# Patient Record
Sex: MALE | Race: WHITE | NOT HISPANIC OR LATINO | Employment: FULL TIME | URBAN - NONMETROPOLITAN AREA
[De-identification: names, ages, dates, MRNs, and addresses within clinical notes are randomized per-mention and may not be internally consistent; named-entity substitution may affect disease eponyms.]

---

## 2017-03-23 ENCOUNTER — APPOINTMENT (OUTPATIENT)
Dept: CT IMAGING | Facility: HOSPITAL | Age: 72
End: 2017-03-23

## 2017-03-23 ENCOUNTER — HOSPITAL ENCOUNTER (EMERGENCY)
Facility: HOSPITAL | Age: 72
Discharge: HOME | End: 2017-03-23
Attending: EMERGENCY MEDICINE | Admitting: EMERGENCY MEDICINE

## 2017-03-23 VITALS
DIASTOLIC BLOOD PRESSURE: 89 MMHG | HEART RATE: 99 BPM | SYSTOLIC BLOOD PRESSURE: 148 MMHG | OXYGEN SATURATION: 97 % | BODY MASS INDEX: 28.85 KG/M2 | HEIGHT: 64 IN | RESPIRATION RATE: 18 BRPM | TEMPERATURE: 98 F | WEIGHT: 169 LBS

## 2017-03-23 DIAGNOSIS — G43.109 OCULAR MIGRAINE: Primary | ICD-10-CM

## 2017-03-23 LAB
A-A DO2: 30.7 MMHG (ref 0–300)
ALBUMIN SERPL-MCNC: 3.7 G/DL (ref 3.4–4.8)
ALBUMIN/GLOB SERPL: 1 G/DL (ref 1.5–2.5)
ALP SERPL-CCNC: 93 U/L (ref 40–129)
ALT SERPL W P-5'-P-CCNC: 21 U/L (ref 10–44)
ANION GAP SERPL CALCULATED.3IONS-SCNC: 6.2 MMOL/L (ref 3.6–11.2)
ARTERIAL PATENCY WRIST A: ABNORMAL
AST SERPL-CCNC: 33 U/L (ref 10–34)
ATMOSPHERIC PRESS: 735 MMHG
BASE EXCESS BLDA CALC-SCNC: 3.5 MMOL/L
BASOPHILS # BLD AUTO: 0.03 10*3/MM3 (ref 0–0.3)
BASOPHILS NFR BLD AUTO: 0.5 % (ref 0–2)
BDY SITE: ABNORMAL
BILIRUB SERPL-MCNC: 0.6 MG/DL (ref 0.2–1.8)
BILIRUB UR QL STRIP: NEGATIVE
BODY TEMPERATURE: 98.6 C
BUN BLD-MCNC: 19 MG/DL (ref 7–21)
BUN/CREAT SERPL: 18.4 (ref 7–25)
CALCIUM SPEC-SCNC: 9 MG/DL (ref 7.7–10)
CHLORIDE SERPL-SCNC: 103 MMOL/L (ref 99–112)
CLARITY UR: CLEAR
CO2 SERPL-SCNC: 28.8 MMOL/L (ref 24.3–31.9)
COHGB MFR BLD: 2.3 % (ref 0–5)
COLOR UR: YELLOW
CREAT BLD-MCNC: 1.03 MG/DL (ref 0.43–1.29)
DEPRECATED RDW RBC AUTO: 40.2 FL (ref 37–54)
EOSINOPHIL # BLD AUTO: 0.14 10*3/MM3 (ref 0–0.7)
EOSINOPHIL NFR BLD AUTO: 2.3 % (ref 0–7)
ERYTHROCYTE [DISTWIDTH] IN BLOOD BY AUTOMATED COUNT: 12.6 % (ref 11.5–14.5)
GFR SERPL CREATININE-BSD FRML MDRD: 71 ML/MIN/1.73
GLOBULIN UR ELPH-MCNC: 3.6 GM/DL
GLUCOSE BLD-MCNC: 97 MG/DL (ref 70–110)
GLUCOSE UR STRIP-MCNC: NEGATIVE MG/DL
HCO3 BLDA-SCNC: 27.7 MMOL/L (ref 22–26)
HCT VFR BLD AUTO: 40.9 % (ref 42–52)
HCT VFR BLD CALC: 42 % (ref 42–52)
HGB BLD-MCNC: 13.4 G/DL (ref 14–18)
HGB BLDA-MCNC: 14.4 G/DL (ref 12–16)
HGB UR QL STRIP.AUTO: NEGATIVE
HOROWITZ INDEX BLD+IHG-RTO: 21 %
IMM GRANULOCYTES # BLD: 0.01 10*3/MM3 (ref 0–0.03)
IMM GRANULOCYTES NFR BLD: 0.2 % (ref 0–0.5)
KETONES UR QL STRIP: NEGATIVE
LEUKOCYTE ESTERASE UR QL STRIP.AUTO: NEGATIVE
LYMPHOCYTES # BLD AUTO: 1.52 10*3/MM3 (ref 1–3)
LYMPHOCYTES NFR BLD AUTO: 24.5 % (ref 16–46)
MCH RBC QN AUTO: 29.3 PG (ref 27–33)
MCHC RBC AUTO-ENTMCNC: 32.8 G/DL (ref 33–37)
MCV RBC AUTO: 89.3 FL (ref 80–94)
METHGB BLD QL: 0.4 % (ref 0–3)
MODALITY: ABNORMAL
MONOCYTES # BLD AUTO: 0.65 10*3/MM3 (ref 0.1–0.9)
MONOCYTES NFR BLD AUTO: 10.5 % (ref 0–12)
NEUTROPHILS # BLD AUTO: 3.85 10*3/MM3 (ref 1.4–6.5)
NEUTROPHILS NFR BLD AUTO: 62 % (ref 40–75)
NITRITE UR QL STRIP: NEGATIVE
OSMOLALITY SERPL CALC.SUM OF ELEC: 277.9 MOSM/KG (ref 273–305)
OXYHGB MFR BLDV: 91.2 % (ref 85–100)
PCO2 BLDA: 40.4 MM HG (ref 35–45)
PH BLDA: 7.45 PH UNITS (ref 7.35–7.45)
PH UR STRIP.AUTO: 6.5 [PH] (ref 5–8)
PLATELET # BLD AUTO: 202 10*3/MM3 (ref 130–400)
PMV BLD AUTO: 10.6 FL (ref 6–10)
PO2 BLDA: 65.4 MM HG (ref 80–100)
POTASSIUM BLD-SCNC: 3.5 MMOL/L (ref 3.5–5.3)
PROT SERPL-MCNC: 7.3 G/DL (ref 6–8)
PROT UR QL STRIP: NEGATIVE
RBC # BLD AUTO: 4.58 10*6/MM3 (ref 4.7–6.1)
SAO2 % BLDCOA: 93.7 % (ref 90–100)
SODIUM BLD-SCNC: 138 MMOL/L (ref 135–153)
SP GR UR STRIP: 1.02 (ref 1–1.03)
UROBILINOGEN UR QL STRIP: NORMAL
WBC NRBC COR # BLD: 6.2 10*3/MM3 (ref 4.5–12.5)

## 2017-03-23 PROCEDURE — 36600 WITHDRAWAL OF ARTERIAL BLOOD: CPT | Performed by: EMERGENCY MEDICINE

## 2017-03-23 PROCEDURE — 82805 BLOOD GASES W/O2 SATURATION: CPT | Performed by: EMERGENCY MEDICINE

## 2017-03-23 PROCEDURE — 81003 URINALYSIS AUTO W/O SCOPE: CPT | Performed by: EMERGENCY MEDICINE

## 2017-03-23 PROCEDURE — 80053 COMPREHEN METABOLIC PANEL: CPT | Performed by: EMERGENCY MEDICINE

## 2017-03-23 PROCEDURE — 99284 EMERGENCY DEPT VISIT MOD MDM: CPT

## 2017-03-23 PROCEDURE — 85025 COMPLETE CBC W/AUTO DIFF WBC: CPT | Performed by: EMERGENCY MEDICINE

## 2017-03-23 PROCEDURE — 93005 ELECTROCARDIOGRAM TRACING: CPT | Performed by: EMERGENCY MEDICINE

## 2017-03-23 PROCEDURE — 82375 ASSAY CARBOXYHB QUANT: CPT | Performed by: EMERGENCY MEDICINE

## 2017-03-23 PROCEDURE — 70450 CT HEAD/BRAIN W/O DYE: CPT

## 2017-03-23 PROCEDURE — 70450 CT HEAD/BRAIN W/O DYE: CPT | Performed by: RADIOLOGY

## 2017-03-23 PROCEDURE — 93010 ELECTROCARDIOGRAM REPORT: CPT | Performed by: INTERNAL MEDICINE

## 2017-03-23 PROCEDURE — 83050 HGB METHEMOGLOBIN QUAN: CPT | Performed by: EMERGENCY MEDICINE

## 2017-03-23 RX ORDER — NAPROXEN 500 MG/1
500 TABLET ORAL 2 TIMES DAILY PRN
Qty: 20 TABLET | Refills: 0 | Status: SHIPPED | OUTPATIENT
Start: 2017-03-23 | End: 2021-01-01

## 2017-03-23 RX ORDER — OMEPRAZOLE 40 MG/1
40 CAPSULE, DELAYED RELEASE ORAL DAILY
COMMUNITY

## 2017-03-23 RX ORDER — LOSARTAN POTASSIUM AND HYDROCHLOROTHIAZIDE 25; 100 MG/1; MG/1
1 TABLET ORAL DAILY
COMMUNITY
End: 2021-01-01

## 2017-03-23 NOTE — ED PROVIDER NOTES
"Subjective   History of Present Illness  72-year-old white male here today with complaint of vision change and speech deficit.  Patient states that he was at work today when he had abrupt onset of speech deficit which she describes as \"not being able to say what he wanted to say.  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  He states that he could see somewhat what he was doing, but not completely, and this was worse in the right eye.  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  He states that he also has severe headache primarily on the right side which seems to start after the other symptoms.  His symptoms currently have resolved.  He had no associated chest pain, shortness of breath, nausea, vomiting, numbness, weakness, tingling, or other symptoms.  He states he has had these symptoms for years intermittently-more than 45 years.  When he was a teenager use to have severe headaches.  Review of Systems   All other systems reviewed and are negative.      Past Medical History:   Diagnosis Date   • Diabetes mellitus     states border diabetic, is not on meds for it   • Hypertension        Allergies   Allergen Reactions   • Codeine        Past Surgical History:   Procedure Laterality Date   • APPENDECTOMY     • CATARACT EXTRACTION     • HERNIA REPAIR         History reviewed. No pertinent family history.    Social History     Social History   • Marital status:      Spouse name: N/A   • Number of children: N/A   • Years of education: N/A     Social History Main Topics   • Smoking status: Former Smoker   • Smokeless tobacco: None   • Alcohol use No   • Drug use: No   • Sexual activity: Defer     Other Topics Concern   • None     Social History Narrative   • None           Objective   Physical Exam   Constitutional: He is oriented to person, place, and time. He appears well-developed and well-nourished.   HENT:   Head: Normocephalic and atraumatic.   Cardiovascular: Normal " rate, regular rhythm and normal heart sounds.  Exam reveals no gallop and no friction rub.    No murmur heard.  Pulmonary/Chest: Effort normal and breath sounds normal.   Abdominal: Soft. Bowel sounds are normal. He exhibits no distension. There is no tenderness.   Musculoskeletal: Normal range of motion. He exhibits edema.   Neurological: He is alert and oriented to person, place, and time. He has normal strength. No cranial nerve deficit or sensory deficit.   Skin: Skin is warm and dry.   Psychiatric: He has a normal mood and affect.   Nursing note and vitals reviewed.      Procedures         ED Course  ED Course   Value Comment By Time   ECG 12 Lead Normal sinus rhythm with occasional PVCs..  Rate of 80.  The right bundle branch block.  Small Q waves in lead aVF.  No acute ischemic changes. Pan Barrera MD 03/23 1704    Workup here today unremarkable.  Given his current symptoms as well as past history, he most likely has ocular migraines.  I discussed with Dr. Vargas who will see patient in follow-up as an outpatient.  He recommends NSAIDs for abortive therapy at this time. Pan Barrera MD 03/23 1710                  MDM  Number of Diagnoses or Management Options  Ocular migraine:      Amount and/or Complexity of Data Reviewed  Clinical lab tests: reviewed and ordered  Tests in the radiology section of CPT®: reviewed and ordered    Risk of Complications, Morbidity, and/or Mortality  Presenting problems: high  Diagnostic procedures: high  Management options: high        Final diagnoses:   Ocular migraine            Pan Barrera MD  03/23/17 3598

## 2017-03-23 NOTE — ED NOTES
Pt requesting something to eat, dr walker agreed, dietary notified     Oliva Griffin, RN  03/23/17 9837

## 2017-03-23 NOTE — ED NOTES
Patient unable to provide a urine sample at this time. Collection materials at patient bedside, will check back soon.      Sharonda Kerr  03/23/17 2758

## 2017-05-05 ENCOUNTER — TRANSCRIBE ORDERS (OUTPATIENT)
Dept: ADMINISTRATIVE | Facility: HOSPITAL | Age: 72
End: 2017-05-05

## 2017-05-05 ENCOUNTER — HOSPITAL ENCOUNTER (OUTPATIENT)
Dept: GENERAL RADIOLOGY | Facility: HOSPITAL | Age: 72
Discharge: HOME | End: 2017-05-05
Attending: PSYCHIATRY & NEUROLOGY | Admitting: PSYCHIATRY & NEUROLOGY

## 2017-05-05 DIAGNOSIS — H53.9 VISUAL CHANGES: ICD-10-CM

## 2017-05-05 DIAGNOSIS — R51.9 HEADACHE, UNSPECIFIED HEADACHE TYPE: Primary | ICD-10-CM

## 2017-05-05 DIAGNOSIS — R51.9 HEADACHE, UNSPECIFIED HEADACHE TYPE: ICD-10-CM

## 2017-05-05 PROCEDURE — 72050 X-RAY EXAM NECK SPINE 4/5VWS: CPT | Performed by: RADIOLOGY

## 2017-05-05 PROCEDURE — 72050 X-RAY EXAM NECK SPINE 4/5VWS: CPT

## 2017-05-15 ENCOUNTER — HOSPITAL ENCOUNTER (OUTPATIENT)
Dept: MRI IMAGING | Facility: HOSPITAL | Age: 72
Discharge: HOME | End: 2017-05-15
Attending: PSYCHIATRY & NEUROLOGY

## 2017-05-15 ENCOUNTER — HOSPITAL ENCOUNTER (OUTPATIENT)
Dept: CARDIOLOGY | Facility: HOSPITAL | Age: 72
Discharge: HOME | End: 2017-05-15
Attending: PSYCHIATRY & NEUROLOGY | Admitting: PSYCHIATRY & NEUROLOGY

## 2017-05-15 DIAGNOSIS — H53.9 VISUAL CHANGES: ICD-10-CM

## 2017-05-15 DIAGNOSIS — R51.9 HEADACHE, UNSPECIFIED HEADACHE TYPE: ICD-10-CM

## 2017-05-15 PROCEDURE — 70551 MRI BRAIN STEM W/O DYE: CPT

## 2017-05-15 PROCEDURE — 93880 EXTRACRANIAL BILAT STUDY: CPT

## 2017-05-15 PROCEDURE — 93880 EXTRACRANIAL BILAT STUDY: CPT | Performed by: RADIOLOGY

## 2017-05-15 PROCEDURE — 70551 MRI BRAIN STEM W/O DYE: CPT | Performed by: RADIOLOGY

## 2017-06-13 ENCOUNTER — HOSPITAL ENCOUNTER (EMERGENCY)
Facility: HOSPITAL | Age: 72
Discharge: LEFT BEFORE DISCHARGE | End: 2017-06-13
Attending: EMERGENCY MEDICINE

## 2017-06-13 VITALS
WEIGHT: 163 LBS | HEART RATE: 88 BPM | RESPIRATION RATE: 16 BRPM | BODY MASS INDEX: 26.2 KG/M2 | OXYGEN SATURATION: 97 % | TEMPERATURE: 97.5 F | HEIGHT: 66 IN | DIASTOLIC BLOOD PRESSURE: 86 MMHG | SYSTOLIC BLOOD PRESSURE: 174 MMHG

## 2017-06-13 PROCEDURE — 99211 OFF/OP EST MAY X REQ PHY/QHP: CPT

## 2017-06-14 ENCOUNTER — HOSPITAL ENCOUNTER (EMERGENCY)
Facility: HOSPITAL | Age: 72
Discharge: HOME | End: 2017-06-14
Attending: EMERGENCY MEDICINE | Admitting: EMERGENCY MEDICINE

## 2017-06-14 VITALS
BODY MASS INDEX: 26.2 KG/M2 | WEIGHT: 163 LBS | SYSTOLIC BLOOD PRESSURE: 172 MMHG | HEART RATE: 82 BPM | DIASTOLIC BLOOD PRESSURE: 96 MMHG | OXYGEN SATURATION: 98 % | HEIGHT: 66 IN | RESPIRATION RATE: 16 BRPM | TEMPERATURE: 98 F

## 2017-06-14 DIAGNOSIS — G44.209 TENSION HEADACHE: Primary | ICD-10-CM

## 2017-06-14 DIAGNOSIS — I10 ESSENTIAL HYPERTENSION: ICD-10-CM

## 2017-06-14 LAB
ANION GAP SERPL CALCULATED.3IONS-SCNC: 5.3 MMOL/L (ref 3.6–11.2)
BASOPHILS # BLD AUTO: 0.02 10*3/MM3 (ref 0–0.3)
BASOPHILS NFR BLD AUTO: 0.2 % (ref 0–2)
BUN BLD-MCNC: 18 MG/DL (ref 7–21)
BUN/CREAT SERPL: 18.4 (ref 7–25)
CALCIUM SPEC-SCNC: 9.6 MG/DL (ref 7.7–10)
CHLORIDE SERPL-SCNC: 98 MMOL/L (ref 99–112)
CO2 SERPL-SCNC: 31.7 MMOL/L (ref 24.3–31.9)
CREAT BLD-MCNC: 0.98 MG/DL (ref 0.43–1.29)
DEPRECATED RDW RBC AUTO: 39.2 FL (ref 37–54)
EOSINOPHIL # BLD AUTO: 0.15 10*3/MM3 (ref 0–0.7)
EOSINOPHIL NFR BLD AUTO: 1.8 % (ref 0–7)
ERYTHROCYTE [DISTWIDTH] IN BLOOD BY AUTOMATED COUNT: 12.2 % (ref 11.5–14.5)
GFR SERPL CREATININE-BSD FRML MDRD: 75 ML/MIN/1.73
GLUCOSE BLD-MCNC: 122 MG/DL (ref 70–110)
HCT VFR BLD AUTO: 42.1 % (ref 42–52)
HGB BLD-MCNC: 14.1 G/DL (ref 14–18)
IMM GRANULOCYTES # BLD: 0.01 10*3/MM3 (ref 0–0.03)
IMM GRANULOCYTES NFR BLD: 0.1 % (ref 0–0.5)
LYMPHOCYTES # BLD AUTO: 1.43 10*3/MM3 (ref 1–3)
LYMPHOCYTES NFR BLD AUTO: 17 % (ref 16–46)
MCH RBC QN AUTO: 29.7 PG (ref 27–33)
MCHC RBC AUTO-ENTMCNC: 33.5 G/DL (ref 33–37)
MCV RBC AUTO: 88.6 FL (ref 80–94)
MONOCYTES # BLD AUTO: 0.64 10*3/MM3 (ref 0.1–0.9)
MONOCYTES NFR BLD AUTO: 7.6 % (ref 0–12)
NEUTROPHILS # BLD AUTO: 6.14 10*3/MM3 (ref 1.4–6.5)
NEUTROPHILS NFR BLD AUTO: 73.3 % (ref 40–75)
OSMOLALITY SERPL CALC.SUM OF ELEC: 273.3 MOSM/KG (ref 273–305)
PLATELET # BLD AUTO: 186 10*3/MM3 (ref 130–400)
PMV BLD AUTO: 10.9 FL (ref 6–10)
POTASSIUM BLD-SCNC: 3.6 MMOL/L (ref 3.5–5.3)
RBC # BLD AUTO: 4.75 10*6/MM3 (ref 4.7–6.1)
SODIUM BLD-SCNC: 135 MMOL/L (ref 135–153)
WBC NRBC COR # BLD: 8.39 10*3/MM3 (ref 4.5–12.5)

## 2017-06-14 PROCEDURE — 80048 BASIC METABOLIC PNL TOTAL CA: CPT | Performed by: EMERGENCY MEDICINE

## 2017-06-14 PROCEDURE — 99283 EMERGENCY DEPT VISIT LOW MDM: CPT

## 2017-06-14 PROCEDURE — 96374 THER/PROPH/DIAG INJ IV PUSH: CPT

## 2017-06-14 PROCEDURE — 25010000002 KETOROLAC TROMETHAMINE PER 15 MG: Performed by: EMERGENCY MEDICINE

## 2017-06-14 PROCEDURE — 96372 THER/PROPH/DIAG INJ SC/IM: CPT

## 2017-06-14 PROCEDURE — 96375 TX/PRO/DX INJ NEW DRUG ADDON: CPT

## 2017-06-14 PROCEDURE — 85025 COMPLETE CBC W/AUTO DIFF WBC: CPT | Performed by: EMERGENCY MEDICINE

## 2017-06-14 PROCEDURE — 25010000002 PROCHLORPERAZINE EDISYLATE PER 10 MG: Performed by: EMERGENCY MEDICINE

## 2017-06-14 PROCEDURE — 96361 HYDRATE IV INFUSION ADD-ON: CPT

## 2017-06-14 RX ORDER — KETOROLAC TROMETHAMINE 30 MG/ML
30 INJECTION, SOLUTION INTRAMUSCULAR; INTRAVENOUS ONCE
Status: COMPLETED | OUTPATIENT
Start: 2017-06-14 | End: 2017-06-14

## 2017-06-14 RX ORDER — SUMATRIPTAN 6 MG/.5ML
6 INJECTION, SOLUTION SUBCUTANEOUS ONCE
Status: COMPLETED | OUTPATIENT
Start: 2017-06-14 | End: 2017-06-14

## 2017-06-14 RX ORDER — SODIUM CHLORIDE 0.9 % (FLUSH) 0.9 %
10 SYRINGE (ML) INJECTION AS NEEDED
Status: DISCONTINUED | OUTPATIENT
Start: 2017-06-14 | End: 2017-06-14 | Stop reason: HOSPADM

## 2017-06-14 RX ADMIN — SODIUM CHLORIDE 500 ML: 9 INJECTION, SOLUTION INTRAVENOUS at 09:51

## 2017-06-14 RX ADMIN — PROCHLORPERAZINE EDISYLATE 10 MG: 5 INJECTION INTRAMUSCULAR; INTRAVENOUS at 09:56

## 2017-06-14 RX ADMIN — KETOROLAC TROMETHAMINE 30 MG: 30 INJECTION, SOLUTION INTRAMUSCULAR at 09:54

## 2017-06-14 RX ADMIN — SUMATRIPTAN 6 MG: 6 INJECTION, SOLUTION SUBCUTANEOUS at 09:51

## 2017-06-14 NOTE — ED PROVIDER NOTES
Subjective   HPI Comments: Dr. Vargas suggested chronic headaches were not migraines, but mini-strokes.  Recent MRI shows chronic small vessel disease no evidence of ischemia.    Patient is a 72 y.o. male presenting with headaches.   History provided by:  Patient and spouse   used: No    Headache   Pain location:  L temporal  Quality:  Sharp  Radiates to:  Does not radiate  Severity currently:  10/10  Severity at highest:  10/10  Onset quality:  Sudden  Duration:  1 day  Timing:  Constant  Progression:  Unchanged  Chronicity:  Recurrent  Similar to prior headaches: yes    Relieved by:  Nothing  Worsened by:  Nothing  Ineffective treatments:  Acetaminophen  Associated symptoms: nausea    Associated symptoms: no abdominal pain and no fever        Review of Systems   Constitutional: Negative.  Negative for fever.   Respiratory: Negative.    Cardiovascular: Negative.  Negative for chest pain.   Gastrointestinal: Positive for nausea. Negative for abdominal pain.   Endocrine: Negative.    Genitourinary: Negative.  Negative for dysuria.   Skin: Negative.    Neurological: Positive for headaches.   Psychiatric/Behavioral: Positive for decreased concentration.   All other systems reviewed and are negative.      Past Medical History:   Diagnosis Date   • Diabetes mellitus     states border diabetic, is not on meds for it   • Hypertension        Allergies   Allergen Reactions   • Codeine        Past Surgical History:   Procedure Laterality Date   • APPENDECTOMY     • CATARACT EXTRACTION     • HERNIA REPAIR         History reviewed. No pertinent family history.    Social History     Social History   • Marital status:      Spouse name: N/A   • Number of children: N/A   • Years of education: N/A     Social History Main Topics   • Smoking status: Former Smoker   • Smokeless tobacco: None   • Alcohol use No   • Drug use: No   • Sexual activity: Defer     Other Topics Concern   • None     Social History  Narrative         Objective   Physical Exam   Constitutional: He is oriented to person, place, and time. He appears well-developed and well-nourished. No distress.   HENT:   Head: Normocephalic and atraumatic.   Right Ear: External ear normal.   Left Ear: External ear normal.   Nose: Nose normal.   Eyes: Conjunctivae and EOM are normal. Pupils are equal, round, and reactive to light.   Neck: Normal range of motion. Neck supple. No JVD present. No tracheal deviation present.   Cardiovascular: Normal rate, regular rhythm and normal heart sounds.    No murmur heard.  Pulmonary/Chest: Effort normal and breath sounds normal. No respiratory distress. He has no wheezes.   Abdominal: Soft. Bowel sounds are normal. There is no tenderness.   Musculoskeletal: Normal range of motion. He exhibits no edema or deformity.   Neurological: He is alert and oriented to person, place, and time. No cranial nerve deficit.   Skin: Skin is warm and dry. No rash noted. He is not diaphoretic. No erythema. No pallor.   Psychiatric: He has a normal mood and affect. His behavior is normal. Thought content normal.   Nursing note and vitals reviewed.      Procedures         LAB RESULTS  Lab Results (last 24 hours)     Procedure Component Value Units Date/Time    CBC & Differential [[id number]] Collected:  06/14/17 0958    Specimen:  Blood Updated:  06/14/17 1025    Narrative:       The following orders were created for panel order CBC & Differential.  Procedure                               Abnormality         Status                     ---------                               -----------         ------                     CBC Auto Differential[[id number]]         Abnormal            Final result                 Please view results for these tests on the individual orders.    Basic Metabolic Panel [[id number]]  (Abnormal) Collected:  06/14/17 0958    Specimen:  Blood from Arm, Left Updated:  06/14/17 1027     Glucose 122 (H) mg/dL      BUN 18 mg/dL       Creatinine 0.98 mg/dL      Sodium 135 mmol/L      Potassium 3.6 mmol/L      Chloride 98 (L) mmol/L      CO2 31.7 mmol/L      Calcium 9.6 mg/dL      eGFR Non African Amer 75 mL/min/1.73      BUN/Creatinine Ratio 18.4     Anion Gap 5.3 mmol/L     Narrative:       The MDRD GFR formula is only valid for adults with stable renal function between ages 18 and 70.    CBC Auto Differential [[id number]]  (Abnormal) Collected:  06/14/17 0958    Specimen:  Blood from Arm, Left Updated:  06/14/17 1025     WBC 8.39 10*3/mm3      RBC 4.75 10*6/mm3      Hemoglobin 14.1 g/dL      Hematocrit 42.1 %      MCV 88.6 fL      MCH 29.7 pg      MCHC 33.5 g/dL      RDW 12.2 %      RDW-SD 39.2 fl      MPV 10.9 (H) fL      Platelets 186 10*3/mm3      Neutrophil % 73.3 %      Lymphocyte % 17.0 %      Monocyte % 7.6 %      Eosinophil % 1.8 %      Basophil % 0.2 %      Immature Grans % 0.1 %      Neutrophils, Absolute 6.14 10*3/mm3      Lymphocytes, Absolute 1.43 10*3/mm3      Monocytes, Absolute 0.64 10*3/mm3      Eosinophils, Absolute 0.15 10*3/mm3      Basophils, Absolute 0.02 10*3/mm3      Immature Grans, Absolute 0.01 10*3/mm3           I ordered the above labs and reviewed the results        ED Course  ED Course       Headache much improved              MDM  Number of Diagnoses or Management Options  Tension headache: established and improving     Amount and/or Complexity of Data Reviewed  Clinical lab tests: ordered    Risk of Complications, Morbidity, and/or Mortality  Presenting problems: high  Diagnostic procedures: high  Management options: high        Final diagnoses:   Tension headache   Essential hypertension       Documentation assistance provided by agueda Calderon.  Information recorded by the agueda was done at my direction and has been verified and validated by me.     Morro Calderon  06/14/17 0929       Morro Calderon  06/14/17 1039       Tyson Calderon MD  06/14/17 3672

## 2018-07-06 ENCOUNTER — TRANSCRIBE ORDERS (OUTPATIENT)
Dept: ADMINISTRATIVE | Facility: HOSPITAL | Age: 73
End: 2018-07-06

## 2018-07-06 DIAGNOSIS — Z13.6 ENCOUNTER FOR SCREENING FOR CARDIOVASCULAR DISORDERS: Primary | ICD-10-CM

## 2019-01-24 ENCOUNTER — HOSPITAL ENCOUNTER (OUTPATIENT)
Dept: ULTRASOUND IMAGING | Facility: HOSPITAL | Age: 74
Discharge: HOME | End: 2019-01-24
Admitting: NURSE PRACTITIONER

## 2019-01-24 DIAGNOSIS — Z13.6 ENCOUNTER FOR SCREENING FOR CARDIOVASCULAR DISORDERS: ICD-10-CM

## 2019-01-24 PROCEDURE — 76706 US ABDL AORTA SCREEN AAA: CPT | Performed by: RADIOLOGY

## 2019-01-24 PROCEDURE — 76706 US ABDL AORTA SCREEN AAA: CPT

## 2019-02-12 ENCOUNTER — TRANSCRIBE ORDERS (OUTPATIENT)
Dept: ADMINISTRATIVE | Facility: HOSPITAL | Age: 74
End: 2019-02-12

## 2019-02-12 DIAGNOSIS — Z13.6 SCREENING FOR AAA (AORTIC ABDOMINAL ANEURYSM): Primary | ICD-10-CM

## 2019-08-02 ENCOUNTER — TRANSCRIBE ORDERS (OUTPATIENT)
Dept: ADMINISTRATIVE | Facility: HOSPITAL | Age: 74
End: 2019-08-02

## 2019-08-02 DIAGNOSIS — Z13.6 SCREENING FOR ISCHEMIC HEART DISEASE: Primary | ICD-10-CM

## 2021-01-01 ENCOUNTER — APPOINTMENT (OUTPATIENT)
Dept: GENERAL RADIOLOGY | Facility: HOSPITAL | Age: 76
End: 2021-01-01

## 2021-01-01 ENCOUNTER — APPOINTMENT (OUTPATIENT)
Dept: ULTRASOUND IMAGING | Facility: HOSPITAL | Age: 76
End: 2021-01-01

## 2021-01-01 ENCOUNTER — APPOINTMENT (OUTPATIENT)
Dept: CT IMAGING | Facility: HOSPITAL | Age: 76
End: 2021-01-01

## 2021-01-01 ENCOUNTER — HOSPITAL ENCOUNTER (INPATIENT)
Facility: HOSPITAL | Age: 76
LOS: 18 days | End: 2021-09-20
Attending: STUDENT IN AN ORGANIZED HEALTH CARE EDUCATION/TRAINING PROGRAM | Admitting: INTERNAL MEDICINE

## 2021-01-01 ENCOUNTER — APPOINTMENT (OUTPATIENT)
Dept: CARDIOLOGY | Facility: HOSPITAL | Age: 76
End: 2021-01-01

## 2021-01-01 ENCOUNTER — APPOINTMENT (OUTPATIENT)
Dept: NUCLEAR MEDICINE | Facility: HOSPITAL | Age: 76
End: 2021-01-01

## 2021-01-01 VITALS
WEIGHT: 170.2 LBS | RESPIRATION RATE: 24 BRPM | SYSTOLIC BLOOD PRESSURE: 85 MMHG | DIASTOLIC BLOOD PRESSURE: 67 MMHG | HEIGHT: 66 IN | OXYGEN SATURATION: 60 % | BODY MASS INDEX: 27.35 KG/M2 | HEART RATE: 64 BPM | TEMPERATURE: 97.9 F

## 2021-01-01 DIAGNOSIS — J12.82 PNEUMONIA DUE TO COVID-19 VIRUS: Primary | ICD-10-CM

## 2021-01-01 DIAGNOSIS — Z23 IMMUNIZATION DUE: ICD-10-CM

## 2021-01-01 DIAGNOSIS — J96.01 ACUTE RESPIRATORY FAILURE WITH HYPOXIA (HCC): ICD-10-CM

## 2021-01-01 DIAGNOSIS — R09.02 HYPOXIA: ICD-10-CM

## 2021-01-01 DIAGNOSIS — U07.1 PNEUMONIA DUE TO COVID-19 VIRUS: Primary | ICD-10-CM

## 2021-01-01 LAB
A-A DO2: 100 MMHG (ref 0–300)
A-A DO2: 117.8 MMHG (ref 0–300)
A-A DO2: 144.3 MMHG (ref 0–300)
A-A DO2: 213.5 MMHG (ref 0–300)
A-A DO2: 223.4 MMHG (ref 0–300)
A-A DO2: 226.1 MMHG (ref 0–300)
A-A DO2: 244.4 MMHG (ref 0–300)
A-A DO2: 245.3 MMHG (ref 0–300)
A-A DO2: 274.7 MMHG (ref 0–300)
A-A DO2: 297.6 MMHG (ref 0–300)
A-A DO2: 299 MMHG (ref 0–300)
A-A DO2: 309.2 MMHG (ref 0–300)
A-A DO2: 314.1 MMHG (ref 0–300)
A-A DO2: 344.5 MMHG (ref 0–300)
A-A DO2: 358 MMHG (ref 0–300)
A-A DO2: 368.7 MMHG (ref 0–300)
A-A DO2: 441.4 MMHG (ref 0–300)
A-A DO2: 47.4 MMHG (ref 0–300)
A-A DO2: 502.5 MMHG (ref 0–300)
A-A DO2: 504.4 MMHG (ref 0–300)
A-A DO2: 505.1 MMHG (ref 0–300)
A-A DO2: 521.4 MMHG (ref 0–300)
A-A DO2: 551 MMHG (ref 0–300)
A-A DO2: 552.7 MMHG (ref 0–300)
A-A DO2: 565 MMHG (ref 0–300)
A-A DO2: 568.2 MMHG (ref 0–300)
A-A DO2: 573.5 MMHG (ref 0–300)
A-A DO2: 577.1 MMHG (ref 0–300)
A-A DO2: 577.7 MMHG (ref 0–300)
A-A DO2: 577.7 MMHG (ref 0–300)
A-A DO2: 581.8 MMHG (ref 0–300)
A-A DO2: 584.3 MMHG (ref 0–300)
A-A DO2: 585.9 MMHG (ref 0–300)
A-A DO2: 596.7 MMHG (ref 0–300)
ALBUMIN SERPL-MCNC: 1.74 G/DL (ref 3.5–5.2)
ALBUMIN SERPL-MCNC: 1.78 G/DL (ref 3.5–5.2)
ALBUMIN SERPL-MCNC: 1.85 G/DL (ref 3.5–5.2)
ALBUMIN SERPL-MCNC: 1.88 G/DL (ref 3.5–5.2)
ALBUMIN SERPL-MCNC: 1.91 G/DL (ref 3.5–5.2)
ALBUMIN SERPL-MCNC: 1.92 G/DL (ref 3.5–5.2)
ALBUMIN SERPL-MCNC: 1.96 G/DL (ref 3.5–5.2)
ALBUMIN SERPL-MCNC: 1.98 G/DL (ref 3.5–5.2)
ALBUMIN SERPL-MCNC: 2.04 G/DL (ref 3.5–5.2)
ALBUMIN SERPL-MCNC: 2.05 G/DL (ref 3.5–5.2)
ALBUMIN SERPL-MCNC: 2.12 G/DL (ref 3.5–5.2)
ALBUMIN SERPL-MCNC: 2.14 G/DL (ref 3.5–5.2)
ALBUMIN SERPL-MCNC: 2.14 G/DL (ref 3.5–5.2)
ALBUMIN SERPL-MCNC: 2.56 G/DL (ref 3.5–5.2)
ALBUMIN SERPL-MCNC: 2.76 G/DL (ref 3.5–5.2)
ALBUMIN SERPL-MCNC: 2.78 G/DL (ref 3.5–5.2)
ALBUMIN SERPL-MCNC: 2.94 G/DL (ref 3.5–5.2)
ALBUMIN SERPL-MCNC: 2.94 G/DL (ref 3.5–5.2)
ALBUMIN SERPL-MCNC: 3.11 G/DL (ref 3.5–5.2)
ALBUMIN SERPL-MCNC: 3.16 G/DL (ref 3.5–5.2)
ALBUMIN SERPL-MCNC: 3.3 G/DL (ref 3.5–5.2)
ALBUMIN SERPL-MCNC: 3.65 G/DL (ref 3.5–5.2)
ALBUMIN/GLOB SERPL: 0.5 G/DL
ALBUMIN/GLOB SERPL: 0.5 G/DL
ALBUMIN/GLOB SERPL: 0.6 G/DL
ALBUMIN/GLOB SERPL: 0.7 G/DL
ALBUMIN/GLOB SERPL: 0.8 G/DL
ALBUMIN/GLOB SERPL: 0.8 G/DL
ALBUMIN/GLOB SERPL: 0.9 G/DL
ALBUMIN/GLOB SERPL: 1.2 G/DL
ALP SERPL-CCNC: 46 U/L (ref 39–117)
ALP SERPL-CCNC: 47 U/L (ref 39–117)
ALP SERPL-CCNC: 47 U/L (ref 39–117)
ALP SERPL-CCNC: 48 U/L (ref 39–117)
ALP SERPL-CCNC: 50 U/L (ref 39–117)
ALP SERPL-CCNC: 51 U/L (ref 39–117)
ALP SERPL-CCNC: 54 U/L (ref 39–117)
ALP SERPL-CCNC: 56 U/L (ref 39–117)
ALP SERPL-CCNC: 57 U/L (ref 39–117)
ALP SERPL-CCNC: 59 U/L (ref 39–117)
ALP SERPL-CCNC: 59 U/L (ref 39–117)
ALP SERPL-CCNC: 60 U/L (ref 39–117)
ALP SERPL-CCNC: 61 U/L (ref 39–117)
ALP SERPL-CCNC: 61 U/L (ref 39–117)
ALP SERPL-CCNC: 63 U/L (ref 39–117)
ALP SERPL-CCNC: 66 U/L (ref 39–117)
ALP SERPL-CCNC: 67 U/L (ref 39–117)
ALP SERPL-CCNC: 68 U/L (ref 39–117)
ALP SERPL-CCNC: 71 U/L (ref 39–117)
ALP SERPL-CCNC: 75 U/L (ref 39–117)
ALT SERPL W P-5'-P-CCNC: 28 U/L (ref 1–41)
ALT SERPL W P-5'-P-CCNC: 30 U/L (ref 1–41)
ALT SERPL W P-5'-P-CCNC: 32 U/L (ref 1–41)
ALT SERPL W P-5'-P-CCNC: 32 U/L (ref 1–41)
ALT SERPL W P-5'-P-CCNC: 33 U/L (ref 1–41)
ALT SERPL W P-5'-P-CCNC: 34 U/L (ref 1–41)
ALT SERPL W P-5'-P-CCNC: 34 U/L (ref 1–41)
ALT SERPL W P-5'-P-CCNC: 36 U/L (ref 1–41)
ALT SERPL W P-5'-P-CCNC: 37 U/L (ref 1–41)
ALT SERPL W P-5'-P-CCNC: 37 U/L (ref 1–41)
ALT SERPL W P-5'-P-CCNC: 38 U/L (ref 1–41)
ALT SERPL W P-5'-P-CCNC: 38 U/L (ref 1–41)
ALT SERPL W P-5'-P-CCNC: 39 U/L (ref 1–41)
ALT SERPL W P-5'-P-CCNC: 40 U/L (ref 1–41)
ALT SERPL W P-5'-P-CCNC: 40 U/L (ref 1–41)
ALT SERPL W P-5'-P-CCNC: 41 U/L (ref 1–41)
ALT SERPL W P-5'-P-CCNC: 41 U/L (ref 1–41)
ALT SERPL W P-5'-P-CCNC: 42 U/L (ref 1–41)
ALT SERPL W P-5'-P-CCNC: 42 U/L (ref 1–41)
ALT SERPL W P-5'-P-CCNC: 43 U/L (ref 1–41)
ALT SERPL W P-5'-P-CCNC: 47 U/L (ref 1–41)
ALT SERPL W P-5'-P-CCNC: 68 U/L (ref 1–41)
AMORPH URATE CRY URNS QL MICRO: ABNORMAL /HPF
ANION GAP SERPL CALCULATED.3IONS-SCNC: 10.1 MMOL/L (ref 5–15)
ANION GAP SERPL CALCULATED.3IONS-SCNC: 10.3 MMOL/L (ref 5–15)
ANION GAP SERPL CALCULATED.3IONS-SCNC: 10.8 MMOL/L (ref 5–15)
ANION GAP SERPL CALCULATED.3IONS-SCNC: 11 MMOL/L (ref 5–15)
ANION GAP SERPL CALCULATED.3IONS-SCNC: 11.5 MMOL/L (ref 5–15)
ANION GAP SERPL CALCULATED.3IONS-SCNC: 13.1 MMOL/L (ref 5–15)
ANION GAP SERPL CALCULATED.3IONS-SCNC: 5.4 MMOL/L (ref 5–15)
ANION GAP SERPL CALCULATED.3IONS-SCNC: 6.7 MMOL/L (ref 5–15)
ANION GAP SERPL CALCULATED.3IONS-SCNC: 7 MMOL/L (ref 5–15)
ANION GAP SERPL CALCULATED.3IONS-SCNC: 7.2 MMOL/L (ref 5–15)
ANION GAP SERPL CALCULATED.3IONS-SCNC: 7.2 MMOL/L (ref 5–15)
ANION GAP SERPL CALCULATED.3IONS-SCNC: 7.4 MMOL/L (ref 5–15)
ANION GAP SERPL CALCULATED.3IONS-SCNC: 8.4 MMOL/L (ref 5–15)
ANION GAP SERPL CALCULATED.3IONS-SCNC: 8.4 MMOL/L (ref 5–15)
ANION GAP SERPL CALCULATED.3IONS-SCNC: 8.7 MMOL/L (ref 5–15)
ANION GAP SERPL CALCULATED.3IONS-SCNC: 9 MMOL/L (ref 5–15)
ANION GAP SERPL CALCULATED.3IONS-SCNC: 9.5 MMOL/L (ref 5–15)
ANION GAP SERPL CALCULATED.3IONS-SCNC: 9.6 MMOL/L (ref 5–15)
ANION GAP SERPL CALCULATED.3IONS-SCNC: 9.8 MMOL/L (ref 5–15)
APTT PPP: 30.2 SECONDS (ref 25.5–35.4)
APTT PPP: 30.9 SECONDS (ref 25.5–35.4)
APTT PPP: 33.1 SECONDS (ref 25.5–35.4)
APTT PPP: 33.4 SECONDS (ref 25.5–35.4)
APTT PPP: 35.3 SECONDS (ref 25.5–35.4)
APTT PPP: 36.2 SECONDS (ref 25.5–35.4)
APTT PPP: 36.8 SECONDS (ref 25.5–35.4)
APTT PPP: 39.7 SECONDS (ref 25.5–35.4)
APTT PPP: 45.9 SECONDS (ref 25.5–35.4)
ARTERIAL PATENCY WRIST A: ABNORMAL
ARTERIAL PATENCY WRIST A: POSITIVE
AST SERPL-CCNC: 32 U/L (ref 1–40)
AST SERPL-CCNC: 35 U/L (ref 1–40)
AST SERPL-CCNC: 38 U/L (ref 1–40)
AST SERPL-CCNC: 40 U/L (ref 1–40)
AST SERPL-CCNC: 41 U/L (ref 1–40)
AST SERPL-CCNC: 44 U/L (ref 1–40)
AST SERPL-CCNC: 45 U/L (ref 1–40)
AST SERPL-CCNC: 50 U/L (ref 1–40)
AST SERPL-CCNC: 51 U/L (ref 1–40)
AST SERPL-CCNC: 53 U/L (ref 1–40)
AST SERPL-CCNC: 53 U/L (ref 1–40)
AST SERPL-CCNC: 54 U/L (ref 1–40)
AST SERPL-CCNC: 55 U/L (ref 1–40)
AST SERPL-CCNC: 60 U/L (ref 1–40)
AST SERPL-CCNC: 63 U/L (ref 1–40)
AST SERPL-CCNC: 65 U/L (ref 1–40)
AST SERPL-CCNC: 68 U/L (ref 1–40)
AST SERPL-CCNC: 71 U/L (ref 1–40)
AST SERPL-CCNC: 72 U/L (ref 1–40)
AST SERPL-CCNC: 73 U/L (ref 1–40)
AST SERPL-CCNC: 74 U/L (ref 1–40)
AST SERPL-CCNC: 88 U/L (ref 1–40)
ATMOSPHERIC PRESS: 724 MMHG
ATMOSPHERIC PRESS: 724 MMHG
ATMOSPHERIC PRESS: 725 MMHG
ATMOSPHERIC PRESS: 726 MMHG
ATMOSPHERIC PRESS: 727 MMHG
ATMOSPHERIC PRESS: 728 MMHG
ATMOSPHERIC PRESS: 729 MMHG
ATMOSPHERIC PRESS: 730 MMHG
ATMOSPHERIC PRESS: 730 MMHG
ATMOSPHERIC PRESS: 731 MMHG
ATMOSPHERIC PRESS: 732 MMHG
ATMOSPHERIC PRESS: 733 MMHG
BACTERIA SPEC AEROBE CULT: NO GROWTH
BACTERIA SPEC AEROBE CULT: NORMAL
BACTERIA SPEC RESP CULT: NO GROWTH
BACTERIA UR QL AUTO: ABNORMAL /HPF
BACTERIA UR QL AUTO: ABNORMAL /HPF
BACTERIA UR QL AUTO: NORMAL /HPF
BASE EXCESS BLDA CALC-SCNC: -1.2 MMOL/L (ref 0–2)
BASE EXCESS BLDA CALC-SCNC: -3 MMOL/L (ref 0–2)
BASE EXCESS BLDA CALC-SCNC: -3.6 MMOL/L (ref 0–2)
BASE EXCESS BLDA CALC-SCNC: -3.9 MMOL/L (ref 0–2)
BASE EXCESS BLDA CALC-SCNC: 0 MMOL/L (ref 0–2)
BASE EXCESS BLDA CALC-SCNC: 0.4 MMOL/L (ref 0–2)
BASE EXCESS BLDA CALC-SCNC: 1 MMOL/L (ref 0–2)
BASE EXCESS BLDA CALC-SCNC: 1.5 MMOL/L (ref 0–2)
BASE EXCESS BLDA CALC-SCNC: 1.5 MMOL/L (ref 0–2)
BASE EXCESS BLDA CALC-SCNC: 1.8 MMOL/L (ref 0–2)
BASE EXCESS BLDA CALC-SCNC: 2 MMOL/L (ref 0–2)
BASE EXCESS BLDA CALC-SCNC: 2.1 MMOL/L (ref 0–2)
BASE EXCESS BLDA CALC-SCNC: 2.7 MMOL/L (ref 0–2)
BASE EXCESS BLDA CALC-SCNC: 2.9 MMOL/L (ref 0–2)
BASE EXCESS BLDA CALC-SCNC: 3 MMOL/L (ref 0–2)
BASE EXCESS BLDA CALC-SCNC: 3.5 MMOL/L (ref 0–2)
BASE EXCESS BLDA CALC-SCNC: 4 MMOL/L (ref 0–2)
BASE EXCESS BLDA CALC-SCNC: 4.2 MMOL/L (ref 0–2)
BASE EXCESS BLDA CALC-SCNC: 4.3 MMOL/L (ref 0–2)
BASE EXCESS BLDA CALC-SCNC: 4.4 MMOL/L (ref 0–2)
BASE EXCESS BLDA CALC-SCNC: 4.4 MMOL/L (ref 0–2)
BASE EXCESS BLDA CALC-SCNC: 4.8 MMOL/L (ref 0–2)
BASE EXCESS BLDA CALC-SCNC: 4.9 MMOL/L (ref 0–2)
BASE EXCESS BLDA CALC-SCNC: 5 MMOL/L (ref 0–2)
BASE EXCESS BLDA CALC-SCNC: 5.1 MMOL/L (ref 0–2)
BASE EXCESS BLDA CALC-SCNC: 5.2 MMOL/L (ref 0–2)
BASE EXCESS BLDA CALC-SCNC: 5.3 MMOL/L (ref 0–2)
BASE EXCESS BLDA CALC-SCNC: 5.9 MMOL/L (ref 0–2)
BASE EXCESS BLDA CALC-SCNC: 6 MMOL/L (ref 0–2)
BASE EXCESS BLDA CALC-SCNC: 6.1 MMOL/L (ref 0–2)
BASE EXCESS BLDA CALC-SCNC: 6.3 MMOL/L (ref 0–2)
BASE EXCESS BLDA CALC-SCNC: 7 MMOL/L (ref 0–2)
BASE EXCESS BLDA CALC-SCNC: 7.3 MMOL/L (ref 0–2)
BASE EXCESS BLDA CALC-SCNC: 8.5 MMOL/L (ref 0–2)
BASOPHILS # BLD AUTO: 0 10*3/MM3 (ref 0–0.2)
BASOPHILS # BLD AUTO: 0.01 10*3/MM3 (ref 0–0.2)
BASOPHILS # BLD AUTO: 0.02 10*3/MM3 (ref 0–0.2)
BASOPHILS # BLD AUTO: 0.03 10*3/MM3 (ref 0–0.2)
BASOPHILS # BLD AUTO: 0.04 10*3/MM3 (ref 0–0.2)
BASOPHILS NFR BLD AUTO: 0 % (ref 0–1.5)
BASOPHILS NFR BLD AUTO: 0.1 % (ref 0–1.5)
BASOPHILS NFR BLD AUTO: 0.2 % (ref 0–1.5)
BASOPHILS NFR BLD AUTO: 0.2 % (ref 0–1.5)
BASOPHILS NFR BLD AUTO: 0.3 % (ref 0–1.5)
BDY SITE: ABNORMAL
BH CV ECHO MEAS - ACS: 1.4 CM
BH CV ECHO MEAS - AO MAX PG: 8.4 MMHG
BH CV ECHO MEAS - AO MEAN PG: 5 MMHG
BH CV ECHO MEAS - AO ROOT AREA (BSA CORRECTED): 2
BH CV ECHO MEAS - AO ROOT AREA: 10.2 CM^2
BH CV ECHO MEAS - AO ROOT DIAM: 3.6 CM
BH CV ECHO MEAS - AO V2 MAX: 145 CM/SEC
BH CV ECHO MEAS - AO V2 MEAN: 97.3 CM/SEC
BH CV ECHO MEAS - AO V2 VTI: 31.7 CM
BH CV ECHO MEAS - BSA(HAYCOCK): 1.9 M^2
BH CV ECHO MEAS - BSA: 1.8 M^2
BH CV ECHO MEAS - BZI_BMI: 27.8 KILOGRAMS/M^2
BH CV ECHO MEAS - BZI_METRIC_HEIGHT: 165.1 CM
BH CV ECHO MEAS - BZI_METRIC_WEIGHT: 75.8 KG
BH CV ECHO MEAS - EDV(CUBED): 74.1 ML
BH CV ECHO MEAS - EDV(MOD-SP4): 54.2 ML
BH CV ECHO MEAS - EDV(TEICH): 78.6 ML
BH CV ECHO MEAS - EF(CUBED): 59.8 %
BH CV ECHO MEAS - EF(MOD-SP4): 69.7 %
BH CV ECHO MEAS - EF(TEICH): 51.7 %
BH CV ECHO MEAS - ESV(CUBED): 29.8 ML
BH CV ECHO MEAS - ESV(MOD-SP4): 16.4 ML
BH CV ECHO MEAS - ESV(TEICH): 37.9 ML
BH CV ECHO MEAS - FS: 26.2 %
BH CV ECHO MEAS - IVS/LVPW: 1.1
BH CV ECHO MEAS - IVSD: 1.2 CM
BH CV ECHO MEAS - LA DIMENSION: 2.8 CM
BH CV ECHO MEAS - LA/AO: 0.78
BH CV ECHO MEAS - LV DIASTOLIC VOL/BSA (35-75): 29.6 ML/M^2
BH CV ECHO MEAS - LV MASS(C)D: 167.4 GRAMS
BH CV ECHO MEAS - LV MASS(C)DI: 91.4 GRAMS/M^2
BH CV ECHO MEAS - LV SYSTOLIC VOL/BSA (12-30): 9 ML/M^2
BH CV ECHO MEAS - LVIDD: 4.2 CM
BH CV ECHO MEAS - LVIDS: 3.1 CM
BH CV ECHO MEAS - LVLD AP4: 7.1 CM
BH CV ECHO MEAS - LVLS AP4: 6.1 CM
BH CV ECHO MEAS - LVOT AREA (M): 2.5 CM^2
BH CV ECHO MEAS - LVOT AREA: 2.5 CM^2
BH CV ECHO MEAS - LVOT DIAM: 1.8 CM
BH CV ECHO MEAS - LVPWD: 1.1 CM
BH CV ECHO MEAS - MV A MAX VEL: 91.9 CM/SEC
BH CV ECHO MEAS - MV E MAX VEL: 82.6 CM/SEC
BH CV ECHO MEAS - MV E/A: 0.9
BH CV ECHO MEAS - PA ACC TIME: 0.15 SEC
BH CV ECHO MEAS - PA PR(ACCEL): 11.1 MMHG
BH CV ECHO MEAS - RAP SYSTOLE: 10 MMHG
BH CV ECHO MEAS - RVSP: 47.9 MMHG
BH CV ECHO MEAS - SI(AO): 176.1 ML/M^2
BH CV ECHO MEAS - SI(CUBED): 24.2 ML/M^2
BH CV ECHO MEAS - SI(MOD-SP4): 20.6 ML/M^2
BH CV ECHO MEAS - SI(TEICH): 22.2 ML/M^2
BH CV ECHO MEAS - SV(AO): 322.7 ML
BH CV ECHO MEAS - SV(CUBED): 44.3 ML
BH CV ECHO MEAS - SV(MOD-SP4): 37.8 ML
BH CV ECHO MEAS - SV(TEICH): 40.7 ML
BH CV ECHO MEAS - TR MAX VEL: 308 CM/SEC
BILIRUB CONJ SERPL-MCNC: 0.2 MG/DL (ref 0–0.3)
BILIRUB CONJ SERPL-MCNC: 0.3 MG/DL (ref 0–0.3)
BILIRUB CONJ SERPL-MCNC: 0.4 MG/DL (ref 0–0.3)
BILIRUB CONJ SERPL-MCNC: <0.2 MG/DL (ref 0–0.3)
BILIRUB INDIRECT SERPL-MCNC: 0.2 MG/DL
BILIRUB INDIRECT SERPL-MCNC: ABNORMAL MG/DL
BILIRUB SERPL-MCNC: 0.2 MG/DL (ref 0–1.2)
BILIRUB SERPL-MCNC: 0.3 MG/DL (ref 0–1.2)
BILIRUB SERPL-MCNC: 0.4 MG/DL (ref 0–1.2)
BILIRUB SERPL-MCNC: 0.5 MG/DL (ref 0–1.2)
BILIRUB SERPL-MCNC: 0.6 MG/DL (ref 0–1.2)
BILIRUB UR QL STRIP: NEGATIVE
BODY TEMPERATURE: 0 C
BUN SERPL-MCNC: 36 MG/DL (ref 8–23)
BUN SERPL-MCNC: 41 MG/DL (ref 8–23)
BUN SERPL-MCNC: 41 MG/DL (ref 8–23)
BUN SERPL-MCNC: 42 MG/DL (ref 8–23)
BUN SERPL-MCNC: 42 MG/DL (ref 8–23)
BUN SERPL-MCNC: 43 MG/DL (ref 8–23)
BUN SERPL-MCNC: 49 MG/DL (ref 8–23)
BUN SERPL-MCNC: 54 MG/DL (ref 8–23)
BUN SERPL-MCNC: 54 MG/DL (ref 8–23)
BUN SERPL-MCNC: 55 MG/DL (ref 8–23)
BUN SERPL-MCNC: 56 MG/DL (ref 8–23)
BUN SERPL-MCNC: 56 MG/DL (ref 8–23)
BUN SERPL-MCNC: 58 MG/DL (ref 8–23)
BUN SERPL-MCNC: 66 MG/DL (ref 8–23)
BUN SERPL-MCNC: 69 MG/DL (ref 8–23)
BUN SERPL-MCNC: 69 MG/DL (ref 8–23)
BUN SERPL-MCNC: 70 MG/DL (ref 8–23)
BUN/CREAT SERPL: 110.3 (ref 7–25)
BUN/CREAT SERPL: 26.2 (ref 7–25)
BUN/CREAT SERPL: 29.1 (ref 7–25)
BUN/CREAT SERPL: 31 (ref 7–25)
BUN/CREAT SERPL: 37.1 (ref 7–25)
BUN/CREAT SERPL: 40.8 (ref 7–25)
BUN/CREAT SERPL: 41 (ref 7–25)
BUN/CREAT SERPL: 47.1 (ref 7–25)
BUN/CREAT SERPL: 52.7 (ref 7–25)
BUN/CREAT SERPL: 58 (ref 7–25)
BUN/CREAT SERPL: 63.6 (ref 7–25)
BUN/CREAT SERPL: 65.9 (ref 7–25)
BUN/CREAT SERPL: 68 (ref 7–25)
BUN/CREAT SERPL: 71.4 (ref 7–25)
BUN/CREAT SERPL: 76.1 (ref 7–25)
BUN/CREAT SERPL: 76.4 (ref 7–25)
BUN/CREAT SERPL: 79.6 (ref 7–25)
BUN/CREAT SERPL: 80.8 (ref 7–25)
BUN/CREAT SERPL: 88.5 (ref 7–25)
CALCIUM SPEC-SCNC: 7.1 MG/DL (ref 8.6–10.5)
CALCIUM SPEC-SCNC: 8 MG/DL (ref 8.6–10.5)
CALCIUM SPEC-SCNC: 8.1 MG/DL (ref 8.6–10.5)
CALCIUM SPEC-SCNC: 8.2 MG/DL (ref 8.6–10.5)
CALCIUM SPEC-SCNC: 8.3 MG/DL (ref 8.6–10.5)
CALCIUM SPEC-SCNC: 8.4 MG/DL (ref 8.6–10.5)
CALCIUM SPEC-SCNC: 8.5 MG/DL (ref 8.6–10.5)
CALCIUM SPEC-SCNC: 8.6 MG/DL (ref 8.6–10.5)
CALCIUM SPEC-SCNC: 8.7 MG/DL (ref 8.6–10.5)
CALCIUM SPEC-SCNC: 8.8 MG/DL (ref 8.6–10.5)
CALCIUM SPEC-SCNC: 8.9 MG/DL (ref 8.6–10.5)
CHLORIDE SERPL-SCNC: 101 MMOL/L (ref 98–107)
CHLORIDE SERPL-SCNC: 104 MMOL/L (ref 98–107)
CHLORIDE SERPL-SCNC: 104 MMOL/L (ref 98–107)
CHLORIDE SERPL-SCNC: 105 MMOL/L (ref 98–107)
CHLORIDE SERPL-SCNC: 106 MMOL/L (ref 98–107)
CHLORIDE SERPL-SCNC: 106 MMOL/L (ref 98–107)
CHLORIDE SERPL-SCNC: 109 MMOL/L (ref 98–107)
CHLORIDE SERPL-SCNC: 110 MMOL/L (ref 98–107)
CHLORIDE SERPL-SCNC: 112 MMOL/L (ref 98–107)
CHLORIDE SERPL-SCNC: 112 MMOL/L (ref 98–107)
CHLORIDE SERPL-SCNC: 113 MMOL/L (ref 98–107)
CHLORIDE SERPL-SCNC: 113 MMOL/L (ref 98–107)
CHLORIDE SERPL-SCNC: 115 MMOL/L (ref 98–107)
CHLORIDE SERPL-SCNC: 116 MMOL/L (ref 98–107)
CHLORIDE SERPL-SCNC: 97 MMOL/L (ref 98–107)
CHLORIDE SERPL-SCNC: 99 MMOL/L (ref 98–107)
CLARITY UR: ABNORMAL
CO2 BLDA-SCNC: 23.3 MMOL/L (ref 22–33)
CO2 BLDA-SCNC: 23.7 MMOL/L (ref 22–33)
CO2 BLDA-SCNC: 23.9 MMOL/L (ref 22–33)
CO2 BLDA-SCNC: 24.5 MMOL/L (ref 22–33)
CO2 BLDA-SCNC: 26 MMOL/L (ref 22–33)
CO2 BLDA-SCNC: 26.1 MMOL/L (ref 22–33)
CO2 BLDA-SCNC: 26.3 MMOL/L (ref 22–33)
CO2 BLDA-SCNC: 28.6 MMOL/L (ref 22–33)
CO2 BLDA-SCNC: 28.9 MMOL/L (ref 22–33)
CO2 BLDA-SCNC: 30.1 MMOL/L (ref 22–33)
CO2 BLDA-SCNC: 30.4 MMOL/L (ref 22–33)
CO2 BLDA-SCNC: 30.7 MMOL/L (ref 22–33)
CO2 BLDA-SCNC: 30.8 MMOL/L (ref 22–33)
CO2 BLDA-SCNC: 31.1 MMOL/L (ref 22–33)
CO2 BLDA-SCNC: 31.2 MMOL/L (ref 22–33)
CO2 BLDA-SCNC: 31.2 MMOL/L (ref 22–33)
CO2 BLDA-SCNC: 31.3 MMOL/L (ref 22–33)
CO2 BLDA-SCNC: 31.8 MMOL/L (ref 22–33)
CO2 BLDA-SCNC: 31.9 MMOL/L (ref 22–33)
CO2 BLDA-SCNC: 32.1 MMOL/L (ref 22–33)
CO2 BLDA-SCNC: 32.3 MMOL/L (ref 22–33)
CO2 BLDA-SCNC: 32.3 MMOL/L (ref 22–33)
CO2 BLDA-SCNC: 32.5 MMOL/L (ref 22–33)
CO2 BLDA-SCNC: 32.9 MMOL/L (ref 22–33)
CO2 BLDA-SCNC: 33.1 MMOL/L (ref 22–33)
CO2 BLDA-SCNC: 33.2 MMOL/L (ref 22–33)
CO2 BLDA-SCNC: 33.4 MMOL/L (ref 22–33)
CO2 BLDA-SCNC: 33.5 MMOL/L (ref 22–33)
CO2 BLDA-SCNC: 33.8 MMOL/L (ref 22–33)
CO2 BLDA-SCNC: 33.9 MMOL/L (ref 22–33)
CO2 BLDA-SCNC: 33.9 MMOL/L (ref 22–33)
CO2 BLDA-SCNC: 34.6 MMOL/L (ref 22–33)
CO2 SERPL-SCNC: 20 MMOL/L (ref 22–29)
CO2 SERPL-SCNC: 21.9 MMOL/L (ref 22–29)
CO2 SERPL-SCNC: 22.4 MMOL/L (ref 22–29)
CO2 SERPL-SCNC: 22.5 MMOL/L (ref 22–29)
CO2 SERPL-SCNC: 22.9 MMOL/L (ref 22–29)
CO2 SERPL-SCNC: 26 MMOL/L (ref 22–29)
CO2 SERPL-SCNC: 26.2 MMOL/L (ref 22–29)
CO2 SERPL-SCNC: 26.6 MMOL/L (ref 22–29)
CO2 SERPL-SCNC: 26.7 MMOL/L (ref 22–29)
CO2 SERPL-SCNC: 27.2 MMOL/L (ref 22–29)
CO2 SERPL-SCNC: 27.6 MMOL/L (ref 22–29)
CO2 SERPL-SCNC: 27.8 MMOL/L (ref 22–29)
CO2 SERPL-SCNC: 28.3 MMOL/L (ref 22–29)
CO2 SERPL-SCNC: 28.3 MMOL/L (ref 22–29)
CO2 SERPL-SCNC: 28.6 MMOL/L (ref 22–29)
CO2 SERPL-SCNC: 28.8 MMOL/L (ref 22–29)
CO2 SERPL-SCNC: 29 MMOL/L (ref 22–29)
CO2 SERPL-SCNC: 29.5 MMOL/L (ref 22–29)
CO2 SERPL-SCNC: 29.6 MMOL/L (ref 22–29)
COHGB MFR BLD: 0.3 % (ref 0–5)
COHGB MFR BLD: 0.3 % (ref 0–5)
COHGB MFR BLD: 0.4 % (ref 0–5)
COHGB MFR BLD: 0.6 % (ref 0–5)
COHGB MFR BLD: 0.7 % (ref 0–5)
COHGB MFR BLD: 0.8 % (ref 0–5)
COHGB MFR BLD: 0.9 % (ref 0–5)
COHGB MFR BLD: 1 % (ref 0–5)
COHGB MFR BLD: 1.1 % (ref 0–5)
COHGB MFR BLD: 1.2 % (ref 0–5)
COHGB MFR BLD: 1.4 % (ref 0–5)
COHGB MFR BLD: 1.4 % (ref 0–5)
COHGB MFR BLD: 1.5 % (ref 0–5)
COLOR UR: YELLOW
CREAT SERPL-MCNC: 0.39 MG/DL (ref 0.76–1.27)
CREAT SERPL-MCNC: 0.52 MG/DL (ref 0.76–1.27)
CREAT SERPL-MCNC: 0.54 MG/DL (ref 0.76–1.27)
CREAT SERPL-MCNC: 0.55 MG/DL (ref 0.76–1.27)
CREAT SERPL-MCNC: 0.61 MG/DL (ref 0.76–1.27)
CREAT SERPL-MCNC: 0.71 MG/DL (ref 0.76–1.27)
CREAT SERPL-MCNC: 0.88 MG/DL (ref 0.76–1.27)
CREAT SERPL-MCNC: 0.88 MG/DL (ref 0.76–1.27)
CREAT SERPL-MCNC: 0.92 MG/DL (ref 0.76–1.27)
CREAT SERPL-MCNC: 0.97 MG/DL (ref 0.76–1.27)
CREAT SERPL-MCNC: 0.98 MG/DL (ref 0.76–1.27)
CREAT SERPL-MCNC: 1 MG/DL (ref 0.76–1.27)
CREAT SERPL-MCNC: 1.09 MG/DL (ref 0.76–1.27)
CREAT SERPL-MCNC: 1.16 MG/DL (ref 0.76–1.27)
CREAT SERPL-MCNC: 1.16 MG/DL (ref 0.76–1.27)
CREAT SERPL-MCNC: 1.19 MG/DL (ref 0.76–1.27)
CREAT SERPL-MCNC: 1.19 MG/DL (ref 0.76–1.27)
CREAT SERPL-MCNC: 1.2 MG/DL (ref 0.76–1.27)
CREAT SERPL-MCNC: 1.27 MG/DL (ref 0.76–1.27)
CREAT SERPL-MCNC: 1.31 MG/DL (ref 0.76–1.27)
CREAT SERPL-MCNC: 1.41 MG/DL (ref 0.76–1.27)
CREAT SERPL-MCNC: 2.1 MG/DL (ref 0.76–1.27)
CRP SERPL-MCNC: 12.35 MG/DL (ref 0–0.5)
CRP SERPL-MCNC: 13.04 MG/DL (ref 0–0.5)
CRP SERPL-MCNC: 13.17 MG/DL (ref 0–0.5)
CRP SERPL-MCNC: 14.08 MG/DL (ref 0–0.5)
CRP SERPL-MCNC: 14.61 MG/DL (ref 0–0.5)
CRP SERPL-MCNC: 16.41 MG/DL (ref 0–0.5)
CRP SERPL-MCNC: 19.45 MG/DL (ref 0–0.5)
CRP SERPL-MCNC: 2.54 MG/DL (ref 0–0.5)
CRP SERPL-MCNC: 2.93 MG/DL (ref 0–0.5)
CRP SERPL-MCNC: 21.68 MG/DL (ref 0–0.5)
CRP SERPL-MCNC: 21.82 MG/DL (ref 0–0.5)
CRP SERPL-MCNC: 3.13 MG/DL (ref 0–0.5)
CRP SERPL-MCNC: 4.68 MG/DL (ref 0–0.5)
CRP SERPL-MCNC: 5.05 MG/DL (ref 0–0.5)
CRP SERPL-MCNC: 5.85 MG/DL (ref 0–0.5)
CRP SERPL-MCNC: 7.14 MG/DL (ref 0–0.5)
CRP SERPL-MCNC: 7.8 MG/DL (ref 0–0.5)
D DIMER PPP FEU-MCNC: 0.36 MCGFEU/ML (ref 0–0.5)
D DIMER PPP FEU-MCNC: 0.36 MCGFEU/ML (ref 0–0.5)
D DIMER PPP FEU-MCNC: 0.41 MCGFEU/ML (ref 0–0.5)
D DIMER PPP FEU-MCNC: 0.52 MCGFEU/ML (ref 0–0.5)
D DIMER PPP FEU-MCNC: 0.71 MCGFEU/ML (ref 0–0.5)
D DIMER PPP FEU-MCNC: 0.78 MCGFEU/ML (ref 0–0.5)
D DIMER PPP FEU-MCNC: 0.83 MCGFEU/ML (ref 0–0.5)
D DIMER PPP FEU-MCNC: 0.95 MCGFEU/ML (ref 0–0.5)
D DIMER PPP FEU-MCNC: 1.38 MCGFEU/ML (ref 0–0.5)
D DIMER PPP FEU-MCNC: 1.69 MCGFEU/ML (ref 0–0.5)
D-LACTATE SERPL-SCNC: 0.8 MMOL/L (ref 0.5–2)
D-LACTATE SERPL-SCNC: 1.2 MMOL/L (ref 0.5–2)
D-LACTATE SERPL-SCNC: 1.3 MMOL/L (ref 0.5–2)
D-LACTATE SERPL-SCNC: 1.4 MMOL/L (ref 0.5–2)
D-LACTATE SERPL-SCNC: 1.5 MMOL/L (ref 0.5–2)
D-LACTATE SERPL-SCNC: 1.6 MMOL/L (ref 0.5–2)
D-LACTATE SERPL-SCNC: 1.7 MMOL/L (ref 0.5–2)
D-LACTATE SERPL-SCNC: 1.9 MMOL/L (ref 0.5–2)
D-LACTATE SERPL-SCNC: 1.9 MMOL/L (ref 0.5–2)
D-LACTATE SERPL-SCNC: 2 MMOL/L (ref 0.5–2)
D-LACTATE SERPL-SCNC: 2.3 MMOL/L (ref 0.5–2)
DEPRECATED RDW RBC AUTO: 41.2 FL (ref 37–54)
DEPRECATED RDW RBC AUTO: 41.4 FL (ref 37–54)
DEPRECATED RDW RBC AUTO: 41.9 FL (ref 37–54)
DEPRECATED RDW RBC AUTO: 42.7 FL (ref 37–54)
DEPRECATED RDW RBC AUTO: 43 FL (ref 37–54)
DEPRECATED RDW RBC AUTO: 43.3 FL (ref 37–54)
DEPRECATED RDW RBC AUTO: 43.8 FL (ref 37–54)
DEPRECATED RDW RBC AUTO: 44.9 FL (ref 37–54)
DEPRECATED RDW RBC AUTO: 45 FL (ref 37–54)
DEPRECATED RDW RBC AUTO: 45.1 FL (ref 37–54)
DEPRECATED RDW RBC AUTO: 45.3 FL (ref 37–54)
DEPRECATED RDW RBC AUTO: 45.4 FL (ref 37–54)
DEPRECATED RDW RBC AUTO: 45.7 FL (ref 37–54)
DEPRECATED RDW RBC AUTO: 45.8 FL (ref 37–54)
DEPRECATED RDW RBC AUTO: 46.3 FL (ref 37–54)
DEPRECATED RDW RBC AUTO: 46.8 FL (ref 37–54)
DEPRECATED RDW RBC AUTO: 47.4 FL (ref 37–54)
DEPRECATED RDW RBC AUTO: 47.9 FL (ref 37–54)
DEPRECATED RDW RBC AUTO: 48.1 FL (ref 37–54)
EOSINOPHIL # BLD AUTO: 0 10*3/MM3 (ref 0–0.4)
EOSINOPHIL # BLD AUTO: 0.02 10*3/MM3 (ref 0–0.4)
EOSINOPHIL # BLD AUTO: 0.1 10*3/MM3 (ref 0–0.4)
EOSINOPHIL # BLD MANUAL: 0.16 10*3/MM3 (ref 0–0.4)
EOSINOPHIL NFR BLD AUTO: 0 % (ref 0.3–6.2)
EOSINOPHIL NFR BLD AUTO: 0.2 % (ref 0.3–6.2)
EOSINOPHIL NFR BLD AUTO: 0.5 % (ref 0.3–6.2)
EOSINOPHIL NFR BLD MANUAL: 1 % (ref 0.3–6.2)
EPAP: 5
ERYTHROCYTE [DISTWIDTH] IN BLOOD BY AUTOMATED COUNT: 12.8 % (ref 12.3–15.4)
ERYTHROCYTE [DISTWIDTH] IN BLOOD BY AUTOMATED COUNT: 13 % (ref 12.3–15.4)
ERYTHROCYTE [DISTWIDTH] IN BLOOD BY AUTOMATED COUNT: 13.1 % (ref 12.3–15.4)
ERYTHROCYTE [DISTWIDTH] IN BLOOD BY AUTOMATED COUNT: 13.1 % (ref 12.3–15.4)
ERYTHROCYTE [DISTWIDTH] IN BLOOD BY AUTOMATED COUNT: 13.2 % (ref 12.3–15.4)
ERYTHROCYTE [DISTWIDTH] IN BLOOD BY AUTOMATED COUNT: 13.3 % (ref 12.3–15.4)
ERYTHROCYTE [DISTWIDTH] IN BLOOD BY AUTOMATED COUNT: 13.3 % (ref 12.3–15.4)
ERYTHROCYTE [DISTWIDTH] IN BLOOD BY AUTOMATED COUNT: 13.4 % (ref 12.3–15.4)
ERYTHROCYTE [DISTWIDTH] IN BLOOD BY AUTOMATED COUNT: 13.5 % (ref 12.3–15.4)
ERYTHROCYTE [DISTWIDTH] IN BLOOD BY AUTOMATED COUNT: 13.7 % (ref 12.3–15.4)
ERYTHROCYTE [DISTWIDTH] IN BLOOD BY AUTOMATED COUNT: 13.7 % (ref 12.3–15.4)
ERYTHROCYTE [DISTWIDTH] IN BLOOD BY AUTOMATED COUNT: 13.8 % (ref 12.3–15.4)
FERRITIN SERPL-MCNC: 1666 NG/ML (ref 30–400)
FERRITIN SERPL-MCNC: 1685 NG/ML (ref 30–400)
FERRITIN SERPL-MCNC: 2397 NG/ML (ref 30–400)
FERRITIN SERPL-MCNC: 2572 NG/ML (ref 30–400)
FERRITIN SERPL-MCNC: 2904 NG/ML (ref 30–400)
FLUAV RNA RESP QL NAA+PROBE: NOT DETECTED
FLUBV RNA RESP QL NAA+PROBE: NOT DETECTED
GAS FLOW AIRWAY: 3 LPM
GAS FLOW AIRWAY: 3 LPM
GFR SERPL CREATININE-BSD FRML MDRD: 108 ML/MIN/1.73
GFR SERPL CREATININE-BSD FRML MDRD: 129 ML/MIN/1.73
GFR SERPL CREATININE-BSD FRML MDRD: 145 ML/MIN/1.73
GFR SERPL CREATININE-BSD FRML MDRD: 148 ML/MIN/1.73
GFR SERPL CREATININE-BSD FRML MDRD: 31 ML/MIN/1.73
GFR SERPL CREATININE-BSD FRML MDRD: 49 ML/MIN/1.73
GFR SERPL CREATININE-BSD FRML MDRD: 53 ML/MIN/1.73
GFR SERPL CREATININE-BSD FRML MDRD: 55 ML/MIN/1.73
GFR SERPL CREATININE-BSD FRML MDRD: 59 ML/MIN/1.73
GFR SERPL CREATININE-BSD FRML MDRD: 61 ML/MIN/1.73
GFR SERPL CREATININE-BSD FRML MDRD: 61 ML/MIN/1.73
GFR SERPL CREATININE-BSD FRML MDRD: 66 ML/MIN/1.73
GFR SERPL CREATININE-BSD FRML MDRD: 73 ML/MIN/1.73
GFR SERPL CREATININE-BSD FRML MDRD: 74 ML/MIN/1.73
GFR SERPL CREATININE-BSD FRML MDRD: 75 ML/MIN/1.73
GFR SERPL CREATININE-BSD FRML MDRD: 80 ML/MIN/1.73
GFR SERPL CREATININE-BSD FRML MDRD: 84 ML/MIN/1.73
GFR SERPL CREATININE-BSD FRML MDRD: 84 ML/MIN/1.73
GFR SERPL CREATININE-BSD FRML MDRD: >150 ML/MIN/1.73
GFR SERPL CREATININE-BSD FRML MDRD: >150 ML/MIN/1.73
GLOBULIN UR ELPH-MCNC: 2.9 GM/DL
GLOBULIN UR ELPH-MCNC: 2.9 GM/DL
GLOBULIN UR ELPH-MCNC: 3.1 GM/DL
GLOBULIN UR ELPH-MCNC: 3.2 GM/DL
GLOBULIN UR ELPH-MCNC: 3.4 GM/DL
GLOBULIN UR ELPH-MCNC: 3.5 GM/DL
GLOBULIN UR ELPH-MCNC: 3.5 GM/DL
GLOBULIN UR ELPH-MCNC: 3.7 GM/DL
GLOBULIN UR ELPH-MCNC: 3.7 GM/DL
GLOBULIN UR ELPH-MCNC: 3.8 GM/DL
GLOBULIN UR ELPH-MCNC: 4 GM/DL
GLUCOSE BLDC GLUCOMTR-MCNC: 102 MG/DL (ref 70–130)
GLUCOSE BLDC GLUCOMTR-MCNC: 102 MG/DL (ref 70–130)
GLUCOSE BLDC GLUCOMTR-MCNC: 103 MG/DL (ref 70–130)
GLUCOSE BLDC GLUCOMTR-MCNC: 110 MG/DL (ref 70–130)
GLUCOSE BLDC GLUCOMTR-MCNC: 112 MG/DL (ref 70–130)
GLUCOSE BLDC GLUCOMTR-MCNC: 115 MG/DL (ref 70–130)
GLUCOSE BLDC GLUCOMTR-MCNC: 130 MG/DL (ref 70–130)
GLUCOSE BLDC GLUCOMTR-MCNC: 133 MG/DL (ref 70–130)
GLUCOSE BLDC GLUCOMTR-MCNC: 134 MG/DL (ref 70–130)
GLUCOSE BLDC GLUCOMTR-MCNC: 137 MG/DL (ref 70–130)
GLUCOSE BLDC GLUCOMTR-MCNC: 144 MG/DL (ref 70–130)
GLUCOSE BLDC GLUCOMTR-MCNC: 146 MG/DL (ref 70–130)
GLUCOSE BLDC GLUCOMTR-MCNC: 147 MG/DL (ref 70–130)
GLUCOSE BLDC GLUCOMTR-MCNC: 147 MG/DL (ref 70–130)
GLUCOSE BLDC GLUCOMTR-MCNC: 167 MG/DL (ref 70–130)
GLUCOSE BLDC GLUCOMTR-MCNC: 168 MG/DL (ref 70–130)
GLUCOSE BLDC GLUCOMTR-MCNC: 169 MG/DL (ref 70–130)
GLUCOSE BLDC GLUCOMTR-MCNC: 171 MG/DL (ref 70–130)
GLUCOSE BLDC GLUCOMTR-MCNC: 173 MG/DL (ref 70–130)
GLUCOSE BLDC GLUCOMTR-MCNC: 176 MG/DL (ref 70–130)
GLUCOSE BLDC GLUCOMTR-MCNC: 176 MG/DL (ref 70–130)
GLUCOSE BLDC GLUCOMTR-MCNC: 178 MG/DL (ref 70–130)
GLUCOSE BLDC GLUCOMTR-MCNC: 183 MG/DL (ref 70–130)
GLUCOSE BLDC GLUCOMTR-MCNC: 183 MG/DL (ref 70–130)
GLUCOSE BLDC GLUCOMTR-MCNC: 186 MG/DL (ref 70–130)
GLUCOSE BLDC GLUCOMTR-MCNC: 195 MG/DL (ref 70–130)
GLUCOSE BLDC GLUCOMTR-MCNC: 201 MG/DL (ref 70–130)
GLUCOSE BLDC GLUCOMTR-MCNC: 203 MG/DL (ref 70–130)
GLUCOSE BLDC GLUCOMTR-MCNC: 211 MG/DL (ref 70–130)
GLUCOSE BLDC GLUCOMTR-MCNC: 211 MG/DL (ref 70–130)
GLUCOSE BLDC GLUCOMTR-MCNC: 212 MG/DL (ref 70–130)
GLUCOSE BLDC GLUCOMTR-MCNC: 216 MG/DL (ref 70–130)
GLUCOSE BLDC GLUCOMTR-MCNC: 221 MG/DL (ref 70–130)
GLUCOSE BLDC GLUCOMTR-MCNC: 232 MG/DL (ref 70–130)
GLUCOSE BLDC GLUCOMTR-MCNC: 233 MG/DL (ref 70–130)
GLUCOSE BLDC GLUCOMTR-MCNC: 235 MG/DL (ref 70–130)
GLUCOSE BLDC GLUCOMTR-MCNC: 238 MG/DL (ref 70–130)
GLUCOSE BLDC GLUCOMTR-MCNC: 248 MG/DL (ref 70–130)
GLUCOSE BLDC GLUCOMTR-MCNC: 254 MG/DL (ref 70–130)
GLUCOSE BLDC GLUCOMTR-MCNC: 260 MG/DL (ref 70–130)
GLUCOSE BLDC GLUCOMTR-MCNC: 269 MG/DL (ref 70–130)
GLUCOSE BLDC GLUCOMTR-MCNC: 270 MG/DL (ref 70–130)
GLUCOSE BLDC GLUCOMTR-MCNC: 275 MG/DL (ref 70–130)
GLUCOSE BLDC GLUCOMTR-MCNC: 285 MG/DL (ref 70–130)
GLUCOSE BLDC GLUCOMTR-MCNC: 292 MG/DL (ref 70–130)
GLUCOSE BLDC GLUCOMTR-MCNC: 292 MG/DL (ref 70–130)
GLUCOSE BLDC GLUCOMTR-MCNC: 296 MG/DL (ref 70–130)
GLUCOSE BLDC GLUCOMTR-MCNC: 74 MG/DL (ref 70–130)
GLUCOSE BLDC GLUCOMTR-MCNC: 79 MG/DL (ref 70–130)
GLUCOSE SERPL-MCNC: 124 MG/DL (ref 65–99)
GLUCOSE SERPL-MCNC: 130 MG/DL (ref 65–99)
GLUCOSE SERPL-MCNC: 135 MG/DL (ref 65–99)
GLUCOSE SERPL-MCNC: 136 MG/DL (ref 65–99)
GLUCOSE SERPL-MCNC: 145 MG/DL (ref 65–99)
GLUCOSE SERPL-MCNC: 158 MG/DL (ref 65–99)
GLUCOSE SERPL-MCNC: 158 MG/DL (ref 65–99)
GLUCOSE SERPL-MCNC: 159 MG/DL (ref 65–99)
GLUCOSE SERPL-MCNC: 173 MG/DL (ref 65–99)
GLUCOSE SERPL-MCNC: 181 MG/DL (ref 65–99)
GLUCOSE SERPL-MCNC: 197 MG/DL (ref 65–99)
GLUCOSE SERPL-MCNC: 209 MG/DL (ref 65–99)
GLUCOSE SERPL-MCNC: 228 MG/DL (ref 65–99)
GLUCOSE SERPL-MCNC: 238 MG/DL (ref 65–99)
GLUCOSE SERPL-MCNC: 274 MG/DL (ref 65–99)
GLUCOSE SERPL-MCNC: 278 MG/DL (ref 65–99)
GLUCOSE SERPL-MCNC: 303 MG/DL (ref 65–99)
GLUCOSE SERPL-MCNC: 307 MG/DL (ref 65–99)
GLUCOSE SERPL-MCNC: 88 MG/DL (ref 65–99)
GLUCOSE UR STRIP-MCNC: NEGATIVE MG/DL
GRAM STN SPEC: NORMAL
GRAN CASTS URNS QL MICRO: ABNORMAL /LPF
HBA1C MFR BLD: 6.3 % (ref 4.8–5.6)
HCO3 BLDA-SCNC: 22 MMOL/L (ref 20–26)
HCO3 BLDA-SCNC: 22.4 MMOL/L (ref 20–26)
HCO3 BLDA-SCNC: 22.8 MMOL/L (ref 20–26)
HCO3 BLDA-SCNC: 23.2 MMOL/L (ref 20–26)
HCO3 BLDA-SCNC: 24.9 MMOL/L (ref 20–26)
HCO3 BLDA-SCNC: 24.9 MMOL/L (ref 20–26)
HCO3 BLDA-SCNC: 25.2 MMOL/L (ref 20–26)
HCO3 BLDA-SCNC: 27.2 MMOL/L (ref 20–26)
HCO3 BLDA-SCNC: 27.4 MMOL/L (ref 20–26)
HCO3 BLDA-SCNC: 28.7 MMOL/L (ref 20–26)
HCO3 BLDA-SCNC: 28.7 MMOL/L (ref 20–26)
HCO3 BLDA-SCNC: 28.9 MMOL/L (ref 20–26)
HCO3 BLDA-SCNC: 29.2 MMOL/L (ref 20–26)
HCO3 BLDA-SCNC: 29.2 MMOL/L (ref 20–26)
HCO3 BLDA-SCNC: 29.5 MMOL/L (ref 20–26)
HCO3 BLDA-SCNC: 29.7 MMOL/L (ref 20–26)
HCO3 BLDA-SCNC: 29.7 MMOL/L (ref 20–26)
HCO3 BLDA-SCNC: 29.9 MMOL/L (ref 20–26)
HCO3 BLDA-SCNC: 30 MMOL/L (ref 20–26)
HCO3 BLDA-SCNC: 30.2 MMOL/L (ref 20–26)
HCO3 BLDA-SCNC: 30.2 MMOL/L (ref 20–26)
HCO3 BLDA-SCNC: 30.6 MMOL/L (ref 20–26)
HCO3 BLDA-SCNC: 30.7 MMOL/L (ref 20–26)
HCO3 BLDA-SCNC: 30.8 MMOL/L (ref 20–26)
HCO3 BLDA-SCNC: 31 MMOL/L (ref 20–26)
HCO3 BLDA-SCNC: 31.3 MMOL/L (ref 20–26)
HCO3 BLDA-SCNC: 31.4 MMOL/L (ref 20–26)
HCO3 BLDA-SCNC: 31.5 MMOL/L (ref 20–26)
HCO3 BLDA-SCNC: 31.8 MMOL/L (ref 20–26)
HCO3 BLDA-SCNC: 32 MMOL/L (ref 20–26)
HCO3 BLDA-SCNC: 32.1 MMOL/L (ref 20–26)
HCO3 BLDA-SCNC: 32.1 MMOL/L (ref 20–26)
HCO3 BLDA-SCNC: 32.4 MMOL/L (ref 20–26)
HCO3 BLDA-SCNC: 33.2 MMOL/L (ref 20–26)
HCT VFR BLD AUTO: 26 % (ref 37.5–51)
HCT VFR BLD AUTO: 30.9 % (ref 37.5–51)
HCT VFR BLD AUTO: 30.9 % (ref 37.5–51)
HCT VFR BLD AUTO: 31.1 % (ref 37.5–51)
HCT VFR BLD AUTO: 31.1 % (ref 37.5–51)
HCT VFR BLD AUTO: 34 % (ref 37.5–51)
HCT VFR BLD AUTO: 35.1 % (ref 37.5–51)
HCT VFR BLD AUTO: 35.5 % (ref 37.5–51)
HCT VFR BLD AUTO: 36.7 % (ref 37.5–51)
HCT VFR BLD AUTO: 37.1 % (ref 37.5–51)
HCT VFR BLD AUTO: 37.2 % (ref 37.5–51)
HCT VFR BLD AUTO: 37.5 % (ref 37.5–51)
HCT VFR BLD AUTO: 38 % (ref 37.5–51)
HCT VFR BLD AUTO: 38.2 % (ref 37.5–51)
HCT VFR BLD AUTO: 40.3 % (ref 37.5–51)
HCT VFR BLD AUTO: 42.9 % (ref 37.5–51)
HCT VFR BLD AUTO: 43.6 % (ref 37.5–51)
HCT VFR BLD AUTO: 44.1 % (ref 37.5–51)
HCT VFR BLD AUTO: 44.6 % (ref 37.5–51)
HCT VFR BLD CALC: 24.2 % (ref 38–51)
HCT VFR BLD CALC: 26.8 % (ref 38–51)
HCT VFR BLD CALC: 28 % (ref 38–51)
HCT VFR BLD CALC: 28.4 % (ref 38–51)
HCT VFR BLD CALC: 29 % (ref 38–51)
HCT VFR BLD CALC: 29.9 % (ref 38–51)
HCT VFR BLD CALC: 30.4 % (ref 38–51)
HCT VFR BLD CALC: 31.7 % (ref 38–51)
HCT VFR BLD CALC: 32.7 % (ref 38–51)
HCT VFR BLD CALC: 33.9 % (ref 38–51)
HCT VFR BLD CALC: 34.2 % (ref 38–51)
HCT VFR BLD CALC: 34.9 % (ref 38–51)
HCT VFR BLD CALC: 35.5 % (ref 38–51)
HCT VFR BLD CALC: 36.6 % (ref 38–51)
HCT VFR BLD CALC: 36.8 % (ref 38–51)
HCT VFR BLD CALC: 37.3 % (ref 38–51)
HCT VFR BLD CALC: 38.4 % (ref 38–51)
HCT VFR BLD CALC: 39 % (ref 38–51)
HCT VFR BLD CALC: 39 % (ref 38–51)
HCT VFR BLD CALC: 40 % (ref 38–51)
HCT VFR BLD CALC: 40.5 % (ref 38–51)
HCT VFR BLD CALC: 40.7 % (ref 38–51)
HCT VFR BLD CALC: 40.8 % (ref 38–51)
HCT VFR BLD CALC: 42 % (ref 38–51)
HCT VFR BLD CALC: 42.1 % (ref 38–51)
HCT VFR BLD CALC: 42.2 % (ref 38–51)
HCT VFR BLD CALC: 42.6 % (ref 38–51)
HCT VFR BLD CALC: 42.7 % (ref 38–51)
HCT VFR BLD CALC: 43.8 % (ref 38–51)
HCT VFR BLD CALC: 44.1 % (ref 38–51)
HCT VFR BLD CALC: 46.6 % (ref 38–51)
HCT VFR BLD CALC: 50.2 % (ref 38–51)
HCT VFR BLD CALC: 51.2 % (ref 38–51)
HCT VFR BLD CALC: 60 % (ref 38–51)
HGB BLD-MCNC: 10.5 G/DL (ref 13–17.7)
HGB BLD-MCNC: 10.6 G/DL (ref 13–17.7)
HGB BLD-MCNC: 10.8 G/DL (ref 13–17.7)
HGB BLD-MCNC: 11 G/DL (ref 13–17.7)
HGB BLD-MCNC: 11.8 G/DL (ref 13–17.7)
HGB BLD-MCNC: 12 G/DL (ref 13–17.7)
HGB BLD-MCNC: 12 G/DL (ref 13–17.7)
HGB BLD-MCNC: 12.3 G/DL (ref 13–17.7)
HGB BLD-MCNC: 12.4 G/DL (ref 13–17.7)
HGB BLD-MCNC: 13.1 G/DL (ref 13–17.7)
HGB BLD-MCNC: 13.6 G/DL (ref 13–17.7)
HGB BLD-MCNC: 13.8 G/DL (ref 13–17.7)
HGB BLD-MCNC: 13.8 G/DL (ref 13–17.7)
HGB BLD-MCNC: 8.3 G/DL (ref 13–17.7)
HGB BLD-MCNC: 9.1 G/DL (ref 13–17.7)
HGB BLD-MCNC: 9.4 G/DL (ref 13–17.7)
HGB BLD-MCNC: 9.5 G/DL (ref 13–17.7)
HGB BLD-MCNC: 9.8 G/DL (ref 13–17.7)
HGB BLD-MCNC: 9.9 G/DL (ref 13–17.7)
HGB BLDA-MCNC: 10.3 G/DL (ref 14–18)
HGB BLDA-MCNC: 10.7 G/DL (ref 14–18)
HGB BLDA-MCNC: 11.1 G/DL (ref 14–18)
HGB BLDA-MCNC: 11.2 G/DL (ref 14–18)
HGB BLDA-MCNC: 11.4 G/DL (ref 14–18)
HGB BLDA-MCNC: 11.6 G/DL (ref 14–18)
HGB BLDA-MCNC: 11.9 G/DL (ref 14–18)
HGB BLDA-MCNC: 12 G/DL (ref 14–18)
HGB BLDA-MCNC: 12.2 G/DL (ref 14–18)
HGB BLDA-MCNC: 12.5 G/DL (ref 14–18)
HGB BLDA-MCNC: 12.7 G/DL (ref 14–18)
HGB BLDA-MCNC: 12.7 G/DL (ref 14–18)
HGB BLDA-MCNC: 13.1 G/DL (ref 14–18)
HGB BLDA-MCNC: 13.2 G/DL (ref 14–18)
HGB BLDA-MCNC: 13.3 G/DL (ref 14–18)
HGB BLDA-MCNC: 13.3 G/DL (ref 14–18)
HGB BLDA-MCNC: 13.7 G/DL (ref 14–18)
HGB BLDA-MCNC: 13.7 G/DL (ref 14–18)
HGB BLDA-MCNC: 13.8 G/DL (ref 14–18)
HGB BLDA-MCNC: 13.9 G/DL (ref 14–18)
HGB BLDA-MCNC: 13.9 G/DL (ref 14–18)
HGB BLDA-MCNC: 14.3 G/DL (ref 14–18)
HGB BLDA-MCNC: 14.4 G/DL (ref 14–18)
HGB BLDA-MCNC: 15.2 G/DL (ref 14–18)
HGB BLDA-MCNC: 16.4 G/DL (ref 14–18)
HGB BLDA-MCNC: 16.7 G/DL (ref 14–18)
HGB BLDA-MCNC: 19.6 G/DL (ref 14–18)
HGB BLDA-MCNC: 7.9 G/DL (ref 14–18)
HGB BLDA-MCNC: 8.7 G/DL (ref 14–18)
HGB BLDA-MCNC: 9.1 G/DL (ref 14–18)
HGB BLDA-MCNC: 9.3 G/DL (ref 14–18)
HGB BLDA-MCNC: 9.5 G/DL (ref 14–18)
HGB BLDA-MCNC: 9.8 G/DL (ref 14–18)
HGB BLDA-MCNC: 9.9 G/DL (ref 14–18)
HGB UR QL STRIP.AUTO: ABNORMAL
HOLD SPECIMEN: NORMAL
HOLD SPECIMEN: NORMAL
HYALINE CASTS UR QL AUTO: ABNORMAL /LPF
HYALINE CASTS UR QL AUTO: ABNORMAL /LPF
HYALINE CASTS UR QL AUTO: NORMAL /LPF
HYPOCHROMIA BLD QL: ABNORMAL
HYPOCHROMIA BLD QL: NORMAL
IMM GRANULOCYTES # BLD AUTO: 0.01 10*3/MM3 (ref 0–0.05)
IMM GRANULOCYTES # BLD AUTO: 0.02 10*3/MM3 (ref 0–0.05)
IMM GRANULOCYTES # BLD AUTO: 0.02 10*3/MM3 (ref 0–0.05)
IMM GRANULOCYTES # BLD AUTO: 0.03 10*3/MM3 (ref 0–0.05)
IMM GRANULOCYTES # BLD AUTO: 0.08 10*3/MM3 (ref 0–0.05)
IMM GRANULOCYTES # BLD AUTO: 0.12 10*3/MM3 (ref 0–0.05)
IMM GRANULOCYTES # BLD AUTO: 0.13 10*3/MM3 (ref 0–0.05)
IMM GRANULOCYTES # BLD AUTO: 0.15 10*3/MM3 (ref 0–0.05)
IMM GRANULOCYTES # BLD AUTO: 0.43 10*3/MM3 (ref 0–0.05)
IMM GRANULOCYTES NFR BLD AUTO: 0.2 % (ref 0–0.5)
IMM GRANULOCYTES NFR BLD AUTO: 0.4 % (ref 0–0.5)
IMM GRANULOCYTES NFR BLD AUTO: 0.5 % (ref 0–0.5)
IMM GRANULOCYTES NFR BLD AUTO: 0.5 % (ref 0–0.5)
IMM GRANULOCYTES NFR BLD AUTO: 0.6 % (ref 0–0.5)
IMM GRANULOCYTES NFR BLD AUTO: 0.9 % (ref 0–0.5)
IMM GRANULOCYTES NFR BLD AUTO: 1 % (ref 0–0.5)
IMM GRANULOCYTES NFR BLD AUTO: 1 % (ref 0–0.5)
IMM GRANULOCYTES NFR BLD AUTO: 2 % (ref 0–0.5)
INHALED O2 CONCENTRATION: 100 %
INHALED O2 CONCENTRATION: 21 %
INHALED O2 CONCENTRATION: 32 %
INHALED O2 CONCENTRATION: 32 %
INHALED O2 CONCENTRATION: 45 %
INHALED O2 CONCENTRATION: 50 %
INHALED O2 CONCENTRATION: 50 %
INHALED O2 CONCENTRATION: 55 %
INHALED O2 CONCENTRATION: 60 %
INHALED O2 CONCENTRATION: 65 %
INHALED O2 CONCENTRATION: 65 %
INHALED O2 CONCENTRATION: 70 %
INHALED O2 CONCENTRATION: 75 %
INHALED O2 CONCENTRATION: 80 %
INHALED O2 CONCENTRATION: 80 %
INHALED O2 CONCENTRATION: 90 %
INR PPP: 0.95 (ref 0.9–1.1)
INR PPP: 0.96 (ref 0.9–1.1)
INR PPP: 1.03 (ref 0.9–1.1)
INR PPP: 1.07 (ref 0.9–1.1)
INR PPP: 1.13 (ref 0.9–1.1)
INR PPP: 1.13 (ref 0.9–1.1)
INR PPP: 1.19 (ref 0.9–1.1)
INR PPP: 1.23 (ref 0.9–1.1)
INR PPP: 1.29 (ref 0.9–1.1)
INR PPP: 1.36 (ref 0.9–1.1)
IPAP: 15
KETONES UR QL STRIP: NEGATIVE
LARGE PLATELETS: NORMAL
LDH SERPL-CCNC: 336 U/L (ref 135–225)
LDH SERPL-CCNC: 347 U/L (ref 135–225)
LDH SERPL-CCNC: 349 U/L (ref 135–225)
LDH SERPL-CCNC: 390 U/L (ref 135–225)
LDH SERPL-CCNC: 404 U/L (ref 135–225)
LDH SERPL-CCNC: 448 U/L (ref 135–225)
LDH SERPL-CCNC: 496 U/L (ref 135–225)
LDH SERPL-CCNC: 608 U/L (ref 135–225)
LDH SERPL-CCNC: 608 U/L (ref 135–225)
LEUKOCYTE ESTERASE UR QL STRIP.AUTO: NEGATIVE
LV EF 2D ECHO EST: 50 %
LYMPHOCYTES # BLD AUTO: 0.37 10*3/MM3 (ref 0.7–3.1)
LYMPHOCYTES # BLD AUTO: 0.43 10*3/MM3 (ref 0.7–3.1)
LYMPHOCYTES # BLD AUTO: 0.46 10*3/MM3 (ref 0.7–3.1)
LYMPHOCYTES # BLD AUTO: 0.47 10*3/MM3 (ref 0.7–3.1)
LYMPHOCYTES # BLD AUTO: 0.48 10*3/MM3 (ref 0.7–3.1)
LYMPHOCYTES # BLD AUTO: 0.58 10*3/MM3 (ref 0.7–3.1)
LYMPHOCYTES # BLD AUTO: 0.63 10*3/MM3 (ref 0.7–3.1)
LYMPHOCYTES # BLD AUTO: 0.68 10*3/MM3 (ref 0.7–3.1)
LYMPHOCYTES # BLD AUTO: 0.83 10*3/MM3 (ref 0.7–3.1)
LYMPHOCYTES # BLD MANUAL: 0.16 10*3/MM3 (ref 0.7–3.1)
LYMPHOCYTES # BLD MANUAL: 0.32 10*3/MM3 (ref 0.7–3.1)
LYMPHOCYTES # BLD MANUAL: 0.66 10*3/MM3 (ref 0.7–3.1)
LYMPHOCYTES NFR BLD AUTO: 11.1 % (ref 19.6–45.3)
LYMPHOCYTES NFR BLD AUTO: 11.4 % (ref 19.6–45.3)
LYMPHOCYTES NFR BLD AUTO: 2.2 % (ref 19.6–45.3)
LYMPHOCYTES NFR BLD AUTO: 3.2 % (ref 19.6–45.3)
LYMPHOCYTES NFR BLD AUTO: 4 % (ref 19.6–45.3)
LYMPHOCYTES NFR BLD AUTO: 4.4 % (ref 19.6–45.3)
LYMPHOCYTES NFR BLD AUTO: 5.8 % (ref 19.6–45.3)
LYMPHOCYTES NFR BLD AUTO: 7.5 % (ref 19.6–45.3)
LYMPHOCYTES NFR BLD AUTO: 9.2 % (ref 19.6–45.3)
LYMPHOCYTES NFR BLD MANUAL: 1 % (ref 19.6–45.3)
LYMPHOCYTES NFR BLD MANUAL: 2 % (ref 19.6–45.3)
LYMPHOCYTES NFR BLD MANUAL: 2 % (ref 5–12)
LYMPHOCYTES NFR BLD MANUAL: 4 % (ref 5–12)
LYMPHOCYTES NFR BLD MANUAL: 5 % (ref 19.6–45.3)
LYMPHOCYTES NFR BLD MANUAL: 7 % (ref 5–12)
Lab: ABNORMAL
MAXIMAL PREDICTED HEART RATE: 144 BPM
MCH RBC QN AUTO: 27.5 PG (ref 26.6–33)
MCH RBC QN AUTO: 27.8 PG (ref 26.6–33)
MCH RBC QN AUTO: 27.8 PG (ref 26.6–33)
MCH RBC QN AUTO: 27.9 PG (ref 26.6–33)
MCH RBC QN AUTO: 28 PG (ref 26.6–33)
MCH RBC QN AUTO: 28.1 PG (ref 26.6–33)
MCH RBC QN AUTO: 28.2 PG (ref 26.6–33)
MCH RBC QN AUTO: 28.3 PG (ref 26.6–33)
MCH RBC QN AUTO: 28.3 PG (ref 26.6–33)
MCH RBC QN AUTO: 28.5 PG (ref 26.6–33)
MCH RBC QN AUTO: 28.5 PG (ref 26.6–33)
MCH RBC QN AUTO: 28.6 PG (ref 26.6–33)
MCHC RBC AUTO-ENTMCNC: 29.1 G/DL (ref 31.5–35.7)
MCHC RBC AUTO-ENTMCNC: 29.1 G/DL (ref 31.5–35.7)
MCHC RBC AUTO-ENTMCNC: 29.4 G/DL (ref 31.5–35.7)
MCHC RBC AUTO-ENTMCNC: 29.8 G/DL (ref 31.5–35.7)
MCHC RBC AUTO-ENTMCNC: 29.9 G/DL (ref 31.5–35.7)
MCHC RBC AUTO-ENTMCNC: 29.9 G/DL (ref 31.5–35.7)
MCHC RBC AUTO-ENTMCNC: 30 G/DL (ref 31.5–35.7)
MCHC RBC AUTO-ENTMCNC: 30.4 G/DL (ref 31.5–35.7)
MCHC RBC AUTO-ENTMCNC: 30.5 G/DL (ref 31.5–35.7)
MCHC RBC AUTO-ENTMCNC: 31.3 G/DL (ref 31.5–35.7)
MCHC RBC AUTO-ENTMCNC: 31.5 G/DL (ref 31.5–35.7)
MCHC RBC AUTO-ENTMCNC: 31.6 G/DL (ref 31.5–35.7)
MCHC RBC AUTO-ENTMCNC: 31.7 G/DL (ref 31.5–35.7)
MCHC RBC AUTO-ENTMCNC: 31.7 G/DL (ref 31.5–35.7)
MCHC RBC AUTO-ENTMCNC: 31.9 G/DL (ref 31.5–35.7)
MCHC RBC AUTO-ENTMCNC: 32.5 G/DL (ref 31.5–35.7)
MCHC RBC AUTO-ENTMCNC: 32.8 G/DL (ref 31.5–35.7)
MCV RBC AUTO: 86.6 FL (ref 79–97)
MCV RBC AUTO: 87 FL (ref 79–97)
MCV RBC AUTO: 88.4 FL (ref 79–97)
MCV RBC AUTO: 88.6 FL (ref 79–97)
MCV RBC AUTO: 89 FL (ref 79–97)
MCV RBC AUTO: 89.4 FL (ref 79–97)
MCV RBC AUTO: 90.7 FL (ref 79–97)
MCV RBC AUTO: 91.1 FL (ref 79–97)
MCV RBC AUTO: 91.5 FL (ref 79–97)
MCV RBC AUTO: 91.8 FL (ref 79–97)
MCV RBC AUTO: 92.3 FL (ref 79–97)
MCV RBC AUTO: 92.5 FL (ref 79–97)
MCV RBC AUTO: 92.7 FL (ref 79–97)
MCV RBC AUTO: 92.9 FL (ref 79–97)
MCV RBC AUTO: 94.8 FL (ref 79–97)
MCV RBC AUTO: 94.9 FL (ref 79–97)
MCV RBC AUTO: 95.5 FL (ref 79–97)
MCV RBC AUTO: 95.7 FL (ref 79–97)
MCV RBC AUTO: 96.6 FL (ref 79–97)
METAMYELOCYTES NFR BLD MANUAL: 1 % (ref 0–0)
METHGB BLD QL: 0 % (ref 0–3)
METHGB BLD QL: 0.1 % (ref 0–3)
METHGB BLD QL: 0.2 % (ref 0–3)
METHGB BLD QL: 0.3 % (ref 0–3)
METHGB BLD QL: 0.4 % (ref 0–3)
METHGB BLD QL: 0.4 % (ref 0–3)
METHGB BLD QL: 0.5 % (ref 0–3)
METHGB BLD QL: <-0.1 % (ref 0–3)
MODALITY: ABNORMAL
MONOCYTES # BLD AUTO: 0.13 10*3/MM3 (ref 0.1–0.9)
MONOCYTES # BLD AUTO: 0.18 10*3/MM3 (ref 0.1–0.9)
MONOCYTES # BLD AUTO: 0.32 10*3/MM3 (ref 0.1–0.9)
MONOCYTES # BLD AUTO: 0.36 10*3/MM3 (ref 0.1–0.9)
MONOCYTES # BLD AUTO: 0.39 10*3/MM3 (ref 0.1–0.9)
MONOCYTES # BLD AUTO: 0.41 10*3/MM3 (ref 0.1–0.9)
MONOCYTES # BLD AUTO: 0.41 10*3/MM3 (ref 0.1–0.9)
MONOCYTES # BLD AUTO: 0.53 10*3/MM3 (ref 0.1–0.9)
MONOCYTES # BLD AUTO: 0.83 10*3/MM3 (ref 0.1–0.9)
MONOCYTES # BLD AUTO: 0.97 10*3/MM3 (ref 0.1–0.9)
MONOCYTES # BLD AUTO: 1.09 10*3/MM3 (ref 0.1–0.9)
MONOCYTES # BLD AUTO: 1.1 10*3/MM3 (ref 0.1–0.9)
MONOCYTES NFR BLD AUTO: 1.1 % (ref 5–12)
MONOCYTES NFR BLD AUTO: 2.7 % (ref 5–12)
MONOCYTES NFR BLD AUTO: 3.8 % (ref 5–12)
MONOCYTES NFR BLD AUTO: 4.5 % (ref 5–12)
MONOCYTES NFR BLD AUTO: 6.2 % (ref 5–12)
MONOCYTES NFR BLD AUTO: 6.3 % (ref 5–12)
MONOCYTES NFR BLD AUTO: 7.1 % (ref 5–12)
MONOCYTES NFR BLD AUTO: 7.7 % (ref 5–12)
MONOCYTES NFR BLD AUTO: 7.8 % (ref 5–12)
MRSA DNA SPEC QL NAA+PROBE: NEGATIVE
MYELOCYTES NFR BLD MANUAL: 1 % (ref 0–0)
NEUTROPHILS # BLD AUTO: 12.08 10*3/MM3 (ref 1.7–7)
NEUTROPHILS # BLD AUTO: 14.16 10*3/MM3 (ref 1.7–7)
NEUTROPHILS # BLD AUTO: 15.1 10*3/MM3 (ref 1.7–7)
NEUTROPHILS NFR BLD AUTO: 10.75 10*3/MM3 (ref 1.7–7)
NEUTROPHILS NFR BLD AUTO: 12.25 10*3/MM3 (ref 1.7–7)
NEUTROPHILS NFR BLD AUTO: 13.82 10*3/MM3 (ref 1.7–7)
NEUTROPHILS NFR BLD AUTO: 13.88 10*3/MM3 (ref 1.7–7)
NEUTROPHILS NFR BLD AUTO: 19.81 10*3/MM3 (ref 1.7–7)
NEUTROPHILS NFR BLD AUTO: 3.45 10*3/MM3 (ref 1.7–7)
NEUTROPHILS NFR BLD AUTO: 4.24 10*3/MM3 (ref 1.7–7)
NEUTROPHILS NFR BLD AUTO: 4.46 10*3/MM3 (ref 1.7–7)
NEUTROPHILS NFR BLD AUTO: 4.89 10*3/MM3 (ref 1.7–7)
NEUTROPHILS NFR BLD AUTO: 80.9 % (ref 42.7–76)
NEUTROPHILS NFR BLD AUTO: 80.9 % (ref 42.7–76)
NEUTROPHILS NFR BLD AUTO: 85.7 % (ref 42.7–76)
NEUTROPHILS NFR BLD AUTO: 85.8 % (ref 42.7–76)
NEUTROPHILS NFR BLD AUTO: 85.8 % (ref 42.7–76)
NEUTROPHILS NFR BLD AUTO: 88.3 % (ref 42.7–76)
NEUTROPHILS NFR BLD AUTO: 91.3 % (ref 42.7–76)
NEUTROPHILS NFR BLD AUTO: 93.1 % (ref 42.7–76)
NEUTROPHILS NFR BLD AUTO: 93.4 % (ref 42.7–76)
NEUTROPHILS NFR BLD MANUAL: 78 % (ref 42.7–76)
NEUTROPHILS NFR BLD MANUAL: 80 % (ref 42.7–76)
NEUTROPHILS NFR BLD MANUAL: 88 % (ref 42.7–76)
NEUTS BAND NFR BLD MANUAL: 11 % (ref 0–5)
NEUTS BAND NFR BLD MANUAL: 16 % (ref 0–5)
NEUTS BAND NFR BLD MANUAL: 3 % (ref 0–5)
NITRITE UR QL STRIP: NEGATIVE
NOTE: ABNORMAL
NOTIFIED BY: ABNORMAL
NOTIFIED WHO: ABNORMAL
NRBC BLD AUTO-RTO: 0 /100 WBC (ref 0–0.2)
NT-PROBNP SERPL-MCNC: 1762 PG/ML (ref 0–1800)
NT-PROBNP SERPL-MCNC: 370.2 PG/ML (ref 0–1800)
OXYHGB MFR BLDV: 78.6 % (ref 94–99)
OXYHGB MFR BLDV: 81.2 % (ref 94–99)
OXYHGB MFR BLDV: 85.3 % (ref 94–99)
OXYHGB MFR BLDV: 86.2 % (ref 94–99)
OXYHGB MFR BLDV: 87 % (ref 94–99)
OXYHGB MFR BLDV: 87.1 % (ref 94–99)
OXYHGB MFR BLDV: 87.3 % (ref 94–99)
OXYHGB MFR BLDV: 87.6 % (ref 94–99)
OXYHGB MFR BLDV: 88.5 % (ref 94–99)
OXYHGB MFR BLDV: 89.2 % (ref 94–99)
OXYHGB MFR BLDV: 90.2 % (ref 94–99)
OXYHGB MFR BLDV: 90.4 % (ref 94–99)
OXYHGB MFR BLDV: 90.8 % (ref 94–99)
OXYHGB MFR BLDV: 91 % (ref 94–99)
OXYHGB MFR BLDV: 91.1 % (ref 94–99)
OXYHGB MFR BLDV: 91.5 % (ref 94–99)
OXYHGB MFR BLDV: 92.2 % (ref 94–99)
OXYHGB MFR BLDV: 93.2 % (ref 94–99)
OXYHGB MFR BLDV: 93.4 % (ref 94–99)
OXYHGB MFR BLDV: 93.9 % (ref 94–99)
OXYHGB MFR BLDV: 94.1 % (ref 94–99)
OXYHGB MFR BLDV: 94.3 % (ref 94–99)
OXYHGB MFR BLDV: 94.4 % (ref 94–99)
OXYHGB MFR BLDV: 94.9 % (ref 94–99)
OXYHGB MFR BLDV: 95.3 % (ref 94–99)
OXYHGB MFR BLDV: 95.4 % (ref 94–99)
OXYHGB MFR BLDV: 96.9 % (ref 94–99)
OXYHGB MFR BLDV: 97.3 % (ref 94–99)
OXYHGB MFR BLDV: 97.3 % (ref 94–99)
OXYHGB MFR BLDV: 97.6 % (ref 94–99)
OXYHGB MFR BLDV: 97.9 % (ref 94–99)
OXYHGB MFR BLDV: >98.5 % (ref 94–99)
PCO2 BLDA: 35.4 MM HG (ref 35–45)
PCO2 BLDA: 36 MM HG (ref 35–45)
PCO2 BLDA: 36.6 MM HG (ref 35–45)
PCO2 BLDA: 38.6 MM HG (ref 35–45)
PCO2 BLDA: 42.3 MM HG (ref 35–45)
PCO2 BLDA: 42.4 MM HG (ref 35–45)
PCO2 BLDA: 43.3 MM HG (ref 35–45)
PCO2 BLDA: 43.3 MM HG (ref 35–45)
PCO2 BLDA: 43.4 MM HG (ref 35–45)
PCO2 BLDA: 43.9 MM HG (ref 35–45)
PCO2 BLDA: 44.5 MM HG (ref 35–45)
PCO2 BLDA: 44.6 MM HG (ref 35–45)
PCO2 BLDA: 44.7 MM HG (ref 35–45)
PCO2 BLDA: 44.9 MM HG (ref 35–45)
PCO2 BLDA: 45.7 MM HG (ref 35–45)
PCO2 BLDA: 45.7 MM HG (ref 35–45)
PCO2 BLDA: 46.1 MM HG (ref 35–45)
PCO2 BLDA: 47 MM HG (ref 35–45)
PCO2 BLDA: 47.2 MM HG (ref 35–45)
PCO2 BLDA: 48.4 MM HG (ref 35–45)
PCO2 BLDA: 49.7 MM HG (ref 35–45)
PCO2 BLDA: 49.9 MM HG (ref 35–45)
PCO2 BLDA: 50 MM HG (ref 35–45)
PCO2 BLDA: 51.3 MM HG (ref 35–45)
PCO2 BLDA: 52.2 MM HG (ref 35–45)
PCO2 BLDA: 52.3 MM HG (ref 35–45)
PCO2 BLDA: 52.5 MM HG (ref 35–45)
PCO2 BLDA: 53.8 MM HG (ref 35–45)
PCO2 BLDA: 55.5 MM HG (ref 35–45)
PCO2 BLDA: 57.4 MM HG (ref 35–45)
PCO2 BLDA: 57.5 MM HG (ref 35–45)
PCO2 BLDA: 62.7 MM HG (ref 35–45)
PCO2 BLDA: 63 MM HG (ref 35–45)
PCO2 BLDA: 63.2 MM HG (ref 35–45)
PCO2 TEMP ADJ BLD: ABNORMAL MM[HG]
PEEP RESPIRATORY: 10 CM[H2O]
PEEP RESPIRATORY: 12 CM[H2O]
PEEP RESPIRATORY: 15 CM[H2O]
PEEP RESPIRATORY: 8 CM[H2O]
PH BLDA: 7.27 PH UNITS (ref 7.35–7.45)
PH BLDA: 7.29 PH UNITS (ref 7.35–7.45)
PH BLDA: 7.32 PH UNITS (ref 7.35–7.45)
PH BLDA: 7.34 PH UNITS (ref 7.35–7.45)
PH BLDA: 7.35 PH UNITS (ref 7.35–7.45)
PH BLDA: 7.35 PH UNITS (ref 7.35–7.45)
PH BLDA: 7.36 PH UNITS (ref 7.35–7.45)
PH BLDA: 7.36 PH UNITS (ref 7.35–7.45)
PH BLDA: 7.37 PH UNITS (ref 7.35–7.45)
PH BLDA: 7.38 PH UNITS (ref 7.35–7.45)
PH BLDA: 7.39 PH UNITS (ref 7.35–7.45)
PH BLDA: 7.39 PH UNITS (ref 7.35–7.45)
PH BLDA: 7.4 PH UNITS (ref 7.35–7.45)
PH BLDA: 7.4 PH UNITS (ref 7.35–7.45)
PH BLDA: 7.41 PH UNITS (ref 7.35–7.45)
PH BLDA: 7.42 PH UNITS (ref 7.35–7.45)
PH BLDA: 7.43 PH UNITS (ref 7.35–7.45)
PH BLDA: 7.44 PH UNITS (ref 7.35–7.45)
PH BLDA: 7.45 PH UNITS (ref 7.35–7.45)
PH BLDA: 7.46 PH UNITS (ref 7.35–7.45)
PH BLDA: 7.47 PH UNITS (ref 7.35–7.45)
PH UR STRIP.AUTO: 5.5 [PH] (ref 5–8)
PH UR STRIP.AUTO: <=5 [PH] (ref 5–8)
PH UR STRIP.AUTO: <=5 [PH] (ref 5–8)
PH, TEMP CORRECTED: ABNORMAL
PLAT MORPH BLD: NORMAL
PLATELET # BLD AUTO: 118 10*3/MM3 (ref 140–450)
PLATELET # BLD AUTO: 124 10*3/MM3 (ref 140–450)
PLATELET # BLD AUTO: 126 10*3/MM3 (ref 140–450)
PLATELET # BLD AUTO: 131 10*3/MM3 (ref 140–450)
PLATELET # BLD AUTO: 135 10*3/MM3 (ref 140–450)
PLATELET # BLD AUTO: 136 10*3/MM3 (ref 140–450)
PLATELET # BLD AUTO: 143 10*3/MM3 (ref 140–450)
PLATELET # BLD AUTO: 148 10*3/MM3 (ref 140–450)
PLATELET # BLD AUTO: 150 10*3/MM3 (ref 140–450)
PLATELET # BLD AUTO: 154 10*3/MM3 (ref 140–450)
PLATELET # BLD AUTO: 165 10*3/MM3 (ref 140–450)
PLATELET # BLD AUTO: 176 10*3/MM3 (ref 140–450)
PLATELET # BLD AUTO: 198 10*3/MM3 (ref 140–450)
PLATELET # BLD AUTO: 201 10*3/MM3 (ref 140–450)
PLATELET # BLD AUTO: 216 10*3/MM3 (ref 140–450)
PLATELET # BLD AUTO: 221 10*3/MM3 (ref 140–450)
PLATELET # BLD AUTO: 226 10*3/MM3 (ref 140–450)
PLATELET # BLD AUTO: 232 10*3/MM3 (ref 140–450)
PLATELET # BLD AUTO: 239 10*3/MM3 (ref 140–450)
PMV BLD AUTO: 10.5 FL (ref 6–12)
PMV BLD AUTO: 10.7 FL (ref 6–12)
PMV BLD AUTO: 10.9 FL (ref 6–12)
PMV BLD AUTO: 11 FL (ref 6–12)
PMV BLD AUTO: 11.3 FL (ref 6–12)
PMV BLD AUTO: 11.4 FL (ref 6–12)
PMV BLD AUTO: 11.9 FL (ref 6–12)
PMV BLD AUTO: 12.1 FL (ref 6–12)
PMV BLD AUTO: 12.2 FL (ref 6–12)
PMV BLD AUTO: 12.4 FL (ref 6–12)
PMV BLD AUTO: 12.5 FL (ref 6–12)
PMV BLD AUTO: 12.7 FL (ref 6–12)
PMV BLD AUTO: 12.8 FL (ref 6–12)
PMV BLD AUTO: 12.8 FL (ref 6–12)
PMV BLD AUTO: 12.9 FL (ref 6–12)
PMV BLD AUTO: 13 FL (ref 6–12)
PMV BLD AUTO: 13 FL (ref 6–12)
PMV BLD AUTO: 13.1 FL (ref 6–12)
PMV BLD AUTO: 13.3 FL (ref 6–12)
PO2 BLDA: 101 MM HG (ref 83–108)
PO2 BLDA: 115 MM HG (ref 83–108)
PO2 BLDA: 116 MM HG (ref 83–108)
PO2 BLDA: 121 MM HG (ref 83–108)
PO2 BLDA: 156 MM HG (ref 83–108)
PO2 BLDA: 180 MM HG (ref 83–108)
PO2 BLDA: 232 MM HG (ref 83–108)
PO2 BLDA: 40.7 MM HG (ref 83–108)
PO2 BLDA: 47.9 MM HG (ref 83–108)
PO2 BLDA: 51.9 MM HG (ref 83–108)
PO2 BLDA: 52.4 MM HG (ref 83–108)
PO2 BLDA: 53.1 MM HG (ref 83–108)
PO2 BLDA: 53.3 MM HG (ref 83–108)
PO2 BLDA: 54 MM HG (ref 83–108)
PO2 BLDA: 54.4 MM HG (ref 83–108)
PO2 BLDA: 55.9 MM HG (ref 83–108)
PO2 BLDA: 56 MM HG (ref 83–108)
PO2 BLDA: 58.9 MM HG (ref 83–108)
PO2 BLDA: 59.9 MM HG (ref 83–108)
PO2 BLDA: 60.6 MM HG (ref 83–108)
PO2 BLDA: 61.3 MM HG (ref 83–108)
PO2 BLDA: 63.2 MM HG (ref 83–108)
PO2 BLDA: 64.3 MM HG (ref 83–108)
PO2 BLDA: 64.3 MM HG (ref 83–108)
PO2 BLDA: 66.7 MM HG (ref 83–108)
PO2 BLDA: 66.7 MM HG (ref 83–108)
PO2 BLDA: 68.8 MM HG (ref 83–108)
PO2 BLDA: 72.6 MM HG (ref 83–108)
PO2 BLDA: 72.8 MM HG (ref 83–108)
PO2 BLDA: 74.5 MM HG (ref 83–108)
PO2 BLDA: 75 MM HG (ref 83–108)
PO2 BLDA: 79.9 MM HG (ref 83–108)
PO2 BLDA: 80.4 MM HG (ref 83–108)
PO2 BLDA: 88.2 MM HG (ref 83–108)
PO2 TEMP ADJ BLD: ABNORMAL MM[HG]
POTASSIUM SERPL-SCNC: 3 MMOL/L (ref 3.5–5.2)
POTASSIUM SERPL-SCNC: 3.7 MMOL/L (ref 3.5–5.2)
POTASSIUM SERPL-SCNC: 3.9 MMOL/L (ref 3.5–5.2)
POTASSIUM SERPL-SCNC: 4 MMOL/L (ref 3.5–5.2)
POTASSIUM SERPL-SCNC: 4 MMOL/L (ref 3.5–5.2)
POTASSIUM SERPL-SCNC: 4.1 MMOL/L (ref 3.5–5.2)
POTASSIUM SERPL-SCNC: 4.5 MMOL/L (ref 3.5–5.2)
POTASSIUM SERPL-SCNC: 4.6 MMOL/L (ref 3.5–5.2)
POTASSIUM SERPL-SCNC: 4.7 MMOL/L (ref 3.5–5.2)
POTASSIUM SERPL-SCNC: 4.9 MMOL/L (ref 3.5–5.2)
POTASSIUM SERPL-SCNC: 4.9 MMOL/L (ref 3.5–5.2)
POTASSIUM SERPL-SCNC: 5.1 MMOL/L (ref 3.5–5.2)
POTASSIUM SERPL-SCNC: 5.2 MMOL/L (ref 3.5–5.2)
POTASSIUM SERPL-SCNC: 5.2 MMOL/L (ref 3.5–5.2)
POTASSIUM SERPL-SCNC: 5.5 MMOL/L (ref 3.5–5.2)
PROCALCITONIN SERPL-MCNC: 0.11 NG/ML (ref 0–0.25)
PROCALCITONIN SERPL-MCNC: 0.16 NG/ML (ref 0–0.25)
PROCALCITONIN SERPL-MCNC: 0.22 NG/ML (ref 0–0.25)
PROCALCITONIN SERPL-MCNC: 0.45 NG/ML (ref 0–0.25)
PROCALCITONIN SERPL-MCNC: 2.59 NG/ML (ref 0–0.25)
PROT SERPL-MCNC: 4.6 G/DL (ref 6–8.5)
PROT SERPL-MCNC: 4.7 G/DL (ref 6–8.5)
PROT SERPL-MCNC: 4.8 G/DL (ref 6–8.5)
PROT SERPL-MCNC: 5 G/DL (ref 6–8.5)
PROT SERPL-MCNC: 5 G/DL (ref 6–8.5)
PROT SERPL-MCNC: 5.1 G/DL (ref 6–8.5)
PROT SERPL-MCNC: 5.3 G/DL (ref 6–8.5)
PROT SERPL-MCNC: 5.4 G/DL (ref 6–8.5)
PROT SERPL-MCNC: 5.6 G/DL (ref 6–8.5)
PROT SERPL-MCNC: 5.8 G/DL (ref 6–8.5)
PROT SERPL-MCNC: 5.8 G/DL (ref 6–8.5)
PROT SERPL-MCNC: 5.9 G/DL (ref 6–8.5)
PROT SERPL-MCNC: 6 G/DL (ref 6–8.5)
PROT SERPL-MCNC: 6 G/DL (ref 6–8.5)
PROT SERPL-MCNC: 6.2 G/DL (ref 6–8.5)
PROT SERPL-MCNC: 6.3 G/DL (ref 6–8.5)
PROT SERPL-MCNC: 6.4 G/DL (ref 6–8.5)
PROT SERPL-MCNC: 6.5 G/DL (ref 6–8.5)
PROT SERPL-MCNC: 6.7 G/DL (ref 6–8.5)
PROT SERPL-MCNC: 7.1 G/DL (ref 6–8.5)
PROT UR QL STRIP: ABNORMAL
PROTHROMBIN TIME: 13.1 SECONDS (ref 12.8–14.5)
PROTHROMBIN TIME: 13.2 SECONDS (ref 12.8–14.5)
PROTHROMBIN TIME: 14 SECONDS (ref 12.8–14.5)
PROTHROMBIN TIME: 14.3 SECONDS (ref 12.8–14.5)
PROTHROMBIN TIME: 14.9 SECONDS (ref 12.8–14.5)
PROTHROMBIN TIME: 15 SECONDS (ref 12.8–14.5)
PROTHROMBIN TIME: 15.5 SECONDS (ref 12.8–14.5)
PROTHROMBIN TIME: 15.9 SECONDS (ref 12.8–14.5)
PROTHROMBIN TIME: 16.6 SECONDS (ref 12.8–14.5)
PROTHROMBIN TIME: 17.2 SECONDS (ref 12.8–14.5)
QT INTERVAL: 268 MS
QT INTERVAL: 278 MS
QT INTERVAL: 388 MS
QTC INTERVAL: 437 MS
QTC INTERVAL: 453 MS
QTC INTERVAL: 477 MS
RBC # BLD AUTO: 2.94 10*6/MM3 (ref 4.14–5.8)
RBC # BLD AUTO: 3.23 10*6/MM3 (ref 4.14–5.8)
RBC # BLD AUTO: 3.34 10*6/MM3 (ref 4.14–5.8)
RBC # BLD AUTO: 3.4 10*6/MM3 (ref 4.14–5.8)
RBC # BLD AUTO: 3.43 10*6/MM3 (ref 4.14–5.8)
RBC # BLD AUTO: 3.56 10*6/MM3 (ref 4.14–5.8)
RBC # BLD AUTO: 3.74 10*6/MM3 (ref 4.14–5.8)
RBC # BLD AUTO: 3.78 10*6/MM3 (ref 4.14–5.8)
RBC # BLD AUTO: 3.84 10*6/MM3 (ref 4.14–5.8)
RBC # BLD AUTO: 4 10*6/MM3 (ref 4.14–5.8)
RBC # BLD AUTO: 4.18 10*6/MM3 (ref 4.14–5.8)
RBC # BLD AUTO: 4.25 10*6/MM3 (ref 4.14–5.8)
RBC # BLD AUTO: 4.25 10*6/MM3 (ref 4.14–5.8)
RBC # BLD AUTO: 4.31 10*6/MM3 (ref 4.14–5.8)
RBC # BLD AUTO: 4.41 10*6/MM3 (ref 4.14–5.8)
RBC # BLD AUTO: 4.63 10*6/MM3 (ref 4.14–5.8)
RBC # BLD AUTO: 4.83 10*6/MM3 (ref 4.14–5.8)
RBC # BLD AUTO: 4.84 10*6/MM3 (ref 4.14–5.8)
RBC # BLD AUTO: 4.92 10*6/MM3 (ref 4.14–5.8)
RBC # UR: ABNORMAL /HPF
RBC # UR: ABNORMAL /HPF
RBC # UR: NORMAL /HPF
RBC MORPH BLD: NORMAL
REF LAB TEST METHOD: ABNORMAL
REF LAB TEST METHOD: ABNORMAL
REF LAB TEST METHOD: NORMAL
S AUREUS DNA SPEC QL NAA+PROBE: NEGATIVE
SAO2 % BLDCOA: 79.9 % (ref 94–99)
SAO2 % BLDCOA: 82.2 % (ref 94–99)
SAO2 % BLDCOA: 86.6 % (ref 94–99)
SAO2 % BLDCOA: 87.3 % (ref 94–99)
SAO2 % BLDCOA: 88.1 % (ref 94–99)
SAO2 % BLDCOA: 88.2 % (ref 94–99)
SAO2 % BLDCOA: 88.5 % (ref 94–99)
SAO2 % BLDCOA: 88.8 % (ref 94–99)
SAO2 % BLDCOA: 89.2 % (ref 94–99)
SAO2 % BLDCOA: 89.9 % (ref 94–99)
SAO2 % BLDCOA: 91.6 % (ref 94–99)
SAO2 % BLDCOA: 91.8 % (ref 94–99)
SAO2 % BLDCOA: 91.9 % (ref 94–99)
SAO2 % BLDCOA: 91.9 % (ref 94–99)
SAO2 % BLDCOA: 92.5 % (ref 94–99)
SAO2 % BLDCOA: 92.6 % (ref 94–99)
SAO2 % BLDCOA: 92.9 % (ref 94–99)
SAO2 % BLDCOA: 94.2 % (ref 94–99)
SAO2 % BLDCOA: 94.4 % (ref 94–99)
SAO2 % BLDCOA: 94.9 % (ref 94–99)
SAO2 % BLDCOA: 95 % (ref 94–99)
SAO2 % BLDCOA: 95.1 % (ref 94–99)
SAO2 % BLDCOA: 95.1 % (ref 94–99)
SAO2 % BLDCOA: 95.9 % (ref 94–99)
SAO2 % BLDCOA: 96.2 % (ref 94–99)
SAO2 % BLDCOA: 96.2 % (ref 94–99)
SAO2 % BLDCOA: 97.8 % (ref 94–99)
SAO2 % BLDCOA: 98 % (ref 94–99)
SAO2 % BLDCOA: 98.3 % (ref 94–99)
SAO2 % BLDCOA: 98.8 % (ref 94–99)
SAO2 % BLDCOA: 98.8 % (ref 94–99)
SAO2 % BLDCOA: >99.2 % (ref 94–99)
SARS-COV-2 RNA RESP QL NAA+PROBE: DETECTED
SCAN SLIDE: NORMAL
SCAN SLIDE: NORMAL
SET MECH RESP RATE: 18
SET MECH RESP RATE: 20
SET MECH RESP RATE: 22
SET MECH RESP RATE: 26
SET MECH RESP RATE: 28
SMALL PLATELETS BLD QL SMEAR: ABNORMAL
SODIUM SERPL-SCNC: 133 MMOL/L (ref 136–145)
SODIUM SERPL-SCNC: 135 MMOL/L (ref 136–145)
SODIUM SERPL-SCNC: 136 MMOL/L (ref 136–145)
SODIUM SERPL-SCNC: 137 MMOL/L (ref 136–145)
SODIUM SERPL-SCNC: 138 MMOL/L (ref 136–145)
SODIUM SERPL-SCNC: 139 MMOL/L (ref 136–145)
SODIUM SERPL-SCNC: 140 MMOL/L (ref 136–145)
SODIUM SERPL-SCNC: 140 MMOL/L (ref 136–145)
SODIUM SERPL-SCNC: 142 MMOL/L (ref 136–145)
SODIUM SERPL-SCNC: 146 MMOL/L (ref 136–145)
SODIUM SERPL-SCNC: 147 MMOL/L (ref 136–145)
SODIUM SERPL-SCNC: 147 MMOL/L (ref 136–145)
SODIUM SERPL-SCNC: 148 MMOL/L (ref 136–145)
SODIUM SERPL-SCNC: 149 MMOL/L (ref 136–145)
SODIUM SERPL-SCNC: 149 MMOL/L (ref 136–145)
SODIUM SERPL-SCNC: 150 MMOL/L (ref 136–145)
SODIUM SERPL-SCNC: 151 MMOL/L (ref 136–145)
SP GR UR STRIP: 1.02 (ref 1–1.03)
SQUAMOUS #/AREA URNS HPF: ABNORMAL /HPF
SQUAMOUS #/AREA URNS HPF: ABNORMAL /HPF
SQUAMOUS #/AREA URNS HPF: NORMAL /HPF
STRESS TARGET HR: 122 BPM
TROPONIN T SERPL-MCNC: 0.02 NG/ML (ref 0–0.03)
TROPONIN T SERPL-MCNC: 0.03 NG/ML (ref 0–0.03)
URATE CRY URNS QL MICRO: NORMAL /HPF
UROBILINOGEN UR QL STRIP: ABNORMAL
VANCOMYCIN TROUGH SERPL-MCNC: 5.7 MCG/ML (ref 5–20)
VANCOMYCIN TROUGH SERPL-MCNC: 9.6 MCG/ML (ref 5–20)
VENTILATOR MODE: ABNORMAL
VT ON VENT VENT: 450 ML
VT ON VENT VENT: 480 ML
WBC # BLD AUTO: 10.94 10*3/MM3 (ref 3.4–10.8)
WBC # BLD AUTO: 11.51 10*3/MM3 (ref 3.4–10.8)
WBC # BLD AUTO: 13.27 10*3/MM3 (ref 3.4–10.8)
WBC # BLD AUTO: 14.26 10*3/MM3 (ref 3.4–10.8)
WBC # BLD AUTO: 14.27 10*3/MM3 (ref 3.4–10.8)
WBC # BLD AUTO: 14.73 10*3/MM3 (ref 3.4–10.8)
WBC # BLD AUTO: 14.91 10*3/MM3 (ref 3.4–10.8)
WBC # BLD AUTO: 15.44 10*3/MM3 (ref 3.4–10.8)
WBC # BLD AUTO: 15.56 10*3/MM3 (ref 3.4–10.8)
WBC # BLD AUTO: 15.63 10*3/MM3 (ref 3.4–10.8)
WBC # BLD AUTO: 16.06 10*3/MM3 (ref 3.4–10.8)
WBC # BLD AUTO: 17.02 10*3/MM3 (ref 3.4–10.8)
WBC # BLD AUTO: 20.13 10*3/MM3 (ref 3.4–10.8)
WBC # BLD AUTO: 21.69 10*3/MM3 (ref 3.4–10.8)
WBC # BLD AUTO: 4.02 10*3/MM3 (ref 3.4–10.8)
WBC # BLD AUTO: 5.24 10*3/MM3 (ref 3.4–10.8)
WBC # BLD AUTO: 5.51 10*3/MM3 (ref 3.4–10.8)
WBC # BLD AUTO: 5.71 10*3/MM3 (ref 3.4–10.8)
WBC # BLD AUTO: 9.55 10*3/MM3 (ref 3.4–10.8)
WBC UR QL AUTO: ABNORMAL /HPF
WBC UR QL AUTO: ABNORMAL /HPF
WBC UR QL AUTO: NORMAL /HPF
WHOLE BLOOD HOLD SPECIMEN: NORMAL
WHOLE BLOOD HOLD SPECIMEN: NORMAL
YEAST URNS QL MICRO: ABNORMAL /HPF

## 2021-01-01 PROCEDURE — 85007 BL SMEAR W/DIFF WBC COUNT: CPT | Performed by: INTERNAL MEDICINE

## 2021-01-01 PROCEDURE — 94003 VENT MGMT INPAT SUBQ DAY: CPT

## 2021-01-01 PROCEDURE — 25010000002 DEXAMETHASONE PER 1 MG: Performed by: HOSPITALIST

## 2021-01-01 PROCEDURE — 85610 PROTHROMBIN TIME: CPT | Performed by: HOSPITALIST

## 2021-01-01 PROCEDURE — 63710000001 INSULIN DETEMIR PER 5 UNITS: Performed by: INTERNAL MEDICINE

## 2021-01-01 PROCEDURE — 71045 X-RAY EXAM CHEST 1 VIEW: CPT

## 2021-01-01 PROCEDURE — 71046 X-RAY EXAM CHEST 2 VIEWS: CPT

## 2021-01-01 PROCEDURE — 99291 CRITICAL CARE FIRST HOUR: CPT | Performed by: INTERNAL MEDICINE

## 2021-01-01 PROCEDURE — 82805 BLOOD GASES W/O2 SATURATION: CPT

## 2021-01-01 PROCEDURE — 82962 GLUCOSE BLOOD TEST: CPT

## 2021-01-01 PROCEDURE — 87640 STAPH A DNA AMP PROBE: CPT | Performed by: INTERNAL MEDICINE

## 2021-01-01 PROCEDURE — 94799 UNLISTED PULMONARY SVC/PX: CPT

## 2021-01-01 PROCEDURE — 82375 ASSAY CARBOXYHB QUANT: CPT

## 2021-01-01 PROCEDURE — 83050 HGB METHEMOGLOBIN QUAN: CPT

## 2021-01-01 PROCEDURE — 94760 N-INVAS EAR/PLS OXIMETRY 1: CPT

## 2021-01-01 PROCEDURE — 87040 BLOOD CULTURE FOR BACTERIA: CPT | Performed by: NURSE PRACTITIONER

## 2021-01-01 PROCEDURE — 86140 C-REACTIVE PROTEIN: CPT | Performed by: NURSE PRACTITIONER

## 2021-01-01 PROCEDURE — 85730 THROMBOPLASTIN TIME PARTIAL: CPT | Performed by: HOSPITALIST

## 2021-01-01 PROCEDURE — 25010000002 DEXAMETHASONE PER 1 MG: Performed by: INTERNAL MEDICINE

## 2021-01-01 PROCEDURE — 93970 EXTREMITY STUDY: CPT | Performed by: RADIOLOGY

## 2021-01-01 PROCEDURE — 87086 URINE CULTURE/COLONY COUNT: CPT | Performed by: INTERNAL MEDICINE

## 2021-01-01 PROCEDURE — 25010000002 PROPOFOL 10 MG/ML EMULSION: Performed by: INTERNAL MEDICINE

## 2021-01-01 PROCEDURE — 63710000001 INSULIN ASPART PER 5 UNITS: Performed by: INTERNAL MEDICINE

## 2021-01-01 PROCEDURE — 83605 ASSAY OF LACTIC ACID: CPT | Performed by: HOSPITALIST

## 2021-01-01 PROCEDURE — 71046 X-RAY EXAM CHEST 2 VIEWS: CPT | Performed by: RADIOLOGY

## 2021-01-01 PROCEDURE — 93306 TTE W/DOPPLER COMPLETE: CPT | Performed by: INTERNAL MEDICINE

## 2021-01-01 PROCEDURE — 5A1955Z RESPIRATORY VENTILATION, GREATER THAN 96 CONSECUTIVE HOURS: ICD-10-PCS | Performed by: INTERNAL MEDICINE

## 2021-01-01 PROCEDURE — XW033E5 INTRODUCTION OF REMDESIVIR ANTI-INFECTIVE INTO PERIPHERAL VEIN, PERCUTANEOUS APPROACH, NEW TECHNOLOGY GROUP 5: ICD-10-PCS | Performed by: HOSPITALIST

## 2021-01-01 PROCEDURE — 25010000002 PHENYLEPHRINE 10 MG/ML SOLUTION: Performed by: INTERNAL MEDICINE

## 2021-01-01 PROCEDURE — 25010000002 LORAZEPAM PER 2 MG: Performed by: NURSE PRACTITIONER

## 2021-01-01 PROCEDURE — 87070 CULTURE OTHR SPECIMN AEROBIC: CPT | Performed by: INTERNAL MEDICINE

## 2021-01-01 PROCEDURE — 80053 COMPREHEN METABOLIC PANEL: CPT | Performed by: INTERNAL MEDICINE

## 2021-01-01 PROCEDURE — 80048 BASIC METABOLIC PNL TOTAL CA: CPT | Performed by: INTERNAL MEDICINE

## 2021-01-01 PROCEDURE — 25010000002 ENOXAPARIN PER 10 MG: Performed by: INTERNAL MEDICINE

## 2021-01-01 PROCEDURE — 85027 COMPLETE CBC AUTOMATED: CPT | Performed by: INTERNAL MEDICINE

## 2021-01-01 PROCEDURE — 82728 ASSAY OF FERRITIN: CPT | Performed by: HOSPITALIST

## 2021-01-01 PROCEDURE — 86140 C-REACTIVE PROTEIN: CPT | Performed by: HOSPITALIST

## 2021-01-01 PROCEDURE — 94002 VENT MGMT INPAT INIT DAY: CPT

## 2021-01-01 PROCEDURE — 25010000002 MEROPENEM PER 100 MG: Performed by: INTERNAL MEDICINE

## 2021-01-01 PROCEDURE — 83615 LACTATE (LD) (LDH) ENZYME: CPT | Performed by: HOSPITALIST

## 2021-01-01 PROCEDURE — 82565 ASSAY OF CREATININE: CPT | Performed by: HOSPITALIST

## 2021-01-01 PROCEDURE — 78580 LUNG PERFUSION IMAGING: CPT

## 2021-01-01 PROCEDURE — 83880 ASSAY OF NATRIURETIC PEPTIDE: CPT | Performed by: PHYSICIAN ASSISTANT

## 2021-01-01 PROCEDURE — 25010000002 FENTANYL CITRATE (PF) 2500 MCG/50ML SOLUTION: Performed by: INTERNAL MEDICINE

## 2021-01-01 PROCEDURE — 94762 N-INVAS EAR/PLS OXIMTRY CONT: CPT

## 2021-01-01 PROCEDURE — 87040 BLOOD CULTURE FOR BACTERIA: CPT | Performed by: PHYSICIAN ASSISTANT

## 2021-01-01 PROCEDURE — 87040 BLOOD CULTURE FOR BACTERIA: CPT | Performed by: INTERNAL MEDICINE

## 2021-01-01 PROCEDURE — 85379 FIBRIN DEGRADATION QUANT: CPT | Performed by: HOSPITALIST

## 2021-01-01 PROCEDURE — 87205 SMEAR GRAM STAIN: CPT | Performed by: INTERNAL MEDICINE

## 2021-01-01 PROCEDURE — 25010000002 VANCOMYCIN 5 G RECONSTITUTED SOLUTION: Performed by: INTERNAL MEDICINE

## 2021-01-01 PROCEDURE — 71045 X-RAY EXAM CHEST 1 VIEW: CPT | Performed by: RADIOLOGY

## 2021-01-01 PROCEDURE — 85025 COMPLETE CBC W/AUTO DIFF WBC: CPT | Performed by: INTERNAL MEDICINE

## 2021-01-01 PROCEDURE — 99285 EMERGENCY DEPT VISIT HI MDM: CPT

## 2021-01-01 PROCEDURE — 84145 PROCALCITONIN (PCT): CPT | Performed by: INTERNAL MEDICINE

## 2021-01-01 PROCEDURE — 87641 MR-STAPH DNA AMP PROBE: CPT | Performed by: INTERNAL MEDICINE

## 2021-01-01 PROCEDURE — 99232 SBSQ HOSP IP/OBS MODERATE 35: CPT | Performed by: INTERNAL MEDICINE

## 2021-01-01 PROCEDURE — 25010000002 THIAMINE PER 100 MG: Performed by: INTERNAL MEDICINE

## 2021-01-01 PROCEDURE — 82248 BILIRUBIN DIRECT: CPT | Performed by: HOSPITALIST

## 2021-01-01 PROCEDURE — 85025 COMPLETE CBC W/AUTO DIFF WBC: CPT | Performed by: HOSPITALIST

## 2021-01-01 PROCEDURE — 80053 COMPREHEN METABOLIC PANEL: CPT | Performed by: HOSPITALIST

## 2021-01-01 PROCEDURE — 71275 CT ANGIOGRAPHY CHEST: CPT

## 2021-01-01 PROCEDURE — 36600 WITHDRAWAL OF ARTERIAL BLOOD: CPT

## 2021-01-01 PROCEDURE — 25010000002 VANCOMYCIN 5 G RECONSTITUTED SOLUTION

## 2021-01-01 PROCEDURE — 84145 PROCALCITONIN (PCT): CPT | Performed by: HOSPITALIST

## 2021-01-01 PROCEDURE — 43752 NASAL/OROGASTRIC W/TUBE PLMT: CPT | Performed by: SURGERY

## 2021-01-01 PROCEDURE — 93306 TTE W/DOPPLER COMPLETE: CPT

## 2021-01-01 PROCEDURE — 93010 ELECTROCARDIOGRAM REPORT: CPT | Performed by: INTERNAL MEDICINE

## 2021-01-01 PROCEDURE — 25010000002 ENOXAPARIN PER 10 MG: Performed by: PHYSICIAN ASSISTANT

## 2021-01-01 PROCEDURE — 80076 HEPATIC FUNCTION PANEL: CPT | Performed by: HOSPITALIST

## 2021-01-01 PROCEDURE — 25010000002 MORPHINE PER 10 MG: Performed by: NURSE PRACTITIONER

## 2021-01-01 PROCEDURE — 0DH68UZ INSERTION OF FEEDING DEVICE INTO STOMACH, VIA NATURAL OR ARTIFICIAL OPENING ENDOSCOPIC: ICD-10-PCS | Performed by: SURGERY

## 2021-01-01 PROCEDURE — B548ZZA ULTRASONOGRAPHY OF SUPERIOR VENA CAVA, GUIDANCE: ICD-10-PCS | Performed by: SURGERY

## 2021-01-01 PROCEDURE — 25010000002 AMIODARONE PER 30 MG: Performed by: INTERNAL MEDICINE

## 2021-01-01 PROCEDURE — 31500 INSERT EMERGENCY AIRWAY: CPT | Performed by: INTERNAL MEDICINE

## 2021-01-01 PROCEDURE — 99233 SBSQ HOSP IP/OBS HIGH 50: CPT | Performed by: INTERNAL MEDICINE

## 2021-01-01 PROCEDURE — 25010000002 FUROSEMIDE PER 20 MG: Performed by: INTERNAL MEDICINE

## 2021-01-01 PROCEDURE — 0 IOPAMIDOL PER 1 ML: Performed by: INTERNAL MEDICINE

## 2021-01-01 PROCEDURE — 93970 EXTREMITY STUDY: CPT

## 2021-01-01 PROCEDURE — 80053 COMPREHEN METABOLIC PANEL: CPT | Performed by: PHYSICIAN ASSISTANT

## 2021-01-01 PROCEDURE — 25010000002 VANCOMYCIN: Performed by: INTERNAL MEDICINE

## 2021-01-01 PROCEDURE — 85379 FIBRIN DEGRADATION QUANT: CPT | Performed by: PHYSICIAN ASSISTANT

## 2021-01-01 PROCEDURE — 71275 CT ANGIOGRAPHY CHEST: CPT | Performed by: RADIOLOGY

## 2021-01-01 PROCEDURE — 85025 COMPLETE CBC W/AUTO DIFF WBC: CPT | Performed by: PHYSICIAN ASSISTANT

## 2021-01-01 PROCEDURE — 25010000002 AMIODARONE IN DEXTROSE 5% 360-4.14 MG/200ML-% SOLUTION: Performed by: INTERNAL MEDICINE

## 2021-01-01 PROCEDURE — 0BH18EZ INSERTION OF ENDOTRACHEAL AIRWAY INTO TRACHEA, VIA NATURAL OR ARTIFICIAL OPENING ENDOSCOPIC: ICD-10-PCS | Performed by: INTERNAL MEDICINE

## 2021-01-01 PROCEDURE — 74176 CT ABD & PELVIS W/O CONTRAST: CPT | Performed by: RADIOLOGY

## 2021-01-01 PROCEDURE — 81001 URINALYSIS AUTO W/SCOPE: CPT | Performed by: NURSE PRACTITIONER

## 2021-01-01 PROCEDURE — 25010000003 POTASSIUM CHLORIDE 10 MEQ/100ML SOLUTION: Performed by: INTERNAL MEDICINE

## 2021-01-01 PROCEDURE — 87040 BLOOD CULTURE FOR BACTERIA: CPT | Performed by: HOSPITALIST

## 2021-01-01 PROCEDURE — 93005 ELECTROCARDIOGRAM TRACING: CPT | Performed by: INTERNAL MEDICINE

## 2021-01-01 PROCEDURE — 94660 CPAP INITIATION&MGMT: CPT

## 2021-01-01 PROCEDURE — 70450 CT HEAD/BRAIN W/O DYE: CPT

## 2021-01-01 PROCEDURE — 71250 CT THORAX DX C-: CPT | Performed by: RADIOLOGY

## 2021-01-01 PROCEDURE — 71250 CT THORAX DX C-: CPT

## 2021-01-01 PROCEDURE — 83605 ASSAY OF LACTIC ACID: CPT | Performed by: PHYSICIAN ASSISTANT

## 2021-01-01 PROCEDURE — 25010000002 VANCOMYCIN PER 500 MG: Performed by: INTERNAL MEDICINE

## 2021-01-01 PROCEDURE — 99223 1ST HOSP IP/OBS HIGH 75: CPT | Performed by: PHYSICIAN ASSISTANT

## 2021-01-01 PROCEDURE — 02HV33Z INSERTION OF INFUSION DEVICE INTO SUPERIOR VENA CAVA, PERCUTANEOUS APPROACH: ICD-10-PCS | Performed by: SURGERY

## 2021-01-01 PROCEDURE — 83880 ASSAY OF NATRIURETIC PEPTIDE: CPT | Performed by: INTERNAL MEDICINE

## 2021-01-01 PROCEDURE — 85610 PROTHROMBIN TIME: CPT | Performed by: PHYSICIAN ASSISTANT

## 2021-01-01 PROCEDURE — A9540 TC99M MAA: HCPCS | Performed by: STUDENT IN AN ORGANIZED HEALTH CARE EDUCATION/TRAINING PROGRAM

## 2021-01-01 PROCEDURE — 25010000002 FENTANYL CITRATE (PF) 2500 MCG/50ML SOLUTION

## 2021-01-01 PROCEDURE — 25010000002 DIGOXIN PER 500 MCG: Performed by: INTERNAL MEDICINE

## 2021-01-01 PROCEDURE — 81001 URINALYSIS AUTO W/SCOPE: CPT | Performed by: INTERNAL MEDICINE

## 2021-01-01 PROCEDURE — 0BP1XDZ REMOVAL OF INTRALUMINAL DEVICE FROM TRACHEA, EXTERNAL APPROACH: ICD-10-PCS | Performed by: INTERNAL MEDICINE

## 2021-01-01 PROCEDURE — 99221 1ST HOSP IP/OBS SF/LOW 40: CPT | Performed by: SURGERY

## 2021-01-01 PROCEDURE — 84132 ASSAY OF SERUM POTASSIUM: CPT | Performed by: INTERNAL MEDICINE

## 2021-01-01 PROCEDURE — 93005 ELECTROCARDIOGRAM TRACING: CPT | Performed by: PHYSICIAN ASSISTANT

## 2021-01-01 PROCEDURE — 74176 CT ABD & PELVIS W/O CONTRAST: CPT

## 2021-01-01 PROCEDURE — 25010000002 CEFEPIME PER 500 MG: Performed by: INTERNAL MEDICINE

## 2021-01-01 PROCEDURE — 81001 URINALYSIS AUTO W/SCOPE: CPT | Performed by: PHYSICIAN ASSISTANT

## 2021-01-01 PROCEDURE — 25010000002 DEXAMETHASONE PER 1 MG: Performed by: PHYSICIAN ASSISTANT

## 2021-01-01 PROCEDURE — 87636 SARSCOV2 & INF A&B AMP PRB: CPT | Performed by: PHYSICIAN ASSISTANT

## 2021-01-01 PROCEDURE — 84484 ASSAY OF TROPONIN QUANT: CPT | Performed by: PHYSICIAN ASSISTANT

## 2021-01-01 PROCEDURE — 80202 ASSAY OF VANCOMYCIN: CPT | Performed by: INTERNAL MEDICINE

## 2021-01-01 PROCEDURE — 0 TECHNETIUM ALBUMIN AGGREGATED: Performed by: STUDENT IN AN ORGANIZED HEALTH CARE EDUCATION/TRAINING PROGRAM

## 2021-01-01 PROCEDURE — 25010000002 PROPOFOL 10 MG/ML EMULSION

## 2021-01-01 PROCEDURE — 36556 INSERT NON-TUNNEL CV CATH: CPT | Performed by: SURGERY

## 2021-01-01 PROCEDURE — 99221 1ST HOSP IP/OBS SF/LOW 40: CPT | Performed by: INTERNAL MEDICINE

## 2021-01-01 PROCEDURE — 70450 CT HEAD/BRAIN W/O DYE: CPT | Performed by: RADIOLOGY

## 2021-01-01 PROCEDURE — XW0DXM6 INTRODUCTION OF BARICITINIB INTO MOUTH AND PHARYNX, EXTERNAL APPROACH, NEW TECHNOLOGY GROUP 6: ICD-10-PCS | Performed by: HOSPITALIST

## 2021-01-01 PROCEDURE — 83036 HEMOGLOBIN GLYCOSYLATED A1C: CPT | Performed by: INTERNAL MEDICINE

## 2021-01-01 RX ORDER — DEXTROSE MONOHYDRATE 25 G/50ML
25 INJECTION, SOLUTION INTRAVENOUS
Status: DISCONTINUED | OUTPATIENT
Start: 2021-01-01 | End: 2021-01-01

## 2021-01-01 RX ORDER — VECURONIUM BROMIDE 1 MG/ML
5 INJECTION, POWDER, LYOPHILIZED, FOR SOLUTION INTRAVENOUS ONCE
Status: COMPLETED | OUTPATIENT
Start: 2021-01-01 | End: 2021-01-01

## 2021-01-01 RX ORDER — HYDROXYZINE HYDROCHLORIDE 25 MG/1
25 TABLET, FILM COATED ORAL ONCE
Status: COMPLETED | OUTPATIENT
Start: 2021-01-01 | End: 2021-01-01

## 2021-01-01 RX ORDER — SODIUM CHLORIDE 9 MG/ML
75 INJECTION, SOLUTION INTRAVENOUS CONTINUOUS
Status: DISCONTINUED | OUTPATIENT
Start: 2021-01-01 | End: 2021-01-01

## 2021-01-01 RX ORDER — NICOTINE POLACRILEX 4 MG
15 LOZENGE BUCCAL
Status: DISCONTINUED | OUTPATIENT
Start: 2021-01-01 | End: 2021-01-01

## 2021-01-01 RX ORDER — FENTANYL CITRATE/PF 100MCG/2ML
SYRINGE (ML) INTRAVENOUS
Status: DISCONTINUED | OUTPATIENT
Start: 2021-01-01 | End: 2021-01-01 | Stop reason: SDUPTHER

## 2021-01-01 RX ORDER — ACETAMINOPHEN 325 MG/1
650 TABLET ORAL EVERY 6 HOURS PRN
Status: DISCONTINUED | OUTPATIENT
Start: 2021-01-01 | End: 2021-01-01 | Stop reason: SDUPTHER

## 2021-01-01 RX ORDER — FUROSEMIDE 10 MG/ML
40 INJECTION INTRAMUSCULAR; INTRAVENOUS ONCE
Status: COMPLETED | OUTPATIENT
Start: 2021-01-01 | End: 2021-01-01

## 2021-01-01 RX ORDER — PANTOPRAZOLE SODIUM 40 MG/1
40 TABLET, DELAYED RELEASE ORAL EVERY MORNING
Status: DISCONTINUED | OUTPATIENT
Start: 2021-01-01 | End: 2021-01-01

## 2021-01-01 RX ORDER — MIDODRINE HYDROCHLORIDE 2.5 MG/1
10 TABLET ORAL
Status: DISCONTINUED | OUTPATIENT
Start: 2021-01-01 | End: 2021-01-01

## 2021-01-01 RX ORDER — SERTRALINE HYDROCHLORIDE 100 MG/1
100 TABLET, FILM COATED ORAL DAILY
COMMUNITY

## 2021-01-01 RX ORDER — OLMESARTAN MEDOXOMIL 40 MG/1
40 TABLET ORAL DAILY
COMMUNITY

## 2021-01-01 RX ORDER — CASTOR OIL AND BALSAM, PERU 788; 87 MG/G; MG/G
1 OINTMENT TOPICAL EVERY 12 HOURS SCHEDULED
Status: DISCONTINUED | OUTPATIENT
Start: 2021-01-01 | End: 2021-01-01

## 2021-01-01 RX ORDER — SODIUM CHLORIDE 0.9 % (FLUSH) 0.9 %
10 SYRINGE (ML) INJECTION AS NEEDED
Status: DISCONTINUED | OUTPATIENT
Start: 2021-01-01 | End: 2021-01-01

## 2021-01-01 RX ORDER — WATER 1000 ML/1000ML
INJECTION, SOLUTION INTRAVENOUS
Status: DISPENSED
Start: 2021-01-01 | End: 2021-01-01

## 2021-01-01 RX ORDER — DIGOXIN 0.25 MG/ML
250 INJECTION INTRAMUSCULAR; INTRAVENOUS ONCE
Status: COMPLETED | OUTPATIENT
Start: 2021-01-01 | End: 2021-01-01

## 2021-01-01 RX ORDER — FENTANYL CITRATE/PF 100MCG/2ML
SYRINGE (ML) INTRAVENOUS
Status: COMPLETED
Start: 2021-01-01 | End: 2021-01-01

## 2021-01-01 RX ORDER — PHENYLEPHRINE HCL IN 0.9% NACL 0.5 MG/5ML
.5-3 SYRINGE (ML) INTRAVENOUS
Status: DISCONTINUED | OUTPATIENT
Start: 2021-01-01 | End: 2021-01-01

## 2021-01-01 RX ORDER — ACETAMINOPHEN 325 MG/1
650 TABLET ORAL EVERY 6 HOURS PRN
Status: DISCONTINUED | OUTPATIENT
Start: 2021-01-01 | End: 2021-01-01

## 2021-01-01 RX ORDER — NOREPINEPHRINE BIT/0.9 % NACL 8 MG/250ML
.02-.3 INFUSION BOTTLE (ML) INTRAVENOUS
Status: DISCONTINUED | OUTPATIENT
Start: 2021-01-01 | End: 2021-01-01

## 2021-01-01 RX ORDER — SODIUM CHLORIDE 0.9 % (FLUSH) 0.9 %
10 SYRINGE (ML) INJECTION EVERY 12 HOURS SCHEDULED
Status: DISCONTINUED | OUTPATIENT
Start: 2021-01-01 | End: 2021-01-01

## 2021-01-01 RX ORDER — OXYMETAZOLINE HYDROCHLORIDE 0.05 G/100ML
2 SPRAY NASAL 2 TIMES DAILY
Status: COMPLETED | OUTPATIENT
Start: 2021-01-01 | End: 2021-01-01

## 2021-01-01 RX ORDER — ATORVASTATIN CALCIUM 20 MG/1
20 TABLET, FILM COATED ORAL NIGHTLY
Status: CANCELLED | OUTPATIENT
Start: 2021-01-01

## 2021-01-01 RX ORDER — LOSARTAN POTASSIUM 25 MG/1
100 TABLET ORAL DAILY
Status: CANCELLED | OUTPATIENT
Start: 2021-01-01

## 2021-01-01 RX ORDER — FUROSEMIDE 10 MG/ML
80 INJECTION INTRAMUSCULAR; INTRAVENOUS ONCE
Status: COMPLETED | OUTPATIENT
Start: 2021-01-01 | End: 2021-01-01

## 2021-01-01 RX ORDER — NICOTINE POLACRILEX 4 MG
15 LOZENGE BUCCAL
Status: DISCONTINUED | OUTPATIENT
Start: 2021-01-01 | End: 2021-01-01 | Stop reason: SDUPTHER

## 2021-01-01 RX ORDER — FENTANYL CITRATE/PF 100MCG/2ML
SYRINGE (ML) INTRAVENOUS
Status: DISPENSED | OUTPATIENT
Start: 2021-01-01 | End: 2021-01-01

## 2021-01-01 RX ORDER — BENZONATATE 100 MG/1
200 CAPSULE ORAL EVERY 4 HOURS PRN
Status: DISCONTINUED | OUTPATIENT
Start: 2021-01-01 | End: 2021-01-01

## 2021-01-01 RX ORDER — POTASSIUM CHLORIDE 7.45 MG/ML
10 INJECTION INTRAVENOUS
Status: COMPLETED | OUTPATIENT
Start: 2021-01-01 | End: 2021-01-01

## 2021-01-01 RX ORDER — VECURONIUM BROMIDE 1 MG/ML
100 INJECTION, POWDER, LYOPHILIZED, FOR SOLUTION INTRAVENOUS ONCE
Status: DISCONTINUED | OUTPATIENT
Start: 2021-01-01 | End: 2021-01-01

## 2021-01-01 RX ORDER — DEXMEDETOMIDINE HYDROCHLORIDE 4 UG/ML
.2-1.5 INJECTION, SOLUTION INTRAVENOUS
Status: DISCONTINUED | OUTPATIENT
Start: 2021-01-01 | End: 2021-01-01

## 2021-01-01 RX ORDER — SODIUM CHLORIDE 0.9 % (FLUSH) 0.9 %
20 SYRINGE (ML) INJECTION AS NEEDED
Status: DISCONTINUED | OUTPATIENT
Start: 2021-01-01 | End: 2021-01-01 | Stop reason: HOSPADM

## 2021-01-01 RX ORDER — DEXAMETHASONE SODIUM PHOSPHATE 4 MG/ML
6 INJECTION, SOLUTION INTRA-ARTICULAR; INTRALESIONAL; INTRAMUSCULAR; INTRAVENOUS; SOFT TISSUE DAILY
Status: DISCONTINUED | OUTPATIENT
Start: 2021-01-01 | End: 2021-01-01

## 2021-01-01 RX ORDER — FAMOTIDINE 10 MG/ML
20 INJECTION, SOLUTION INTRAVENOUS 2 TIMES DAILY
Status: DISCONTINUED | OUTPATIENT
Start: 2021-01-01 | End: 2021-01-01

## 2021-01-01 RX ORDER — VECURONIUM BROMIDE 1 MG/ML
INJECTION, POWDER, LYOPHILIZED, FOR SOLUTION INTRAVENOUS
Status: COMPLETED
Start: 2021-01-01 | End: 2021-01-01

## 2021-01-01 RX ORDER — CHLORHEXIDINE GLUCONATE 0.12 MG/ML
15 RINSE ORAL EVERY 12 HOURS SCHEDULED
Status: DISCONTINUED | OUTPATIENT
Start: 2021-01-01 | End: 2021-01-01

## 2021-01-01 RX ORDER — TAMSULOSIN HYDROCHLORIDE 0.4 MG/1
0.4 CAPSULE ORAL DAILY
Status: DISCONTINUED | OUTPATIENT
Start: 2021-01-01 | End: 2021-01-01

## 2021-01-01 RX ORDER — PROPOFOL 10 MG/ML
VIAL (ML) INTRAVENOUS
Status: COMPLETED
Start: 2021-01-01 | End: 2021-01-01

## 2021-01-01 RX ORDER — DEXAMETHASONE SODIUM PHOSPHATE 4 MG/ML
6 INJECTION, SOLUTION INTRA-ARTICULAR; INTRALESIONAL; INTRAMUSCULAR; INTRAVENOUS; SOFT TISSUE 2 TIMES DAILY
Status: DISCONTINUED | OUTPATIENT
Start: 2021-01-01 | End: 2021-01-01

## 2021-01-01 RX ORDER — POTASSIUM CHLORIDE 1.5 G/1.77G
40 POWDER, FOR SOLUTION ORAL ONCE
Status: COMPLETED | OUTPATIENT
Start: 2021-01-01 | End: 2021-01-01

## 2021-01-01 RX ORDER — LORAZEPAM 2 MG/ML
1 INJECTION INTRAMUSCULAR EVERY 4 HOURS PRN
Status: DISCONTINUED | OUTPATIENT
Start: 2021-01-01 | End: 2021-01-01 | Stop reason: HOSPADM

## 2021-01-01 RX ORDER — ONDANSETRON 2 MG/ML
4 INJECTION INTRAMUSCULAR; INTRAVENOUS EVERY 6 HOURS PRN
Status: DISCONTINUED | OUTPATIENT
Start: 2021-01-01 | End: 2021-01-01 | Stop reason: HOSPADM

## 2021-01-01 RX ORDER — DEXAMETHASONE SODIUM PHOSPHATE 4 MG/ML
6 INJECTION, SOLUTION INTRA-ARTICULAR; INTRALESIONAL; INTRAMUSCULAR; INTRAVENOUS; SOFT TISSUE ONCE
Status: COMPLETED | OUTPATIENT
Start: 2021-01-01 | End: 2021-01-01

## 2021-01-01 RX ORDER — FENTANYL CITRATE/PF 100MCG/2ML
SYRINGE (ML) INTRAVENOUS
Status: DISCONTINUED | OUTPATIENT
Start: 2021-01-01 | End: 2021-01-01

## 2021-01-01 RX ORDER — GLYCOPYRROLATE 0.2 MG/ML
0.4 INJECTION INTRAMUSCULAR; INTRAVENOUS EVERY 4 HOURS PRN
Status: DISCONTINUED | OUTPATIENT
Start: 2021-01-01 | End: 2021-01-01 | Stop reason: HOSPADM

## 2021-01-01 RX ORDER — METOPROLOL TARTRATE 5 MG/5ML
5 INJECTION INTRAVENOUS ONCE
Status: COMPLETED | OUTPATIENT
Start: 2021-01-01 | End: 2021-01-01

## 2021-01-01 RX ORDER — SODIUM CHLORIDE 0.9 % (FLUSH) 0.9 %
10 SYRINGE (ML) INJECTION EVERY 12 HOURS SCHEDULED
Status: DISCONTINUED | OUTPATIENT
Start: 2021-01-01 | End: 2021-01-01 | Stop reason: HOSPADM

## 2021-01-01 RX ORDER — FENTANYL CITRATE/PF 100MCG/2ML
SYRINGE (ML) INTRAVENOUS
Status: ACTIVE | OUTPATIENT
Start: 2021-01-01 | End: 2021-01-01

## 2021-01-01 RX ORDER — TAMSULOSIN HYDROCHLORIDE 0.4 MG/1
1 CAPSULE ORAL DAILY
COMMUNITY

## 2021-01-01 RX ORDER — FENTANYL CITRATE/PF 100MCG/2ML
SYRINGE (ML) INTRAVENOUS
Status: DISCONTINUED | OUTPATIENT
Start: 2021-01-01 | End: 2021-01-01 | Stop reason: HOSPADM

## 2021-01-01 RX ORDER — VECURONIUM BROMIDE 1 MG/ML
100 INJECTION, POWDER, LYOPHILIZED, FOR SOLUTION INTRAVENOUS ONCE
Status: COMPLETED | OUTPATIENT
Start: 2021-01-01 | End: 2021-01-01

## 2021-01-01 RX ORDER — GLYCOPYRROLATE 0.2 MG/ML
0.1 INJECTION INTRAMUSCULAR; INTRAVENOUS ONCE
Status: DISCONTINUED | OUTPATIENT
Start: 2021-01-01 | End: 2021-01-01

## 2021-01-01 RX ORDER — TAMSULOSIN HYDROCHLORIDE 0.4 MG/1
0.4 CAPSULE ORAL DAILY
Status: CANCELLED | OUTPATIENT
Start: 2021-01-01

## 2021-01-01 RX ORDER — BUMETANIDE 0.25 MG/ML
2 INJECTION INTRAMUSCULAR; INTRAVENOUS ONCE
Status: COMPLETED | OUTPATIENT
Start: 2021-01-01 | End: 2021-01-01

## 2021-01-01 RX ORDER — MORPHINE SULFATE 2 MG/ML
1 INJECTION, SOLUTION INTRAMUSCULAR; INTRAVENOUS
Status: DISCONTINUED | OUTPATIENT
Start: 2021-01-01 | End: 2021-01-01 | Stop reason: HOSPADM

## 2021-01-01 RX ORDER — PANTOPRAZOLE SODIUM 40 MG/1
40 TABLET, DELAYED RELEASE ORAL EVERY MORNING
Status: CANCELLED | OUTPATIENT
Start: 2021-01-01

## 2021-01-01 RX ORDER — CARBOXYMETHYLCELLULOSE SODIUM 5 MG/ML
1 SOLUTION/ DROPS OPHTHALMIC 3 TIMES DAILY PRN
Status: DISCONTINUED | OUTPATIENT
Start: 2021-01-01 | End: 2021-01-01 | Stop reason: HOSPADM

## 2021-01-01 RX ADMIN — SODIUM CHLORIDE, PRESERVATIVE FREE 10 ML: 5 INJECTION INTRAVENOUS at 20:10

## 2021-01-01 RX ADMIN — MINERAL OIL AND PETROLATUM: 150; 830 OINTMENT OPHTHALMIC at 18:44

## 2021-01-01 RX ADMIN — PHENYLEPHRINE HYDROCHLORIDE 3 MCG/KG/MIN: 10 INJECTION INTRAVENOUS at 14:09

## 2021-01-01 RX ADMIN — SERTRALINE 100 MG: 50 TABLET, FILM COATED ORAL at 08:37

## 2021-01-01 RX ADMIN — ENOXAPARIN SODIUM 80 MG: 80 INJECTION SUBCUTANEOUS at 20:36

## 2021-01-01 RX ADMIN — SODIUM CHLORIDE, PRESERVATIVE FREE 10 ML: 5 INJECTION INTRAVENOUS at 21:13

## 2021-01-01 RX ADMIN — Medication 1 APPLICATION: at 11:06

## 2021-01-01 RX ADMIN — INSULIN DETEMIR 25 UNITS: 100 INJECTION, SOLUTION SUBCUTANEOUS at 20:52

## 2021-01-01 RX ADMIN — VECURONIUM BROMIDE 7.6 MG: 1 INJECTION, POWDER, LYOPHILIZED, FOR SOLUTION INTRAVENOUS at 15:23

## 2021-01-01 RX ADMIN — SODIUM CHLORIDE, PRESERVATIVE FREE 10 ML: 5 INJECTION INTRAVENOUS at 10:20

## 2021-01-01 RX ADMIN — ENOXAPARIN SODIUM 70 MG: 80 INJECTION SUBCUTANEOUS at 21:51

## 2021-01-01 RX ADMIN — SODIUM CHLORIDE, PRESERVATIVE FREE 10 ML: 5 INJECTION INTRAVENOUS at 20:48

## 2021-01-01 RX ADMIN — SODIUM CHLORIDE, PRESERVATIVE FREE 10 ML: 5 INJECTION INTRAVENOUS at 09:34

## 2021-01-01 RX ADMIN — CASTOR OIL AND BALSAM, PERU 1 APPLICATION: 788; 87 OINTMENT TOPICAL at 20:59

## 2021-01-01 RX ADMIN — SODIUM CHLORIDE, PRESERVATIVE FREE 10 ML: 5 INJECTION INTRAVENOUS at 20:19

## 2021-01-01 RX ADMIN — MINERAL OIL AND PETROLATUM: 150; 830 OINTMENT OPHTHALMIC at 22:34

## 2021-01-01 RX ADMIN — PHENYLEPHRINE HYDROCHLORIDE 3 MCG/KG/MIN: 10 INJECTION INTRAVENOUS at 06:43

## 2021-01-01 RX ADMIN — MEROPENEM 1 G: 1 INJECTION, POWDER, FOR SOLUTION INTRAVENOUS at 04:56

## 2021-01-01 RX ADMIN — Medication 2 SPRAY: at 20:49

## 2021-01-01 RX ADMIN — INSULIN ASPART 8 UNITS: 100 INJECTION, SOLUTION INTRAVENOUS; SUBCUTANEOUS at 17:43

## 2021-01-01 RX ADMIN — DEXAMETHASONE SODIUM PHOSPHATE 6 MG: 4 INJECTION, SOLUTION INTRA-ARTICULAR; INTRALESIONAL; INTRAMUSCULAR; INTRAVENOUS; SOFT TISSUE at 08:27

## 2021-01-01 RX ADMIN — TAMSULOSIN HYDROCHLORIDE 0.4 MG: 0.4 CAPSULE ORAL at 08:53

## 2021-01-01 RX ADMIN — SODIUM CHLORIDE, PRESERVATIVE FREE 10 ML: 5 INJECTION INTRAVENOUS at 20:34

## 2021-01-01 RX ADMIN — VASOPRESSIN 0.03 UNITS/MIN: 20 INJECTION INTRAVENOUS at 05:16

## 2021-01-01 RX ADMIN — MINERAL OIL AND PETROLATUM: 150; 830 OINTMENT OPHTHALMIC at 08:43

## 2021-01-01 RX ADMIN — DIGOXIN 250 MCG: 0.25 INJECTION INTRAMUSCULAR; INTRAVENOUS at 13:54

## 2021-01-01 RX ADMIN — SODIUM CHLORIDE, PRESERVATIVE FREE 10 ML: 5 INJECTION INTRAVENOUS at 09:36

## 2021-01-01 RX ADMIN — SODIUM CHLORIDE, PRESERVATIVE FREE 10 ML: 5 INJECTION INTRAVENOUS at 20:56

## 2021-01-01 RX ADMIN — CASTOR OIL AND BALSAM, PERU 1 APPLICATION: 788; 87 OINTMENT TOPICAL at 20:20

## 2021-01-01 RX ADMIN — CHLORHEXIDINE GLUCONATE 15 ML: 1.2 RINSE ORAL at 20:14

## 2021-01-01 RX ADMIN — ENOXAPARIN SODIUM 70 MG: 80 INJECTION SUBCUTANEOUS at 09:36

## 2021-01-01 RX ADMIN — CHLORHEXIDINE GLUCONATE 15 ML: 1.2 RINSE ORAL at 09:33

## 2021-01-01 RX ADMIN — SODIUM CHLORIDE, PRESERVATIVE FREE 10 ML: 5 INJECTION INTRAVENOUS at 21:01

## 2021-01-01 RX ADMIN — TAMSULOSIN HYDROCHLORIDE 0.4 MG: 0.4 CAPSULE ORAL at 10:22

## 2021-01-01 RX ADMIN — CASTOR OIL AND BALSAM, PERU 1 APPLICATION: 788; 87 OINTMENT TOPICAL at 08:43

## 2021-01-01 RX ADMIN — Medication 2 SPRAY: at 21:32

## 2021-01-01 RX ADMIN — MINERAL OIL AND PETROLATUM: 150; 830 OINTMENT OPHTHALMIC at 23:06

## 2021-01-01 RX ADMIN — DEXAMETHASONE SODIUM PHOSPHATE 6 MG: 4 INJECTION, SOLUTION INTRA-ARTICULAR; INTRALESIONAL; INTRAMUSCULAR; INTRAVENOUS; SOFT TISSUE at 21:27

## 2021-01-01 RX ADMIN — MEROPENEM 1 G: 1 INJECTION, POWDER, FOR SOLUTION INTRAVENOUS at 20:04

## 2021-01-01 RX ADMIN — FUROSEMIDE 80 MG: 10 INJECTION, SOLUTION INTRAMUSCULAR; INTRAVENOUS at 12:46

## 2021-01-01 RX ADMIN — PROPOFOL 35 MCG/KG/MIN: 10 INJECTION, EMULSION INTRAVENOUS at 01:18

## 2021-01-01 RX ADMIN — VANCOMYCIN HYDROCHLORIDE 1000 MG: 1 INJECTION, POWDER, LYOPHILIZED, FOR SOLUTION INTRAVENOUS at 10:37

## 2021-01-01 RX ADMIN — CARBIDOPA AND LEVODOPA 10 MG: 50; 200 TABLET, EXTENDED RELEASE ORAL at 12:07

## 2021-01-01 RX ADMIN — PROPOFOL 35 MCG/KG/MIN: 10 INJECTION, EMULSION INTRAVENOUS at 21:15

## 2021-01-01 RX ADMIN — Medication 2 SPRAY: at 08:36

## 2021-01-01 RX ADMIN — PROPOFOL 30 MCG/KG/MIN: 10 INJECTION, EMULSION INTRAVENOUS at 04:31

## 2021-01-01 RX ADMIN — MEROPENEM 1 G: 1 INJECTION, POWDER, FOR SOLUTION INTRAVENOUS at 05:20

## 2021-01-01 RX ADMIN — MINERAL OIL AND PETROLATUM: 150; 830 OINTMENT OPHTHALMIC at 00:30

## 2021-01-01 RX ADMIN — SODIUM CHLORIDE, PRESERVATIVE FREE 10 ML: 5 INJECTION INTRAVENOUS at 11:10

## 2021-01-01 RX ADMIN — CARBIDOPA AND LEVODOPA 10 MG: 50; 200 TABLET, EXTENDED RELEASE ORAL at 18:22

## 2021-01-01 RX ADMIN — PANTOPRAZOLE SODIUM 40 MG: 40 TABLET, DELAYED RELEASE ORAL at 08:52

## 2021-01-01 RX ADMIN — INSULIN ASPART 4 UNITS: 100 INJECTION, SOLUTION INTRAVENOUS; SUBCUTANEOUS at 23:32

## 2021-01-01 RX ADMIN — Medication: at 04:15

## 2021-01-01 RX ADMIN — SODIUM CHLORIDE, PRESERVATIVE FREE 10 ML: 5 INJECTION INTRAVENOUS at 21:26

## 2021-01-01 RX ADMIN — Medication 2 SPRAY: at 08:27

## 2021-01-01 RX ADMIN — SERTRALINE 100 MG: 50 TABLET, FILM COATED ORAL at 10:22

## 2021-01-01 RX ADMIN — SODIUM CHLORIDE, PRESERVATIVE FREE 10 ML: 5 INJECTION INTRAVENOUS at 09:53

## 2021-01-01 RX ADMIN — CHLORHEXIDINE GLUCONATE 15 ML: 1.2 RINSE ORAL at 08:33

## 2021-01-01 RX ADMIN — VASOPRESSIN 0.03 UNITS/MIN: 20 INJECTION INTRAVENOUS at 02:53

## 2021-01-01 RX ADMIN — SODIUM CHLORIDE, PRESERVATIVE FREE 10 ML: 5 INJECTION INTRAVENOUS at 08:03

## 2021-01-01 RX ADMIN — SODIUM CHLORIDE, PRESERVATIVE FREE 10 ML: 5 INJECTION INTRAVENOUS at 20:04

## 2021-01-01 RX ADMIN — CHLORHEXIDINE GLUCONATE 15 ML: 1.2 RINSE ORAL at 20:08

## 2021-01-01 RX ADMIN — SODIUM CHLORIDE, PRESERVATIVE FREE 10 ML: 5 INJECTION INTRAVENOUS at 08:08

## 2021-01-01 RX ADMIN — SODIUM CHLORIDE, PRESERVATIVE FREE 10 ML: 5 INJECTION INTRAVENOUS at 09:28

## 2021-01-01 RX ADMIN — PROPOFOL 5 MCG/KG/MIN: 10 INJECTION, EMULSION INTRAVENOUS at 13:05

## 2021-01-01 RX ADMIN — PHENYLEPHRINE HYDROCHLORIDE 0.9 MCG/KG/MIN: 10 INJECTION INTRAVENOUS at 19:00

## 2021-01-01 RX ADMIN — CARBIDOPA AND LEVODOPA 10 MG: 50; 200 TABLET, EXTENDED RELEASE ORAL at 17:23

## 2021-01-01 RX ADMIN — SODIUM CHLORIDE, PRESERVATIVE FREE 10 ML: 5 INJECTION INTRAVENOUS at 08:38

## 2021-01-01 RX ADMIN — REMDESIVIR 100 MG: 100 INJECTION, POWDER, LYOPHILIZED, FOR SOLUTION INTRAVENOUS at 09:43

## 2021-01-01 RX ADMIN — INSULIN ASPART 4 UNITS: 100 INJECTION, SOLUTION INTRAVENOUS; SUBCUTANEOUS at 11:10

## 2021-01-01 RX ADMIN — PHENYLEPHRINE HYDROCHLORIDE 3 MCG/KG/MIN: 10 INJECTION INTRAVENOUS at 07:49

## 2021-01-01 RX ADMIN — CARBIDOPA AND LEVODOPA 10 MG: 50; 200 TABLET, EXTENDED RELEASE ORAL at 10:53

## 2021-01-01 RX ADMIN — MINERAL OIL AND PETROLATUM: 150; 830 OINTMENT OPHTHALMIC at 18:30

## 2021-01-01 RX ADMIN — MINERAL OIL AND PETROLATUM: 150; 830 OINTMENT OPHTHALMIC at 20:14

## 2021-01-01 RX ADMIN — SODIUM CHLORIDE, PRESERVATIVE FREE 10 ML: 5 INJECTION INTRAVENOUS at 08:10

## 2021-01-01 RX ADMIN — BENZONATATE 200 MG: 100 CAPSULE ORAL at 21:14

## 2021-01-01 RX ADMIN — CARBIDOPA AND LEVODOPA 10 MG: 50; 200 TABLET, EXTENDED RELEASE ORAL at 04:47

## 2021-01-01 RX ADMIN — SODIUM CHLORIDE, PRESERVATIVE FREE 10 ML: 5 INJECTION INTRAVENOUS at 08:40

## 2021-01-01 RX ADMIN — MINERAL OIL AND PETROLATUM: 150; 830 OINTMENT OPHTHALMIC at 06:07

## 2021-01-01 RX ADMIN — MEROPENEM 1 G: 1 INJECTION, POWDER, FOR SOLUTION INTRAVENOUS at 20:50

## 2021-01-01 RX ADMIN — CARBIDOPA AND LEVODOPA 10 MG: 50; 200 TABLET, EXTENDED RELEASE ORAL at 06:55

## 2021-01-01 RX ADMIN — CHLORHEXIDINE GLUCONATE 15 ML: 1.2 RINSE ORAL at 08:49

## 2021-01-01 RX ADMIN — SERTRALINE 100 MG: 50 TABLET, FILM COATED ORAL at 08:40

## 2021-01-01 RX ADMIN — Medication 1250 MG: at 11:04

## 2021-01-01 RX ADMIN — LORAZEPAM 1 MG: 2 INJECTION INTRAMUSCULAR; INTRAVENOUS at 18:22

## 2021-01-01 RX ADMIN — SODIUM CHLORIDE, PRESERVATIVE FREE 10 ML: 5 INJECTION INTRAVENOUS at 20:22

## 2021-01-01 RX ADMIN — MEROPENEM 1 G: 1 INJECTION, POWDER, FOR SOLUTION INTRAVENOUS at 21:26

## 2021-01-01 RX ADMIN — INSULIN DETEMIR 15 UNITS: 100 INJECTION, SOLUTION SUBCUTANEOUS at 21:06

## 2021-01-01 RX ADMIN — FAMOTIDINE 20 MG: 10 INJECTION INTRAVENOUS at 20:36

## 2021-01-01 RX ADMIN — MEROPENEM 1 G: 1 INJECTION, POWDER, FOR SOLUTION INTRAVENOUS at 04:28

## 2021-01-01 RX ADMIN — PROPOFOL 35 MCG/KG/MIN: 10 INJECTION, EMULSION INTRAVENOUS at 18:00

## 2021-01-01 RX ADMIN — ENOXAPARIN SODIUM 70 MG: 80 INJECTION SUBCUTANEOUS at 22:45

## 2021-01-01 RX ADMIN — MINERAL OIL AND PETROLATUM: 150; 830 OINTMENT OPHTHALMIC at 14:57

## 2021-01-01 RX ADMIN — VASOPRESSIN 0.03 UNITS/MIN: 20 INJECTION INTRAVENOUS at 13:52

## 2021-01-01 RX ADMIN — VECURONIUM BROMIDE 5 MG: 1 INJECTION, POWDER, LYOPHILIZED, FOR SOLUTION INTRAVENOUS at 03:25

## 2021-01-01 RX ADMIN — PROPOFOL 40 MCG/KG/MIN: 10 INJECTION, EMULSION INTRAVENOUS at 13:00

## 2021-01-01 RX ADMIN — MINERAL OIL AND PETROLATUM: 150; 830 OINTMENT OPHTHALMIC at 12:36

## 2021-01-01 RX ADMIN — MEROPENEM 1 G: 1 INJECTION, POWDER, FOR SOLUTION INTRAVENOUS at 21:20

## 2021-01-01 RX ADMIN — SODIUM CHLORIDE, PRESERVATIVE FREE 10 ML: 5 INJECTION INTRAVENOUS at 10:46

## 2021-01-01 RX ADMIN — PROPOFOL 35 MCG/KG/MIN: 10 INJECTION, EMULSION INTRAVENOUS at 06:49

## 2021-01-01 RX ADMIN — Medication: at 08:00

## 2021-01-01 RX ADMIN — DEXAMETHASONE SODIUM PHOSPHATE 6 MG: 4 INJECTION, SOLUTION INTRA-ARTICULAR; INTRALESIONAL; INTRAMUSCULAR; INTRAVENOUS; SOFT TISSUE at 20:51

## 2021-01-01 RX ADMIN — Medication 2 SPRAY: at 20:47

## 2021-01-01 RX ADMIN — MINERAL OIL AND PETROLATUM: 150; 830 OINTMENT OPHTHALMIC at 03:39

## 2021-01-01 RX ADMIN — SODIUM CHLORIDE, PRESERVATIVE FREE 10 ML: 5 INJECTION INTRAVENOUS at 20:14

## 2021-01-01 RX ADMIN — REMDESIVIR 100 MG: 100 INJECTION, POWDER, LYOPHILIZED, FOR SOLUTION INTRAVENOUS at 08:54

## 2021-01-01 RX ADMIN — VASOPRESSIN 0.03 UNITS/MIN: 20 INJECTION INTRAVENOUS at 08:19

## 2021-01-01 RX ADMIN — Medication 2 SPRAY: at 12:00

## 2021-01-01 RX ADMIN — FUROSEMIDE 80 MG: 10 INJECTION INTRAMUSCULAR; INTRAVENOUS at 08:42

## 2021-01-01 RX ADMIN — SODIUM CHLORIDE, PRESERVATIVE FREE 10 ML: 5 INJECTION INTRAVENOUS at 20:36

## 2021-01-01 RX ADMIN — VANCOMYCIN HYDROCHLORIDE 1500 MG: 10 INJECTION, POWDER, LYOPHILIZED, FOR SOLUTION INTRAVENOUS at 13:03

## 2021-01-01 RX ADMIN — FAMOTIDINE 20 MG: 10 INJECTION INTRAVENOUS at 09:48

## 2021-01-01 RX ADMIN — INSULIN ASPART 8 UNITS: 100 INJECTION, SOLUTION INTRAVENOUS; SUBCUTANEOUS at 11:12

## 2021-01-01 RX ADMIN — PROPOFOL 35 MCG/KG/MIN: 10 INJECTION, EMULSION INTRAVENOUS at 14:03

## 2021-01-01 RX ADMIN — SODIUM CHLORIDE, PRESERVATIVE FREE 10 ML: 5 INJECTION INTRAVENOUS at 20:11

## 2021-01-01 RX ADMIN — MINERAL OIL AND PETROLATUM: 150; 830 OINTMENT OPHTHALMIC at 10:37

## 2021-01-01 RX ADMIN — MEROPENEM 1 G: 1 INJECTION, POWDER, FOR SOLUTION INTRAVENOUS at 04:24

## 2021-01-01 RX ADMIN — PROPOFOL 35 MCG/KG/MIN: 10 INJECTION, EMULSION INTRAVENOUS at 02:12

## 2021-01-01 RX ADMIN — Medication: at 15:41

## 2021-01-01 RX ADMIN — NOREPINEPHRINE BITARTRATE 0.08 MCG/KG/MIN: 1 INJECTION, SOLUTION, CONCENTRATE INTRAVENOUS at 19:29

## 2021-01-01 RX ADMIN — DEXAMETHASONE SODIUM PHOSPHATE 6 MG: 4 INJECTION, SOLUTION INTRA-ARTICULAR; INTRALESIONAL; INTRAMUSCULAR; INTRAVENOUS; SOFT TISSUE at 08:05

## 2021-01-01 RX ADMIN — SERTRALINE 100 MG: 50 TABLET, FILM COATED ORAL at 09:27

## 2021-01-01 RX ADMIN — FAMOTIDINE 20 MG: 10 INJECTION INTRAVENOUS at 08:27

## 2021-01-01 RX ADMIN — PROPOFOL 25 MCG/KG/MIN: 10 INJECTION, EMULSION INTRAVENOUS at 11:24

## 2021-01-01 RX ADMIN — MINERAL OIL AND PETROLATUM: 150; 830 OINTMENT OPHTHALMIC at 06:55

## 2021-01-01 RX ADMIN — PHENYLEPHRINE HYDROCHLORIDE 0.9 MCG/KG/MIN: 10 INJECTION INTRAVENOUS at 12:47

## 2021-01-01 RX ADMIN — ENOXAPARIN SODIUM 70 MG: 80 INJECTION SUBCUTANEOUS at 20:58

## 2021-01-01 RX ADMIN — SODIUM CHLORIDE, PRESERVATIVE FREE 10 ML: 5 INJECTION INTRAVENOUS at 08:28

## 2021-01-01 RX ADMIN — SODIUM CHLORIDE, PRESERVATIVE FREE 10 ML: 5 INJECTION INTRAVENOUS at 08:34

## 2021-01-01 RX ADMIN — PANTOPRAZOLE SODIUM 40 MG: 40 TABLET, DELAYED RELEASE ORAL at 08:40

## 2021-01-01 RX ADMIN — VASOPRESSIN 0.03 UNITS/MIN: 20 INJECTION INTRAVENOUS at 04:04

## 2021-01-01 RX ADMIN — PHENYLEPHRINE HYDROCHLORIDE 3 MCG/KG/MIN: 10 INJECTION INTRAVENOUS at 23:45

## 2021-01-01 RX ADMIN — INSULIN ASPART 8 UNITS: 100 INJECTION, SOLUTION INTRAVENOUS; SUBCUTANEOUS at 07:58

## 2021-01-01 RX ADMIN — SODIUM CHLORIDE, PRESERVATIVE FREE 10 ML: 5 INJECTION INTRAVENOUS at 20:49

## 2021-01-01 RX ADMIN — PROPOFOL 40 MCG/KG/MIN: 10 INJECTION, EMULSION INTRAVENOUS at 17:05

## 2021-01-01 RX ADMIN — DEXAMETHASONE SODIUM PHOSPHATE 6 MG: 4 INJECTION, SOLUTION INTRA-ARTICULAR; INTRALESIONAL; INTRAMUSCULAR; INTRAVENOUS; SOFT TISSUE at 09:35

## 2021-01-01 RX ADMIN — INSULIN ASPART 3 UNITS: 100 INJECTION, SOLUTION INTRAVENOUS; SUBCUTANEOUS at 18:22

## 2021-01-01 RX ADMIN — SODIUM CHLORIDE, PRESERVATIVE FREE 10 ML: 5 INJECTION INTRAVENOUS at 09:54

## 2021-01-01 RX ADMIN — MINERAL OIL AND PETROLATUM: 150; 830 OINTMENT OPHTHALMIC at 15:53

## 2021-01-01 RX ADMIN — PHENYLEPHRINE HYDROCHLORIDE 3 MCG/KG/MIN: 10 INJECTION INTRAVENOUS at 20:14

## 2021-01-01 RX ADMIN — CHLORHEXIDINE GLUCONATE 15 ML: 1.2 RINSE ORAL at 09:35

## 2021-01-01 RX ADMIN — CHLORHEXIDINE GLUCONATE 15 ML: 1.2 RINSE ORAL at 08:02

## 2021-01-01 RX ADMIN — FENTANYL CITRATE: 50 INJECTION, SOLUTION INTRAMUSCULAR; INTRAVENOUS at 11:50

## 2021-01-01 RX ADMIN — SODIUM CHLORIDE, PRESERVATIVE FREE 10 ML: 5 INJECTION INTRAVENOUS at 21:19

## 2021-01-01 RX ADMIN — ENOXAPARIN SODIUM 70 MG: 80 INJECTION SUBCUTANEOUS at 21:06

## 2021-01-01 RX ADMIN — SODIUM CHLORIDE 250 ML: 9 INJECTION, SOLUTION INTRAVENOUS at 15:00

## 2021-01-01 RX ADMIN — FAMOTIDINE 20 MG: 10 INJECTION INTRAVENOUS at 20:21

## 2021-01-01 RX ADMIN — TAMSULOSIN HYDROCHLORIDE 0.4 MG: 0.4 CAPSULE ORAL at 09:39

## 2021-01-01 RX ADMIN — PHENYLEPHRINE HYDROCHLORIDE 3 MCG/KG/MIN: 10 INJECTION INTRAVENOUS at 11:02

## 2021-01-01 RX ADMIN — MINERAL OIL AND PETROLATUM: 150; 830 OINTMENT OPHTHALMIC at 02:20

## 2021-01-01 RX ADMIN — FAMOTIDINE 20 MG: 10 INJECTION INTRAVENOUS at 20:03

## 2021-01-01 RX ADMIN — SERTRALINE 100 MG: 50 TABLET, FILM COATED ORAL at 09:36

## 2021-01-01 RX ADMIN — CHLORHEXIDINE GLUCONATE 15 ML: 1.2 RINSE ORAL at 09:26

## 2021-01-01 RX ADMIN — PROPOFOL 45 MCG/KG/MIN: 10 INJECTION, EMULSION INTRAVENOUS at 10:21

## 2021-01-01 RX ADMIN — MINERAL OIL AND PETROLATUM: 150; 830 OINTMENT OPHTHALMIC at 02:30

## 2021-01-01 RX ADMIN — FAMOTIDINE 20 MG: 10 INJECTION INTRAVENOUS at 20:33

## 2021-01-01 RX ADMIN — SODIUM CHLORIDE, PRESERVATIVE FREE 10 ML: 5 INJECTION INTRAVENOUS at 09:27

## 2021-01-01 RX ADMIN — CARBIDOPA AND LEVODOPA 10 MG: 50; 200 TABLET, EXTENDED RELEASE ORAL at 05:52

## 2021-01-01 RX ADMIN — PROPOFOL 35 MCG/KG/MIN: 10 INJECTION, EMULSION INTRAVENOUS at 09:49

## 2021-01-01 RX ADMIN — INSULIN ASPART 4 UNITS: 100 INJECTION, SOLUTION INTRAVENOUS; SUBCUTANEOUS at 09:47

## 2021-01-01 RX ADMIN — SODIUM CHLORIDE, PRESERVATIVE FREE 10 ML: 5 INJECTION INTRAVENOUS at 20:33

## 2021-01-01 RX ADMIN — FAMOTIDINE 20 MG: 10 INJECTION INTRAVENOUS at 20:46

## 2021-01-01 RX ADMIN — TAMSULOSIN HYDROCHLORIDE 0.4 MG: 0.4 CAPSULE ORAL at 09:27

## 2021-01-01 RX ADMIN — VANCOMYCIN HYDROCHLORIDE 1750 MG: 5 INJECTION, POWDER, LYOPHILIZED, FOR SOLUTION INTRAVENOUS at 15:44

## 2021-01-01 RX ADMIN — Medication: at 10:50

## 2021-01-01 RX ADMIN — SODIUM CHLORIDE, PRESERVATIVE FREE 10 ML: 5 INJECTION INTRAVENOUS at 10:21

## 2021-01-01 RX ADMIN — INSULIN ASPART 8 UNITS: 100 INJECTION, SOLUTION INTRAVENOUS; SUBCUTANEOUS at 06:08

## 2021-01-01 RX ADMIN — POTASSIUM CHLORIDE 10 MEQ: 7.46 INJECTION, SOLUTION INTRAVENOUS at 20:58

## 2021-01-01 RX ADMIN — MINERAL OIL AND PETROLATUM: 150; 830 OINTMENT OPHTHALMIC at 15:59

## 2021-01-01 RX ADMIN — VASOPRESSIN 0.03 UNITS/MIN: 20 INJECTION INTRAVENOUS at 12:08

## 2021-01-01 RX ADMIN — INSULIN DETEMIR 5 UNITS: 100 INJECTION, SOLUTION SUBCUTANEOUS at 21:30

## 2021-01-01 RX ADMIN — PROPOFOL 40 MCG/KG/MIN: 10 INJECTION, EMULSION INTRAVENOUS at 08:01

## 2021-01-01 RX ADMIN — BUMETANIDE 2 MG: 0.25 INJECTION, SOLUTION INTRAMUSCULAR; INTRAVENOUS at 09:48

## 2021-01-01 RX ADMIN — ENOXAPARIN SODIUM 70 MG: 80 INJECTION SUBCUTANEOUS at 20:21

## 2021-01-01 RX ADMIN — NOREPINEPHRINE BITARTRATE 0.02 MCG/KG/MIN: 1 INJECTION, SOLUTION, CONCENTRATE INTRAVENOUS at 22:42

## 2021-01-01 RX ADMIN — SODIUM CHLORIDE 1000 ML: 9 INJECTION, SOLUTION INTRAVENOUS at 10:21

## 2021-01-01 RX ADMIN — Medication: at 12:43

## 2021-01-01 RX ADMIN — CASTOR OIL AND BALSAM, PERU 1 APPLICATION: 788; 87 OINTMENT TOPICAL at 08:02

## 2021-01-01 RX ADMIN — CHLORHEXIDINE GLUCONATE 15 ML: 1.2 RINSE ORAL at 12:01

## 2021-01-01 RX ADMIN — MEROPENEM 1 G: 1 INJECTION, POWDER, FOR SOLUTION INTRAVENOUS at 12:07

## 2021-01-01 RX ADMIN — DEXAMETHASONE SODIUM PHOSPHATE 6 MG: 4 INJECTION, SOLUTION INTRA-ARTICULAR; INTRALESIONAL; INTRAMUSCULAR; INTRAVENOUS; SOFT TISSUE at 13:29

## 2021-01-01 RX ADMIN — INSULIN ASPART 6 UNITS: 100 INJECTION, SOLUTION INTRAVENOUS; SUBCUTANEOUS at 11:49

## 2021-01-01 RX ADMIN — SODIUM CHLORIDE, PRESERVATIVE FREE 10 ML: 5 INJECTION INTRAVENOUS at 20:55

## 2021-01-01 RX ADMIN — SODIUM CHLORIDE, PRESERVATIVE FREE 10 ML: 5 INJECTION INTRAVENOUS at 08:27

## 2021-01-01 RX ADMIN — MINERAL OIL AND PETROLATUM: 150; 830 OINTMENT OPHTHALMIC at 02:31

## 2021-01-01 RX ADMIN — PROPOFOL 30 MCG/KG/MIN: 10 INJECTION, EMULSION INTRAVENOUS at 00:15

## 2021-01-01 RX ADMIN — CARBIDOPA AND LEVODOPA 10 MG: 50; 200 TABLET, EXTENDED RELEASE ORAL at 09:33

## 2021-01-01 RX ADMIN — PROPOFOL 45 MCG/KG/MIN: 10 INJECTION, EMULSION INTRAVENOUS at 17:47

## 2021-01-01 RX ADMIN — Medication 1 APPLICATION: at 20:19

## 2021-01-01 RX ADMIN — PROPOFOL 20 MCG/KG/MIN: 10 INJECTION, EMULSION INTRAVENOUS at 00:18

## 2021-01-01 RX ADMIN — FAMOTIDINE 20 MG: 10 INJECTION INTRAVENOUS at 20:14

## 2021-01-01 RX ADMIN — FAMOTIDINE 20 MG: 10 INJECTION INTRAVENOUS at 21:16

## 2021-01-01 RX ADMIN — MEROPENEM 1 G: 1 INJECTION, POWDER, FOR SOLUTION INTRAVENOUS at 13:28

## 2021-01-01 RX ADMIN — SODIUM CHLORIDE, PRESERVATIVE FREE 10 ML: 5 INJECTION INTRAVENOUS at 20:18

## 2021-01-01 RX ADMIN — DEXAMETHASONE SODIUM PHOSPHATE 6 MG: 4 INJECTION, SOLUTION INTRA-ARTICULAR; INTRALESIONAL; INTRAMUSCULAR; INTRAVENOUS; SOFT TISSUE at 09:26

## 2021-01-01 RX ADMIN — PHENYLEPHRINE HYDROCHLORIDE 0.8 MCG/KG/MIN: 10 INJECTION INTRAVENOUS at 08:42

## 2021-01-01 RX ADMIN — CARBIDOPA AND LEVODOPA 10 MG: 50; 200 TABLET, EXTENDED RELEASE ORAL at 06:30

## 2021-01-01 RX ADMIN — CHLORHEXIDINE GLUCONATE 15 ML: 1.2 RINSE ORAL at 21:07

## 2021-01-01 RX ADMIN — MEROPENEM 1 G: 1 INJECTION, POWDER, FOR SOLUTION INTRAVENOUS at 20:18

## 2021-01-01 RX ADMIN — MINERAL OIL AND PETROLATUM: 150; 830 OINTMENT OPHTHALMIC at 02:59

## 2021-01-01 RX ADMIN — PROPOFOL 20 MCG/KG/MIN: 10 INJECTION, EMULSION INTRAVENOUS at 02:24

## 2021-01-01 RX ADMIN — ENOXAPARIN SODIUM 70 MG: 80 INJECTION SUBCUTANEOUS at 10:23

## 2021-01-01 RX ADMIN — SODIUM CHLORIDE, PRESERVATIVE FREE 10 ML: 5 INJECTION INTRAVENOUS at 09:52

## 2021-01-01 RX ADMIN — INSULIN DETEMIR 25 UNITS: 100 INJECTION, SOLUTION SUBCUTANEOUS at 20:31

## 2021-01-01 RX ADMIN — PROPOFOL 35 MCG/KG/MIN: 10 INJECTION, EMULSION INTRAVENOUS at 02:21

## 2021-01-01 RX ADMIN — PHENYLEPHRINE HYDROCHLORIDE 1.4 MCG/KG/MIN: 10 INJECTION INTRAVENOUS at 04:02

## 2021-01-01 RX ADMIN — SODIUM CHLORIDE, PRESERVATIVE FREE 10 ML: 5 INJECTION INTRAVENOUS at 21:25

## 2021-01-01 RX ADMIN — CARBIDOPA AND LEVODOPA 10 MG: 50; 200 TABLET, EXTENDED RELEASE ORAL at 18:16

## 2021-01-01 RX ADMIN — FENTANYL CITRATE: 50 INJECTION, SOLUTION INTRAMUSCULAR; INTRAVENOUS at 12:24

## 2021-01-01 RX ADMIN — SODIUM CHLORIDE, PRESERVATIVE FREE 10 ML: 5 INJECTION INTRAVENOUS at 20:15

## 2021-01-01 RX ADMIN — FAMOTIDINE 20 MG: 10 INJECTION INTRAVENOUS at 09:37

## 2021-01-01 RX ADMIN — FAMOTIDINE 20 MG: 10 INJECTION INTRAVENOUS at 20:58

## 2021-01-01 RX ADMIN — Medication: at 00:33

## 2021-01-01 RX ADMIN — HYDROXYZINE HYDROCHLORIDE 25 MG: 25 TABLET ORAL at 09:26

## 2021-01-01 RX ADMIN — Medication: at 18:28

## 2021-01-01 RX ADMIN — CARBIDOPA AND LEVODOPA 10 MG: 50; 200 TABLET, EXTENDED RELEASE ORAL at 11:12

## 2021-01-01 RX ADMIN — INSULIN DETEMIR 25 UNITS: 100 INJECTION, SOLUTION SUBCUTANEOUS at 20:20

## 2021-01-01 RX ADMIN — PROPOFOL 20 MCG/KG/MIN: 10 INJECTION, EMULSION INTRAVENOUS at 21:21

## 2021-01-01 RX ADMIN — MEROPENEM 1 G: 1 INJECTION, POWDER, FOR SOLUTION INTRAVENOUS at 05:37

## 2021-01-01 RX ADMIN — MEROPENEM 1 G: 1 INJECTION, POWDER, FOR SOLUTION INTRAVENOUS at 04:15

## 2021-01-01 RX ADMIN — FENTANYL CITRATE: 50 INJECTION, SOLUTION INTRAMUSCULAR; INTRAVENOUS at 05:59

## 2021-01-01 RX ADMIN — PROPOFOL 35 MCG/KG/MIN: 10 INJECTION, EMULSION INTRAVENOUS at 00:05

## 2021-01-01 RX ADMIN — DEXAMETHASONE SODIUM PHOSPHATE 6 MG: 4 INJECTION, SOLUTION INTRA-ARTICULAR; INTRALESIONAL; INTRAMUSCULAR; INTRAVENOUS; SOFT TISSUE at 09:39

## 2021-01-01 RX ADMIN — TAMSULOSIN HYDROCHLORIDE 0.4 MG: 0.4 CAPSULE ORAL at 08:27

## 2021-01-01 RX ADMIN — INSULIN ASPART 5 UNITS: 100 INJECTION, SOLUTION INTRAVENOUS; SUBCUTANEOUS at 17:51

## 2021-01-01 RX ADMIN — PROPOFOL 45 MCG/KG/MIN: 10 INJECTION, EMULSION INTRAVENOUS at 12:57

## 2021-01-01 RX ADMIN — PROPOFOL 40 MCG/KG/MIN: 10 INJECTION, EMULSION INTRAVENOUS at 11:53

## 2021-01-01 RX ADMIN — INSULIN ASPART 5 UNITS: 100 INJECTION, SOLUTION INTRAVENOUS; SUBCUTANEOUS at 17:06

## 2021-01-01 RX ADMIN — INSULIN DETEMIR 25 UNITS: 100 INJECTION, SOLUTION SUBCUTANEOUS at 20:18

## 2021-01-01 RX ADMIN — ENOXAPARIN SODIUM 70 MG: 80 INJECTION SUBCUTANEOUS at 10:56

## 2021-01-01 RX ADMIN — SODIUM CHLORIDE, PRESERVATIVE FREE 10 ML: 5 INJECTION INTRAVENOUS at 20:37

## 2021-01-01 RX ADMIN — AMIODARONE HYDROCHLORIDE 1 MG/MIN: 1.8 INJECTION, SOLUTION INTRAVENOUS at 07:53

## 2021-01-01 RX ADMIN — MEROPENEM 1 G: 1 INJECTION, POWDER, FOR SOLUTION INTRAVENOUS at 22:21

## 2021-01-01 RX ADMIN — PROPOFOL 35 MCG/KG/MIN: 10 INJECTION, EMULSION INTRAVENOUS at 12:11

## 2021-01-01 RX ADMIN — CARBIDOPA AND LEVODOPA 10 MG: 50; 200 TABLET, EXTENDED RELEASE ORAL at 17:45

## 2021-01-01 RX ADMIN — SODIUM CHLORIDE, PRESERVATIVE FREE 10 ML: 5 INJECTION INTRAVENOUS at 08:02

## 2021-01-01 RX ADMIN — ENOXAPARIN SODIUM 70 MG: 80 INJECTION SUBCUTANEOUS at 10:38

## 2021-01-01 RX ADMIN — PROPOFOL 20 MCG/KG/MIN: 10 INJECTION, EMULSION INTRAVENOUS at 18:57

## 2021-01-01 RX ADMIN — PROPOFOL 35 MCG/KG/MIN: 10 INJECTION, EMULSION INTRAVENOUS at 14:21

## 2021-01-01 RX ADMIN — CHLORHEXIDINE GLUCONATE 15 ML: 1.2 RINSE ORAL at 20:53

## 2021-01-01 RX ADMIN — CHLORHEXIDINE GLUCONATE 15 ML: 1.2 RINSE ORAL at 08:44

## 2021-01-01 RX ADMIN — PHENYLEPHRINE HYDROCHLORIDE 1.8 MCG/KG/MIN: 10 INJECTION INTRAVENOUS at 08:20

## 2021-01-01 RX ADMIN — DEXAMETHASONE SODIUM PHOSPHATE 6 MG: 4 INJECTION, SOLUTION INTRA-ARTICULAR; INTRALESIONAL; INTRAMUSCULAR; INTRAVENOUS; SOFT TISSUE at 08:54

## 2021-01-01 RX ADMIN — PROPOFOL 20 MCG/KG/MIN: 10 INJECTION, EMULSION INTRAVENOUS at 12:22

## 2021-01-01 RX ADMIN — MINERAL OIL AND PETROLATUM: 150; 830 OINTMENT OPHTHALMIC at 02:34

## 2021-01-01 RX ADMIN — Medication: at 20:20

## 2021-01-01 RX ADMIN — PROPOFOL 35 MCG/KG/MIN: 10 INJECTION, EMULSION INTRAVENOUS at 05:19

## 2021-01-01 RX ADMIN — PROPOFOL 20 MCG/KG/MIN: 10 INJECTION, EMULSION INTRAVENOUS at 21:19

## 2021-01-01 RX ADMIN — PROPOFOL 35 MCG/KG/MIN: 10 INJECTION, EMULSION INTRAVENOUS at 18:17

## 2021-01-01 RX ADMIN — SODIUM CHLORIDE, PRESERVATIVE FREE 10 ML: 5 INJECTION INTRAVENOUS at 21:28

## 2021-01-01 RX ADMIN — CARBIDOPA AND LEVODOPA 10 MG: 50; 200 TABLET, EXTENDED RELEASE ORAL at 08:07

## 2021-01-01 RX ADMIN — SODIUM CHLORIDE, PRESERVATIVE FREE 10 ML: 5 INJECTION INTRAVENOUS at 09:29

## 2021-01-01 RX ADMIN — CASTOR OIL AND BALSAM, PERU 1 APPLICATION: 788; 87 OINTMENT TOPICAL at 09:35

## 2021-01-01 RX ADMIN — PHENYLEPHRINE HYDROCHLORIDE 1.8 MCG/KG/MIN: 10 INJECTION INTRAVENOUS at 23:55

## 2021-01-01 RX ADMIN — VANCOMYCIN HYDROCHLORIDE 1500 MG: 10 INJECTION, POWDER, LYOPHILIZED, FOR SOLUTION INTRAVENOUS at 14:57

## 2021-01-01 RX ADMIN — PROPOFOL 35 MCG/KG/MIN: 10 INJECTION, EMULSION INTRAVENOUS at 10:24

## 2021-01-01 RX ADMIN — DEXAMETHASONE SODIUM PHOSPHATE 6 MG: 4 INJECTION, SOLUTION INTRA-ARTICULAR; INTRALESIONAL; INTRAMUSCULAR; INTRAVENOUS; SOFT TISSUE at 10:44

## 2021-01-01 RX ADMIN — CASTOR OIL AND BALSAM, PERU 1 APPLICATION: 788; 87 OINTMENT TOPICAL at 08:28

## 2021-01-01 RX ADMIN — SODIUM CHLORIDE, PRESERVATIVE FREE 10 ML: 5 INJECTION INTRAVENOUS at 22:21

## 2021-01-01 RX ADMIN — ENOXAPARIN SODIUM 80 MG: 80 INJECTION SUBCUTANEOUS at 21:17

## 2021-01-01 RX ADMIN — INSULIN ASPART 8 UNITS: 100 INJECTION, SOLUTION INTRAVENOUS; SUBCUTANEOUS at 10:53

## 2021-01-01 RX ADMIN — CHLORHEXIDINE GLUCONATE 15 ML: 1.2 RINSE ORAL at 10:02

## 2021-01-01 RX ADMIN — ENOXAPARIN SODIUM 40 MG: 40 INJECTION SUBCUTANEOUS at 20:47

## 2021-01-01 RX ADMIN — FAMOTIDINE 20 MG: 10 INJECTION INTRAVENOUS at 20:49

## 2021-01-01 RX ADMIN — CEFEPIME HYDROCHLORIDE 2 G: 2 INJECTION, POWDER, FOR SOLUTION INTRAVENOUS at 11:05

## 2021-01-01 RX ADMIN — MINERAL OIL AND PETROLATUM: 150; 830 OINTMENT OPHTHALMIC at 02:57

## 2021-01-01 RX ADMIN — ENOXAPARIN SODIUM 70 MG: 80 INJECTION SUBCUTANEOUS at 23:21

## 2021-01-01 RX ADMIN — CARBIDOPA AND LEVODOPA 10 MG: 50; 200 TABLET, EXTENDED RELEASE ORAL at 17:52

## 2021-01-01 RX ADMIN — VASOPRESSIN 0.03 UNITS/MIN: 20 INJECTION INTRAVENOUS at 12:06

## 2021-01-01 RX ADMIN — MINERAL OIL AND PETROLATUM: 150; 830 OINTMENT OPHTHALMIC at 11:21

## 2021-01-01 RX ADMIN — SODIUM CHLORIDE, PRESERVATIVE FREE 10 ML: 5 INJECTION INTRAVENOUS at 21:00

## 2021-01-01 RX ADMIN — MINERAL OIL AND PETROLATUM: 150; 830 OINTMENT OPHTHALMIC at 09:25

## 2021-01-01 RX ADMIN — MEROPENEM 1 G: 1 INJECTION, POWDER, FOR SOLUTION INTRAVENOUS at 05:59

## 2021-01-01 RX ADMIN — PROPOFOL 45 MCG/KG/MIN: 10 INJECTION, EMULSION INTRAVENOUS at 01:56

## 2021-01-01 RX ADMIN — SODIUM CHLORIDE, PRESERVATIVE FREE 10 ML: 5 INJECTION INTRAVENOUS at 08:55

## 2021-01-01 RX ADMIN — VASOPRESSIN 0.03 UNITS/MIN: 20 INJECTION INTRAVENOUS at 14:33

## 2021-01-01 RX ADMIN — CARBIDOPA AND LEVODOPA 10 MG: 50; 200 TABLET, EXTENDED RELEASE ORAL at 12:18

## 2021-01-01 RX ADMIN — INSULIN ASPART 3 UNITS: 100 INJECTION, SOLUTION INTRAVENOUS; SUBCUTANEOUS at 17:37

## 2021-01-01 RX ADMIN — MEROPENEM 1 G: 1 INJECTION, POWDER, FOR SOLUTION INTRAVENOUS at 12:31

## 2021-01-01 RX ADMIN — ENOXAPARIN SODIUM 80 MG: 80 INJECTION SUBCUTANEOUS at 22:17

## 2021-01-01 RX ADMIN — INSULIN ASPART 3 UNITS: 100 INJECTION, SOLUTION INTRAVENOUS; SUBCUTANEOUS at 17:04

## 2021-01-01 RX ADMIN — SODIUM CHLORIDE, PRESERVATIVE FREE 10 ML: 5 INJECTION INTRAVENOUS at 08:01

## 2021-01-01 RX ADMIN — MEROPENEM 1 G: 1 INJECTION, POWDER, FOR SOLUTION INTRAVENOUS at 13:50

## 2021-01-01 RX ADMIN — MINERAL OIL AND PETROLATUM: 150; 830 OINTMENT OPHTHALMIC at 20:01

## 2021-01-01 RX ADMIN — FENTANYL CITRATE: 50 INJECTION, SOLUTION INTRAMUSCULAR; INTRAVENOUS at 19:47

## 2021-01-01 RX ADMIN — ENOXAPARIN SODIUM 70 MG: 80 INJECTION SUBCUTANEOUS at 14:22

## 2021-01-01 RX ADMIN — VECURONIUM BROMIDE 0.6 MCG/KG/MIN: 1 INJECTION, POWDER, LYOPHILIZED, FOR SOLUTION INTRAVENOUS at 06:18

## 2021-01-01 RX ADMIN — Medication 2 SPRAY: at 09:29

## 2021-01-01 RX ADMIN — Medication: at 16:31

## 2021-01-01 RX ADMIN — VANCOMYCIN HYDROCHLORIDE 1750 MG: 5 INJECTION, POWDER, LYOPHILIZED, FOR SOLUTION INTRAVENOUS at 16:17

## 2021-01-01 RX ADMIN — PROPOFOL 40 MCG/KG/MIN: 10 INJECTION, EMULSION INTRAVENOUS at 12:09

## 2021-01-01 RX ADMIN — SODIUM CHLORIDE, PRESERVATIVE FREE 10 ML: 5 INJECTION INTRAVENOUS at 21:18

## 2021-01-01 RX ADMIN — INSULIN ASPART 4 UNITS: 100 INJECTION, SOLUTION INTRAVENOUS; SUBCUTANEOUS at 17:14

## 2021-01-01 RX ADMIN — IOPAMIDOL 70 ML: 755 INJECTION, SOLUTION INTRAVENOUS at 14:32

## 2021-01-01 RX ADMIN — FAMOTIDINE 20 MG: 10 INJECTION INTRAVENOUS at 08:28

## 2021-01-01 RX ADMIN — CHLORHEXIDINE GLUCONATE 15 ML: 1.2 RINSE ORAL at 10:31

## 2021-01-01 RX ADMIN — CHLORHEXIDINE GLUCONATE 15 ML: 1.2 RINSE ORAL at 08:28

## 2021-01-01 RX ADMIN — Medication 2 SPRAY: at 21:04

## 2021-01-01 RX ADMIN — SODIUM CHLORIDE, PRESERVATIVE FREE 10 ML: 5 INJECTION INTRAVENOUS at 08:33

## 2021-01-01 RX ADMIN — AMIODARONE HYDROCHLORIDE 150 MG: 50 INJECTION, SOLUTION INTRAVENOUS at 08:13

## 2021-01-01 RX ADMIN — VASOPRESSIN 0.03 UNITS/MIN: 20 INJECTION INTRAVENOUS at 15:21

## 2021-01-01 RX ADMIN — DEXAMETHASONE SODIUM PHOSPHATE 6 MG: 4 INJECTION, SOLUTION INTRA-ARTICULAR; INTRALESIONAL; INTRAMUSCULAR; INTRAVENOUS; SOFT TISSUE at 08:19

## 2021-01-01 RX ADMIN — SODIUM CHLORIDE 75 ML/HR: 9 INJECTION, SOLUTION INTRAVENOUS at 06:32

## 2021-01-01 RX ADMIN — MINERAL OIL AND PETROLATUM: 150; 830 OINTMENT OPHTHALMIC at 19:01

## 2021-01-01 RX ADMIN — PROPOFOL 35 MCG/KG/MIN: 10 INJECTION, EMULSION INTRAVENOUS at 12:35

## 2021-01-01 RX ADMIN — CASTOR OIL AND BALSAM, PERU 1 APPLICATION: 788; 87 OINTMENT TOPICAL at 19:55

## 2021-01-01 RX ADMIN — INSULIN ASPART 8 UNITS: 100 INJECTION, SOLUTION INTRAVENOUS; SUBCUTANEOUS at 17:20

## 2021-01-01 RX ADMIN — CHLORHEXIDINE GLUCONATE 15 ML: 1.2 RINSE ORAL at 20:33

## 2021-01-01 RX ADMIN — SODIUM CHLORIDE, PRESERVATIVE FREE 10 ML: 5 INJECTION INTRAVENOUS at 08:54

## 2021-01-01 RX ADMIN — SODIUM CHLORIDE, PRESERVATIVE FREE 10 ML: 5 INJECTION INTRAVENOUS at 21:20

## 2021-01-01 RX ADMIN — PROPOFOL 50 MCG/KG/MIN: 10 INJECTION, EMULSION INTRAVENOUS at 03:15

## 2021-01-01 RX ADMIN — PROPOFOL 30 MCG/KG/MIN: 10 INJECTION, EMULSION INTRAVENOUS at 12:21

## 2021-01-01 RX ADMIN — MINERAL OIL AND PETROLATUM: 150; 830 OINTMENT OPHTHALMIC at 18:47

## 2021-01-01 RX ADMIN — VASOPRESSIN 0.03 UNITS/MIN: 20 INJECTION INTRAVENOUS at 23:45

## 2021-01-01 RX ADMIN — FAMOTIDINE 20 MG: 10 INJECTION INTRAVENOUS at 10:08

## 2021-01-01 RX ADMIN — SODIUM CHLORIDE, PRESERVATIVE FREE 10 ML: 5 INJECTION INTRAVENOUS at 20:21

## 2021-01-01 RX ADMIN — TAMSULOSIN HYDROCHLORIDE 0.4 MG: 0.4 CAPSULE ORAL at 08:01

## 2021-01-01 RX ADMIN — SODIUM CHLORIDE, PRESERVATIVE FREE 10 ML: 5 INJECTION INTRAVENOUS at 08:44

## 2021-01-01 RX ADMIN — FAMOTIDINE 20 MG: 10 INJECTION INTRAVENOUS at 08:37

## 2021-01-01 RX ADMIN — MINERAL OIL AND PETROLATUM: 150; 830 OINTMENT OPHTHALMIC at 04:11

## 2021-01-01 RX ADMIN — FAMOTIDINE 20 MG: 10 INJECTION INTRAVENOUS at 08:03

## 2021-01-01 RX ADMIN — INSULIN ASPART 6 UNITS: 100 INJECTION, SOLUTION INTRAVENOUS; SUBCUTANEOUS at 17:39

## 2021-01-01 RX ADMIN — ENOXAPARIN SODIUM 70 MG: 80 INJECTION SUBCUTANEOUS at 20:54

## 2021-01-01 RX ADMIN — PROPOFOL 45 MCG/KG/MIN: 10 INJECTION, EMULSION INTRAVENOUS at 20:33

## 2021-01-01 RX ADMIN — DEXAMETHASONE SODIUM PHOSPHATE 6 MG: 4 INJECTION, SOLUTION INTRA-ARTICULAR; INTRALESIONAL; INTRAMUSCULAR; INTRAVENOUS; SOFT TISSUE at 08:48

## 2021-01-01 RX ADMIN — FUROSEMIDE 40 MG: 10 INJECTION INTRAMUSCULAR; INTRAVENOUS at 08:39

## 2021-01-01 RX ADMIN — CASTOR OIL AND BALSAM, PERU 1 APPLICATION: 788; 87 OINTMENT TOPICAL at 08:33

## 2021-01-01 RX ADMIN — CARBIDOPA AND LEVODOPA 10 MG: 50; 200 TABLET, EXTENDED RELEASE ORAL at 17:07

## 2021-01-01 RX ADMIN — CASTOR OIL AND BALSAM, PERU 1 APPLICATION: 788; 87 OINTMENT TOPICAL at 20:01

## 2021-01-01 RX ADMIN — VANCOMYCIN HYDROCHLORIDE 1750 MG: 5 INJECTION, POWDER, LYOPHILIZED, FOR SOLUTION INTRAVENOUS at 15:21

## 2021-01-01 RX ADMIN — MINERAL OIL AND PETROLATUM: 150; 830 OINTMENT OPHTHALMIC at 23:22

## 2021-01-01 RX ADMIN — MINERAL OIL AND PETROLATUM: 150; 830 OINTMENT OPHTHALMIC at 23:30

## 2021-01-01 RX ADMIN — VASOPRESSIN 0.03 UNITS/MIN: 20 INJECTION INTRAVENOUS at 01:58

## 2021-01-01 RX ADMIN — TAMSULOSIN HYDROCHLORIDE 0.4 MG: 0.4 CAPSULE ORAL at 08:37

## 2021-01-01 RX ADMIN — CASTOR OIL AND BALSAM, PERU 1 APPLICATION: 788; 87 OINTMENT TOPICAL at 10:44

## 2021-01-01 RX ADMIN — INSULIN ASPART 3 UNITS: 100 INJECTION, SOLUTION INTRAVENOUS; SUBCUTANEOUS at 11:21

## 2021-01-01 RX ADMIN — ACETAMINOPHEN 650 MG: 325 TABLET ORAL at 21:28

## 2021-01-01 RX ADMIN — FAMOTIDINE 20 MG: 10 INJECTION INTRAVENOUS at 20:47

## 2021-01-01 RX ADMIN — VANCOMYCIN HYDROCHLORIDE 1750 MG: 5 INJECTION, POWDER, LYOPHILIZED, FOR SOLUTION INTRAVENOUS at 15:50

## 2021-01-01 RX ADMIN — MEROPENEM 1 G: 1 INJECTION, POWDER, FOR SOLUTION INTRAVENOUS at 05:18

## 2021-01-01 RX ADMIN — MEROPENEM 1 G: 1 INJECTION, POWDER, FOR SOLUTION INTRAVENOUS at 13:00

## 2021-01-01 RX ADMIN — PHENYLEPHRINE HYDROCHLORIDE 1.4 MCG/KG/MIN: 10 INJECTION INTRAVENOUS at 16:22

## 2021-01-01 RX ADMIN — Medication: at 05:12

## 2021-01-01 RX ADMIN — MEROPENEM 1 G: 1 INJECTION, POWDER, FOR SOLUTION INTRAVENOUS at 21:00

## 2021-01-01 RX ADMIN — SODIUM CHLORIDE, PRESERVATIVE FREE 10 ML: 5 INJECTION INTRAVENOUS at 11:11

## 2021-01-01 RX ADMIN — CARBIDOPA AND LEVODOPA 10 MG: 50; 200 TABLET, EXTENDED RELEASE ORAL at 17:56

## 2021-01-01 RX ADMIN — Medication 2 SPRAY: at 21:33

## 2021-01-01 RX ADMIN — VASOPRESSIN 0.03 UNITS/MIN: 20 INJECTION INTRAVENOUS at 16:08

## 2021-01-01 RX ADMIN — CASTOR OIL AND BALSAM, PERU 1 APPLICATION: 788; 87 OINTMENT TOPICAL at 21:25

## 2021-01-01 RX ADMIN — ENOXAPARIN SODIUM 70 MG: 80 INJECTION SUBCUTANEOUS at 22:16

## 2021-01-01 RX ADMIN — VASOPRESSIN 0.03 UNITS/MIN: 20 INJECTION INTRAVENOUS at 20:43

## 2021-01-01 RX ADMIN — ENOXAPARIN SODIUM 80 MG: 80 INJECTION SUBCUTANEOUS at 09:55

## 2021-01-01 RX ADMIN — MINERAL OIL AND PETROLATUM: 150; 830 OINTMENT OPHTHALMIC at 20:19

## 2021-01-01 RX ADMIN — SODIUM CHLORIDE, PRESERVATIVE FREE 10 ML: 5 INJECTION INTRAVENOUS at 08:07

## 2021-01-01 RX ADMIN — ENOXAPARIN SODIUM 70 MG: 80 INJECTION SUBCUTANEOUS at 11:01

## 2021-01-01 RX ADMIN — PROPOFOL 40 MCG/KG/MIN: 10 INJECTION, EMULSION INTRAVENOUS at 01:43

## 2021-01-01 RX ADMIN — AMIODARONE HYDROCHLORIDE 0.5 MG/MIN: 1.8 INJECTION, SOLUTION INTRAVENOUS at 13:05

## 2021-01-01 RX ADMIN — DEXAMETHASONE SODIUM PHOSPHATE 6 MG: 4 INJECTION, SOLUTION INTRA-ARTICULAR; INTRALESIONAL; INTRAMUSCULAR; INTRAVENOUS; SOFT TISSUE at 09:53

## 2021-01-01 RX ADMIN — INSULIN DETEMIR 20 UNITS: 100 INJECTION, SOLUTION SUBCUTANEOUS at 20:56

## 2021-01-01 RX ADMIN — ENOXAPARIN SODIUM 40 MG: 40 INJECTION SUBCUTANEOUS at 21:20

## 2021-01-01 RX ADMIN — INSULIN ASPART 5 UNITS: 100 INJECTION, SOLUTION INTRAVENOUS; SUBCUTANEOUS at 06:55

## 2021-01-01 RX ADMIN — PROPOFOL 25 MCG/KG/MIN: 10 INJECTION, EMULSION INTRAVENOUS at 23:23

## 2021-01-01 RX ADMIN — KIT FOR THE PREPARATION OF TECHNETIUM TC 99M ALBUMIN AGGREGATED 1 DOSE: 2.5 INJECTION, POWDER, FOR SOLUTION INTRAVENOUS at 18:57

## 2021-01-01 RX ADMIN — INSULIN DETEMIR 25 UNITS: 100 INJECTION, SOLUTION SUBCUTANEOUS at 20:04

## 2021-01-01 RX ADMIN — PHENYLEPHRINE HYDROCHLORIDE 2.1 MCG/KG/MIN: 10 INJECTION INTRAVENOUS at 01:05

## 2021-01-01 RX ADMIN — Medication: at 14:07

## 2021-01-01 RX ADMIN — THIAMINE HYDROCHLORIDE 100 MG: 100 INJECTION, SOLUTION INTRAMUSCULAR; INTRAVENOUS at 10:37

## 2021-01-01 RX ADMIN — Medication: at 13:07

## 2021-01-01 RX ADMIN — PROPOFOL 35 MCG/KG/MIN: 10 INJECTION, EMULSION INTRAVENOUS at 19:22

## 2021-01-01 RX ADMIN — CASTOR OIL AND BALSAM, PERU 1 APPLICATION: 788; 87 OINTMENT TOPICAL at 20:14

## 2021-01-01 RX ADMIN — SODIUM CHLORIDE, PRESERVATIVE FREE 10 ML: 5 INJECTION INTRAVENOUS at 08:37

## 2021-01-01 RX ADMIN — FAMOTIDINE 20 MG: 10 INJECTION INTRAVENOUS at 22:21

## 2021-01-01 RX ADMIN — DEXMEDETOMIDINE HYDROCHLORIDE 0.2 MCG/KG/HR: 100 INJECTION, SOLUTION INTRAVENOUS at 03:23

## 2021-01-01 RX ADMIN — PHENYLEPHRINE HYDROCHLORIDE 1.4 MCG/KG/MIN: 10 INJECTION INTRAVENOUS at 22:22

## 2021-01-01 RX ADMIN — Medication 1250 MG: at 11:01

## 2021-01-01 RX ADMIN — Medication 2 SPRAY: at 21:19

## 2021-01-01 RX ADMIN — THIAMINE HYDROCHLORIDE 100 MG: 100 INJECTION, SOLUTION INTRAMUSCULAR; INTRAVENOUS at 04:34

## 2021-01-01 RX ADMIN — PROPOFOL 20 MCG/KG/MIN: 10 INJECTION, EMULSION INTRAVENOUS at 08:10

## 2021-01-01 RX ADMIN — REMDESIVIR 100 MG: 100 INJECTION, POWDER, LYOPHILIZED, FOR SOLUTION INTRAVENOUS at 09:55

## 2021-01-01 RX ADMIN — MINERAL OIL AND PETROLATUM: 150; 830 OINTMENT OPHTHALMIC at 16:03

## 2021-01-01 RX ADMIN — Medication: at 13:05

## 2021-01-01 RX ADMIN — DEXAMETHASONE SODIUM PHOSPHATE 6 MG: 4 INJECTION, SOLUTION INTRA-ARTICULAR; INTRALESIONAL; INTRAMUSCULAR; INTRAVENOUS; SOFT TISSUE at 10:06

## 2021-01-01 RX ADMIN — CHLORHEXIDINE GLUCONATE 15 ML: 1.2 RINSE ORAL at 20:59

## 2021-01-01 RX ADMIN — ENOXAPARIN SODIUM 80 MG: 80 INJECTION SUBCUTANEOUS at 09:26

## 2021-01-01 RX ADMIN — NOREPINEPHRINE BITARTRATE 0.02 MCG/KG/MIN: 1 INJECTION, SOLUTION, CONCENTRATE INTRAVENOUS at 17:36

## 2021-01-01 RX ADMIN — CASTOR OIL AND BALSAM, PERU 1 APPLICATION: 788; 87 OINTMENT TOPICAL at 08:24

## 2021-01-01 RX ADMIN — CHLORHEXIDINE GLUCONATE 15 ML: 1.2 RINSE ORAL at 08:04

## 2021-01-01 RX ADMIN — TAMSULOSIN HYDROCHLORIDE 0.4 MG: 0.4 CAPSULE ORAL at 08:40

## 2021-01-01 RX ADMIN — FAMOTIDINE 20 MG: 10 INJECTION INTRAVENOUS at 08:19

## 2021-01-01 RX ADMIN — SERTRALINE 100 MG: 50 TABLET, FILM COATED ORAL at 08:53

## 2021-01-01 RX ADMIN — FAMOTIDINE 20 MG: 10 INJECTION INTRAVENOUS at 08:01

## 2021-01-01 RX ADMIN — SERTRALINE 100 MG: 50 TABLET, FILM COATED ORAL at 09:38

## 2021-01-01 RX ADMIN — PROPOFOL 35 MCG/KG/MIN: 10 INJECTION, EMULSION INTRAVENOUS at 08:00

## 2021-01-01 RX ADMIN — MINERAL OIL AND PETROLATUM: 150; 830 OINTMENT OPHTHALMIC at 06:00

## 2021-01-01 RX ADMIN — MEROPENEM 1 G: 1 INJECTION, POWDER, FOR SOLUTION INTRAVENOUS at 12:47

## 2021-01-01 RX ADMIN — BARICITINIB 4 MG: 2 TABLET, FILM COATED ORAL at 22:17

## 2021-01-01 RX ADMIN — METOPROLOL TARTRATE 5 MG: 1 INJECTION, SOLUTION INTRAVENOUS at 19:43

## 2021-01-01 RX ADMIN — MEROPENEM 1 G: 1 INJECTION, POWDER, FOR SOLUTION INTRAVENOUS at 12:59

## 2021-01-01 RX ADMIN — POTASSIUM CHLORIDE 40 MEQ: 1.5 POWDER, FOR SOLUTION ORAL at 20:57

## 2021-01-01 RX ADMIN — DEXMEDETOMIDINE HYDROCHLORIDE 0.2 MCG/KG/HR: 100 INJECTION, SOLUTION INTRAVENOUS at 23:58

## 2021-01-01 RX ADMIN — FAMOTIDINE 20 MG: 10 INJECTION INTRAVENOUS at 09:40

## 2021-01-01 RX ADMIN — PROPOFOL 25 MCG/KG/MIN: 10 INJECTION, EMULSION INTRAVENOUS at 18:15

## 2021-01-01 RX ADMIN — VECURONIUM BROMIDE 0.6 MCG/KG/MIN: 1 INJECTION, POWDER, LYOPHILIZED, FOR SOLUTION INTRAVENOUS at 23:45

## 2021-01-01 RX ADMIN — VECURONIUM BROMIDE 0.8 MCG/KG/MIN: 1 INJECTION, POWDER, LYOPHILIZED, FOR SOLUTION INTRAVENOUS at 23:23

## 2021-01-01 RX ADMIN — FENTANYL CITRATE: 50 INJECTION, SOLUTION INTRAMUSCULAR; INTRAVENOUS at 21:27

## 2021-01-01 RX ADMIN — NOREPINEPHRINE BITARTRATE 0.18 MCG/KG/MIN: 1 INJECTION, SOLUTION, CONCENTRATE INTRAVENOUS at 08:29

## 2021-01-01 RX ADMIN — FENTANYL CITRATE: 50 INJECTION, SOLUTION INTRAMUSCULAR; INTRAVENOUS at 02:23

## 2021-01-01 RX ADMIN — MEROPENEM 1 G: 1 INJECTION, POWDER, FOR SOLUTION INTRAVENOUS at 13:13

## 2021-01-01 RX ADMIN — VASOPRESSIN 0.03 UNITS/MIN: 20 INJECTION INTRAVENOUS at 17:25

## 2021-01-01 RX ADMIN — PHENYLEPHRINE HYDROCHLORIDE 3 MCG/KG/MIN: 10 INJECTION INTRAVENOUS at 04:05

## 2021-01-01 RX ADMIN — SODIUM CHLORIDE, PRESERVATIVE FREE 10 ML: 5 INJECTION INTRAVENOUS at 20:50

## 2021-01-01 RX ADMIN — SODIUM CHLORIDE, PRESERVATIVE FREE 10 ML: 5 INJECTION INTRAVENOUS at 20:03

## 2021-01-01 RX ADMIN — Medication: at 20:57

## 2021-01-01 RX ADMIN — Medication: at 13:19

## 2021-01-01 RX ADMIN — DEXAMETHASONE SODIUM PHOSPHATE 6 MG: 4 INJECTION, SOLUTION INTRA-ARTICULAR; INTRALESIONAL; INTRAMUSCULAR; INTRAVENOUS; SOFT TISSUE at 21:07

## 2021-01-01 RX ADMIN — VECURONIUM BROMIDE 0.6 MCG/KG/MIN: 1 INJECTION, POWDER, LYOPHILIZED, FOR SOLUTION INTRAVENOUS at 00:19

## 2021-01-01 RX ADMIN — MINERAL OIL AND PETROLATUM: 150; 830 OINTMENT OPHTHALMIC at 19:37

## 2021-01-01 RX ADMIN — PHENYLEPHRINE HYDROCHLORIDE 1.8 MCG/KG/MIN: 10 INJECTION INTRAVENOUS at 15:21

## 2021-01-01 RX ADMIN — CHLORHEXIDINE GLUCONATE 15 ML: 1.2 RINSE ORAL at 20:01

## 2021-01-01 RX ADMIN — PHENYLEPHRINE HYDROCHLORIDE 0.5 MCG/KG/MIN: 10 INJECTION INTRAVENOUS at 21:46

## 2021-01-01 RX ADMIN — INSULIN ASPART 3 UNITS: 100 INJECTION, SOLUTION INTRAVENOUS; SUBCUTANEOUS at 10:59

## 2021-01-01 RX ADMIN — PROPOFOL 30 MCG/KG/MIN: 10 INJECTION, EMULSION INTRAVENOUS at 18:02

## 2021-01-01 RX ADMIN — INSULIN ASPART 5 UNITS: 100 INJECTION, SOLUTION INTRAVENOUS; SUBCUTANEOUS at 10:41

## 2021-01-01 RX ADMIN — INSULIN ASPART 2 UNITS: 100 INJECTION, SOLUTION INTRAVENOUS; SUBCUTANEOUS at 06:42

## 2021-01-01 RX ADMIN — PHENYLEPHRINE HYDROCHLORIDE 1.8 MCG/KG/MIN: 10 INJECTION INTRAVENOUS at 07:47

## 2021-01-01 RX ADMIN — INSULIN ASPART 8 UNITS: 100 INJECTION, SOLUTION INTRAVENOUS; SUBCUTANEOUS at 17:45

## 2021-01-01 RX ADMIN — MINERAL OIL AND PETROLATUM: 150; 830 OINTMENT OPHTHALMIC at 15:41

## 2021-01-01 RX ADMIN — MEROPENEM 1 G: 1 INJECTION, POWDER, FOR SOLUTION INTRAVENOUS at 13:18

## 2021-01-01 RX ADMIN — MINERAL OIL AND PETROLATUM: 150; 830 OINTMENT OPHTHALMIC at 17:03

## 2021-01-01 RX ADMIN — MINERAL OIL AND PETROLATUM: 150; 830 OINTMENT OPHTHALMIC at 20:56

## 2021-01-01 RX ADMIN — FENTANYL CITRATE: 50 INJECTION, SOLUTION INTRAMUSCULAR; INTRAVENOUS at 16:15

## 2021-01-01 RX ADMIN — DEXMEDETOMIDINE HYDROCHLORIDE 0.3 MCG/KG/HR: 100 INJECTION, SOLUTION INTRAVENOUS at 04:34

## 2021-01-01 RX ADMIN — DIGOXIN 250 MCG: 0.25 INJECTION INTRAMUSCULAR; INTRAVENOUS at 10:31

## 2021-01-01 RX ADMIN — SODIUM CHLORIDE, PRESERVATIVE FREE 10 ML: 5 INJECTION INTRAVENOUS at 22:25

## 2021-01-01 RX ADMIN — ENOXAPARIN SODIUM 70 MG: 80 INJECTION SUBCUTANEOUS at 21:26

## 2021-01-01 RX ADMIN — FUROSEMIDE 40 MG: 10 INJECTION INTRAMUSCULAR; INTRAVENOUS at 09:26

## 2021-01-01 RX ADMIN — PHENYLEPHRINE HYDROCHLORIDE 2.7 MCG/KG/MIN: 10 INJECTION INTRAVENOUS at 12:37

## 2021-01-01 RX ADMIN — CARBIDOPA AND LEVODOPA 10 MG: 50; 200 TABLET, EXTENDED RELEASE ORAL at 11:22

## 2021-01-01 RX ADMIN — VECURONIUM BROMIDE 0.6 MCG/KG/MIN: 1 INJECTION, POWDER, LYOPHILIZED, FOR SOLUTION INTRAVENOUS at 15:03

## 2021-01-01 RX ADMIN — PROPOFOL 20 MCG/KG/MIN: 10 INJECTION, EMULSION INTRAVENOUS at 06:15

## 2021-01-01 RX ADMIN — SODIUM CHLORIDE, PRESERVATIVE FREE 10 ML: 5 INJECTION INTRAVENOUS at 09:35

## 2021-01-01 RX ADMIN — MEROPENEM 1 G: 1 INJECTION, POWDER, FOR SOLUTION INTRAVENOUS at 15:59

## 2021-01-01 RX ADMIN — PHENYLEPHRINE HYDROCHLORIDE 2.1 MCG/KG/MIN: 10 INJECTION INTRAVENOUS at 00:05

## 2021-01-01 RX ADMIN — MINERAL OIL AND PETROLATUM: 150; 830 OINTMENT OPHTHALMIC at 07:50

## 2021-01-01 RX ADMIN — FAMOTIDINE 20 MG: 10 INJECTION INTRAVENOUS at 08:43

## 2021-01-01 RX ADMIN — PHENYLEPHRINE HYDROCHLORIDE 0.9 MCG/KG/MIN: 10 INJECTION INTRAVENOUS at 05:45

## 2021-01-01 RX ADMIN — INSULIN DETEMIR 25 UNITS: 100 INJECTION, SOLUTION SUBCUTANEOUS at 22:21

## 2021-01-01 RX ADMIN — NOREPINEPHRINE BITARTRATE 0.12 MCG/KG/MIN: 1 INJECTION, SOLUTION, CONCENTRATE INTRAVENOUS at 20:07

## 2021-01-01 RX ADMIN — ENOXAPARIN SODIUM 80 MG: 80 INJECTION SUBCUTANEOUS at 21:15

## 2021-01-01 RX ADMIN — INSULIN ASPART 2 UNITS: 100 INJECTION, SOLUTION INTRAVENOUS; SUBCUTANEOUS at 00:30

## 2021-01-01 RX ADMIN — CARBIDOPA AND LEVODOPA 10 MG: 50; 200 TABLET, EXTENDED RELEASE ORAL at 11:04

## 2021-01-01 RX ADMIN — Medication: at 19:18

## 2021-01-01 RX ADMIN — ENOXAPARIN SODIUM 70 MG: 80 INJECTION SUBCUTANEOUS at 11:21

## 2021-01-01 RX ADMIN — CASTOR OIL AND BALSAM, PERU 1 APPLICATION: 788; 87 OINTMENT TOPICAL at 20:33

## 2021-01-01 RX ADMIN — ENOXAPARIN SODIUM 70 MG: 80 INJECTION SUBCUTANEOUS at 10:45

## 2021-01-01 RX ADMIN — CHLORHEXIDINE GLUCONATE 15 ML: 1.2 RINSE ORAL at 20:20

## 2021-01-01 RX ADMIN — CHLORHEXIDINE GLUCONATE 15 ML: 1.2 RINSE ORAL at 19:55

## 2021-01-01 RX ADMIN — ENOXAPARIN SODIUM 70 MG: 80 INJECTION SUBCUTANEOUS at 08:42

## 2021-01-01 RX ADMIN — REMDESIVIR 100 MG: 100 INJECTION, POWDER, LYOPHILIZED, FOR SOLUTION INTRAVENOUS at 08:52

## 2021-01-01 RX ADMIN — INSULIN DETEMIR 25 UNITS: 100 INJECTION, SOLUTION SUBCUTANEOUS at 20:19

## 2021-01-01 RX ADMIN — NOREPINEPHRINE BITARTRATE 0.04 MCG/KG/MIN: 1 INJECTION, SOLUTION, CONCENTRATE INTRAVENOUS at 12:12

## 2021-01-01 RX ADMIN — SODIUM CHLORIDE, PRESERVATIVE FREE 10 ML: 5 INJECTION INTRAVENOUS at 22:24

## 2021-01-01 RX ADMIN — DEXAMETHASONE SODIUM PHOSPHATE 6 MG: 4 INJECTION, SOLUTION INTRA-ARTICULAR; INTRALESIONAL; INTRAMUSCULAR; INTRAVENOUS; SOFT TISSUE at 08:49

## 2021-01-01 RX ADMIN — MINERAL OIL AND PETROLATUM: 150; 830 OINTMENT OPHTHALMIC at 19:28

## 2021-01-01 RX ADMIN — INSULIN DETEMIR 25 UNITS: 100 INJECTION, SOLUTION SUBCUTANEOUS at 21:42

## 2021-01-01 RX ADMIN — POTASSIUM CHLORIDE 10 MEQ: 7.46 INJECTION, SOLUTION INTRAVENOUS at 21:00

## 2021-01-01 RX ADMIN — FAMOTIDINE 20 MG: 10 INJECTION INTRAVENOUS at 21:29

## 2021-01-01 RX ADMIN — CHLORHEXIDINE GLUCONATE 15 ML: 1.2 RINSE ORAL at 21:27

## 2021-01-01 RX ADMIN — VASOPRESSIN 0.03 UNITS/MIN: 20 INJECTION INTRAVENOUS at 11:07

## 2021-01-01 RX ADMIN — CHLORHEXIDINE GLUCONATE 15 ML: 1.2 RINSE ORAL at 20:36

## 2021-01-01 RX ADMIN — PROPOFOL 35 MCG/KG/MIN: 10 INJECTION, EMULSION INTRAVENOUS at 06:44

## 2021-01-01 RX ADMIN — MINERAL OIL AND PETROLATUM: 150; 830 OINTMENT OPHTHALMIC at 06:43

## 2021-01-01 RX ADMIN — REMDESIVIR 200 MG: 100 INJECTION, POWDER, LYOPHILIZED, FOR SOLUTION INTRAVENOUS at 08:40

## 2021-01-01 RX ADMIN — CHLORHEXIDINE GLUCONATE 15 ML: 1.2 RINSE ORAL at 22:24

## 2021-01-01 RX ADMIN — CARBIDOPA AND LEVODOPA 10 MG: 50; 200 TABLET, EXTENDED RELEASE ORAL at 05:27

## 2021-01-01 RX ADMIN — Medication: at 06:30

## 2021-01-01 RX ADMIN — PROPOFOL 45 MCG/KG/MIN: 10 INJECTION, EMULSION INTRAVENOUS at 22:19

## 2021-01-01 RX ADMIN — ENOXAPARIN SODIUM 70 MG: 80 INJECTION SUBCUTANEOUS at 22:25

## 2021-01-01 RX ADMIN — ENOXAPARIN SODIUM 80 MG: 80 INJECTION SUBCUTANEOUS at 08:53

## 2021-01-01 RX ADMIN — VASOPRESSIN 0.03 UNITS/MIN: 20 INJECTION INTRAVENOUS at 02:43

## 2021-01-01 RX ADMIN — MINERAL OIL AND PETROLATUM: 150; 830 OINTMENT OPHTHALMIC at 10:53

## 2021-01-01 RX ADMIN — CHLORHEXIDINE GLUCONATE 15 ML: 1.2 RINSE ORAL at 08:27

## 2021-01-01 RX ADMIN — ENOXAPARIN SODIUM 40 MG: 40 INJECTION SUBCUTANEOUS at 21:29

## 2021-01-01 RX ADMIN — PROPOFOL 25 MCG/KG/MIN: 10 INJECTION, EMULSION INTRAVENOUS at 04:41

## 2021-01-01 RX ADMIN — ENOXAPARIN SODIUM 80 MG: 80 INJECTION SUBCUTANEOUS at 11:13

## 2021-01-01 RX ADMIN — SODIUM CHLORIDE, PRESERVATIVE FREE 10 ML: 5 INJECTION INTRAVENOUS at 10:47

## 2021-01-01 RX ADMIN — CHLORHEXIDINE GLUCONATE 15 ML: 1.2 RINSE ORAL at 20:56

## 2021-01-01 RX ADMIN — NOREPINEPHRINE BITARTRATE 0.1 MCG/KG/MIN: 1 INJECTION, SOLUTION, CONCENTRATE INTRAVENOUS at 11:20

## 2021-01-01 RX ADMIN — MEROPENEM 1 G: 1 INJECTION, POWDER, FOR SOLUTION INTRAVENOUS at 05:16

## 2021-01-01 RX ADMIN — PROPOFOL 40 MCG/KG/MIN: 10 INJECTION, EMULSION INTRAVENOUS at 19:47

## 2021-01-01 RX ADMIN — MINERAL OIL AND PETROLATUM: 150; 830 OINTMENT OPHTHALMIC at 22:50

## 2021-01-01 RX ADMIN — CHLORHEXIDINE GLUCONATE 15 ML: 1.2 RINSE ORAL at 20:46

## 2021-01-01 RX ADMIN — SODIUM CHLORIDE, PRESERVATIVE FREE 10 ML: 5 INJECTION INTRAVENOUS at 21:12

## 2021-01-01 RX ADMIN — PHENYLEPHRINE HYDROCHLORIDE 0.5 MCG/KG/MIN: 10 INJECTION INTRAVENOUS at 17:06

## 2021-01-01 RX ADMIN — Medication: at 23:40

## 2021-01-01 RX ADMIN — TAMSULOSIN HYDROCHLORIDE 0.4 MG: 0.4 CAPSULE ORAL at 09:36

## 2021-01-01 RX ADMIN — SODIUM CHLORIDE, PRESERVATIVE FREE 10 ML: 5 INJECTION INTRAVENOUS at 22:18

## 2021-01-01 RX ADMIN — FAMOTIDINE 20 MG: 10 INJECTION INTRAVENOUS at 19:54

## 2021-01-01 RX ADMIN — Medication 2 SPRAY: at 08:01

## 2021-01-01 RX ADMIN — MINERAL OIL AND PETROLATUM: 150; 830 OINTMENT OPHTHALMIC at 21:24

## 2021-01-01 RX ADMIN — FAMOTIDINE 20 MG: 10 INJECTION INTRAVENOUS at 09:33

## 2021-01-01 RX ADMIN — PHENYLEPHRINE HYDROCHLORIDE 3 MCG/KG/MIN: 10 INJECTION INTRAVENOUS at 18:29

## 2021-01-01 RX ADMIN — DEXAMETHASONE SODIUM PHOSPHATE 6 MG: 4 INJECTION, SOLUTION INTRA-ARTICULAR; INTRALESIONAL; INTRAMUSCULAR; INTRAVENOUS; SOFT TISSUE at 08:37

## 2021-01-01 RX ADMIN — THIAMINE HYDROCHLORIDE 100 MG: 100 INJECTION, SOLUTION INTRAMUSCULAR; INTRAVENOUS at 10:21

## 2021-01-01 RX ADMIN — Medication: at 01:30

## 2021-01-01 RX ADMIN — MINERAL OIL AND PETROLATUM: 150; 830 OINTMENT OPHTHALMIC at 11:09

## 2021-01-01 RX ADMIN — INSULIN ASPART 4 UNITS: 100 INJECTION, SOLUTION INTRAVENOUS; SUBCUTANEOUS at 05:50

## 2021-01-01 RX ADMIN — ENOXAPARIN SODIUM 70 MG: 80 INJECTION SUBCUTANEOUS at 09:27

## 2021-01-01 RX ADMIN — PHENYLEPHRINE HYDROCHLORIDE 3 MCG/KG/MIN: 10 INJECTION INTRAVENOUS at 02:19

## 2021-01-01 RX ADMIN — PROPOFOL 45 MCG/KG/MIN: 10 INJECTION, EMULSION INTRAVENOUS at 05:20

## 2021-01-01 RX ADMIN — SODIUM CHLORIDE, PRESERVATIVE FREE 10 ML: 5 INJECTION INTRAVENOUS at 09:37

## 2021-01-01 RX ADMIN — Medication: at 06:42

## 2021-01-01 RX ADMIN — VASOPRESSIN 0.03 UNITS/MIN: 20 INJECTION INTRAVENOUS at 00:04

## 2021-01-01 RX ADMIN — PROPOFOL 35 MCG/KG/MIN: 10 INJECTION, EMULSION INTRAVENOUS at 16:03

## 2021-01-01 RX ADMIN — MINERAL OIL AND PETROLATUM: 150; 830 OINTMENT OPHTHALMIC at 09:35

## 2021-01-01 RX ADMIN — SODIUM CHLORIDE, PRESERVATIVE FREE 10 ML: 5 INJECTION INTRAVENOUS at 11:08

## 2021-01-01 RX ADMIN — AMIODARONE HYDROCHLORIDE 0.5 MG/MIN: 1.8 INJECTION, SOLUTION INTRAVENOUS at 01:58

## 2021-01-01 RX ADMIN — CASTOR OIL AND BALSAM, PERU 1 APPLICATION: 788; 87 OINTMENT TOPICAL at 20:50

## 2021-01-01 RX ADMIN — FAMOTIDINE 20 MG: 10 INJECTION INTRAVENOUS at 09:27

## 2021-01-01 RX ADMIN — PROPOFOL 45 MCG/KG/MIN: 10 INJECTION, EMULSION INTRAVENOUS at 07:42

## 2021-01-01 RX ADMIN — CHLORHEXIDINE GLUCONATE 15 ML: 1.2 RINSE ORAL at 21:16

## 2021-01-01 RX ADMIN — PHENYLEPHRINE HYDROCHLORIDE 2.1 MCG/KG/MIN: 10 INJECTION INTRAVENOUS at 19:17

## 2021-01-01 RX ADMIN — FAMOTIDINE 20 MG: 10 INJECTION INTRAVENOUS at 21:25

## 2021-01-01 RX ADMIN — INSULIN ASPART 3 UNITS: 100 INJECTION, SOLUTION INTRAVENOUS; SUBCUTANEOUS at 12:37

## 2021-01-01 RX ADMIN — FAMOTIDINE 20 MG: 10 INJECTION INTRAVENOUS at 08:06

## 2021-01-01 RX ADMIN — SERTRALINE 100 MG: 50 TABLET, FILM COATED ORAL at 08:01

## 2021-01-01 RX ADMIN — SERTRALINE 100 MG: 50 TABLET, FILM COATED ORAL at 08:27

## 2021-01-01 RX ADMIN — PROPOFOL 35 MCG/KG/MIN: 10 INJECTION, EMULSION INTRAVENOUS at 19:37

## 2021-01-01 RX ADMIN — BARICITINIB 4 MG: 2 TABLET, FILM COATED ORAL at 08:55

## 2021-01-01 RX ADMIN — DEXAMETHASONE SODIUM PHOSPHATE 6 MG: 4 INJECTION, SOLUTION INTRA-ARTICULAR; INTRALESIONAL; INTRAMUSCULAR; INTRAVENOUS; SOFT TISSUE at 08:36

## 2021-01-01 RX ADMIN — MEROPENEM 1 G: 1 INJECTION, POWDER, FOR SOLUTION INTRAVENOUS at 20:32

## 2021-01-01 RX ADMIN — DEXAMETHASONE SODIUM PHOSPHATE 6 MG: 4 INJECTION, SOLUTION INTRA-ARTICULAR; INTRALESIONAL; INTRAMUSCULAR; INTRAVENOUS; SOFT TISSUE at 08:01

## 2021-01-01 RX ADMIN — CASTOR OIL AND BALSAM, PERU 1 APPLICATION: 788; 87 OINTMENT TOPICAL at 09:33

## 2021-01-01 RX ADMIN — ENOXAPARIN SODIUM 70 MG: 80 INJECTION SUBCUTANEOUS at 09:37

## 2021-01-01 RX ADMIN — PROPOFOL 45 MCG/KG/MIN: 10 INJECTION, EMULSION INTRAVENOUS at 14:59

## 2021-01-01 RX ADMIN — ENOXAPARIN SODIUM 70 MG: 80 INJECTION SUBCUTANEOUS at 11:06

## 2021-01-01 RX ADMIN — PROPOFOL 40 MCG/KG/MIN: 10 INJECTION, EMULSION INTRAVENOUS at 07:24

## 2021-01-01 RX ADMIN — DEXAMETHASONE SODIUM PHOSPHATE 6 MG: 4 INJECTION, SOLUTION INTRA-ARTICULAR; INTRALESIONAL; INTRAMUSCULAR; INTRAVENOUS; SOFT TISSUE at 22:21

## 2021-01-01 RX ADMIN — VASOPRESSIN 0.03 UNITS/MIN: 20 INJECTION INTRAVENOUS at 08:37

## 2021-01-01 RX ADMIN — ENOXAPARIN SODIUM 70 MG: 80 INJECTION SUBCUTANEOUS at 21:01

## 2021-01-01 RX ADMIN — FENTANYL CITRATE: 50 INJECTION, SOLUTION INTRAMUSCULAR; INTRAVENOUS at 06:53

## 2021-01-01 RX ADMIN — PROPOFOL 25 MCG/KG/MIN: 10 INJECTION, EMULSION INTRAVENOUS at 14:22

## 2021-01-01 RX ADMIN — MINERAL OIL AND PETROLATUM: 150; 830 OINTMENT OPHTHALMIC at 22:30

## 2021-01-01 RX ADMIN — PHENYLEPHRINE HYDROCHLORIDE 0.8 MCG/KG/MIN: 10 INJECTION INTRAVENOUS at 12:13

## 2021-01-01 RX ADMIN — NOREPINEPHRINE BITARTRATE 0.02 MCG/KG/MIN: 1 INJECTION, SOLUTION, CONCENTRATE INTRAVENOUS at 14:32

## 2021-01-01 RX ADMIN — BENZONATATE 200 MG: 100 CAPSULE ORAL at 01:18

## 2021-01-01 RX ADMIN — MEROPENEM 1 G: 1 INJECTION, POWDER, FOR SOLUTION INTRAVENOUS at 11:01

## 2021-01-01 RX ADMIN — FAMOTIDINE 20 MG: 10 INJECTION INTRAVENOUS at 10:45

## 2021-01-01 RX ADMIN — MORPHINE SULFATE 1 MG: 2 INJECTION, SOLUTION INTRAMUSCULAR; INTRAVENOUS at 18:23

## 2021-01-01 RX ADMIN — MINERAL OIL AND PETROLATUM: 150; 830 OINTMENT OPHTHALMIC at 11:02

## 2021-01-01 RX ADMIN — SODIUM CHLORIDE, PRESERVATIVE FREE 10 ML: 5 INJECTION INTRAVENOUS at 20:12

## 2021-01-01 RX ADMIN — CHLORHEXIDINE GLUCONATE 15 ML: 1.2 RINSE ORAL at 08:36

## 2021-01-01 RX ADMIN — CHLORHEXIDINE GLUCONATE 15 ML: 1.2 RINSE ORAL at 08:00

## 2021-01-01 RX ADMIN — PROPOFOL 35 MCG/KG/MIN: 10 INJECTION, EMULSION INTRAVENOUS at 04:25

## 2021-01-01 RX ADMIN — PROPOFOL 35 MCG/KG/MIN: 10 INJECTION, EMULSION INTRAVENOUS at 01:05

## 2021-01-01 RX ADMIN — FAMOTIDINE 20 MG: 10 INJECTION INTRAVENOUS at 21:08

## 2021-01-01 RX ADMIN — Medication 2 SPRAY: at 10:00

## 2021-09-02 PROBLEM — J12.82 PNEUMONIA DUE TO COVID-19 VIRUS: Status: ACTIVE | Noted: 2021-01-01

## 2021-09-02 PROBLEM — J96.01 ACUTE RESPIRATORY FAILURE WITH HYPOXIA (HCC): Status: ACTIVE | Noted: 2021-01-01

## 2021-09-02 PROBLEM — U07.1 PNEUMONIA DUE TO COVID-19 VIRUS: Status: ACTIVE | Noted: 2021-01-01

## 2021-09-02 NOTE — ED NOTES
Attempted to get ekg but pt was going for a scan, will get when pt returns.      Giovanna Gamez  09/02/21 0957

## 2021-09-02 NOTE — ED PROVIDER NOTES
Subjective   76-year-old male that presents to the emergency department chief complaint generalized fatigue weakness, shortness of breath, altered mental status worsening over the last 2 days.  Patient states he has had worsening shortness of breath.  Patient denies any associated cough, congestion, fever, chills, nausea, vomiting.  Patient does have history of diabetes, depression, hyperlipidemia.  Patient is a formal smoker.      History provided by:  Patient   used: No    Weakness - Generalized  Severity:  Moderate  Onset quality:  Gradual  Timing:  Intermittent  Progression:  Worsening  Chronicity:  New  Context: not alcohol use, not change in medication, not drug use, not increased activity and not pinched nerve    Relieved by:  Nothing  Worsened by:  Nothing  Ineffective treatments:  None tried  Associated symptoms: shortness of breath    Associated symptoms: no abdominal pain, no anorexia, no arthralgias, no chest pain, no dizziness, no dysuria, no frequency, no headaches, no hematochezia, no lethargy, no near-syncope, no seizures, no sensory-motor deficit, no stroke symptoms, no syncope and no vomiting    Risk factors: no anemia, no congestive heart failure, no coronary artery disease, no excessive menstruation, no family hx of stroke, no heart disease and no recent stressors    Altered Mental Status  Presenting symptoms: no confusion and no lethargy    Severity:  Moderate  Episode history:  Single  Duration:  2 days  Timing:  Intermittent  Progression:  Worsening  Chronicity:  New  Context: not alcohol use, not dementia, not drug use, taking medications as prescribed, not nursing home resident and not recent illness    Associated symptoms: weakness    Associated symptoms: no abdominal pain, no agitation, no bladder incontinence, no hallucinations, no headaches, no palpitations, no rash, no seizures, no slurred speech and no vomiting        Review of Systems   HENT: Negative.  Negative  "for ear discharge, facial swelling, hearing loss and mouth sores.    Eyes: Negative.  Negative for photophobia, pain, redness and itching.   Respiratory: Positive for shortness of breath. Negative for chest tightness.    Cardiovascular: Negative.  Negative for chest pain, palpitations, leg swelling, syncope and near-syncope.   Gastrointestinal: Negative.  Negative for abdominal distention, abdominal pain, anorexia, hematochezia and vomiting.   Endocrine: Negative.  Negative for heat intolerance, polydipsia, polyphagia and polyuria.   Genitourinary: Negative.  Negative for bladder incontinence, dysuria, enuresis, flank pain and frequency.   Musculoskeletal: Negative.  Negative for arthralgias.   Skin: Negative.  Negative for color change, pallor and rash.   Neurological: Positive for weakness. Negative for dizziness, seizures and headaches.   Hematological: Negative.  Negative for adenopathy. Does not bruise/bleed easily.   Psychiatric/Behavioral: Negative for agitation, confusion, decreased concentration, dysphoric mood and hallucinations. The patient is not hyperactive.    All other systems reviewed and are negative.      Past Medical History:   Diagnosis Date   • Depression    • Diabetes mellitus (CMS/[facility])     states border diabetic, is not on meds for it   • Dyslipidemia    • GERD (gastroesophageal reflux disease)    • Hypertension    • Thoracic spine pain    • Vitamin B12 deficiency (dietary) anemia    • Vitamin D deficiency        Allergies   Allergen Reactions   • Codeine Other (See Comments)     Makes him feel \"froggy\"         Past Surgical History:   Procedure Laterality Date   • APPENDECTOMY     • CATARACT EXTRACTION     • HERNIA REPAIR         Family History   Problem Relation Age of Onset   • Hypertension Mother        Social History     Socioeconomic History   • Marital status:      Spouse name: Not on file   • Number of children: Not on file   • Years of education: Not on file   • Highest " education level: Not on file   Tobacco Use   • Smoking status: Former Smoker   Substance and Sexual Activity   • Alcohol use: No   • Drug use: No   • Sexual activity: Defer           Objective   Physical Exam  Vitals and nursing note reviewed.   Constitutional:       General: He is not in acute distress.     Appearance: He is normal weight. He is not toxic-appearing.   HENT:      Head: Normocephalic and atraumatic.      Mouth/Throat:      Mouth: Mucous membranes are moist.      Pharynx: Oropharynx is clear.   Eyes:      General: No scleral icterus.     Extraocular Movements: Extraocular movements intact.      Right eye: Normal extraocular motion and no nystagmus.      Left eye: Normal extraocular motion and no nystagmus.      Pupils: Pupils are equal, round, and reactive to light. Pupils are equal.      Left eye: Pupil is round.   Neck:      Meningeal: Brudzinski's sign absent.   Cardiovascular:      Rate and Rhythm: Normal rate and regular rhythm.      Heart sounds: Normal heart sounds. No murmur heard.   No friction rub.   Pulmonary:      Effort: Respiratory distress present.      Breath sounds: No rhonchi.   Chest:      Chest wall: No tenderness.   Abdominal:      General: Bowel sounds are normal. There is no distension.      Palpations: Abdomen is soft. There is no mass.      Tenderness: There is no abdominal tenderness. There is no guarding.   Musculoskeletal:         General: No swelling or tenderness. Normal range of motion.      Cervical back: Normal range of motion and neck supple. No rigidity.   Lymphadenopathy:      Cervical: No cervical adenopathy.   Skin:     General: Skin is warm and dry.      Capillary Refill: Capillary refill takes less than 2 seconds.      Coloration: Skin is not cyanotic or pale.      Findings: No erythema.   Neurological:      Mental Status: He is alert. He is disoriented.      Cranial Nerves: No cranial nerve deficit, dysarthria or facial asymmetry.      Sensory: No sensory  deficit.      Motor: No weakness.      Coordination: Romberg sign negative. Coordination normal.      Gait: Gait normal.      Deep Tendon Reflexes: Reflexes normal.   Psychiatric:         Mood and Affect: Mood normal. Mood is not anxious or depressed.         Speech: Speech normal.         Behavior: Behavior normal. Behavior is not agitated.         Cognition and Memory: Cognition is not impaired. Memory is not impaired.         Procedures           ED Course  ED Course as of Sep 03 0425   Thu Sep 02, 2021   1031 EKG interpretation, ventricular rate 82, , , QTc 453, normal sinus rhythm, no ST elevation, right bundle branch block present.    []   1041 IMPRESSION:    Scattered bilateral patchy opacities may represent COVID-19 pneumonia.    []   1041 IMPRESSION:    Unremarkable exam demonstrating no CT evidence of acute intracranial  findings.    []   1532 Call placed to hospitalist for possible admit     []   1542 Discussed care with Dr. Vaughan. Patient will be admitted pending CT PE chest.     []   2002 Endorsed to German Mcarthur.     []   2223 Case discussed with Dr. Augustine.  Advises to place patient on BiPAP.  Accepted to hospitalist service.    [MB]      ED Course User Index  [] Valdemar Cervantes PA-C  [] Aric Boyer DO  [MB] Lucy Iverson APRN                                           MDM    Final diagnoses:   Pneumonia due to COVID-19 virus   Hypoxia       ED Disposition  ED Disposition     ED Disposition Condition Comment    Decision to Admit  Level of Care: Progressive Care [20]   Diagnosis: Pneumonia due to COVID-19 virus [[id number]]   Admitting Physician: ABBI AUGUSTINE [[id number]]   Attending Physician: ABBI AUGUSTINE [[id number]]   Certification: I Certify That Inpatient Hospital Services Are Medically Necessary For Greater Than 2 Midnights            No follow-up provider specified.       Medication List      ASK your doctor about these medications    sertraline  100 MG tablet  Commonly known as: ZOLOFT  Ask about: Which instructions should I use?             Valdemar Cervantes PA-C  09/02/21 1954       Lucy Iverson APRN  09/03/21 0424

## 2021-09-03 NOTE — PLAN OF CARE
Goal Outcome Evaluation:  Pt was welcomed to PCU from the ER this shift. He is alert and oriented X4. VSS and afebrile. Pt is on continuous Bipap. NS running at 75 ml/hr. Bed in low position, side rails up X2. Call light in reach.

## 2021-09-03 NOTE — ED PROVIDER NOTES
"Subjective   History of Present Illness    Review of Systems    Past Medical History:   Diagnosis Date   • Depression    • Diabetes mellitus (CMS/[facility])     states border diabetic, is not on meds for it   • Dyslipidemia    • GERD (gastroesophageal reflux disease)    • Hypertension    • Thoracic spine pain    • Vitamin B12 deficiency (dietary) anemia    • Vitamin D deficiency        Allergies   Allergen Reactions   • Codeine Other (See Comments)     Makes him feel \"froggy\"         Past Surgical History:   Procedure Laterality Date   • APPENDECTOMY     • CATARACT EXTRACTION     • HERNIA REPAIR         History reviewed. No pertinent family history.    Social History     Socioeconomic History   • Marital status:      Spouse name: Not on file   • Number of children: Not on file   • Years of education: Not on file   • Highest education level: Not on file   Tobacco Use   • Smoking status: Former Smoker   Substance and Sexual Activity   • Alcohol use: No   • Drug use: No   • Sexual activity: Defer           Objective   Physical Exam    Procedures           ED Course  ED Course as of Sep 02 2224   Thu Sep 02, 2021   1031 EKG interpretation, ventricular rate 82, , , QTc 453, normal sinus rhythm, no ST elevation, right bundle branch block present.    []   1041 IMPRESSION:    Scattered bilateral patchy opacities may represent COVID-19 pneumonia.    []   1041 IMPRESSION:    Unremarkable exam demonstrating no CT evidence of acute intracranial  findings.    []   1532 Call placed to hospitalist for possible admit     []   1542 Discussed care with Dr. Vaughan. Patient will be admitted pending CT PE chest.     []   2002 Endorsed to German Mcarthur.     []   2223 Case discussed with Dr. Augustine.  Advises to place patient on BiPAP.  Accepted to hospitalist service.    [MB]      ED Course User Index  [] Valdemar Cervantes PA-C  [JM] Aric Boyer DO  [MB] Lucy Iverson, APRN                              "              MDM    Final diagnoses:   Pneumonia due to COVID-19 virus   Hypoxia       ED Disposition  ED Disposition     ED Disposition Condition Comment    Decision to Admit            No follow-up provider specified.       Medication List      ASK your doctor about these medications    sertraline 100 MG tablet  Commonly known as: ZOLOFT  Ask about: Which instructions should I use?

## 2021-09-03 NOTE — H&P
[facility] Medicine Services  History & Physical    Patient Identification:  Name:  Valdemar Solitario  Age:  76 y.o.  Sex:  male  :  1945  MRN:  [id number]   Visit Number:  [id number]  Admit Date: 2021   Primary Care Physician:  Laurence Geiger APRN    Subjective     Chief complaint: Picked up from his work due to confusion, dyspnea, and weakness    History of presenting illness:      Valdemar Solitario is a 76 y.o. male with past medical history significant for diabetes meliltus, depression, GERD, hypertension, dyslipidemia.  He arrived to the ED via EMS after presenting to his place of work in a state of confusion.  He reported he had been feeling bad for a few days which worsened on 2021.  He reported while at work he was unable to remember his locker combination and was experiencing shortness of breath and generalized weakness.    Upon arrival to the ED, vital signs were T 98.8F, pulse 85, RR 14, and BP 96/52 with room air oxygen saturation of 82%.  Initial arterial blood gas revealed pH of 7.324 with PCO2 of 42.4 and PO2 of 47.9 on room air.  Troponin T was found to be 0.031.  Creatinine was found to be elevated at 2.10.  Lactate was found to be elevated at 2.3 with D-dimer of 0.95.  Hemoglobin was found to be 11.8.    Mr. Solitario is evaluated in the ED currently on BiPAP.  He reports feeling unwell for a few days but has difficulty talking due to BiPAP. He reports he has been making urine, though it is unclear at present if he has produced a specimen while in the ED.  He reports cough, weakness, and dyspnea.  He is alert to self.  He follows commands but is wearing BiPAP during examination. He denies chest pain and known dysuria.  He reports he has had some pain in his low back.         ---------------------------------------------------------------------------------------------------------------------   Review of Systems   Constitutional: Positive for fatigue.   HENT:  Negative for congestion and drooling.    Eyes: Negative for pain and discharge.   Respiratory: Positive for cough and shortness of breath.    Cardiovascular: Negative for chest pain and leg swelling.   Gastrointestinal: Negative for abdominal distention, diarrhea and nausea.   Endocrine: Negative for cold intolerance and heat intolerance.   Genitourinary: Negative for difficulty urinating and dysuria.   Musculoskeletal: Negative for arthralgias and gait problem.   Skin: Negative for color change and pallor.   Allergic/Immunologic: Negative for environmental allergies and food allergies.   Neurological: Positive for weakness. Negative for dizziness.   Hematological: Negative for adenopathy. Does not bruise/bleed easily.   Psychiatric/Behavioral: Negative for agitation and confusion.        ---------------------------------------------------------------------------------------------------------------------   Past Medical History:   Diagnosis Date   • Depression    • Diabetes mellitus (CMS/[facility])     states border diabetic, is not on meds for it   • Dyslipidemia    • GERD (gastroesophageal reflux disease)    • Hypertension    • Thoracic spine pain    • Vitamin B12 deficiency (dietary) anemia    • Vitamin D deficiency      Past Surgical History:   Procedure Laterality Date   • APPENDECTOMY     • CATARACT EXTRACTION     • HERNIA REPAIR       Family History   Problem Relation Age of Onset   • Hypertension Mother      Social History     Socioeconomic History   • Marital status:      Spouse name: Not on file   • Number of children: Not on file   • Years of education: Not on file   • Highest education level: Not on file   Tobacco Use   • Smoking status: Former Smoker   Substance and Sexual Activity   • Alcohol use: No   • Drug use: No   • Sexual activity: Defer     ---------------------------------------------------------------------------------------------------------------------   Allergies:   Codeine  ---------------------------------------------------------------------------------------------------------------------   Home medications:    Medications below are reported home medications pulling from within the system; at this time, these medications have not been reconciled unless otherwise specified and are in the verification process for further verifcation as current home medications.  (Not in a hospital admission)      Hospital Scheduled Meds:  enoxaparin, 0.5 mg/kg, Subcutaneous, Q24H           Current listed hospital scheduled medications may not yet reflect those currently placed in orders that are signed and held awaiting patient's arrival to floor.   ---------------------------------------------------------------------------------------------------------------------     Objective     Vital Signs:  Temp:  [98.8 °F (37.1 °C)] 98.8 °F (37.1 °C)  Heart Rate:  [65-86] 69  Resp:  [14-24] 24  BP: ()/(45-72) 114/62      09/02/21  0931   Weight: 81.6 kg (180 lb)     Body mass index is 29.05 kg/m².  ---------------------------------------------------------------------------------------------------------------------       Physical Exam  Vitals and nursing note reviewed.   Constitutional:       Interventions: Face mask in place.   HENT:      Head: Normocephalic and atraumatic.   Eyes:      General: Lids are normal.      Conjunctiva/sclera:      Right eye: Right conjunctiva is not injected.      Left eye: Left conjunctiva is not injected.   Cardiovascular:      Rate and Rhythm: Normal rate and regular rhythm.      Heart sounds: S1 normal and S2 normal.   Pulmonary:      Effort: No tachypnea or bradypnea.      Breath sounds: Examination of the right-lower field reveals decreased breath sounds. Examination of the left-lower field reveals decreased breath sounds. Decreased breath sounds present. No wheezing, rhonchi or rales.   Abdominal:      General: Bowel sounds are normal.      Palpations: Abdomen is  soft.      Tenderness: There is no abdominal tenderness.   Musculoskeletal:      Right lower leg: No edema.      Left lower leg: No edema.   Skin:     General: Skin is warm and dry.   Neurological:      Mental Status: He is alert.      Comments: Alert to self.  Follows commands but wearing BiPAP mask making it difficult to assess to some degree.  He is alert to self and location.    Psychiatric:         Attention and Perception: Attention normal.         Mood and Affect: Mood is anxious.               ---------------------------------------------------------------------------------------------------------------------  EKG:                  ---------------------------------------------------------------------------------------------------------------------       COVID LABS:  Results From Last 14 Days   Lab Units 09/02/21  1301 09/02/21  1218 09/02/21  0950   PROBNP pg/mL  --   --  370.2   D DIMER QUANT MCGFEU/mL  --   --  0.95*   LACTATE mmol/L 0.8  --  2.3*   PROTIME Seconds  --   --  13.1   INR   --   --  0.95   TROPONIN T ng/mL  --  0.016 0.031*         Results from last 7 days   Lab Units 09/02/21  2328 09/02/21  1301 09/02/21  0950   LACTATE mmol/L  --  0.8 2.3*   WBC 10*3/mm3 4.02  --  5.51   HEMOGLOBIN g/dL 12.0*  --  11.8*   HEMATOCRIT % 38.0  --  37.2*   MCV fL 89.4  --  89.0   MCHC g/dL 31.6  --  31.7   PLATELETS 10*3/mm3 124*  --  126*   INR   --   --  0.95     Results from last 7 days   Lab Units 09/02/21  2241 09/02/21  1938 09/02/21  0940   PH, ARTERIAL pH units 7.323* 7.322* 7.324*   PO2 ART mm Hg 68.8* 51.9* 47.9*   PCO2, ARTERIAL mm Hg 44.7 43.3 42.4   HCO3 ART mmol/L 23.2 22.4 22.0     Results from last 7 days   Lab Units 09/02/21  0950   SODIUM mmol/L 136   POTASSIUM mmol/L 4.7   CHLORIDE mmol/L 101   CO2 mmol/L 21.9*   BUN mg/dL 55*   CREATININE mg/dL 2.10*   EGFR IF NONAFRICN AM mL/min/1.73 31*   CALCIUM mg/dL 8.4*   GLUCOSE mg/dL 124*   ALBUMIN g/dL 3.65   BILIRUBIN mg/dL 0.3   ALK PHOS U/L 56    AST (SGOT) U/L 88*   ALT (SGPT) U/L 39   Estimated Creatinine Clearance: 30 mL/min (A) (by C-G formula based on SCr of 2.1 mg/dL (H)).  No results found for: AMMONIA  Results from last 7 days   Lab Units 09/02/21  1218 09/02/21  0950   TROPONIN T ng/mL 0.016 0.031*     Results from last 7 days   Lab Units 09/02/21  0950   PROBNP pg/mL 370.2     No results found for: HGBA1C  No results found for: TSH, FREET4  No results found for: PREGTESTUR, PREGSERUM, HCG, HCGQUANT  Pain Management Panel    There is no flowsheet data to display.       No results found for: BLOODCX  No results found for: URINECX  No results found for: WOUNDCX  No results found for: STOOLCX      ---------------------------------------------------------------------------------------------------------------------  Imaging Results (Last 7 Days)     Procedure Component Value Units Date/Time    NM Lung Scan Perfusion Particulate [[id number]] Collected: 09/02/21 2024     Updated: 09/02/21 2027    Narrative:      RADIONUCLIDE PERFUSION-ONLY LUNG SCAN, 9/2/2021    HISTORY:    76-year-old male in the ED with multifocal pulmonary infiltrates and positive COVID-19 testing. Short of air. Hypoxemia. Assess for pulmonary embolism.      DOSE:  4.0 mCi mCi technetium labeled MAA intravenously.    COMPARISON:  Chest x-ray tonight.    FINDINGS:  NOTE: Ventilation imaging is currently restricted during the COVID pandemic as an aerosol-generating procedure. A perfusion-only examination was performed per current protocol.    Multifocal regions of decreased perfusion scattered throughout both lungs. These may correspond to multifocal regions of pneumonitis visible on chest x-ray. Pulmonary embolism could have a similar appearance. The findings are therefore indeterminate for  the presence of pulmonary embolism.      Impression:      1.  Indeterminant examination for the exclusion of pulmonary embolism.  2.  Abnormal perfusion-only lung imaging. Multifocal perfusion  defects may be attributable to the patient's multifocal air space pneumonia but pulmonary embolism is also possible.                      Signer Name: Kael Hamilton MD   Signed: 9/2/2021 8:24 PM   Workstation Name: [email or web address]    [facility]    XR Chest 2 View [[id number]] Collected: 09/02/21 1018     Updated: 09/02/21 1020    Narrative:      EXAM:    XR Chest, 2 Views     EXAM DATE:    9/2/2021 10:15 AM     CLINICAL HISTORY:    CHF/COPD Protocol     TECHNIQUE:    Frontal and lateral views of the chest.     COMPARISON:    No relevant prior studies available.     FINDINGS:    LUNGS:  Scattered bilateral patchy opacities may represent COVID-19  pneumonia.    PLEURAL SPACE:  Unremarkable.  No pneumothorax.    HEART:  Unremarkable.  No cardiomegaly.    MEDIASTINUM:  Unremarkable.    BONES/JOINTS:  Unremarkable.       Impression:        Scattered bilateral patchy opacities may represent COVID-19 pneumonia.     This report was finalized on 9/2/2021 10:18 AM by Dr. Leonel Angel MD.       CT Head Without Contrast [[id number]] Collected: 09/02/21 1007     Updated: 09/02/21 1009    Narrative:      EXAM:    CT Head Without Intravenous Contrast     EXAM DATE:    9/2/2021 9:54 AM     CLINICAL HISTORY:    altered mental status     TECHNIQUE:    Axial computed tomography images of the head/brain without intravenous  contrast.  Sagittal and coronal reformatted images were created and  reviewed.  This CT exam was performed using one or more of the following  dose reduction techniques:  automated exposure control, adjustment of  the mA and/or kV according to patient size, and/or use of iterative  reconstruction technique.     COMPARISON:    No relevant prior studies available.     FINDINGS:    BRAIN:  Unremarkable.  No hemorrhage.  No significant white matter  disease.  No edema.    VENTRICLES:  Unremarkable.  No ventriculomegaly.    BONES/JOINTS:  Unremarkable.  No acute fracture.    SOFT TISSUES:   Unremarkable.    SINUSES:  Unremarkable as visualized.  No acute sinusitis.    MASTOID AIR CELLS:  Unremarkable as visualized.  No mastoid effusion.       Impression:        Unremarkable exam demonstrating no CT evidence of acute intracranial  findings.     This report was finalized on 9/2/2021 10:07 AM by Dr. Leonel Angel MD.              Cultures:  No results found for: BLOODCX, URINECX, WOUNDCX, MRSACX, RESPCX, STOOLCX    Last echocardiogram:          I have personally reviewed the above radiology images and read the final radiology report on 09/02/21  ---------------------------------------------------------------------------------------------------------------------  Assessment / Plan     Active Hospital Problems    Diagnosis  POA   • Pneumonia due to COVID-19 virus [U07.1, J12.82]  Yes   • Acute respiratory failure with hypoxia (CMS/HCC) [J96.01]  Yes       ASSESSMENT/PLAN:  -COVID-19 Pneumonia  -Lactic acidosis  Date of initial positive swab: 9/2/21  IV decadron 6mg on board  IV Remdesivir on board per pharmacy dosing with monitoring of renal and hepatic function.   COVID 19 progression labs have been ordered with q24 hour CBC, CMP, CK, CRP, and Ferritin level in addition to q48 hour LDH,D-dimer, and fibrinogen levels.  Vaccination status: Unvaccinated.    Continuous pulse oximetry monitoring.   Continuous cardiac monitoring.   Imaging studies: CXR with COVID-19, CT chest without contrast added.   Full dose anticoagulation at present given indeterminate VQ scan  • Obtain UA.   • Blood cultures added x2. One culture obtained around 9AM this morning, would like to obtain true set given increased findings of bacteremia in recent COVID-19 admission.        -Acute hypoxemic respiratory failure likely 2/2 COVID-19:   · Continuous cardiac monitoring.   · Treat COVID-19 as outlined within this document.   · ABGs reviewed with repeat pO2 improved with increasing O2.     -Metabolic encephalopathy upon presentation  likely 2/2 COVID-19 & Hypoxia:   · CT head unremarkable.   · Check UA.   · Add neurochecks.   · Treat COVID-19 as outlined.     -Mildly elevated troponin T likely 2/2 acute hypoxemia:   · Repeat troponin T improved.   · Continuous cardiac monitoring.     -Acute kidney injury:   · Avoid nephrotoxins when possible.   · Received 1L of NS in the ED.   · Add UA.   · Obtain CT abd/pelvis during CT chest without contrast to rule out possible underlying obstructive process.    · Urine output: Add I/O monitoring.  Reports adequate output but no current available specimen.      -Elevated d-dimer:   · VQ indeterminate.   · Add US venous doppler.   · Full dose lovenox per pharmacy dosing for now.     -Mildly elevated AST:   · Hepatitis panel pending.     -Hx borderline DM:   · Check hemoglobin a1c.   ----------  -DVT prophylaxis: SQ Lovenox  -Activity: Up with assist  -Expected length of stay: INPATIENT status due to the need for care which can only be reasonably provided in an hospital setting such as aggressive/expedited ancillary services and/or consultation services, the necessity for IV medications, close physician monitoring and/or the possible need for procedures.  In such, I feel patient’s risk for adverse outcomes and need for care warrant INPATIENT evaluation and predict the patient’s care encounter to likely last beyond 2 midnights.  -Disposition: Plans to return home at discharge.    High risk secondary to acute hypoxemic respiratory failure 2/2 COVID-19 pneumonia        Margarita Rowe PA-C  09/02/21  23:54 EDT  Pager # [phone]  ---------------------------------------------------------------------------------------------------------------------

## 2021-09-03 NOTE — CASE MANAGEMENT/SOCIAL WORK
Discharge Planning Assessment   [facility]     Patient Name: Valdemar Solitario  MRN: [id number]  Today's Date: 9/3/2021    Admit Date: 9/2/2021    Discharge Needs Assessment     Row Name 09/03/21 1011       Living Environment    Lives With  alone    Current Living Arrangements  home/apartment/condo    Primary Care Provided by  self    Provides Primary Care For  no one    Family Caregiver if Needed  sibling(s)    Family Caregiver Names  Josef Cook lives nearby and may assist prn, per pt.    Quality of Family Relationships  unable to assess    Able to Return to Prior Arrangements  yes       Resource/Environmental Concerns    Resource/Environmental Concerns  none    Transportation Concerns  car, none       Transition Planning    Patient/Family Anticipates Transition to  home    Patient/Family Anticipated Services at Transition  none    Transportation Anticipated  family or friend will provide States his brotherJosef may provide transportation home at [place].       Discharge Needs Assessment    Readmission Within the Last 30 Days  no previous admission in last 30 days    Current Outpatient/Agency/Support Group  -- No needs at this time.    Equipment Currently Used at Home  none States he does not use DME at this time.    Concerns to be Addressed  no discharge needs identified;denies needs/concerns at this time    Equipment Needed After Discharge  none    Outpatient/Agency/Support Group Needs  -- Usually independent. Employed at [employer]. Denies needs at this time.        Discharge Plan     Row Name 09/03/21 1014       Plan    Plan  Staff assisted pt with questions due to him being on BiPAP. He plans to return home alone at discharge. He does not use DME or Home Health at this time. He states his PCP is Laurence Geiger. He gets his meds at [facility]. He has R coverage. States his brotherJosef may provide transportation home at [place] and be available to assist if needed. He will need to f/u with his  brother, Josef closer to discharge. States he has no needs at this time. CM will follow and assist as needed.     Patient/Family in Agreement with Plan  yes    Row Name 09/03/21 1008       Plan    Plan Comments  --        Continued Care and Services - Admitted Since 9/2/2021    Coordination has not been started for this encounter.       Expected Discharge Date and Time     Expected Discharge Date Expected Discharge Time    Sep 7, 2021         Demographic Summary     Row Name 09/03/21 1010       General Information    Preferred Language  English     Used During This Interaction  no        Functional Status     Row Name 09/03/21 1010       Functional Status, IADL    IADL Comments  Usually independent at home.       Mental Status Summary    Mental Status Comments  Recently confused at work.       Employment/    Current or Previous Occupation  factory work    Employment/ Comments  [employer].        Psychosocial    No documentation.       Abuse/Neglect    No documentation.       Legal    No documentation.       Substance Abuse    No documentation.       Patient Forms    No documentation.           Jenny Rodriguez RN

## 2021-09-03 NOTE — CONSULTS
INFECTIOUS DISEASE CONSULTATION REPORT        Patient Identification:  Name:  Valdemar Solitario  Age:  76 y.o.  Sex:  male  :  1945  MRN:  [id number]   Visit Number:  [id number]  Primary Care Physician:  Laurence Geiger APRN       LOS: 1 day        Subjective       Subjective     History of present illness:      Thank you Dr. Mccord for allowing us to participate in the care of your patient.  As you well know, Mr. Valdemar Solitario is a 76 y.o. male with past medical history significant for diabetes, depression, GERD, hypertension, dyslipidemia, who presented to [facility] Emergency Department on 2021 for confusion.  COVID-19 PCR positive.  WBC normal.  CRP 7.80.  Lactic acid 2.3.  Procalcitonin normal.  CT of the head unremarkable.  Chest x-ray from 2021 reports scattered bilateral patchy opacities that may represent COVID-19 pneumonia.  VQ scan indeterminate for exclusion of PE.  CT of the chest from 9/3/2021 reports patchy bilateral airspace disease concerning for COVID-19 pneumonia.  CT of the abdomen and pelvis from 9/3/2013 reports bibasilar airspace disease suggestive of COVID-19 pneumonia, large right inguinal hernia containing nondilated bowel and fat.      Infectious Disease consultation was requested for antimicrobial management.      ---------------------------------------------------------------------------------------------------------------------     Review Of Systems:    Constitutional: no fever, chills and night sweats. No appetite change or unexpected weight change. No fatigue.  Eyes: no eye drainage, itching or redness.  HEENT: no mouth sores, dysphagia or nose bleed.  Respiratory: Positive shortness of breath, no cough or production of sputum.  Cardiovascular: no chest pain, no palpitations, no orthopnea.  Gastrointestinal: no nausea, vomiting or diarrhea. No abdominal pain, hematemesis or rectal bleeding.  Genitourinary: no dysuria or  polyuria.  Hematologic/lymphatic: no lymph node abnormalities, no easy bruising or easy bleeding.  Musculoskeletal: no muscle or joint pain.  Skin: No rash and no itching.  Neurological: no loss of consciousness, no seizure, no headache.  Behavioral/Psych: no depression or suicidal ideation.  Endocrine: no hot flashes.  Immunologic: negative.    ---------------------------------------------------------------------------------------------------------------------     Past Medical History    Past Medical History:   Diagnosis Date   • Depression    • Diabetes mellitus (CMS/[facility])     states border diabetic, is not on meds for it   • Dyslipidemia    • GERD (gastroesophageal reflux disease)    • Hypertension    • Thoracic spine pain    • Vitamin B12 deficiency (dietary) anemia    • Vitamin D deficiency        Past Surgical History    Past Surgical History:   Procedure Laterality Date   • APPENDECTOMY     • CATARACT EXTRACTION     • HERNIA REPAIR         Family History    Family History   Problem Relation Age of Onset   • Hypertension Mother        Social History    Social History     Tobacco Use   • Smoking status: Former Smoker   Substance Use Topics   • Alcohol use: No   • Drug use: No       Allergies    Codeine  ---------------------------------------------------------------------------------------------------------------------     Home Medications:    Prior to Admission Medications       Prescriptions Last Dose Informant Patient Reported? Taking?    olmesartan (BENICAR) 40 MG tablet Unknown Pharmacy Yes No    Take 40 mg by mouth Daily.    omeprazole (priLOSEC) 40 MG capsule Unknown Pharmacy Yes No    Take 40 mg by mouth Daily.    sertraline (ZOLOFT) 100 MG tablet Unknown Pharmacy Yes No    Take 100 mg by mouth Daily.    simvastatin (ZOCOR) 40 MG tablet Unknown Pharmacy Yes No    Take 40 mg by mouth Every Night.    tamsulosin (FLOMAX) 0.4 MG capsule 24 hr capsule Unknown Pharmacy Yes No    Take 1 capsule by mouth Daily.           ---------------------------------------------------------------------------------------------------------------------    Objective       Objective     Hospital Scheduled Meds:  dexamethasone, 6 mg, Intravenous, Daily  enoxaparin, 1 mg/kg, Subcutaneous, Q12H  pantoprazole, 40 mg, Oral, QAM  [START ON 9/4/2021] remdesivir, 100 mg, Intravenous, Q24H  sertraline, 100 mg, Oral, Daily  sodium chloride, 10 mL, Intravenous, Q12H  tamsulosin, 0.4 mg, Oral, Daily      sodium chloride, 75 mL/hr, Last Rate: 75 mL/hr (09/03/21 0632)      ---------------------------------------------------------------------------------------------------------------------   Vital Signs:  Temp:  [97.2 °F (36.2 °C)-98.5 °F (36.9 °C)] 97.3 °F (36.3 °C)  Heart Rate:  [61-86] 81  Resp:  [20-28] 28  BP: ()/(45-83) 124/71  Mean Arterial Pressure (Non-Invasive) for the past 24 hrs (Last 3 readings):   Noninvasive MAP (mmHg)   09/03/21 1023 78   09/03/21 0736 99   09/03/21 0545 106     SpO2 Percentage    09/03/21 1023 09/03/21 1136 09/03/21 1340   SpO2: 91% 93% 90%     SpO2:  [85 %-97 %] 90 %  on  Flow (L/min):  [4-10] 10;   Device (Oxygen Therapy): NPPV/NIV    Body mass index is 26.98 kg/m².  Wt Readings from Last 3 Encounters:   09/03/21 75.8 kg (167 lb 1.6 oz)   06/14/17 73.9 kg (163 lb)   03/23/17 76.7 kg (169 lb)     ---------------------------------------------------------------------------------------------------------------------     Physical Exam:    Deferred due to COVID-19 isolation.    ---------------------------------------------------------------------------------------------------------------------    Results from last 7 days   Lab Units 09/02/21  1218 09/02/21  0950   TROPONIN T ng/mL 0.016 0.031*     Results from last 7 days   Lab Units 09/02/21  0950   PROBNP pg/mL 370.2       Results from last 7 days   Lab Units 09/02/21  2241   PH, ARTERIAL pH units 7.323*   PO2 ART mm Hg 68.8*   PCO2, ARTERIAL mm Hg 44.7   HCO3 ART  mmol/L 23.2     Results from last 7 days   Lab Units 09/02/21 2328 09/02/21  1301 09/02/21  0950   CRP mg/dL 7.80*  --   --    LACTATE mmol/L 1.2 0.8 2.3*   WBC 10*3/mm3 4.02  --  5.51   HEMOGLOBIN g/dL 12.0*  --  11.8*   HEMATOCRIT % 38.0  --  37.2*   MCV fL 89.4  --  89.0   MCHC g/dL 31.6  --  31.7   PLATELETS 10*3/mm3 124*  --  126*   INR  0.96  --  0.95     Results from last 7 days   Lab Units 09/03/21  0703 09/02/21 2328 09/02/21  0950   SODIUM mmol/L  --  138 136   POTASSIUM mmol/L  --  5.2 4.7   CHLORIDE mmol/L  --  106 101   CO2 mmol/L  --  22.4 21.9*   BUN mg/dL  --  41* 55*   CREATININE mg/dL 1.27 1.41* 2.10*   EGFR IF NONAFRICN AM mL/min/1.73 55* 49* 31*   CALCIUM mg/dL  --  8.5* 8.4*   GLUCOSE mg/dL  --  159* 124*   ALBUMIN g/dL 3.11* 3.16* 3.65   BILIRUBIN mg/dL 0.3 0.2 0.3   ALK PHOS U/L 50 51 56   AST (SGOT) U/L 73* 74* 88*   ALT (SGPT) U/L 32 34 39   Estimated Creatinine Clearance: 53.1 mL/min (by C-G formula based on SCr of 1.27 mg/dL).  No results found for: AMMONIA    Hemoglobin A1C   Date/Time Value Ref Range Status   09/02/2021 2328 6.30 (H) 4.80 - 5.60 % Final     Lab Results   Component Value Date    HGBA1C 6.30 (H) 09/02/2021     No results found for: TSH, FREET4    Blood Culture   Date Value Ref Range Status   09/02/2021 No growth at 24 hours  Preliminary     No results found for: URINECX  No results found for: WOUNDCX  No results found for: STOOLCX  No results found for: RESPCX  Pain Management Panel    There is no flowsheet data to display.       I have personally reviewed the above laboratory results.   ---------------------------------------------------------------------------------------------------------------------  Imaging Results (Last 7 Days)       Procedure Component Value Units Date/Time    CT Chest Without Contrast Diagnostic [[id number]] Collected: 09/03/21 1225     Updated: 09/03/21 1227    Narrative:      EXAM:    CT Chest Without Intravenous Contrast     EXAM DATE:     9/3/2021 11:20 AM     CLINICAL HISTORY:    Pneumonia, effusion or abscess suspected, xray done     TECHNIQUE:    Axial computed tomography images of the chest without intravenous  contrast.  Sagittal and coronal reformatted images were created and  reviewed.  This CT exam was performed using one or more of the following  dose reduction techniques:  automated exposure control, adjustment of  the mA and/or kV according to patient size, and/or use of iterative  reconstruction technique.     COMPARISON:    No relevant prior studies available.     FINDINGS:    LUNGS:  Patchy bilateral airspace disease concerning for COVID-19  pneumonia.    PLEURAL SPACE:  Unremarkable.  No pneumothorax.  No significant  effusion.    HEART:  Coronary artery calcifications.  No significant pericardial  effusion.    BONES/JOINTS:  Unremarkable.  No acute fracture.  No dislocation.    SOFT TISSUES:  Unremarkable.    VASCULATURE:  Unremarkable.  No thoracic aortic aneurysm.    LYMPH NODES:  Unremarkable.  No enlarged lymph nodes.       Impression:        Patchy bilateral airspace disease concerning for COVID-19 pneumonia.     This report was finalized on 9/3/2021 12:25 PM by Dr. Leonel Angel MD.       CT Abdomen Pelvis Without Contrast [[id number]] Collected: 09/03/21 1139     Updated: 09/03/21 1141    Narrative:      EXAM:    CT Abdomen and Pelvis Without Intravenous Contrast     EXAM DATE:    9/3/2021 11:20 AM     CLINICAL HISTORY:    Urinary retention; U07.1-COVID-19; J12.82-Pneumonia due to Coronavirus  disease 2019; R09.02-Hypoxemia     TECHNIQUE:    Axial computed tomography images of the abdomen and pelvis without  intravenous contrast.  Sagittal and coronal reformatted images were  created and reviewed.  This CT exam was performed using one or more of  the following dose reduction techniques:  automated exposure control,  adjustment of the mA and/or kV according to patient size, and/or use of  iterative reconstruction technique.      COMPARISON:    08/04/2015     FINDINGS:    LUNG BASES:  Bibasilar airspace disease suggestive of COVID-19  pneumonia.      ABDOMEN:    LIVER:  Unremarkable.    GALLBLADDER AND BILE DUCTS:  Unremarkable.  No calcified stones.  No  ductal dilation.    PANCREAS:  Unremarkable.  No ductal dilation.    SPLEEN:  Unremarkable.  No splenomegaly.    ADRENALS:  Unremarkable.  No mass.    KIDNEYS AND URETERS:  Unremarkable.  No obstructing stones.  No  hydronephrosis.    STOMACH AND BOWEL:  Large right inguinal hernia containing nondilated  bowel and fat.  Sigmoid colonic diverticulosis.  No mucosal thickening.      PELVIS:    APPENDIX:  No findings to suggest acute appendicitis.    BLADDER:  Unremarkable.  No stones.    REPRODUCTIVE:  Unremarkable as visualized.      ABDOMEN and PELVIS:    INTRAPERITONEAL SPACE:  Unremarkable.  No free air.  No significant  fluid collection.    BONES/JOINTS:  No acute fracture.  No dislocation.    SOFT TISSUES:  See above.    VASCULATURE:  Unremarkable.  No abdominal aortic aneurysm.    LYMPH NODES:  Unremarkable.  No enlarged lymph nodes.       Impression:      1.  Bibasilar airspace disease suggestive of COVID-19 pneumonia.  2.  Large right inguinal hernia containing nondilated bowel and fat.     This report was finalized on 9/3/2021 11:39 AM by Dr. Leonel Angel MD.       NM Lung Scan Perfusion Particulate [[id number]] Collected: 09/02/21 2024     Updated: 09/02/21 2027    Narrative:      RADIONUCLIDE PERFUSION-ONLY LUNG SCAN, 9/2/2021    HISTORY:    76-year-old male in the ED with multifocal pulmonary infiltrates and positive COVID-19 testing. Short of air. Hypoxemia. Assess for pulmonary embolism.      DOSE:  4.0 mCi mCi technetium labeled MAA intravenously.    COMPARISON:  Chest x-ray tonight.    FINDINGS:  NOTE: Ventilation imaging is currently restricted during the COVID pandemic as an aerosol-generating procedure. A perfusion-only examination was performed per current  protocol.    Multifocal regions of decreased perfusion scattered throughout both lungs. These may correspond to multifocal regions of pneumonitis visible on chest x-ray. Pulmonary embolism could have a similar appearance. The findings are therefore indeterminate for  the presence of pulmonary embolism.      Impression:      1.  Indeterminant examination for the exclusion of pulmonary embolism.  2.  Abnormal perfusion-only lung imaging. Multifocal perfusion defects may be attributable to the patient's multifocal air space pneumonia but pulmonary embolism is also possible.                      Signer Name: Kael Hamilton MD   Signed: 9/2/2021 8:24 PM   Work[email or web address]    [facility]    XR Chest 2 View [[id number]] Collected: 09/02/21 1018     Updated: 09/02/21 1020    Narrative:      EXAM:    XR Chest, 2 Views     EXAM DATE:    9/2/2021 10:15 AM     CLINICAL HISTORY:    CHF/COPD Protocol     TECHNIQUE:    Frontal and lateral views of the chest.     COMPARISON:    No relevant prior studies available.     FINDINGS:    LUNGS:  Scattered bilateral patchy opacities may represent COVID-19  pneumonia.    PLEURAL SPACE:  Unremarkable.  No pneumothorax.    HEART:  Unremarkable.  No cardiomegaly.    MEDIASTINUM:  Unremarkable.    BONES/JOINTS:  Unremarkable.       Impression:        Scattered bilateral patchy opacities may represent COVID-19 pneumonia.     This report was finalized on 9/2/2021 10:18 AM by Dr. Leonel Angel MD.       CT Head Without Contrast [[id number]] Collected: 09/02/21 1007     Updated: 09/02/21 1009    Narrative:      EXAM:    CT Head Without Intravenous Contrast     EXAM DATE:    9/2/2021 9:54 AM     CLINICAL HISTORY:    altered mental status     TECHNIQUE:    Axial computed tomography images of the head/brain without intravenous  contrast.  Sagittal and coronal reformatted images were created and  reviewed.  This CT exam was performed using one or more of the  following  dose reduction techniques:  automated exposure control, adjustment of  the mA and/or kV according to patient size, and/or use of iterative  reconstruction technique.     COMPARISON:    No relevant prior studies available.     FINDINGS:    BRAIN:  Unremarkable.  No hemorrhage.  No significant white matter  disease.  No edema.    VENTRICLES:  Unremarkable.  No ventriculomegaly.    BONES/JOINTS:  Unremarkable.  No acute fracture.    SOFT TISSUES:  Unremarkable.    SINUSES:  Unremarkable as visualized.  No acute sinusitis.    MASTOID AIR CELLS:  Unremarkable as visualized.  No mastoid effusion.       Impression:        Unremarkable exam demonstrating no CT evidence of acute intracranial  findings.     This report was finalized on 9/2/2021 10:07 AM by Dr. Leonel Angel MD.             I have personally reviewed the above radiology results.   ---------------------------------------------------------------------------------------------------------------------      Assessment & Plan        Assessment/Plan       ASSESSMENT:    1.  Severe sepsis with lactic acid greater than 2 on admission  2.  COVID-19 pneumonia    PLAN:    Patient presents with confusion.  COVID-19 PCR positive.  WBC normal.  CRP 7.80.  Lactic acid 2.3.  Procalcitonin normal.  CT of the head unremarkable.  Chest x-ray from 9/2/2021 reports scattered bilateral patchy opacities that may represent COVID-19 pneumonia.  VQ scan indeterminate for exclusion of PE.  CT of the chest from 9/3/2021 reports patchy bilateral airspace disease concerning for COVID-19 pneumonia.  CT of the abdomen and pelvis from 9/3/2013 reports bibasilar airspace disease suggestive of COVID-19 pneumonia, large right inguinal hernia containing nondilated bowel and fat.    Agree with initiation of remdesivir and Decadron.    Patient's presentation is typical of COVID-19 infection without superimposed bacterial component, recommend initiation of baricitinib.  We will continue to  follow closely.    Again, thank you Dr. Mccord for allowing us to participate in the care of your patient and please feel free to call for any questions you may have.        Code Status:     Code Status and Medical Interventions:   Ordered at: 09/02/21 2224     Level Of Support Discussed With:    Patient     Code Status:    CPR     Medical Interventions (Level of Support Prior to Arrest):    Full         NELSON Medina  09/03/21  14:03 EDT

## 2021-09-03 NOTE — PROGRESS NOTES
[facility] HOSPITALIST PROGRESS NOTE     Patient Identification:  Name:  Valdemar Solitario  Age:  76 y.o.  Sex:  male  :  1945  MRN:  [id number]  Visit Number:  [id number]  ROOM: [place] [address]     Primary Care Provider:  Laurence Geiger APRN    Length of stay in inpatient status:  1    Subjective     Chief Compliant:    Chief Complaint   Patient presents with   • Exposure To Known Illness   • Altered Mental Status   • Weakness - Generalized     History of Presenting Illness:    Patient remains ill, no acute events overnight, no new complaints, has workup still pending, VQ scan indeterminate, have ordered pharmacy to dose therapeutic lovenox, Cr improved some, continued on mIVFs, pending improved Cr may be able to order CTPE, has non-contrasted studies pending for now, venous duplex ordered and pending, he is continued on steroids and remdesivir, ID consult pending, trops elevated and downtrending, echo ordered and pending, no chest pain, he denies any fevers or chills.   Objective     Current Hospital Meds:dexamethasone, 6 mg, Intravenous, Daily  enoxaparin, 1 mg/kg, Subcutaneous, Q12H  hydrOXYzine, 25 mg, Oral, Once  pantoprazole, 40 mg, Oral, QAM  remdesivir, 200 mg, Intravenous, Q24H   Followed by  [START ON 2021] remdesivir, 100 mg, Intravenous, Q24H  sertraline, 100 mg, Oral, Daily  sodium chloride, 10 mL, Intravenous, Q12H  tamsulosin, 0.4 mg, Oral, Daily    sodium chloride, 75 mL/hr, Last Rate: 75 mL/hr (21 0632)        Current Antimicrobial Therapy:  Anti-Infectives (From admission, onward)    Ordered     Dose/Rate Route Frequency Start Stop    21 0615  remdesivir 100 mg in sodium chloride 0.9 % 270 mL IVPB (powder vial)     Ordering Provider: Mg Augustine MD    100 mg  over 60 Minutes Intravenous Every 24 Hours 21 0900 21 0859    21 0615  remdesivir 200 mg in sodium chloride 0.9 % 290 mL IVPB (powder vial)     Ordering Provider: Mg Augustine  MD Steven    200 mg  over 60 Minutes Intravenous Every 24 Hours 09/03/21 0900 09/04/21 0859        Current Diuretic Therapy:  Diuretics (From admission, onward)    None        ----------------------------------------------------------------------------------------------------------------------  Vital Signs:  Temp:  [97.2 °F (36.2 °C)-98.8 °F (37.1 °C)] 97.5 °F (36.4 °C)  Heart Rate:  [61-86] 82  Resp:  [14-24] 20  BP: ()/(45-83) 128/83  SpO2:  [82 %-98 %] 93 %  on   ;   Device (Oxygen Therapy): NPPV/NIV  Body mass index is 26.98 kg/m².    Wt Readings from Last 3 Encounters:   09/03/21 75.8 kg (167 lb 1.6 oz)   06/14/17 73.9 kg (163 lb)   03/23/17 76.7 kg (169 lb)     Intake & Output (last 3 days)       08/31 0701 - 09/01 0700 09/01 0701 - 09/02 0700 09/02 0701 - 09/03 0700 09/03 0701 - 09/04 0700    Urine (mL/kg/hr)   100     Total Output   100     Net   -100                 NPO Diet  ----------------------------------------------------------------------------------------------------------------------  This physical exam has been personally performed remotely in the unit aided by real-time audio/visual communication tools. RN present at bedside during this exam and assisted during exam. The use of a video visit has been reviewed with the patient and verbal informed consent has been obtained.     Physical Exam:  General: Patient appears awake, alert, and in no acute distress.  Head: Normocephalic, atraumatic  Eyes: EOMI. Conjunctivae and sclerae normal.  Ears: Ears appear intact with no abnormalities noted.   Neck: Trachea midline. No obvious JVD.  Heart: Tele reveals regular rate and rhtyhm  Lungs: Respirations appear to be regular, even and unlabored with no signs of respiratory distress. No audible wheezing. On 2LNC  Abdomen: No obvious abdominal distension.  MS: Muscle tone appears normal. No gross deformities.  Extremities: No clubbing, cyanosis or edema noted.  Skin: No visible bleeding, bruising, or  rash.  Neurologic: Alert and oriented x3. No gross focal deficits.   ----------------------------------------------------------------------------------------------------------------------  Results from last 7 days   Lab Units 09/02/21  2328 09/02/21  1301 09/02/21  0950   CRP mg/dL 7.80*  --   --    LACTATE mmol/L 1.2 0.8 2.3*   WBC 10*3/mm3 4.02  --  5.51   HEMOGLOBIN g/dL 12.0*  --  11.8*   HEMATOCRIT % 38.0  --  37.2*   MCV fL 89.4  --  89.0   MCHC g/dL 31.6  --  31.7   PLATELETS 10*3/mm3 124*  --  126*   INR  0.96  --  0.95     Results from last 7 days   Lab Units 09/02/21  2241   PH, ARTERIAL pH units 7.323*   PO2 ART mm Hg 68.8*   PCO2, ARTERIAL mm Hg 44.7   HCO3 ART mmol/L 23.2     Results from last 7 days   Lab Units 09/02/21  2328 09/02/21  0950   SODIUM mmol/L 138 136   POTASSIUM mmol/L 5.2 4.7   CHLORIDE mmol/L 106 101   CO2 mmol/L 22.4 21.9*   BUN mg/dL 41* 55*   CREATININE mg/dL 1.41* 2.10*   EGFR IF NONAFRICN AM mL/min/1.73 49* 31*   CALCIUM mg/dL 8.5* 8.4*   GLUCOSE mg/dL 159* 124*   ALBUMIN g/dL 3.16* 3.65   BILIRUBIN mg/dL 0.2 0.3   ALK PHOS U/L 51 56   AST (SGOT) U/L 74* 88*   ALT (SGPT) U/L 34 39   Estimated Creatinine Clearance: 47.8 mL/min (A) (by C-G formula based on SCr of 1.41 mg/dL (H)).  No results found for: AMMONIA  Results from last 7 days   Lab Units 09/02/21  1218 09/02/21  0950   TROPONIN T ng/mL 0.016 0.031*     Results from last 7 days   Lab Units 09/02/21  0950   PROBNP pg/mL 370.2         No results found for: HGBA1C, POCGLU  No results found for: TSH, FREET4  No results found for: PREGTESTUR, PREGSERUM, HCG, HCGQUANT  Pain Management Panel    There is no flowsheet data to display.       Brief Urine Lab Results     None        No results found for: BLOODCX  No results found for: URINECX  No results found for: WOUNDCX  No results found for: STOOLCX  No results found for: RESPCX  No results found for: AFBCX  Results from last 7 days   Lab Units 09/02/21  2328 09/02/21  1301  09/02/21  0950   LACTATE mmol/L 1.2 0.8 2.3*   CRP mg/dL 7.80*  --   --      I have personally looked at the labs and they are summarized above.  ----------------------------------------------------------------------------------------------------------------------  Detailed radiology reports for the last 24 hours:  Imaging Results (Last 24 Hours)     Procedure Component Value Units Date/Time    NM Lung Scan Perfusion Particulate [[id number]] Collected: 09/02/21 2024     Updated: 09/02/21 2027    Narrative:      RADIONUCLIDE PERFUSION-ONLY LUNG SCAN, 9/2/2021    HISTORY:    76-year-old male in the ED with multifocal pulmonary infiltrates and positive COVID-19 testing. Short of air. Hypoxemia. Assess for pulmonary embolism.      DOSE:  4.0 mCi mCi technetium labeled MAA intravenously.    COMPARISON:  Chest x-ray tonight.    FINDINGS:  NOTE: Ventilation imaging is currently restricted during the COVID pandemic as an aerosol-generating procedure. A perfusion-only examination was performed per current protocol.    Multifocal regions of decreased perfusion scattered throughout both lungs. These may correspond to multifocal regions of pneumonitis visible on chest x-ray. Pulmonary embolism could have a similar appearance. The findings are therefore indeterminate for  the presence of pulmonary embolism.      Impression:      1.  Indeterminant examination for the exclusion of pulmonary embolism.  2.  Abnormal perfusion-only lung imaging. Multifocal perfusion defects may be attributable to the patient's multifocal air space pneumonia but pulmonary embolism is also possible.                      Signer Name: Kael Hamilton MD   Signed: 9/2/2021 8:24 PM   Workstation Name: [email or web address]    [facility]        Assessment & Plan    76 y.o. male with past medical history significant for diabetes meliltus, depression, GERD, hypertension, dyslipidemia.  He arrived to the ED via EMS after presenting to his  place of work in a state of confusion    #Lactic Acidosis, Acute Metabolic Encephalopathy, Acute Hypoxic Respiratory Failure 2/2 PNA, Viral, treating for Covid 19 c/b mild transaminitis  #Elevated D-dimer, cannot rule out PE  - Patient presented w/ increased shortness of breath, confusion, tachypnea, covid + on 9/2.  - Admission labs showed WBC count 5K, CRP 7.8, lactate 2.3, d-dimer 0.95, covid +, Bcx's pending  - VQ scan showed indeterminate study, noted abnormal perfusion, multifocal defects could be related to multifocal airspace disease but also PE.  - CT Head showed no acute intracranial abnormality  - ID consulted; Recs pending  - Have ordered TTE for cardiac function baseline  - B/l Venous Duplex to rule out DVT pending  - CT Chest, Abd/Pelvis pending  - Continue 6mg IV Dexamethasone x 10 days  - Continue Remdesivir. Will trend LFTs.  - Have consulted pharmacy to dose Level III AC for Covid 19 thromboembolism Ppx, will order CTPE if Cr improves  - Continue APAP PRN for fevers  - Continue Albuterol Inhaler  - Trend Covid 19 progression labs w/ CBC, CMP, CK, CRP, Ferritin daily and LDH, D-dimer, Fibrinogen q48hrs.  - Monitor in PCU, enhanced droplet/contact isolation precautions, continue 02 but wean as able     #HTN/Dyslipidemia  #Elevated Troponin  - Labs showed trops 0.031->0.016, proBNP 370   - EKG showed NSR, RBBB, no significant ST changes  - Echo pending as per above  - Home med rec pending  - Continue to monitor on tele, strict I/O's, daily weights, trend HR and BP    #Nonoliguric MURIEL - Improved   - B/l Cr 0.9-1.0, was up to 2.1 on admission; Now 1.4  - Continue mIVFs, Trend Cr and UOP, avoid nephrotoxins, NSAIDs, dehydration and contrast as able.    #NIDDM Type II, unknown control, unknown complications  - Hgb A1c = Pending  - Holding home oral agents, continue FSBG and SSI, add long acting as indicated.    #Anxiety/Depression  - Continue home regimen pending med rec     F: NPO, mIVFs  E: Monitor &  Replace PRN  N: NPO  Ppx: TLov  Code: Full Code     Dispo: Pending workup and clinical improvement     *This patient is considered high risk due to acute metabolic encephalopathy, acute respiratory failure, covid 19, monitoring for drug toxicities on Remdesivir, MURIEL.    VTE Prophylaxis:   Mechanical Order History:     None      Pharmalogical Order History:      Ordered     Dose Route Frequency Stop    09/03/21 0739  enoxaparin (LOVENOX) syringe 80 mg      1 mg/kg SC Every 12 Hours --    09/03/21 0724  Pharmacy to Dose enoxaparin (LOVENOX)  Status:  Discontinued      -- XX Continuous PRN 09/03/21 0740    09/02/21 1237  enoxaparin (LOVENOX) syringe 40 mg  Status:  Discontinued      40 mg SC Every 24 Hours 09/02/21 1247    09/02/21 1247  enoxaparin (LOVENOX) syringe 40 mg  Status:  Discontinued      0.5 mg/kg SC Every 24 Hours 09/03/21 0549              Jose De Jesus Mccord MD  [facility]  09/03/21  08:12 EDT

## 2021-09-03 NOTE — PLAN OF CARE
Goal Outcome Evaluation:  Plan of Care Reviewed With: patient           Outcome Summary: Pt is not stable. Pt has deteriorated since the beginning of shift and has increased in oxygen demand.  MD is aware. and transfer orders are in.  Pt is on bipap 80%.  He is extremely weak and not able to stay awake.  Bed low and locked with call light in reach.

## 2021-09-04 NOTE — CONSULTS
Inpatient Cardiology Consult  Consult performed by: Smooth Espino MD  Consult ordered by: Jose De Jesus Mccord MD        Date of Admit: 9/2/2021  Date of Consult: 09/04/21  No ref. provider found  Valdemar Solitario  1945    Consulting Physician: Smooth Espino MD    Cardiology consultation    Reason for consultation: Cardiomyopathy, presumably new    Assessment:  1. This patient does not have cardiomyopathy  2. Pro-BNP is normal and EF is 50%  3. COVID Pneumonia   4. Moderate pulmonary hypertension likely secondary to lung disease  5. Mild troponin elevation is likely type 2 from hypoxia  6. RBBB      Recommendations:  1. No further recommendations at this time.  2. Supportive care as being delivered for COVID  3. Follow up echo in 6 months      History of Present Illness    Subjective     Chief Complaint   Patient presents with   • Exposure To Known Illness   • Altered Mental Status   • Weakness - Generalized       Valdemar Solitario is a 76 y.o. male with past medical history significant for hypertension, dyslipidemia, borderline diabetes, GERD, and depression.  Patient was admitted from the ED with complaint of confusion, dyspnea and weakness.  He was diagnosed with COVID-19 pneumonia.  Cardiology was consulted for cardiomyopathy, presumed to be new.    The patient is in Covid isolation.  He currently requires CPAP.    Review of the medical record indicates patient has no prior history of coronary artery disease.      Last Echo: 9/3/2021    Interpretation Summary    · Estimated left ventricular EF = 50% Left ventricular ejection fraction appears to be 46 - 50%. Left ventricular systolic function is normal.  · Left ventricular diastolic function was normal.  · The right ventricular cavity is mild to moderately dilated.  · The right atrial cavity is mild to moderately dilated.  · Estimated right ventricular systolic pressure from tricuspid regurgitation is moderately elevated (45-55 mmHg).  · Moderate to severe  pulmonary hypertension is present.  · No pericardial effusion  · No prev echo       Past Medical History:   Diagnosis Date   • Depression    • Diabetes mellitus (CMS/[facility])     [place], is not on meds for it   • Dyslipidemia    • GERD (gastroesophageal reflux disease)    • Hypertension    • Thoracic spine pain    • Vitamin B12 deficiency (dietary) anemia    • Vitamin D deficiency      Past Surgical History:   Procedure Laterality Date   • APPENDECTOMY     • CATARACT EXTRACTION     • HERNIA REPAIR       Family History   Problem Relation Age of Onset   • Hypertension Mother      Social History     Tobacco Use   • Smoking status: Former Smoker   Substance Use Topics   • Alcohol use: No   • Drug use: No     Medications Prior to Admission   Medication Sig Dispense Refill Last Dose   • olmesartan (BENICAR) 40 MG tablet Take 40 mg by mouth Daily.   Unknown at Unknown time   • omeprazole (priLOSEC) 40 MG capsule Take 40 mg by mouth Daily.   Unknown at Unknown time   • sertraline (ZOLOFT) 100 MG tablet Take 100 mg by mouth Daily.   Unknown at Unknown time   • simvastatin (ZOCOR) 40 MG tablet Take 40 mg by mouth Every Night.   Unknown at Unknown time   • tamsulosin (FLOMAX) 0.4 MG capsule 24 hr capsule Take 1 capsule by mouth Daily.   Unknown at Unknown time     Allergies:  Codeine      Objective      Vital Signs  Temp:  [98.4 °F (36.9 °C)-99.4 °F (37.4 °C)] 98.6 °F (37 °C)  Heart Rate:  [50-85] 56  Resp:  [20-26] 22  BP: ()/() 128/68  Body mass index is 27.17 kg/m².    Intake/Output Summary (Last 24 hours) at 9/4/2021 1444  Last data filed at 9/4/2021 0852  Gross per 24 hour   Intake 1324.69 ml   Output 950 ml   Net 374.69 ml       Physical Exam    Exam deferred due to COVID-19 isolation.    Telemetry: Sinus bradycardia 50s    Results Review:   I reviewed the patient's new clinical results.  Results from last 7 days   Lab Units 09/02/21  1218 09/02/21  0950   TROPONIN T ng/mL 0.016 0.031*     Results  from last 7 days   Lab Units 09/04/21  0043 09/02/21  2328 09/02/21  0950   WBC 10*3/mm3 5.71 4.02 5.51   HEMOGLOBIN g/dL 12.3* 12.0* 11.8*   PLATELETS 10*3/mm3 131* 124* 126*     Results from last 7 days   Lab Units 09/04/21  0043 09/03/21  0703 09/02/21  2328 09/02/21  0950   SODIUM mmol/L 137  --  138 136   POTASSIUM mmol/L 4.9  --  5.2 4.7   CHLORIDE mmol/L 106  --  106 101   CO2 mmol/L 20.0*  --  22.4 21.9*   BUN mg/dL 36*  --  41* 55*   CREATININE mg/dL 1.16 1.27 1.41* 2.10*   CALCIUM mg/dL 8.3*  --  8.5* 8.4*   GLUCOSE mg/dL 136*  --  159* 124*   ALT (SGPT) U/L 32 32 34 39   AST (SGOT) U/L 68* 73* 74* 88*     Lab Results   Component Value Date    INR 0.96 09/02/2021    INR 0.95 09/02/2021       EKG:         Imaging Results (Last 72 Hours)     Procedure Component Value Units Date/Time    CT Chest Pulmonary Embolism [[id number]] Collected: 09/04/21 1435     Updated: 09/04/21 1437    Narrative:      EXAM:    CT Angiography Chest With Intravenous Contrast     EXAM DATE:    9/4/2021 2:20 PM     CLINICAL HISTORY:    PE suspected, low/intermediate prob, positive D-dimer; U07.1-COVID-19;  J12.82-Pneumonia due to Coronavirus disease 2019; R09.02-Hypoxemia     TECHNIQUE:    Axial computed tomographic angiography images of the chest with  intravenous contrast.  This CT exam was performed using one or more of  the following dose reduction techniques:  automated exposure control,  adjustment of the mA and/or kV according to patient size, and/or use of  iterative reconstruction technique.    MIP reconstructed images were created and reviewed.     COMPARISON:    09/03/2021     FINDINGS:    Pulmonary arteries:  Unremarkable.  No pulmonary embolism.    Aorta:  No acute findings.  No thoracic aortic aneurysm.    Lungs:  Worsened bilateral airspace disease.  No mass.    Pleural space:  Unremarkable.  No significant effusion.  No  pneumothorax.    Heart:  Unremarkable.  No cardiomegaly.  No significant pericardial  effusion.  No  evidence of RV dysfunction.    Bones/joints:  No acute fracture.  No dislocation.    Soft tissues:  Unremarkable.    Lymph nodes:  Unremarkable.  No enlarged lymph nodes.       Impression:        Worsened bilateral airspace disease.     This report was finalized on 9/4/2021 2:35 PM by Dr. Leonel Angel MD.       US Venous Doppler Lower Extremity Bilateral (duplex) [[id number]] Collected: 09/03/21 1718     Updated: 09/03/21 1720    Narrative:      EXAM:    US Duplex Bilateral Lower Extremities Veins     CLINICAL HISTORY:    positive d-dimer, rule out DVT; U07.1-COVID-19; J12.82-Pneumonia due  to Coronavirus disease 2019; R09.02-Hypoxemia     TECHNIQUE:    Real-time duplex ultrasound scan of the bilateral lower extremity  veins integrating B-mode two-dimensional vascular structure, Doppler  spectral analysis, color flow Doppler imaging and compression.     COMPARISON:    No relevant prior studies available.     FINDINGS:    RIGHT DEEP VEINS:  Unremarkable.  No DVT in the right common femoral,  femoral, proximal deep femoral or popliteal veins.  The veins  demonstrate normal color flow, are normally compressible, with normal  phasic flow and/or augmentation response.       LEFT DEEP VEINS:  Unremarkable.  No DVT in the left common femoral,  femoral, proximal deep femoral or popliteal veins.  The veins  demonstrate normal color flow, are normally compressible, with normal  phasic flow and/or augmentation response.       SOFT TISSUES:  No acute findings.  No popliteal cyst.       Impression:        Normal bilateral lower extremity duplex venous ultrasound.     This report was finalized on 9/3/2021 5:18 PM by Dr. Leonel Angel MD.       CT Chest Without Contrast Diagnostic [[id number]] Collected: 09/03/21 1225     Updated: 09/03/21 1227    Narrative:      EXAM:    CT Chest Without Intravenous Contrast     EXAM DATE:    9/3/2021 11:20 AM     CLINICAL HISTORY:    Pneumonia, effusion or abscess suspected, xray done      TECHNIQUE:    Axial computed tomography images of the chest without intravenous  contrast.  Sagittal and coronal reformatted images were created and  reviewed.  This CT exam was performed using one or more of the following  dose reduction techniques:  automated exposure control, adjustment of  the mA and/or kV according to patient size, and/or use of iterative  reconstruction technique.     COMPARISON:    No relevant prior studies available.     FINDINGS:    LUNGS:  Patchy bilateral airspace disease concerning for COVID-19  pneumonia.    PLEURAL SPACE:  Unremarkable.  No pneumothorax.  No significant  effusion.    HEART:  Coronary artery calcifications.  No significant pericardial  effusion.    BONES/JOINTS:  Unremarkable.  No acute fracture.  No dislocation.    SOFT TISSUES:  Unremarkable.    VASCULATURE:  Unremarkable.  No thoracic aortic aneurysm.    LYMPH NODES:  Unremarkable.  No enlarged lymph nodes.       Impression:        Patchy bilateral airspace disease concerning for COVID-19 pneumonia.     This report was finalized on 9/3/2021 12:25 PM by Dr. Leonel Angel MD.       CT Abdomen Pelvis Without Contrast [[id number]] Collected: 09/03/21 1139     Updated: 09/03/21 1141    Narrative:      EXAM:    CT Abdomen and Pelvis Without Intravenous Contrast     EXAM DATE:    9/3/2021 11:20 AM     CLINICAL HISTORY:    Urinary retention; U07.1-COVID-19; J12.82-Pneumonia due to Coronavirus  disease 2019; R09.02-Hypoxemia     TECHNIQUE:    Axial computed tomography images of the abdomen and pelvis without  intravenous contrast.  Sagittal and coronal reformatted images were  created and reviewed.  This CT exam was performed using one or more of  the following dose reduction techniques:  automated exposure control,  adjustment of the mA and/or kV according to patient size, and/or use of  iterative reconstruction technique.     COMPARISON:    08/04/2015     FINDINGS:    LUNG BASES:  Bibasilar airspace disease suggestive of  COVID-19  pneumonia.      ABDOMEN:    LIVER:  Unremarkable.    GALLBLADDER AND BILE DUCTS:  Unremarkable.  No calcified stones.  No  ductal dilation.    PANCREAS:  Unremarkable.  No ductal dilation.    SPLEEN:  Unremarkable.  No splenomegaly.    ADRENALS:  Unremarkable.  No mass.    KIDNEYS AND URETERS:  Unremarkable.  No obstructing stones.  No  hydronephrosis.    STOMACH AND BOWEL:  Large right inguinal hernia containing nondilated  bowel and fat.  Sigmoid colonic diverticulosis.  No mucosal thickening.      PELVIS:    APPENDIX:  No findings to suggest acute appendicitis.    BLADDER:  Unremarkable.  No stones.    REPRODUCTIVE:  Unremarkable as visualized.      ABDOMEN and PELVIS:    INTRAPERITONEAL SPACE:  Unremarkable.  No free air.  No significant  fluid collection.    BONES/JOINTS:  No acute fracture.  No dislocation.    SOFT TISSUES:  See above.    VASCULATURE:  Unremarkable.  No abdominal aortic aneurysm.    LYMPH NODES:  Unremarkable.  No enlarged lymph nodes.       Impression:      1.  Bibasilar airspace disease suggestive of COVID-19 pneumonia.  2.  Large right inguinal hernia containing nondilated bowel and fat.     This report was finalized on 9/3/2021 11:39 AM by Dr. Leonel Angel MD.       NM Lung Scan Perfusion Particulate [[id number]] Collected: 09/02/21 2024     Updated: 09/02/21 2027    Narrative:      RADIONUCLIDE PERFUSION-ONLY LUNG SCAN, 9/2/2021    HISTORY:    76-year-old male in the ED with multifocal pulmonary infiltrates and positive COVID-19 testing. Short of air. Hypoxemia. Assess for pulmonary embolism.      DOSE:  4.0 mCi mCi technetium labeled MAA intravenously.    COMPARISON:  Chest x-ray tonight.    FINDINGS:  NOTE: Ventilation imaging is currently restricted during the COVID pandemic as an aerosol-generating procedure. A perfusion-only examination was performed per current protocol.    Multifocal regions of decreased perfusion scattered throughout both lungs. These may correspond to  multifocal regions of pneumonitis visible on chest x-ray. Pulmonary embolism could have a similar appearance. The findings are therefore indeterminate for  the presence of pulmonary embolism.      Impression:      1.  Indeterminant examination for the exclusion of pulmonary embolism.  2.  Abnormal perfusion-only lung imaging. Multifocal perfusion defects may be attributable to the patient's multifocal air space pneumonia but pulmonary embolism is also possible.                      Signer Name: Kael Hamilton MD   Signed: 9/2/2021 8:24 PM   Workstation Name: [facility]    [facility]    XR Chest 2 View [[id number]] Collected: 09/02/21 1018     Updated: 09/02/21 1020    Narrative:      EXAM:    XR Chest, 2 Views     EXAM DATE:    9/2/2021 10:15 AM     CLINICAL HISTORY:    CHF/COPD Protocol     TECHNIQUE:    Frontal and lateral views of the chest.     COMPARISON:    No relevant prior studies available.     FINDINGS:    LUNGS:  Scattered bilateral patchy opacities may represent COVID-19  pneumonia.    PLEURAL SPACE:  Unremarkable.  No pneumothorax.    HEART:  Unremarkable.  No cardiomegaly.    MEDIASTINUM:  Unremarkable.    BONES/JOINTS:  Unremarkable.       Impression:        Scattered bilateral patchy opacities may represent COVID-19 pneumonia.     This report was finalized on 9/2/2021 10:18 AM by Dr. Leonel Angel MD.       CT Head Without Contrast [[id number]] Collected: 09/02/21 1007     Updated: 09/02/21 1009    Narrative:      EXAM:    CT Head Without Intravenous Contrast     EXAM DATE:    9/2/2021 9:54 AM     CLINICAL HISTORY:    altered mental status     TECHNIQUE:    Axial computed tomography images of the head/brain without intravenous  contrast.  Sagittal and coronal reformatted images were created and  reviewed.  This CT exam was performed using one or more of the following  dose reduction techniques:  automated exposure control, adjustment of  the mA and/or kV according to  patient size, and/or use of iterative  reconstruction technique.     COMPARISON:    No relevant prior studies available.     FINDINGS:    BRAIN:  Unremarkable.  No hemorrhage.  No significant white matter  disease.  No edema.    VENTRICLES:  Unremarkable.  No ventriculomegaly.    BONES/JOINTS:  Unremarkable.  No acute fracture.    SOFT TISSUES:  Unremarkable.    SINUSES:  Unremarkable as visualized.  No acute sinusitis.    MASTOID AIR CELLS:  Unremarkable as visualized.  No mastoid effusion.       Impression:        Unremarkable exam demonstrating no CT evidence of acute intracranial  findings.     This report was finalized on 9/2/2021 10:07 AM by Dr. Leonel Angel MD.                Thank you very much for asking us to be involved in this patient's care.  We will follow along with you.    Stephanie Kim, NELSON   09/04/21  14:44 EDT

## 2021-09-04 NOTE — PROGRESS NOTES
PROGRESS NOTE         Patient Identification:  Name:  Valdemar Solitario  Age:  76 y.o.  Sex:  male  :  1945  MRN:  [id number]  Visit Number:  [id number]  Primary Care Provider:  Laurence Geiger APRN         LOS: 2 days       ----------------------------------------------------------------------------------------------------------------------  Subjective       Chief Complaints:    Exposure To Known Illness, Altered Mental Status, and Weakness - Generalized        Interval History:      Patient is on BiPAP at 100% FiO2.  Afebrile.  CRP improved from 7.82-5.05.  WBC stable at 5.71.  Blood cultures from 2021 report no growth to date.  CT chest without contrast from 9/3/2021 reports patchy bilateral airspace disease concerning for COVID-19 pneumonia.    Review of Systems:    Deferred due to altered mental status  ----------------------------------------------------------------------------------------------------------------------      Objective       Current [facility] Meds:  baricitinib, 4 mg, Oral, Daily  dexamethasone, 6 mg, Intravenous, Daily  enoxaparin, 1 mg/kg, Subcutaneous, Q12H  pantoprazole, 40 mg, Oral, QAM  remdesivir, 100 mg, Intravenous, Q24H  sertraline, 100 mg, Oral, Daily  sodium chloride, 10 mL, Intravenous, Q12H  sodium chloride, 10 mL, Intravenous, Q12H  tamsulosin, 0.4 mg, Oral, Daily  thiamine (VITAMIN B1) IVPB, 100 mg, Intravenous, Daily      dexmedetomidine, 0.2-1.5 mcg/kg/hr, Last Rate: 0.3 mcg/kg/hr (21 0334)  Pharmacy Consult - Pharmacy to dose,       ----------------------------------------------------------------------------------------------------------------------    Vital Signs:  Temp:  [97.3 °F (36.3 °C)-99.4 °F (37.4 °C)] 98.4 °F (36.9 °C)  Heart Rate:  [51-85] 57  Resp:  [20-28] 22  BP: ()/() 125/119  Mean Arterial Pressure (Non-Invasive) for the past 24 hrs (Last 3 readings):   Noninvasive MAP (mmHg)   21 0918 121   21 0902 85    09/04/21 0802 94     SpO2 Percentage    09/04/21 0802 09/04/21 0902 09/04/21 0918   SpO2: 96% 94% 94%     SpO2:  [87 %-98 %] 94 %  on  Flow (L/min):  [10-15] 15;   Device (Oxygen Therapy): nasal prongs    Body mass index is 27.17 kg/m².  Wt Readings from Last 3 Encounters:   09/04/21 76.3 kg (168 lb 4 oz)   06/14/17 73.9 kg (163 lb)   03/23/17 76.7 kg (169 lb)        Intake/Output Summary (Last 24 hours) at 9/4/2021 0934  Last data filed at 9/4/2021 0852  Gross per 24 hour   Intake 1324.69 ml   Output 1150 ml   Net 174.69 ml     NPO Diet  ----------------------------------------------------------------------------------------------------------------------      Physical Exam:    Deferred due to COVID-19 isolation      ----------------------------------------------------------------------------------------------------------------------  Results from last 7 days   Lab Units 09/02/21  1218 09/02/21  0950   TROPONIN T ng/mL 0.016 0.031*     Results from last 7 days   Lab Units 09/02/21  0950   PROBNP pg/mL 370.2       Results from last 7 days   Lab Units 09/04/21  0515   PH, ARTERIAL pH units 7.417   PO2 ART mm Hg 64.3*   PCO2, ARTERIAL mm Hg 38.6   HCO3 ART mmol/L 24.9     Results from last 7 days   Lab Units 09/04/21  0043 09/02/21  2328 09/02/21  1301 09/02/21  0950   CRP mg/dL 5.05* 7.80*  --   --    LACTATE mmol/L  --  1.2 0.8 2.3*   WBC 10*3/mm3 5.71 4.02  --  5.51   HEMOGLOBIN g/dL 12.3* 12.0*  --  11.8*   HEMATOCRIT % 37.5 38.0  --  37.2*   MCV fL 87.0 89.4  --  89.0   MCHC g/dL 32.8 31.6  --  31.7   PLATELETS 10*3/mm3 131* 124*  --  126*   INR   --  0.96  --  0.95     Results from last 7 days   Lab Units 09/04/21 0043 09/03/21  0703 09/02/21 2328 09/02/21  0950 09/02/21  0950   SODIUM mmol/L 137  --  138  --  136   POTASSIUM mmol/L 4.9  --  5.2  --  4.7   CHLORIDE mmol/L 106  --  106  --  101   CO2 mmol/L 20.0*  --  22.4  --  21.9*   BUN mg/dL 36*  --  41*  --  55*   CREATININE mg/dL 1.16 1.27 1.41*   < >  2.10*   EGFR IF NONAFRICN AM mL/min/1.73 61 55* 49*   < > 31*   CALCIUM mg/dL 8.3*  --  8.5*  --  8.4*   GLUCOSE mg/dL 136*  --  159*  --  124*   ALBUMIN g/dL 2.78* 3.11* 3.16*   < > 3.65   BILIRUBIN mg/dL 0.3 0.3 0.2   < > 0.3   ALK PHOS U/L 47 50 51   < > 56   AST (SGOT) U/L 68* 73* 74*   < > 88*   ALT (SGPT) U/L 32 32 34   < > 39    < > = values in this interval not displayed.   Estimated Creatinine Clearance: 58.5 mL/min (by C-G formula based on SCr of 1.16 mg/dL).  No results found for: AMMONIA    Hemoglobin A1C   Date/Time Value Ref Range Status   09/02/2021 2328 6.30 (H) 4.80 - 5.60 % Final     Lab Results   Component Value Date    HGBA1C 6.30 (H) 09/02/2021     No results found for: TSH, FREET4    Blood Culture   Date Value Ref Range Status   09/02/2021 No growth at 24 hours  Preliminary   09/02/2021 No growth at 24 hours  Preliminary   09/02/2021 No growth at 24 hours  Preliminary     No results found for: URINECX  No results found for: WOUNDCX  No results found for: STOOLCX  No results found for: RESPCX  Pain Management Panel    There is no flowsheet data to display.           ----------------------------------------------------------------------------------------------------------------------  Imaging Results (Last 24 Hours)       Procedure Component Value Units Date/Time    US Venous Doppler Lower Extremity Bilateral (duplex) [[id number]] Collected: 09/03/21 1718     Updated: 09/03/21 1720    Narrative:      EXAM:    US Duplex Bilateral Lower Extremities Veins     CLINICAL HISTORY:    positive d-dimer, rule out DVT; U07.1-COVID-19; J12.82-Pneumonia due  to Coronavirus disease 2019; R09.02-Hypoxemia     TECHNIQUE:    Real-time duplex ultrasound scan of the bilateral lower extremity  veins integrating B-mode two-dimensional vascular structure, Doppler  spectral analysis, color flow Doppler imaging and compression.     COMPARISON:    No relevant prior studies available.     FINDINGS:    RIGHT DEEP VEINS:   Unremarkable.  No DVT in the right common femoral,  femoral, proximal deep femoral or popliteal veins.  The veins  demonstrate normal color flow, are normally compressible, with normal  phasic flow and/or augmentation response.       LEFT DEEP VEINS:  Unremarkable.  No DVT in the left common femoral,  femoral, proximal deep femoral or popliteal veins.  The veins  demonstrate normal color flow, are normally compressible, with normal  phasic flow and/or augmentation response.       SOFT TISSUES:  No acute findings.  No popliteal cyst.       Impression:        Normal bilateral lower extremity duplex venous ultrasound.     This report was finalized on 9/3/2021 5:18 PM by Dr. Leonel Angel MD.               ----------------------------------------------------------------------------------------------------------------------    Assessment/Plan       Assessment/Plan     ASSESSMENT:    1.  Severe sepsis with lactic acid greater than 2 on admission  2.  COVID-19 pneumonia    PLAN:    Patient is on BiPAP at 100% FiO2.  Afebrile.  CRP improved from 7.82-5.05.  WBC stable at 5.71.  Blood cultures from 9/2/2021 report no growth to date.      Procalcitonin normal.  CT of the head unremarkable.  Chest x-ray from 9/2/2021 reports scattered bilateral patchy opacities that may represent COVID-19 pneumonia.  VQ scan indeterminate for exclusion of PE.  CT of the chest from 9/3/2021 reports patchy bilateral airspace disease concerning for COVID-19 pneumonia.  CT of the abdomen and pelvis from 9/3/2013 reports bibasilar airspace disease suggestive of COVID-19 pneumonia, large right inguinal hernia containing nondilated bowel and fat.     Agree with initiation of remdesivir and Decadron.    Patient is receiving baricitinib.     Patient's presentation is typical of COVID-19 infection without superimposed bacterial component, recommend to continue baricitinib, remdesivir, and Decadron for now.  We will continue to follow closely.      Code  Status:   Code Status and Medical Interventions:   Ordered at: 09/02/21 2226     Level Of Support Discussed With:    Patient     Code Status:    CPR     Medical Interventions (Level of Support Prior to Arrest):    Full       NELSON Torres  09/04/21  09:34 EDT

## 2021-09-04 NOTE — PROGRESS NOTES
[facility] Physician - Brief Progress Note  PERMANENT  09/04/2021 00:22    [facility] [place] ([facility])    CALDERON LECHUGA    Date of Service 09/04/2021 00:22    HPI/Events of Note Ordered Tessalon x3 for cough.      Interventions Intermediate-Communication with other healthcare providers and/or family        Electronically Signed by: Anuradha Angeles) on 09/04/2021 00:22

## 2021-09-04 NOTE — PROGRESS NOTES
Nurse Practitioner - Brief Progress Note  PERMANENT  2021 21:48    [facility] [place] CCU - 10 - [place] ([facility])    CALDERON LECHUGA    Date of Service 2021 21:48    HPI/Events of Note [facility] Provider Assessment Note    Tele ICU Focused Assessment Note: Information obtained from the patient's chart     75 y/o male with PMH of HTN, HLD, GERD, DM, Vitamin B12 defiency, and Vitamin D deficiency presents to the ED with complaints of shortness of breath, generalized weakness, and fatigue for ~ 2 days. CXR showed scattered bilateral patchy opacities. Tested   positive for COVID-19. Head CT negative for acute intracranial findings. Upon arrival to the ED oxygen saturations were 82% on room air and patient was placed on bipap. VQ lung scan negative for PE.   Additional treatments in the ED included: 1 liter NS bolus and IV decadron. Transferred to ICU for management.     Pertinent labs: AB.42/ 35/ 66/ 22, troponin 0.016, sodium 138, BUN/ creatine 41/ 1.41, AST 74, ALT 34, Ferritin 1666, CRP 7.8, LA 1.2, and procalcitonin 0.16.   CXR per RAD: Scattered bilateral patchy opacities may represent COVID-19 pneumonia     HR 81, / 62, RR 22, temperature 99.4, and oxygen saturations are 94%     Assessment and Plan:  1. Acute hypoxic respiratory failure/ pneumonia/ COVID-19   -ID consulted   -Blood cultures   -Bipap as tolerated   -IV decadron   -Baricitinib  -IV Remdesivir   -Therapeutic lovenox > elevated d-dimer > can not rule out PE   -Trend inflammatory markers  -Droplet & Contact isolation per institutional policy  -Supplemental O2 to keep SpO2 > 92 %  -Educate regarding self proning if patient can tolerate?? (non-intubated patient)     2. Acute on chronic kidney injury  -Strict I&O  -Avoid nephrotoxic medications   -Monitor bmp   -Renal dose bmp   -Daily weight       __y___   Video Assessment performed  __y___   Most recent labs reviewed  __y___   Vital Signs  reviewed  __y___   Best Practices addressed:                 VTE prophylaxis: Therapeutic lovenox                  SUP (when indicated): Protonix                  Glycemic control: N/A                       Please notify bedside physician when present or [facility] if glc > 180 X 2                 Sepsis guidelines: N/A                  Lung protective strategy: N/A                  Targeted Temperature Management: N/A     __y___     Spoke with bedside RN  __n___     Orders written      Contact [facility] for any needs if bedside physician is not present.      Interventions Major-Respiratory failure - evaluation and management  Intermediate-Diagnostic test evaluation        Electronically Signed by: Lucero Lo (NP) on 09/03/2021 22:29    Annotated By: Anuradha Angeles (MD)    Date: 09/04/21 01:53  I agree with above.

## 2021-09-04 NOTE — PLAN OF CARE
Goal Outcome Evaluation:              Outcome Summary: oxygen requirements 100%, bipap 15/5/20 reqirement at this time. Sister Flores and son Janessa aware as they have been updated via phone earlier thihs shift.

## 2021-09-04 NOTE — PROGRESS NOTES
"    [facility] HOSPITALIST PROGRESS NOTE     Patient Identification:  Name:  Valdemar Solitario  Age:  76 y.o.  Sex:  male  :  1945  MRN:  [id number]  Visit Number:  [id number]  ROOM: [address]     Primary Care Provider:  Laurence Geiger APRN    Length of stay in inpatient status:  2    Subjective     Chief Compliant:    Chief Complaint   Patient presents with   • Exposure To Known Illness   • Altered Mental Status   • Weakness - Generalized     History of Presenting Illness:    Patient remains ill, yesterday w/ worsening hypoxia requiring initiation of bipap and moved to CCU given quick rise in 02 requirement, now bipap dependent, AM ABG reviewed and borderline P02 on 80% Fi02, patient remains confused but endorses persisting dyspnea, endorses dry cough, denies any fevers or chills, ID following and rec'd baricitinib which I have consulted pharmacy to dose and now patient receiving, procal was normal, Cr has improved and AICU d/c'd mIVFs yesterday, BP stable, holding home antihypertensives, echo revealed new cardiomyopathy and dilated RA/RV w/ RVSP elevated and presumably new low LVEF, cardiology consulted and recs pending, given echo findings there is still c/f for possible PE despite venous duplex negative for DVT's, given Cr now improved have ordered CTPE.  LFTs stable, monitoring for drug toxicities w/ remdesivir. Patient endorses fatigue, notes he just \"wants to sleep\".    Objective     Current Hospital Meds:baricitinib, 4 mg, Oral, Daily  dexamethasone, 6 mg, Intravenous, Daily  enoxaparin, 1 mg/kg, Subcutaneous, Q12H  pantoprazole, 40 mg, Oral, QAM  remdesivir, 100 mg, Intravenous, Q24H  sertraline, 100 mg, Oral, Daily  sodium chloride, 10 mL, Intravenous, Q12H  sodium chloride, 10 mL, Intravenous, Q12H  tamsulosin, 0.4 mg, Oral, Daily  thiamine (VITAMIN B1) IVPB, 100 mg, Intravenous, Daily    dexmedetomidine, 0.2-1.5 mcg/kg/hr, Last Rate: 0.3 mcg/kg/hr (21 2264)  Pharmacy Consult - " Pharmacy to dose,       Current Antimicrobial Therapy:  Anti-Infectives (From admission, onward)    Ordered     Dose/Rate Route Frequency Start Stop    09/03/21 0615  remdesivir 100 mg in sodium chloride 0.9 % 270 mL IVPB (powder vial)     Ordering Provider: Mg Augustine MD    100 mg  over 60 Minutes Intravenous Every 24 Hours 09/04/21 0900 09/08/21 0859    09/03/21 0615  remdesivir 200 mg in sodium chloride 0.9 % 290 mL IVPB (powder vial)     Ordering Provider: Mg Augustine MD    200 mg  over 60 Minutes Intravenous Every 24 Hours 09/03/21 0900 09/03/21 0940        Current Diuretic Therapy:  Diuretics (From admission, onward)    None        ----------------------------------------------------------------------------------------------------------------------  Vital Signs:  Temp:  [97.3 °F (36.3 °C)-99.4 °F (37.4 °C)] 98.4 °F (36.9 °C)  Heart Rate:  [51-85] 57  Resp:  [20-28] 22  BP: ()/() 125/119  SpO2:  [87 %-98 %] 94 %  on  Flow (L/min):  [10-15] 15;   Device (Oxygen Therapy): nasal prongs  Body mass index is 27.17 kg/m².    Wt Readings from Last 3 Encounters:   09/04/21 76.3 kg (168 lb 4 oz)   06/14/17 73.9 kg (163 lb)   03/23/17 76.7 kg (169 lb)     Intake & Output (last 3 days)       09/01 0701 - 09/02 0700 09/02 0701 - 09/03 0700 09/03 0701 - 09/04 0700 09/04 0701 - 09/05 0700    I.V. (mL/kg)   1074.7 (14.1)     IV Piggyback    250    Total Intake(mL/kg)   1074.7 (14.1) 250 (3.3)    Urine (mL/kg/hr)  100 1150 (0.6)     Stool   0     Total Output  100 1150     Net  -100 -75.3 +250            Urine Unmeasured Occurrence   1 x         NPO Diet  ----------------------------------------------------------------------------------------------------------------------  Physical Exam:  General: Patient resting though awoke to verbal, mild acute distress.  Head: Normocephalic, atraumatic  Eyes: EOMI. Conjunctivae and sclerae normal.  Ears: Ears appear intact with no abnormalities noted.    Neck: Trachea midline. No obvious JVD.  Heart: Tele reveals regular rate and rhtyhm  Lungs: Respirations appear to be increased, mild increased WOB, on bipap now, Fi02 80%  Abdomen: No obvious abdominal distension.  MS: Muscle tone appears normal. No gross deformities.  Extremities: No clubbing, cyanosis or edema noted.  Skin: No visible bleeding, bruising, or rash.  Neurologic: Alert and oriented x3 though confused at times. No gross focal deficits.   ----------------------------------------------------------------------------------------------------------------------  Results from last 7 days   Lab Units 09/04/21 0043 09/02/21 2328 09/02/21  1301 09/02/21  0950   CRP mg/dL 5.05* 7.80*  --   --    LACTATE mmol/L  --  1.2 0.8 2.3*   WBC 10*3/mm3 5.71 4.02  --  5.51   HEMOGLOBIN g/dL 12.3* 12.0*  --  11.8*   HEMATOCRIT % 37.5 38.0  --  37.2*   MCV fL 87.0 89.4  --  89.0   MCHC g/dL 32.8 31.6  --  31.7   PLATELETS 10*3/mm3 131* 124*  --  126*   INR   --  0.96  --  0.95     Results from last 7 days   Lab Units 09/04/21  0515   PH, ARTERIAL pH units 7.417   PO2 ART mm Hg 64.3*   PCO2, ARTERIAL mm Hg 38.6   HCO3 ART mmol/L 24.9     Results from last 7 days   Lab Units 09/04/21  0043 09/03/21  0703 09/02/21 2328 09/02/21  0950 09/02/21  0950   SODIUM mmol/L 137  --  138  --  136   POTASSIUM mmol/L 4.9  --  5.2  --  4.7   CHLORIDE mmol/L 106  --  106  --  101   CO2 mmol/L 20.0*  --  22.4  --  21.9*   BUN mg/dL 36*  --  41*  --  55*   CREATININE mg/dL 1.16 1.27 1.41*   < > 2.10*   EGFR IF NONAFRICN AM mL/min/1.73 61 55* 49*   < > 31*   CALCIUM mg/dL 8.3*  --  8.5*  --  8.4*   GLUCOSE mg/dL 136*  --  159*  --  124*   ALBUMIN g/dL 2.78* 3.11* 3.16*   < > 3.65   BILIRUBIN mg/dL 0.3 0.3 0.2   < > 0.3   ALK PHOS U/L 47 50 51   < > 56   AST (SGOT) U/L 68* 73* 74*   < > 88*   ALT (SGPT) U/L 32 32 34   < > 39    < > = values in this interval not displayed.   Estimated Creatinine Clearance: 58.5 mL/min (by C-G formula based on  SCr of 1.16 mg/dL).  No results found for: AMMONIA  Results from last 7 days   Lab Units 09/02/21  1218 09/02/21  0950   TROPONIN T ng/mL 0.016 0.031*     Results from last 7 days   Lab Units 09/02/21  0950   PROBNP pg/mL 370.2         Hemoglobin A1C   Date/Time Value Ref Range Status   09/02/2021 2328 6.30 (H) 4.80 - 5.60 % Final     No results found for: TSH, FREET4  No results found for: PREGTESTUR, PREGSERUM, HCG, HCGQUANT  Pain Management Panel    There is no flowsheet data to display.       Brief Urine Lab Results     None        Blood Culture   Date Value Ref Range Status   09/02/2021 No growth at 24 hours  Preliminary   09/02/2021 No growth at 24 hours  Preliminary   09/02/2021 No growth at 24 hours  Preliminary     No results found for: URINECX  No results found for: WOUNDCX  No results found for: STOOLCX  No results found for: RESPCX  No results found for: AFBCX  Results from last 7 days   Lab Units 09/04/21  0043 09/02/21  2328 09/02/21  1301 09/02/21  0950   PROCALCITONIN ng/mL  --  0.16  --   --    LACTATE mmol/L  --  1.2 0.8 2.3*   CRP mg/dL 5.05* 7.80*  --   --        I have personally looked at the labs and they are summarized above.  ----------------------------------------------------------------------------------------------------------------------  Detailed radiology reports for the last 24 hours:  Imaging Results (Last 24 Hours)     Procedure Component Value Units Date/Time    US Venous Doppler Lower Extremity Bilateral (duplex) [[id number]] Collected: 09/03/21 1718     Updated: 09/03/21 1720    Narrative:      EXAM:    US Duplex Bilateral Lower Extremities Veins     CLINICAL HISTORY:    positive d-dimer, rule out DVT; U07.1-COVID-19; J12.82-Pneumonia due  to Coronavirus disease 2019; R09.02-Hypoxemia     TECHNIQUE:    Real-time duplex ultrasound scan of the bilateral lower extremity  veins integrating B-mode two-dimensional vascular structure, Doppler  spectral analysis, color flow Doppler  imaging and compression.     COMPARISON:    No relevant prior studies available.     FINDINGS:    RIGHT DEEP VEINS:  Unremarkable.  No DVT in the right common femoral,  femoral, proximal deep femoral or popliteal veins.  The veins  demonstrate normal color flow, are normally compressible, with normal  phasic flow and/or augmentation response.       LEFT DEEP VEINS:  Unremarkable.  No DVT in the left common femoral,  femoral, proximal deep femoral or popliteal veins.  The veins  demonstrate normal color flow, are normally compressible, with normal  phasic flow and/or augmentation response.       SOFT TISSUES:  No acute findings.  No popliteal cyst.       Impression:        Normal bilateral lower extremity duplex venous ultrasound.     This report was finalized on 9/3/2021 5:18 PM by Dr. Leonel Angel MD.           Assessment & Plan    76M PMH diabetes meliltus, depression, GERD, hypertension, dyslipidemia, presented to his place of work in a state of confusion, brought to ER, found to be Covid +.    #Lactic Acidosis, Acute Metabolic Encephalopathy, Acute Hypoxic Respiratory Failure requiring NIPPV 2/2 PNA, Viral, treating for Covid 19 c/b mild transaminitis - Worse Respiratory Failure  #Elevated D-dimer, cannot rule out PE  - Patient presented w/ increased shortness of breath, confusion, tachypnea, covid + on 9/2.  - Admission labs showed WBC count 5K, CRP 7.8, lactate 2.3, d-dimer 0.95, procal 0.16, MRSA -, covid +, Bcx's NGTD  - B/l Venous Duplex negative for DVT  - VQ scan showed indeterminate study, noted abnormal perfusion, multifocal defects could be related to multifocal airspace disease but also PE.  - CT Head showed no acute intracranial abnormality  - CT Chest showed patchy b/l airspace disease c/w covid 19  - ID consulted; Following  - Continue 6mg IV Dexamethasone x 10 days  - Continue Remdesivir. Will trend LFTs.  - Have added baricitinib per ID recs  - Continue Level III AC for Covid 19 thromboembolism  Ppx, have ordered CTPE today  - Continue precedex gtt for agitation   - Continue APAP PRN for fevers  - Continue Albuterol Inhaler  - Trend Covid 19 progression labs w/ CBC, CMP, CK, CRP, Ferritin daily and LDH, D-dimer, Fibrinogen q48hrs.  - Monitor in PCU, enhanced droplet/contact isolation precautions, continue 02 but wean as able but currently Bipap dependent     #HTN/Dyslipidemia  #Elevated Troponin, NSTEMI Type II suspected  #Cardiomyopathy - New  #Pulm HTN - New  - Labs showed trops 0.031->0.016, proBNP 370   - EKG showed NSR, RBBB, no significant ST changes  - Echo showed LVEF 46-50%, mild-mod dilated RV, mild-mod dilated RA, mod-severe Pulm HTN, RVSP 45-55mmHg  - Consulted Cardiology; Recs pending  - Holding home ARB due to lower Bps  - Holding home statin while on Remdesivir   - Continue to monitor on tele, strict I/O's, daily weights, trend HR and BP    #Nonoliguric MURIEL - Improved   - B/l Cr 0.9-1.0, was up to 2.1 on admission; Now 1.1  - S/p mIVFs, Trend Cr and UOP, avoid nephrotoxins, NSAIDs, dehydration and contrast as able.    #NIDDM Type II, controlled, unknown complications  - Hgb A1c = 6.3%  - Holding home oral agents, continue FSBG and SSI, add long acting as indicated.    #Anxiety/Depression  - Continue home regimen     #GERD  - Continue home PPI     #BPH  - Continue home Flomax    F: NPO  E: Monitor & Replace PRN  N: NPO  Ppx: TLov  Code: Full Code     Dispo: Pending workup and clinical improvement     *This patient is considered high risk due to acute metabolic encephalopathy, acute respiratory failure, covid 19, monitoring for drug toxicities on Remdesivir, MURIEL, new cardiomyopathy and pulm HTN.    VTE Prophylaxis:   Mechanical Order History:     None      Pharmalogical Order History:      Ordered     Dose Route Frequency Stop    09/03/21 0739  enoxaparin (LOVENOX) syringe 80 mg      1 mg/kg SC Every 12 Hours --    09/03/21 0724  Pharmacy to Dose enoxaparin (LOVENOX)  Status:  Discontinued       -- XX Continuous PRN 09/03/21 0740    09/02/21 1237  enoxaparin (LOVENOX) syringe 40 mg  Status:  Discontinued      40 mg SC Every 24 Hours 09/02/21 1247    09/02/21 1247  enoxaparin (LOVENOX) syringe 40 mg  Status:  Discontinued      0.5 mg/kg SC Every 24 Hours 09/03/21 0549              Jose De Jesus Mccord MD  [facility]  09/04/21  09:29 EDT

## 2021-09-04 NOTE — PAYOR COMM NOTE
"[facility]  [place]  PHONE  [phone]  FAX  [phone]  NPI:  [id number]    [id number]    Valdemar Lechuga (76 y.o. Male)     Date of Birth Social Security Number Address Home Phone MRN    1945  [address]   [facility] [facility] [facility] [place] [address] [phone] [id number]    Yarsanism Marital Status          Yazdanism        Admission Date Admission Type Admitting Provider Attending Provider Department, Room/Bed    21 Emergency Jose De Jesus Mccord MD Parks, Andrew, MD [facility] CRITICAL CARE, CC03/1C    Discharge Date Discharge Disposition Discharge Destination                       Attending Provider: Jose De Jesus Mccord MD    Allergies: Codeine    Isolation: Enh Drop/Con   Infection: COVID (confirmed) (21)   Code Status: CPR    Ht: 167.6 cm (65.98\")   Wt: 76.3 kg (168 lb 4 oz)    Admission Cmt: None   Principal Problem: None                Active Insurance as of 2021     Primary Coverage     Payor Plan Insurance Group Employer/Plan Group    [facility] [id number]     Payor Plan Address Payor Plan Phone Number Payor Plan Fax Number Effective Dates    [address] [address] [phone]  2020 - None Entered    [facility] [id number]       Subscriber Name Subscriber Birth Date Member ID       VALDEMAR LECHUGA 1945 [id number]                 Emergency Contacts      (Rel.) Home Phone Work Phone Mobile Phone    Josef Lechuga (Brother) [phone] -- --               History & Physical      Margarita Rowe PA-C at 21 2259     Attestation signed by Jose De Jesus Mccord MD at 21 1301    I have evaluated the patient independently, I have reviewed H&P, all labs and images from today.  I have discussed w/ KVNG Rowe and agree.                          [facility]  History & Physical    Patient Identification:  Name:  Valdemar Lechuga  Age:  76 y.o.  Sex:  male  :  1945  MRN:  [id number]   Visit Number:  " [id number]  Admit Date: 9/2/2021   Primary Care Physician:  Laurence Geiger APRN    Subjective     Chief complaint: Picked up from his work due to confusion, dyspnea, and weakness    History of presenting illness:      Valdemar Solitario is a 76 y.o. male with past medical history significant for diabetes meliltus, depression, GERD, hypertension, dyslipidemia.  He arrived to the ED via EMS after presenting to his place of work in a state of confusion.  He reported he had been feeling bad for a few days which worsened on 9/2/2021.  He reported while at work he was unable to remember his locker combination and was experiencing shortness of breath and generalized weakness.    Upon arrival to the ED, vital signs were T 98.8F, pulse 85, RR 14, and BP 96/52 with room air oxygen saturation of 82%.  Initial arterial blood gas revealed pH of 7.324 with PCO2 of 42.4 and PO2 of 47.9 on room air.  Troponin T was found to be 0.031.  Creatinine was found to be elevated at 2.10.  Lactate was found to be elevated at 2.3 with D-dimer of 0.95.  Hemoglobin was found to be 11.8.    Mr. Solitario is evaluated in the ED currently on BiPAP.  He reports feeling unwell for a few days but has difficulty talking due to BiPAP. He reports he has been making urine, though it is unclear at present if he has produced a specimen while in the ED.  He reports cough, weakness, and dyspnea.  He is alert to self.  He follows commands but is wearing BiPAP during examination. He denies chest pain and known dysuria.  He reports he has had some pain in his low back.         ---------------------------------------------------------------------------------------------------------------------   Review of Systems   Constitutional: Positive for fatigue.   HENT: Negative for congestion and drooling.    Eyes: Negative for pain and discharge.   Respiratory: Positive for cough and shortness of breath.    Cardiovascular: Negative for chest pain and leg swelling.    Gastrointestinal: Negative for abdominal distention, diarrhea and nausea.   Endocrine: Negative for cold intolerance and heat intolerance.   Genitourinary: Negative for difficulty urinating and dysuria.   Musculoskeletal: Negative for arthralgias and gait problem.   Skin: Negative for color change and pallor.   Allergic/Immunologic: Negative for environmental allergies and food allergies.   Neurological: Positive for weakness. Negative for dizziness.   Hematological: Negative for adenopathy. Does not bruise/bleed easily.   Psychiatric/Behavioral: Negative for agitation and confusion.        ---------------------------------------------------------------------------------------------------------------------   Past Medical History:   Diagnosis Date   • Depression    • Diabetes mellitus (CMS/[facility])     states border diabetic, is not on meds for it   • Dyslipidemia    • GERD (gastroesophageal reflux disease)    • Hypertension    • Thoracic spine pain    • Vitamin B12 deficiency (dietary) anemia    • Vitamin D deficiency      Past Surgical History:   Procedure Laterality Date   • APPENDECTOMY     • CATARACT EXTRACTION     • HERNIA REPAIR       Family History   Problem Relation Age of Onset   • Hypertension Mother      Social History     Socioeconomic History   • Marital status:      Spouse name: Not on file   • Number of children: Not on file   • Years of education: Not on file   • Highest education level: Not on file   Tobacco Use   • Smoking status: Former Smoker   Substance and Sexual Activity   • Alcohol use: No   • Drug use: No   • Sexual activity: Defer     ---------------------------------------------------------------------------------------------------------------------   Allergies:  Codeine  ---------------------------------------------------------------------------------------------------------------------   Home medications:    Medications below are reported home medications pulling from within the  system; at this time, these medications have not been reconciled unless otherwise specified and are in the verification process for further verifcation as current home medications.  (Not in a hospital admission)      Hospital Scheduled Meds:  enoxaparin, 0.5 mg/kg, Subcutaneous, Q24H           Current listed hospital scheduled medications may not yet reflect those currently placed in orders that are signed and held awaiting patient's arrival to floor.   ---------------------------------------------------------------------------------------------------------------------     Objective     Vital Signs:  Temp:  [98.8 °F (37.1 °C)] 98.8 °F (37.1 °C)  Heart Rate:  [65-86] 69  Resp:  [14-24] 24  BP: ()/(45-72) 114/62      09/02/21  0931   Weight: 81.6 kg (180 lb)     Body mass index is 29.05 kg/m².  ---------------------------------------------------------------------------------------------------------------------       Physical Exam  Vitals and nursing note reviewed.   Constitutional:       Interventions: Face mask in place.   HENT:      Head: Normocephalic and atraumatic.   Eyes:      General: Lids are normal.      Conjunctiva/sclera:      Right eye: Right conjunctiva is not injected.      Left eye: Left conjunctiva is not injected.   Cardiovascular:      Rate and Rhythm: Normal rate and regular rhythm.      Heart sounds: S1 normal and S2 normal.   Pulmonary:      Effort: No tachypnea or bradypnea.      Breath sounds: Examination of the right-lower field reveals decreased breath sounds. Examination of the left-lower field reveals decreased breath sounds. Decreased breath sounds present. No wheezing, rhonchi or rales.   Abdominal:      General: Bowel sounds are normal.      Palpations: Abdomen is soft.      Tenderness: There is no abdominal tenderness.   Musculoskeletal:      Right lower leg: No edema.      Left lower leg: No edema.   Skin:     General: Skin is warm and dry.   Neurological:      Mental Status: He  is alert.      Comments: Alert to self.  Follows commands but wearing BiPAP mask making it difficult to assess to some degree.  He is alert to self and location.    Psychiatric:         Attention and Perception: Attention normal.         Mood and Affect: Mood is anxious.               ---------------------------------------------------------------------------------------------------------------------  EKG:                  ---------------------------------------------------------------------------------------------------------------------       COVID LABS:  Results From Last 14 Days   Lab Units 09/02/21  1301 09/02/21  1218 09/02/21  0950   PROBNP pg/mL  --   --  370.2   D DIMER QUANT MCGFEU/mL  --   --  0.95*   LACTATE mmol/L 0.8  --  2.3*   PROTIME Seconds  --   --  13.1   INR   --   --  0.95   TROPONIN T ng/mL  --  0.016 0.031*         Results from last 7 days   Lab Units 09/02/21  2328 09/02/21  1301 09/02/21  0950   LACTATE mmol/L  --  0.8 2.3*   WBC 10*3/mm3 4.02  --  5.51   HEMOGLOBIN g/dL 12.0*  --  11.8*   HEMATOCRIT % 38.0  --  37.2*   MCV fL 89.4  --  89.0   MCHC g/dL 31.6  --  31.7   PLATELETS 10*3/mm3 124*  --  126*   INR   --   --  0.95     Results from last 7 days   Lab Units 09/02/21  2241 09/02/21  1938 09/02/21  0940   PH, ARTERIAL pH units 7.323* 7.322* 7.324*   PO2 ART mm Hg 68.8* 51.9* 47.9*   PCO2, ARTERIAL mm Hg 44.7 43.3 42.4   HCO3 ART mmol/L 23.2 22.4 22.0     Results from last 7 days   Lab Units 09/02/21  0950   SODIUM mmol/L 136   POTASSIUM mmol/L 4.7   CHLORIDE mmol/L 101   CO2 mmol/L 21.9*   BUN mg/dL 55*   CREATININE mg/dL 2.10*   EGFR IF NONAFRICN AM mL/min/1.73 31*   CALCIUM mg/dL 8.4*   GLUCOSE mg/dL 124*   ALBUMIN g/dL 3.65   BILIRUBIN mg/dL 0.3   ALK PHOS U/L 56   AST (SGOT) U/L 88*   ALT (SGPT) U/L 39   Estimated Creatinine Clearance: 30 mL/min (A) (by C-G formula based on SCr of 2.1 mg/dL (H)).  No results found for: AMMONIA  Results from last 7 days   Lab Units 09/02/21  1218  09/02/21  0950   TROPONIN T ng/mL 0.016 0.031*     Results from last 7 days   Lab Units 09/02/21  0950   PROBNP pg/mL 370.2     No results found for: HGBA1C  No results found for: TSH, FREET4  No results found for: PREGTESTUR, PREGSERUM, HCG, HCGQUANT  Pain Management Panel    There is no flowsheet data to display.       No results found for: BLOODCX  No results found for: URINECX  No results found for: WOUNDCX  No results found for: STOOLCX      ---------------------------------------------------------------------------------------------------------------------  Imaging Results (Last 7 Days)     Procedure Component Value Units Date/Time    NM Lung Scan Perfusion Particulate [[id number]] Collected: 09/02/21 2024     Updated: 09/02/21 2027    Narrative:      RADIONUCLIDE PERFUSION-ONLY LUNG SCAN, 9/2/2021    HISTORY:    76-year-old male in the ED with multifocal pulmonary infiltrates and positive COVID-19 testing. Short of air. Hypoxemia. Assess for pulmonary embolism.      DOSE:  4.0 mCi mCi technetium labeled MAA intravenously.    COMPARISON:  Chest x-ray tonight.    FINDINGS:  NOTE: Ventilation imaging is currently restricted during the COVID pandemic as an aerosol-generating procedure. A perfusion-only examination was performed per current protocol.    Multifocal regions of decreased perfusion scattered throughout both lungs. These may correspond to multifocal regions of pneumonitis visible on chest x-ray. Pulmonary embolism could have a similar appearance. The findings are therefore indeterminate for  the presence of pulmonary embolism.      Impression:      1.  Indeterminant examination for the exclusion of pulmonary embolism.  2.  Abnormal perfusion-only lung imaging. Multifocal perfusion defects may be attributable to the patient's multifocal air space pneumonia but pulmonary embolism is also possible.                      Signer Name: Kael Hamilton MD   Signed: 9/2/2021 8:24 PM   Workstation Name:  [id number]    [facility]    XR Chest 2 View [[id number]] Collected: 09/02/21 1018     Updated: 09/02/21 1020    Narrative:      EXAM:    XR Chest, 2 Views     EXAM DATE:    9/2/2021 10:15 AM     CLINICAL HISTORY:    CHF/COPD Protocol     TECHNIQUE:    Frontal and lateral views of the chest.     COMPARISON:    No relevant prior studies available.     FINDINGS:    LUNGS:  Scattered bilateral patchy opacities may represent COVID-19  pneumonia.    PLEURAL SPACE:  Unremarkable.  No pneumothorax.    HEART:  Unremarkable.  No cardiomegaly.    MEDIASTINUM:  Unremarkable.    BONES/JOINTS:  Unremarkable.       Impression:        Scattered bilateral patchy opacities may represent COVID-19 pneumonia.     This report was finalized on 9/2/2021 10:18 AM by Dr. Leonel Agnel MD.       CT Head Without Contrast [[id number]] Collected: 09/02/21 1007     Updated: 09/02/21 1009    Narrative:      EXAM:    CT Head Without Intravenous Contrast     EXAM DATE:    9/2/2021 9:54 AM     CLINICAL HISTORY:    altered mental status     TECHNIQUE:    Axial computed tomography images of the head/brain without intravenous  contrast.  Sagittal and coronal reformatted images were created and  reviewed.  This CT exam was performed using one or more of the following  dose reduction techniques:  automated exposure control, adjustment of  the mA and/or kV according to patient size, and/or use of iterative  reconstruction technique.     COMPARISON:    No relevant prior studies available.     FINDINGS:    BRAIN:  Unremarkable.  No hemorrhage.  No significant white matter  disease.  No edema.    VENTRICLES:  Unremarkable.  No ventriculomegaly.    BONES/JOINTS:  Unremarkable.  No acute fracture.    SOFT TISSUES:  Unremarkable.    SINUSES:  Unremarkable as visualized.  No acute sinusitis.    MASTOID AIR CELLS:  Unremarkable as visualized.  No mastoid effusion.       Impression:        Unremarkable exam demonstrating no CT evidence of  acute intracranial  findings.     This report was finalized on 9/2/2021 10:07 AM by Dr. Leonel Angel MD.              Cultures:  No results found for: BLOODCX, URINECX, WOUNDCX, MRSACX, RESPCX, STOOLCX    Last echocardiogram:          I have personally reviewed the above radiology images and read the final radiology report on 09/02/21  ---------------------------------------------------------------------------------------------------------------------  Assessment / Plan     Active Hospital Problems    Diagnosis  POA   • Pneumonia due to COVID-19 virus [U07.1, J12.82]  Yes   • Acute respiratory failure with hypoxia (CMS/Former[facility]) [J96.01]  Yes       ASSESSMENT/PLAN:  -COVID-19 Pneumonia  -Lactic acidosis  Date of initial positive swab: 9/2/21  IV decadron 6mg on board  IV Remdesivir on board per pharmacy dosing with monitoring of renal and hepatic function.   COVID 19 progression labs have been ordered with q24 hour CBC, CMP, CK, CRP, and Ferritin level in addition to q48 hour LDH,D-dimer, and fibrinogen levels.  Vaccination status: Unvaccinated.    Continuous pulse oximetry monitoring.   Continuous cardiac monitoring.   Imaging studies: CXR with COVID-19, CT chest without contrast added.   Full dose anticoagulation at present given indeterminate VQ scan  • Obtain UA.   • Blood cultures added x2. One culture obtained around 9AM this morning, would like to obtain true set given increased findings of bacteremia in recent COVID-19 admission.        -Acute hypoxemic respiratory failure likely 2/2 COVID-19:   · Continuous cardiac monitoring.   · Treat COVID-19 as outlined within this document.   · ABGs reviewed with repeat pO2 improved with increasing O2.     -Metabolic encephalopathy upon presentation likely 2/2 COVID-19 & Hypoxia:   · CT head unremarkable.   · Check UA.   · Add neurochecks.   · Treat COVID-19 as outlined.     -Mildly elevated troponin T likely 2/2 acute hypoxemia:   · Repeat troponin T improved.    · Continuous cardiac monitoring.     -Acute kidney injury:   · Avoid nephrotoxins when possible.   · Received 1L of NS in the ED.   · Add UA.   · Obtain CT abd/pelvis during CT chest without contrast to rule out possible underlying obstructive process.    · Urine output: Add I/O monitoring.  Reports adequate output but no current available specimen.      -Elevated d-dimer:   · VQ indeterminate.   · Add US venous doppler.   · Full dose lovenox per pharmacy dosing for now.     -Mildly elevated AST:   · Hepatitis panel pending.     -Hx borderline DM:   · Check hemoglobin a1c.   ----------  -DVT prophylaxis: SQ Lovenox  -Activity: Up with assist  -Expected length of stay: INPATIENT status due to the need for care which can only be reasonably provided in an hospital setting such as aggressive/expedited ancillary services and/or consultation services, the necessity for IV medications, close physician monitoring and/or the possible need for procedures.  In such, I feel patient’s risk for adverse outcomes and need for care warrant INPATIENT evaluation and predict the patient’s care encounter to likely last beyond 2 midnights.  -Disposition: Plans to return home at discharge.    High risk secondary to acute hypoxemic respiratory failure 2/2 COVID-19 pneumonia        Margarita Rowe PA-C  09/02/21  23:54 EDT  Pager # [phone]  ---------------------------------------------------------------------------------------------------------------------             Electronically signed by Jose De Jesus Mccord MD at 09/03/21 1301          Emergency Department Notes      Lucy Iverson APRN at 09/02/21 0987     Attestation signed by Aric Boyer DO at 09/03/21 5092          For this patient encounter, I reviewed the NP or PA documentation, treatment plan, and medical decision making. Aric Boyer DO 9/3/2021 12:52 EDT                  Subjective   76-year-old male that presents to the emergency department chief complaint  generalized fatigue weakness, shortness of breath, altered mental status worsening over the last 2 days.  Patient states he has had worsening shortness of breath.  Patient denies any associated cough, congestion, fever, chills, nausea, vomiting.  Patient does have history of diabetes, depression, hyperlipidemia.  Patient is a formal smoker.      History provided by:  Patient   used: No    Weakness - Generalized  Severity:  Moderate  Onset quality:  Gradual  Timing:  Intermittent  Progression:  Worsening  Chronicity:  New  Context: not alcohol use, not change in medication, not drug use, not increased activity and not pinched nerve    Relieved by:  Nothing  Worsened by:  Nothing  Ineffective treatments:  None tried  Associated symptoms: shortness of breath    Associated symptoms: no abdominal pain, no anorexia, no arthralgias, no chest pain, no dizziness, no dysuria, no frequency, no headaches, no hematochezia, no lethargy, no near-syncope, no seizures, no sensory-motor deficit, no stroke symptoms, no syncope and no vomiting    Risk factors: no anemia, no congestive heart failure, no coronary artery disease, no excessive menstruation, no family hx of stroke, no heart disease and no recent stressors    Altered Mental Status  Presenting symptoms: no confusion and no lethargy    Severity:  Moderate  Episode history:  Single  Duration:  2 days  Timing:  Intermittent  Progression:  Worsening  Chronicity:  New  Context: not alcohol use, not dementia, not drug use, taking medications as prescribed, not nursing home resident and not recent illness    Associated symptoms: weakness    Associated symptoms: no abdominal pain, no agitation, no bladder incontinence, no hallucinations, no headaches, no palpitations, no rash, no seizures, no slurred speech and no vomiting        Review of Systems   HENT: Negative.  Negative for ear discharge, facial swelling, hearing loss and mouth sores.    Eyes: Negative.   "Negative for photophobia, pain, redness and itching.   Respiratory: Positive for shortness of breath. Negative for chest tightness.    Cardiovascular: Negative.  Negative for chest pain, palpitations, leg swelling, syncope and near-syncope.   Gastrointestinal: Negative.  Negative for abdominal distention, abdominal pain, anorexia, hematochezia and vomiting.   Endocrine: Negative.  Negative for heat intolerance, polydipsia, polyphagia and polyuria.   Genitourinary: Negative.  Negative for bladder incontinence, dysuria, enuresis, flank pain and frequency.   Musculoskeletal: Negative.  Negative for arthralgias.   Skin: Negative.  Negative for color change, pallor and rash.   Neurological: Positive for weakness. Negative for dizziness, seizures and headaches.   Hematological: Negative.  Negative for adenopathy. Does not bruise/bleed easily.   Psychiatric/Behavioral: Negative for agitation, confusion, decreased concentration, dysphoric mood and hallucinations. The patient is not hyperactive.    All other systems reviewed and are negative.      Past Medical History:   Diagnosis Date   • Depression    • Diabetes mellitus (CMS/[facility])     states border diabetic, is not on meds for it   • Dyslipidemia    • GERD (gastroesophageal reflux disease)    • Hypertension    • Thoracic spine pain    • Vitamin B12 deficiency (dietary) anemia    • Vitamin D deficiency        Allergies   Allergen Reactions   • Codeine Other (See Comments)     Makes him feel \"froggy\"         Past Surgical History:   Procedure Laterality Date   • APPENDECTOMY     • CATARACT EXTRACTION     • HERNIA REPAIR         Family History   Problem Relation Age of Onset   • Hypertension Mother        Social History     Socioeconomic History   • Marital status:      Spouse name: Not on file   • Number of children: Not on file   • Years of education: Not on file   • Highest education level: Not on file   Tobacco Use   • Smoking status: Former Smoker   Substance and " Sexual Activity   • Alcohol use: No   • Drug use: No   • Sexual activity: Defer           Objective   Physical Exam  Vitals and nursing note reviewed.   Constitutional:       General: He is not in acute distress.     Appearance: He is normal weight. He is not toxic-appearing.   HENT:      Head: Normocephalic and atraumatic.      Mouth/Throat:      Mouth: Mucous membranes are moist.      Pharynx: Oropharynx is clear.   Eyes:      General: No scleral icterus.     Extraocular Movements: Extraocular movements intact.      Right eye: Normal extraocular motion and no nystagmus.      Left eye: Normal extraocular motion and no nystagmus.      Pupils: Pupils are equal, round, and reactive to light. Pupils are equal.      Left eye: Pupil is round.   Neck:      Meningeal: Brudzinski's sign absent.   Cardiovascular:      Rate and Rhythm: Normal rate and regular rhythm.      Heart sounds: Normal heart sounds. No murmur heard.   No friction rub.   Pulmonary:      Effort: Respiratory distress present.      Breath sounds: No rhonchi.   Chest:      Chest wall: No tenderness.   Abdominal:      General: Bowel sounds are normal. There is no distension.      Palpations: Abdomen is soft. There is no mass.      Tenderness: There is no abdominal tenderness. There is no guarding.   Musculoskeletal:         General: No swelling or tenderness. Normal range of motion.      Cervical back: Normal range of motion and neck supple. No rigidity.   Lymphadenopathy:      Cervical: No cervical adenopathy.   Skin:     General: Skin is warm and dry.      Capillary Refill: Capillary refill takes less than 2 seconds.      Coloration: Skin is not cyanotic or pale.      Findings: No erythema.   Neurological:      Mental Status: He is alert. He is disoriented.      Cranial Nerves: No cranial nerve deficit, dysarthria or facial asymmetry.      Sensory: No sensory deficit.      Motor: No weakness.      Coordination: Romberg sign negative. Coordination normal.       Gait: Gait normal.      Deep Tendon Reflexes: Reflexes normal.   Psychiatric:         Mood and Affect: Mood normal. Mood is not anxious or depressed.         Speech: Speech normal.         Behavior: Behavior normal. Behavior is not agitated.         Cognition and Memory: Cognition is not impaired. Memory is not impaired.         Procedures          ED Course  ED Course as of Sep 03 0425   Thu Sep 02, 2021   1031 EKG interpretation, ventricular rate 82, , , QTc 453, normal sinus rhythm, no ST elevation, right bundle branch block present.    []   1041 IMPRESSION:    Scattered bilateral patchy opacities may represent COVID-19 pneumonia.    []   1041 IMPRESSION:    Unremarkable exam demonstrating no CT evidence of acute intracranial  findings.    []   1532 Call placed to hospitalist for possible admit     []   1542 Discussed care with Dr. Vaughan. Patient will be admitted pending CT PE chest.     []   2002 Endorsed to [facility] Mcarthur.     []   2223 Case discussed with Dr. Augustine.  Advises to place patient on BiPAP.  Accepted to hospitalist service.    [MB]      ED Course User Index  [] Valdemar Cervantes PA-C  [] Aric Boyer DO  [MB] Lucy Iverson APRN                                           MDM    Final diagnoses:   Pneumonia due to COVID-19 virus   Hypoxia       ED Disposition  ED Disposition     ED Disposition Condition Comment    Decision to Admit  Level of Care: Progressive Care [20]   Diagnosis: Pneumonia due to COVID-19 virus [[id number]]   Admitting Physician: ABBI AUGUSTINE [[id number]]   Attending Physician: ABBI AUGUSTINE [[id number]]   Certification: I Certify That Inpatient Hospital Services Are Medically Necessary For Greater Than 2 Midnights            No follow-up provider specified.       Medication List      ASK your doctor about these medications    sertraline 100 MG tablet  Commonly known as: ZOLOFT  Ask about: Which instructions should I use?              Valdemar Cervantes PA-C  09/02/21 1954       Lucy Iverson APRN  09/03/21 0425      Electronically signed by Aric Boyer DO at 09/03/21 1252     Giovanna Gamez at 09/02/21 0956        Attempted to get ekg but pt was going for a scan, will get when pt returns.      Giovanna Gamez  09/02/21 0957      Electronically signed by Giovanna Gamez at 09/02/21 0957     Giovanna Gamez at 09/02/21 1029        ekg preformed by tech at 1016 given to Giovanna Jimenez  09/02/21 1030      Electronically signed by Giovanna Gamez at 09/02/21 1030     Tone Ann, RN at 09/02/21 1553        Care hand off given to Tone Russell RN, RN  09/02/21 1553      Electronically signed by Tone Ann RN at 09/02/21 1553     Misty Larios RN at 09/02/21 1737        Pt son Tiffany updated via phone with pts permission.     Misty Larios RN  09/02/21 1737      Electronically signed by Misty Larios RN at 09/02/21 1737     Oliva Quinonez RN at 09/03/21 0015               Quinonez, Oliva, RN  09/03/21 0503      Electronically signed by Oliva Quinonez RN at 09/03/21 0503     Oliva Quinonez RN at 09/03/21 0030               Quinonez, Oliva, RN  09/03/21 0508      Electronically signed by Oliva Quinonez RN at 09/03/21 0508     Oliva Quinonez RN at 09/03/21 0042               Quinonez, Oliva, RN  09/03/21 0504      Electronically signed by Oliva Quinonez RN at 09/03/21 0504     Oliva Quinonez RN at 09/03/21 0117               Quinonez, Oliva, RN  09/03/21 0505      Electronically signed by Oliva Quinonez RN at 09/03/21 0505         Current Facility-Administered Medications   Medication Dose Route Frequency Provider Last Rate Last Admin   • baricitinib (OLUMIANT) tablet 4 mg  4 mg Oral Daily Jose De Jesus Mccord MD   4 mg at 09/04/21 0855   • benzonatate (TESSALON) capsule 200 mg  200 mg Oral Q4H PRN Anuradha Angeles MD   200 mg at 09/04/21 0118   • dexamethasone (DECADRON) injection 6 mg  6 mg  Intravenous Daily Mg Augustine MD   6 mg at 09/04/21 0854   • dexmedetomidine (PRECEDEX) 400 mcg in 100 mL NS infusion  0.2-1.5 mcg/kg/hr Intravenous Titrated Anuradha Angeles MD 5.7 mL/hr at 09/04/21 0434 0.3 mcg/kg/hr at 09/04/21 0434   • enoxaparin (LOVENOX) syringe 80 mg  1 mg/kg Subcutaneous Q12H Jose De Jesus Mccord MD   80 mg at 09/04/21 0853   • pantoprazole (PROTONIX) EC tablet 40 mg  40 mg Oral QAM Mg Augustine MD   40 mg at 09/04/21 0852   • Pharmacy Consult - Pharmacy to dose   Does not apply Continuous PRN Jose De Jesus Mccord MD       • remdesivir 100 mg in sodium chloride 0.9 % 270 mL IVPB (powder vial)  100 mg Intravenous Q24H Mg Augustine MD   100 mg at 09/04/21 0852   • sertraline (ZOLOFT) tablet 100 mg  100 mg Oral Daily Mg Augustine MD   100 mg at 09/04/21 0853   • sodium chloride 0.9 % flush 10 mL  10 mL Intravenous PRN Mg Augustine MD       • sodium chloride 0.9 % flush 10 mL  10 mL Intravenous Q12H Mg Augustine MD   10 mL at 09/04/21 0855   • sodium chloride 0.9 % flush 10 mL  10 mL Intravenous PRN Mg Augustine MD       • sodium chloride 0.9 % flush 10 mL  10 mL Intravenous Q12H Jose De Jesus Mccord MD   10 mL at 09/04/21 0854   • sodium chloride 0.9 % flush 10 mL  10 mL Intravenous PRN Jose De Jesus Mccord MD       • tamsulosin (FLOMAX) 24 hr capsule 0.4 mg  0.4 mg Oral Daily Mg Augustine MD   0.4 mg at 09/04/21 0853   • thiamine (B-1) 100 mg in sodium chloride 0.9 % 100 mL IVPB  100 mg Intravenous Daily Anuradha Angeles  mL/hr at 09/04/21 0434 100 mg at 09/04/21 0434     Orders (last 24 hrs)      Start     Ordered    09/04/21 0927  CT Chest Pulmonary Embolism  1 Time Imaging      09/04/21 0926    09/04/21 0918  XR Chest 1 View  1 Time Imaging      09/04/21 0917    09/04/21 0918  Inpatient Cardiology Consult  Once     Specialty:  Cardiology  Provider:  Smooth Espino MD    09/04/21 0917    09/04/21 0900  remdesivir 100 mg in sodium  chloride 0.9 % 270 mL IVPB (powder vial)  Every 24 Hours      09/03/21 0615    09/04/21 0733  Blood Gas, Arterial -With Co-Ox Panel: Yes  Once,   Status:  Canceled      09/04/21 0732    09/04/21 0600  CBC Auto Differential  PROCEDURE ONCE      09/03/21 2207 09/04/21 0600  Bilirubin, Direct  PROCEDURE ONCE      09/03/21 2207    09/04/21 0527  Blood Gas, Arterial With Co-Ox  Once      09/04/21 0515    09/04/21 0459  Blood Gas, Arterial -With Co-Ox Panel: Yes  STAT     Comments: Notify hicuity of result      09/04/21 0459    09/04/21 0400  thiamine (B-1) 100 mg in sodium chloride 0.9 % 100 mL IVPB  Daily      09/04/21 0154    09/04/21 0300  dexmedetomidine (PRECEDEX) 400 mcg in 100 mL NS infusion  Titrated      09/04/21 0151    09/04/21 0108  Scan Slide  Once,   Status:  Canceled      09/04/21 0107    09/04/21 0021  benzonatate (TESSALON) capsule 200 mg  Every 4 Hours PRN      09/04/21 0022    09/03/21 2100  sodium chloride 0.9 % flush 10 mL  Every 12 Hours Scheduled      09/03/21 1615    09/03/21 2000  baricitinib (OLUMIANT) tablet 4 mg  Daily      09/03/21 1847    09/03/21 1903  Blood Gas, Arterial With Co-Ox  Once      09/03/21 1854    09/03/21 1848  CBC & Differential  Daily,   Status:  Canceled      09/03/21 1847    09/03/21 1848  Comprehensive Metabolic Panel  Daily,   Status:  Canceled      09/03/21 1847    09/03/21 1848  CBC Auto Differential  PROCEDURE ONCE,   Status:  Canceled      09/03/21 1847    09/03/21 1842  Pharmacy Consult - Pharmacy to dose  Continuous PRN      09/03/21 1842    09/03/21 1842  Blood Gas, Arterial -With Co-Ox Panel: Yes  STAT      09/03/21 1842    09/03/21 1715  Transfer Patient  Once      09/03/21 1714    09/03/21 1616  Connectors / Hubs Must Be Scrubbed 15 Seconds Using 70% Alcohol Before Access - Allow to Dry Before Accessing Line  Continuous      09/03/21 1615    09/03/21 1616  Change Needleless Connectors  Per Order Details     Comments: Change Needleless Connectors When:  -  Removed For Any Reason  - Residual Blood or Debris Within Connector  - Prior to Drawing Blood Cultures  - Contamination of Connector  - After Administration of Blood or Blood Components    09/03/21 1615    09/03/21 1615  sodium chloride 0.9 % flush 10 mL  As Needed      09/03/21 1615    09/03/21 1608  MRSA Screen, PCR (Inpatient) - Swab, Nares  Once      09/03/21 0813    09/03/21 0900  sertraline (ZOLOFT) tablet 100 mg  Daily      09/03/21 0549    09/03/21 0900  tamsulosin (FLOMAX) 24 hr capsule 0.4 mg  Daily      09/03/21 0549    09/03/21 0900  sodium chloride 0.9 % flush 10 mL  Every 12 Hours Scheduled      09/03/21 0549    09/03/21 0900  dexamethasone (DECADRON) injection 6 mg  Daily      09/03/21 0549    09/03/21 0900  enoxaparin (LOVENOX) syringe 80 mg  Every 12 Hours      09/03/21 0739    09/03/21 0700  pantoprazole (PROTONIX) EC tablet 40 mg  Every Morning      09/03/21 0549    09/03/21 0645  sodium chloride 0.9 % infusion  Continuous,   Status:  Discontinued      09/03/21 0549    09/03/21 0616  Hepatic Function Panel  Daily      09/03/21 0615    09/03/21 0616  Creatinine, Serum  Daily      09/03/21 0615    09/03/21 0600  CBC & Differential  Daily      09/02/21 2227 09/03/21 0600  Comprehensive Metabolic Panel  Daily      09/02/21 2227 09/03/21 0600  C-reactive Protein  Daily      09/02/21 2227 09/03/21 0600  Ferritin  Daily      09/02/21 2227 09/03/21 0600  Lactate Dehydrogenase  Every Other Day      09/02/21 2227 09/03/21 0600  D-dimer, Quantitative  Every Other Day      09/02/21 2227 09/03/21 0600  Protime-INR  Every Other Day      09/02/21 2227 09/03/21 0600  aPTT  Every Other Day      09/02/21 2227    09/03/21 0600  Lactic Acid, Plasma  Every Other Day      09/02/21 2227 09/03/21 0550  Daily Weights  Daily      09/03/21 0549    09/03/21 0549  sodium chloride 0.9 % flush 10 mL  As Needed      09/03/21 0549    09/02/21 0929  sodium chloride 0.9 % flush 10 mL  As Needed      09/02/21  0929    Unscheduled  Up With Assistance  As Needed      09/03/21 0549    Unscheduled  Change Dressing to IV Site As Needed When Damp, Loose or Soiled  As Needed      09/03/21 1615    Unscheduled  Insert New Peripheral IV  As Needed     Comments: Frequency Per Facility Policy    09/03/21 1615    --  olmesartan (BENICAR) 40 MG tablet  Daily      09/02/21 1849    --  sertraline (ZOLOFT) 100 MG tablet  Daily      09/02/21 1849    --  tamsulosin (FLOMAX) 0.4 MG capsule 24 hr capsule  Daily      09/02/21 1849    --  SCANNED - TELEMETRY        09/02/21 0000                   Physician Progress Notes (last 24 hours) (Notes from 09/03/21 1028 through 09/04/21 1028)      Progress Notes signed by Anuradha Angeles MD at 09/04/21 0153         [facility] Physician - Brief Progress Note  PERMANENT  09/04/2021 01:52    [facility] [place] [facility]    CALDERON LECHUGA    Date of Service 09/04/2021 01:52    HPI/Events of Note Ordered precedex gtt for BIPAP compliance. Also, Dc’ed ivfm to prevent pulm congestion in covid PNA.      Interventions Minor-Agitation/anxiety - evaluation and management        Electronically Signed by: Anuradha Angeles) on 09/04/2021 01:53    Electronically signed by Anuradha Angeles MD at 09/04/21 0153     Progress Notes signed by Anuradha Angeles MD at 09/04/21 0022         [facility] Physician - Brief Progress Note  PERMANENT  09/04/2021 00:22    [facility] [place] [facility] [place] ([facility])    CALDERON LECHUGA    Date of Service 09/04/2021 00:22    HPI/Events of Note Ordered Tessalon x3 for cough.      Interventions Intermediate-Communication with other healthcare providers and/or family        Electronically Signed by: Anuradha Angeles) on 09/04/2021 00:22    Electronically signed by Anuradha Angeles MD at 09/04/21 0022     Progress Notes signed by Anuradha Angeles MD at 09/04/21 0153         Nurse Practitioner - Brief Progress  Note  PERMANENT  2021 21:48    [facility] [place] [facility] [place] [facility] [place] ([facility])    CALDERON LECHUGA    Date of Service 2021 21:48    HPI/Events of Note [facility] Provider Assessment Note    Tele ICU Focused Assessment Note: Information obtained from the patient's chart     75 y/o male with PMH of HTN, HLD, GERD, DM, Vitamin B12 defiency, and Vitamin D deficiency presents to the ED with complaints of shortness of breath, generalized weakness, and fatigue for ~ 2 days. CXR showed scattered bilateral patchy opacities. Tested   positive for COVID-19. Head CT negative for acute intracranial findings. Upon arrival to the ED oxygen saturations were 82% on room air and patient was placed on bipap. VQ lung scan negative for PE.   Additional treatments in the ED included: 1 liter NS bolus and IV decadron. Transferred to ICU for management.     Pertinent labs: AB.42/ 35/ 66/ 22, troponin 0.016, sodium 138, BUN/ creatine 41/ 1.41, AST 74, ALT 34, Ferritin 1666, CRP 7.8, LA 1.2, and procalcitonin 0.16.   CXR per RAD: Scattered bilateral patchy opacities may represent COVID-19 pneumonia     HR 81, / 62, RR 22, temperature 99.4, and oxygen saturations are 94%     Assessment and Plan:  1. Acute hypoxic respiratory failure/ pneumonia/ COVID-19   -ID consulted   -Blood cultures   -Bipap as tolerated   -IV decadron   -Baricitinib  -IV Remdesivir   -Therapeutic lovenox > elevated d-dimer > can not rule out PE   -Trend inflammatory markers  -Droplet & Contact isolation per institutional policy  -Supplemental O2 to keep SpO2 > 92 %  -Educate regarding self proning if patient can tolerate?? (non-intubated patient)     2. Acute on chronic kidney injury  -Strict I&O  -Avoid nephrotoxic medications   -Monitor bmp   -Renal dose bmp   -Daily weight       __y___   Video Assessment performed  __y___   Most recent labs reviewed  __y___   Vital Signs reviewed  __y___   Best Practices  addressed:                 VTE prophylaxis: Therapeutic lovenox                  SUP (when indicated): Protonix                  Glycemic control: N/A                       Please notify bedside physician when present or [facility] if glc > 180 X 2                 Sepsis guidelines: N/A                  Lung protective strategy: N/A                  Targeted Temperature Management: N/A     __y___     Spoke with bedside RN  __n___     Orders written      Contact [facility] for any needs if bedside physician is not present.      Interventions Major-Respiratory failure - evaluation and management  Intermediate-Diagnostic test evaluation        Electronically Signed by: Lucero Lo (NP) on 2021 22:29    Annotated By: Anuradha Angeles)    Date: 21 01:53  I agree with above.    Electronically signed by Anuradha Angeles MD at 21 0153          Consult Notes (last 24 hours) (Notes from 21 1028 through 21 1028)      Brock Metzger MD at 21 1103      Consult Orders    1. Inpatient Infectious Diseases Consult [[id number]] ordered by Mg Augustine MD at 21 2226                       INFECTIOUS DISEASE CONSULTATION REPORT        Patient Identification:  Name:  Valdemar Solitario  Age:  76 y.o.  Sex:  male  :  1945  MRN:  [id number]   Visit Number:  [id number]  Primary Care Physician:  Laurence Geiger APRN       LOS: 1 day        Subjective       Subjective     History of present illness:      Thank you Dr. Mccord for allowing us to participate in the care of your patient.  As you well know, Mr. Valdemar Solitario is a 76 y.o. male with past medical history significant for diabetes, depression, GERD, hypertension, dyslipidemia, who presented to [facility] Emergency Department on 2021 for confusion.  COVID-19 PCR positive.  WBC normal.  CRP 7.80.  Lactic acid 2.3.  Procalcitonin normal.  CT of the head unremarkable.  Chest x-ray from 2021  reports scattered bilateral patchy opacities that may represent COVID-19 pneumonia.  VQ scan indeterminate for exclusion of PE.  CT of the chest from 9/3/2021 reports patchy bilateral airspace disease concerning for COVID-19 pneumonia.  CT of the abdomen and pelvis from 9/3/2013 reports bibasilar airspace disease suggestive of COVID-19 pneumonia, large right inguinal hernia containing nondilated bowel and fat.      Infectious Disease consultation was requested for antimicrobial management.      ---------------------------------------------------------------------------------------------------------------------     Review Of Systems:    Constitutional: no fever, chills and night sweats. No appetite change or unexpected weight change. No fatigue.  Eyes: no eye drainage, itching or redness.  HEENT: no mouth sores, dysphagia or nose bleed.  Respiratory: Positive shortness of breath, no cough or production of sputum.  Cardiovascular: no chest pain, no palpitations, no orthopnea.  Gastrointestinal: no nausea, vomiting or diarrhea. No abdominal pain, hematemesis or rectal bleeding.  Genitourinary: no dysuria or polyuria.  Hematologic/lymphatic: no lymph node abnormalities, no easy bruising or easy bleeding.  Musculoskeletal: no muscle or joint pain.  Skin: No rash and no itching.  Neurological: no loss of consciousness, no seizure, no headache.  Behavioral/Psych: no depression or suicidal ideation.  Endocrine: no hot flashes.  Immunologic: negative.    ---------------------------------------------------------------------------------------------------------------------     Past Medical History    Past Medical History:   Diagnosis Date   • Depression    • Diabetes mellitus (CMS/[facility])     [place], is not on meds for it   • Dyslipidemia    • GERD (gastroesophageal reflux disease)    • Hypertension    • Thoracic spine pain    • Vitamin B12 deficiency (dietary) anemia    • Vitamin D deficiency        Past Surgical  History    Past Surgical History:   Procedure Laterality Date   • APPENDECTOMY     • CATARACT EXTRACTION     • HERNIA REPAIR         Family History    Family History   Problem Relation Age of Onset   • Hypertension Mother        Social History    Social History     Tobacco Use   • Smoking status: Former Smoker   Substance Use Topics   • Alcohol use: No   • Drug use: No       Allergies    Codeine  ---------------------------------------------------------------------------------------------------------------------     Home Medications:    Prior to Admission Medications       Prescriptions Last Dose Informant Patient Reported? Taking?    olmesartan (BENICAR) 40 MG tablet Unknown Pharmacy Yes No    Take 40 mg by mouth Daily.    omeprazole (priLOSEC) 40 MG capsule Unknown Pharmacy Yes No    Take 40 mg by mouth Daily.    sertraline (ZOLOFT) 100 MG tablet Unknown Pharmacy Yes No    Take 100 mg by mouth Daily.    simvastatin (ZOCOR) 40 MG tablet Unknown Pharmacy Yes No    Take 40 mg by mouth Every Night.    tamsulosin (FLOMAX) 0.4 MG capsule 24 hr capsule Unknown Pharmacy Yes No    Take 1 capsule by mouth Daily.          ---------------------------------------------------------------------------------------------------------------------    Objective       Objective     Hospital Scheduled Meds:  dexamethasone, 6 mg, Intravenous, Daily  enoxaparin, 1 mg/kg, Subcutaneous, Q12H  pantoprazole, 40 mg, Oral, QAM  [START ON 9/4/2021] remdesivir, 100 mg, Intravenous, Q24H  sertraline, 100 mg, Oral, Daily  sodium chloride, 10 mL, Intravenous, Q12H  tamsulosin, 0.4 mg, Oral, Daily      sodium chloride, 75 mL/hr, Last Rate: 75 mL/hr (09/03/21 0632)      ---------------------------------------------------------------------------------------------------------------------   Vital Signs:  Temp:  [97.2 °F (36.2 °C)-98.5 °F (36.9 °C)] 97.3 °F (36.3 °C)  Heart Rate:  [61-86] 81  Resp:  [20-28] 28  BP: ()/(45-83) 124/71  Mean Arterial  Pressure (Non-Invasive) for the past 24 hrs (Last 3 readings):   Noninvasive MAP (mmHg)   09/03/21 1023 78   09/03/21 0736 99   09/03/21 0545 106     SpO2 Percentage    09/03/21 1023 09/03/21 1136 09/03/21 1340   SpO2: 91% 93% 90%     SpO2:  [85 %-97 %] 90 %  on  Flow (L/min):  [4-10] 10;   Device (Oxygen Therapy): NPPV/NIV    Body mass index is 26.98 kg/m².  Wt Readings from Last 3 Encounters:   09/03/21 75.8 kg (167 lb 1.6 oz)   06/14/17 73.9 kg (163 lb)   03/23/17 76.7 kg (169 lb)     ---------------------------------------------------------------------------------------------------------------------     Physical Exam:    Deferred due to COVID-19 isolation.    ---------------------------------------------------------------------------------------------------------------------    Results from last 7 days   Lab Units 09/02/21  1218 09/02/21  0950   TROPONIN T ng/mL 0.016 0.031*     Results from last 7 days   Lab Units 09/02/21  0950   PROBNP pg/mL 370.2       Results from last 7 days   Lab Units 09/02/21  2241   PH, ARTERIAL pH units 7.323*   PO2 ART mm Hg 68.8*   PCO2, ARTERIAL mm Hg 44.7   HCO3 ART mmol/L 23.2     Results from last 7 days   Lab Units 09/02/21 2328 09/02/21  1301 09/02/21  0950   CRP mg/dL 7.80*  --   --    LACTATE mmol/L 1.2 0.8 2.3*   WBC 10*3/mm3 4.02  --  5.51   HEMOGLOBIN g/dL 12.0*  --  11.8*   HEMATOCRIT % 38.0  --  37.2*   MCV fL 89.4  --  89.0   MCHC g/dL 31.6  --  31.7   PLATELETS 10*3/mm3 124*  --  126*   INR  0.96  --  0.95     Results from last 7 days   Lab Units 09/03/21  0703 09/02/21 2328 09/02/21  0950   SODIUM mmol/L  --  138 136   POTASSIUM mmol/L  --  5.2 4.7   CHLORIDE mmol/L  --  106 101   CO2 mmol/L  --  22.4 21.9*   BUN mg/dL  --  41* 55*   CREATININE mg/dL 1.27 1.41* 2.10*   EGFR IF NONAFRICN AM mL/min/1.73 55* 49* 31*   CALCIUM mg/dL  --  8.5* 8.4*   GLUCOSE mg/dL  --  159* 124*   ALBUMIN g/dL 3.11* 3.16* 3.65   BILIRUBIN mg/dL 0.3 0.2 0.3   ALK PHOS U/L 50 51 56   AST  (SGOT) U/L 73* 74* 88*   ALT (SGPT) U/L 32 34 39   Estimated Creatinine Clearance: 53.1 mL/min (by C-G formula based on SCr of 1.27 mg/dL).  No results found for: AMMONIA    Hemoglobin A1C   Date/Time Value Ref Range Status   09/02/2021 2328 6.30 (H) 4.80 - 5.60 % Final     Lab Results   Component Value Date    HGBA1C 6.30 (H) 09/02/2021     No results found for: TSH, FREET4    Blood Culture   Date Value Ref Range Status   09/02/2021 No growth at 24 hours  Preliminary     No results found for: URINECX  No results found for: WOUNDCX  No results found for: STOOLCX  No results found for: RESPCX  Pain Management Panel    There is no flowsheet data to display.       I have personally reviewed the above laboratory results.   ---------------------------------------------------------------------------------------------------------------------  Imaging Results (Last 7 Days)       Procedure Component Value Units Date/Time    CT Chest Without Contrast Diagnostic [[id number]] Collected: 09/03/21 1225     Updated: 09/03/21 1227    Narrative:      EXAM:    CT Chest Without Intravenous Contrast     EXAM DATE:    9/3/2021 11:20 AM     CLINICAL HISTORY:    Pneumonia, effusion or abscess suspected, xray done     TECHNIQUE:    Axial computed tomography images of the chest without intravenous  contrast.  Sagittal and coronal reformatted images were created and  reviewed.  This CT exam was performed using one or more of the following  dose reduction techniques:  automated exposure control, adjustment of  the mA and/or kV according to patient size, and/or use of iterative  reconstruction technique.     COMPARISON:    No relevant prior studies available.     FINDINGS:    LUNGS:  Patchy bilateral airspace disease concerning for COVID-19  pneumonia.    PLEURAL SPACE:  Unremarkable.  No pneumothorax.  No significant  effusion.    HEART:  Coronary artery calcifications.  No significant pericardial  effusion.    BONES/JOINTS:  Unremarkable.  No  acute fracture.  No dislocation.    SOFT TISSUES:  Unremarkable.    VASCULATURE:  Unremarkable.  No thoracic aortic aneurysm.    LYMPH NODES:  Unremarkable.  No enlarged lymph nodes.       Impression:        Patchy bilateral airspace disease concerning for COVID-19 pneumonia.     This report was finalized on 9/3/2021 12:25 PM by Dr. Leonel Angel MD.       CT Abdomen Pelvis Without Contrast [[id number]] Collected: 09/03/21 1139     Updated: 09/03/21 1141    Narrative:      EXAM:    CT Abdomen and Pelvis Without Intravenous Contrast     EXAM DATE:    9/3/2021 11:20 AM     CLINICAL HISTORY:    Urinary retention; U07.1-COVID-19; J12.82-Pneumonia due to Coronavirus  disease 2019; R09.02-Hypoxemia     TECHNIQUE:    Axial computed tomography images of the abdomen and pelvis without  intravenous contrast.  Sagittal and coronal reformatted images were  created and reviewed.  This CT exam was performed using one or more of  the following dose reduction techniques:  automated exposure control,  adjustment of the mA and/or kV according to patient size, and/or use of  iterative reconstruction technique.     COMPARISON:    08/04/2015     FINDINGS:    LUNG BASES:  Bibasilar airspace disease suggestive of COVID-19  pneumonia.      ABDOMEN:    LIVER:  Unremarkable.    GALLBLADDER AND BILE DUCTS:  Unremarkable.  No calcified stones.  No  ductal dilation.    PANCREAS:  Unremarkable.  No ductal dilation.    SPLEEN:  Unremarkable.  No splenomegaly.    ADRENALS:  Unremarkable.  No mass.    KIDNEYS AND URETERS:  Unremarkable.  No obstructing stones.  No  hydronephrosis.    STOMACH AND BOWEL:  Large right inguinal hernia containing nondilated  bowel and fat.  Sigmoid colonic diverticulosis.  No mucosal thickening.      PELVIS:    APPENDIX:  No findings to suggest acute appendicitis.    BLADDER:  Unremarkable.  No stones.    REPRODUCTIVE:  Unremarkable as visualized.      ABDOMEN and PELVIS:    INTRAPERITONEAL SPACE:  Unremarkable.  No free  air.  No significant  fluid collection.    BONES/JOINTS:  No acute fracture.  No dislocation.    SOFT TISSUES:  See above.    VASCULATURE:  Unremarkable.  No abdominal aortic aneurysm.    LYMPH NODES:  Unremarkable.  No enlarged lymph nodes.       Impression:      1.  Bibasilar airspace disease suggestive of COVID-19 pneumonia.  2.  Large right inguinal hernia containing nondilated bowel and fat.     This report was finalized on 9/3/2021 11:39 AM by Dr. Leonel Angel MD.       NM Lung Scan Perfusion Particulate [[id number]] Collected: 09/02/21 2024     Updated: 09/02/21 2027    Narrative:      RADIONUCLIDE PERFUSION-ONLY LUNG SCAN, 9/2/2021    HISTORY:    76-year-old male in the ED with multifocal pulmonary infiltrates and positive COVID-19 testing. Short of air. Hypoxemia. Assess for pulmonary embolism.      DOSE:  4.0 mCi mCi technetium labeled MAA intravenously.    COMPARISON:  Chest x-ray tonight.    FINDINGS:  NOTE: Ventilation imaging is currently restricted during the COVID pandemic as an aerosol-generating procedure. A perfusion-only examination was performed per current protocol.    Multifocal regions of decreased perfusion scattered throughout both lungs. These may correspond to multifocal regions of pneumonitis visible on chest x-ray. Pulmonary embolism could have a similar appearance. The findings are therefore indeterminate for  the presence of pulmonary embolism.      Impression:      1.  Indeterminant examination for the exclusion of pulmonary embolism.  2.  Abnormal perfusion-only lung imaging. Multifocal perfusion defects may be attributable to the patient's multifocal air space pneumonia but pulmonary embolism is also possible.                      Signer Name: Kael Hamilton MD   Signed: 9/2/2021 8:24 PM   Workstation Name: [id number]    [facility]    XR Chest 2 View [[id number]] Collected: 09/02/21 1018     Updated: 09/02/21 1020    Narrative:      EXAM:    XR Chest,  2 Views     EXAM DATE:    9/2/2021 10:15 AM     CLINICAL HISTORY:    CHF/COPD Protocol     TECHNIQUE:    Frontal and lateral views of the chest.     COMPARISON:    No relevant prior studies available.     FINDINGS:    LUNGS:  Scattered bilateral patchy opacities may represent COVID-19  pneumonia.    PLEURAL SPACE:  Unremarkable.  No pneumothorax.    HEART:  Unremarkable.  No cardiomegaly.    MEDIASTINUM:  Unremarkable.    BONES/JOINTS:  Unremarkable.       Impression:        Scattered bilateral patchy opacities may represent COVID-19 pneumonia.     This report was finalized on 9/2/2021 10:18 AM by Dr. Leonel Angel MD.       CT Head Without Contrast [[id number]] Collected: 09/02/21 1007     Updated: 09/02/21 1009    Narrative:      EXAM:    CT Head Without Intravenous Contrast     EXAM DATE:    9/2/2021 9:54 AM     CLINICAL HISTORY:    altered mental status     TECHNIQUE:    Axial computed tomography images of the head/brain without intravenous  contrast.  Sagittal and coronal reformatted images were created and  reviewed.  This CT exam was performed using one or more of the following  dose reduction techniques:  automated exposure control, adjustment of  the mA and/or kV according to patient size, and/or use of iterative  reconstruction technique.     COMPARISON:    No relevant prior studies available.     FINDINGS:    BRAIN:  Unremarkable.  No hemorrhage.  No significant white matter  disease.  No edema.    VENTRICLES:  Unremarkable.  No ventriculomegaly.    BONES/JOINTS:  Unremarkable.  No acute fracture.    SOFT TISSUES:  Unremarkable.    SINUSES:  Unremarkable as visualized.  No acute sinusitis.    MASTOID AIR CELLS:  Unremarkable as visualized.  No mastoid effusion.       Impression:        Unremarkable exam demonstrating no CT evidence of acute intracranial  findings.     This report was finalized on 9/2/2021 10:07 AM by Dr. Leonel Angel MD.             I have personally reviewed the above radiology results.    ---------------------------------------------------------------------------------------------------------------------      Assessment & Plan        Assessment/Plan       ASSESSMENT:    1.  Severe sepsis with lactic acid greater than 2 on admission  2.  COVID-19 pneumonia    PLAN:    Patient presents with confusion.  COVID-19 PCR positive.  WBC normal.  CRP 7.80.  Lactic acid 2.3.  Procalcitonin normal.  CT of the head unremarkable.  Chest x-ray from 9/2/2021 reports scattered bilateral patchy opacities that may represent COVID-19 pneumonia.  VQ scan indeterminate for exclusion of PE.  CT of the chest from 9/3/2021 reports patchy bilateral airspace disease concerning for COVID-19 pneumonia.  CT of the abdomen and pelvis from 9/3/2013 reports bibasilar airspace disease suggestive of COVID-19 pneumonia, large right inguinal hernia containing nondilated bowel and fat.    Agree with initiation of remdesivir and Decadron.    Patient's presentation is typical of COVID-19 infection without superimposed bacterial component, recommend initiation of baricitinib.  We will continue to follow closely.    Again, thank you Dr. Mccord for allowing us to participate in the care of your patient and please feel free to call for any questions you may have.        Code Status:     Code Status and Medical Interventions:   Ordered at: 09/02/21 8470     Level Of Support Discussed With:    Patient     Code Status:    CPR     Medical Interventions (Level of Support Prior to Arrest):    Full         NELSON Medina  09/03/21  14:03 EDT    Electronically signed by Brock Metzger MD at 09/04/21 0477

## 2021-09-04 NOTE — PROGRESS NOTES
[facility] Physician - Brief Progress Note  PERMANENT  09/04/2021 01:52    [facility] - [facility] [place], [place] ([facility])    CALDERON LECHUGA    Date of Service 09/04/2021 01:52    HPI/Events of Note Ordered precedex gtt for BIPAP compliance. Also, Dc’ed ivfm to prevent pulm congestion in covid PNA.      Interventions Minor-Agitation/anxiety - evaluation and management        Electronically Signed by: Anuradha Angeles) on 09/04/2021 01:53

## 2021-09-05 NOTE — PROGRESS NOTES
PROGRESS NOTE         Patient Identification:  Name:  Valdemar Solitario  Age:  76 y.o.  Sex:  male  :  1945  MRN:  [id number]  Visit Number:  [id number]  Primary Care Provider:  Laurence Geiger APRN         LOS: 3 days       ----------------------------------------------------------------------------------------------------------------------  Subjective       Chief Complaints:    Exposure To Known Illness, Altered Mental Status, and Weakness - Generalized        Interval History:      Patient continues on BiPAP at 100% FiO2.  Afebrile.  CRP improved from 5.05-3.13.  WBC normal at 5.24.  Chest x-ray from 2021 reports increased bilateral opacities concerning for edema/ARDS.      Review of Systems:    Deferred due to altered mental status  ----------------------------------------------------------------------------------------------------------------------      Objective       [facility] Meds:  baricitinib, 4 mg, Oral, Daily  dexamethasone, 6 mg, Intravenous, Daily  enoxaparin, 1 mg/kg, Subcutaneous, Q12H  pantoprazole, 40 mg, Oral, QAM  remdesivir, 100 mg, Intravenous, Q24H  sertraline, 100 mg, Oral, Daily  sodium chloride, 10 mL, Intravenous, Q12H  sodium chloride, 10 mL, Intravenous, Q12H  tamsulosin, 0.4 mg, Oral, Daily  thiamine (VITAMIN B1) IVPB, 100 mg, Intravenous, Daily      dexmedetomidine, 0.2-1.5 mcg/kg/hr, Last Rate: 0.2 mcg/kg/hr (21 3753)  Pharmacy Consult - Pharmacy to dose,       ----------------------------------------------------------------------------------------------------------------------    Vital Signs:  Temp:  [97.4 °F (36.3 °C)-98.6 °F (37 °C)] 97.9 °F (36.6 °C)  Heart Rate:  [48-72] 56  Resp:  [22-26] 24  BP: (112-154)/(56-99) 136/72  Mean Arterial Pressure (Non-Invasive) for the past 24 hrs (Last 3 readings):   Noninvasive MAP (mmHg)   21 0932 83   21 0917 123   21 0832 98     SpO2 Percentage    21 0832 21 0917 21  0932   SpO2: 90% 93% 95%     SpO2:  [77 %-99 %] 95 %  on   ;   Device (Oxygen Therapy): NPPV/NIV    Body mass index is 27.11 kg/m².  Wt Readings from Last 3 Encounters:   09/05/21 76.2 kg (167 lb 14.4 oz)   06/14/17 73.9 kg (163 lb)   03/23/17 76.7 kg (169 lb)        Intake/Output Summary (Last 24 hours) at 9/5/2021 1002  Last data filed at 9/5/2021 0600  Gross per 24 hour   Intake 211.32 ml   Output 1050 ml   Net -838.68 ml     NPO Diet  ----------------------------------------------------------------------------------------------------------------------      Physical Exam:    Deferred due to COVID-19 isolation      ----------------------------------------------------------------------------------------------------------------------  Results from last 7 days   Lab Units 09/02/21  1218 09/02/21  0950   TROPONIN T ng/mL 0.016 0.031*     Results from last 7 days   Lab Units 09/02/21  0950   PROBNP pg/mL 370.2       Results from last 7 days   Lab Units 09/05/21  0328   PH, ARTERIAL pH units 7.454*   PO2 ART mm Hg 56.0*   PCO2, ARTERIAL mm Hg 36.0   HCO3 ART mmol/L 25.2     Results from last 7 days   Lab Units 09/05/21 0227 09/04/21  0043 09/02/21  2328 09/02/21  1301 09/02/21  0950 09/02/21  0950   CRP mg/dL 3.13* 5.05* 7.80*  --   --   --    LACTATE mmol/L 1.4  --  1.2 0.8   < > 2.3*   WBC 10*3/mm3 5.24 5.71 4.02  --    < > 5.51   HEMOGLOBIN g/dL 12.4* 12.3* 12.0*  --    < > 11.8*   HEMATOCRIT % 38.2 37.5 38.0  --    < > 37.2*   MCV fL 86.6 87.0 89.4  --    < > 89.0   MCHC g/dL 32.5 32.8 31.6  --    < > 31.7   PLATELETS 10*3/mm3 135* 131* 124*  --    < > 126*   INR  1.13*  --  0.96  --   --  0.95    < > = values in this interval not displayed.     Results from last 7 days   Lab Units 09/05/21 0227 09/04/21 0043 09/03/21  0703 09/02/21 2328 09/02/21 2328   SODIUM mmol/L 142 137  --   --  138   POTASSIUM mmol/L 4.6 4.9  --   --  5.2   CHLORIDE mmol/L 109* 106  --   --  106   CO2 mmol/L 22.9 20.0*  --   --  22.4    BUN mg/dL 43* 36*  --   --  41*   CREATININE mg/dL 1.16 1.16 1.27   < > 1.41*   EGFR IF NONAFRICN AM mL/min/1.73 61 61 55*   < > 49*   CALCIUM mg/dL 8.7 8.3*  --   --  8.5*   GLUCOSE mg/dL 173* 136*  --   --  159*   ALBUMIN g/dL 2.94* 2.78* 3.11*   < > 3.16*   BILIRUBIN mg/dL 0.4 0.3 0.3   < > 0.2   ALK PHOS U/L 47 47 50   < > 51   AST (SGOT) U/L 51* 68* 73*   < > 74*   ALT (SGPT) U/L 30 32 32   < > 34    < > = values in this interval not displayed.   Estimated Creatinine Clearance: 58.4 mL/min (by C-G formula based on SCr of 1.16 mg/dL).  No results found for: AMMONIA    Hemoglobin A1C   Date/Time Value Ref Range Status   09/02/2021 2328 6.30 (H) 4.80 - 5.60 % Final     Lab Results   Component Value Date    HGBA1C 6.30 (H) 09/02/2021     No results found for: TSH, FREET4    Blood Culture   Date Value Ref Range Status   09/02/2021 No growth at 24 hours  Preliminary   09/02/2021 No growth at 24 hours  Preliminary   09/02/2021 No growth at 24 hours  Preliminary     No results found for: URINECX  No results found for: WOUNDCX  No results found for: STOOLCX  No results found for: RESPCX  Pain Management Panel    There is no flowsheet data to display.           ----------------------------------------------------------------------------------------------------------------------  Imaging Results (Last 24 Hours)       Procedure Component Value Units Date/Time    XR Chest 1 View [[id number]] Collected: 09/04/21 1845     Updated: 09/04/21 1847    Narrative:      CR Chest 1 Vw    INDICATION:   Hypoxia, COVID     COMPARISON:    Chest x-ray from 9/2/21    FINDINGS:  Single portable AP view(s) of the chest.    There appears to be diffusely slightly increased bilateral pulmonary opacities, there may be some fluid in the minor fissure. The opacities from known bilateral pneumonia are not well identified. There is no definite evidence of pneumothorax. The heart  is again noted to be diffusely enlarged.      Impression:      There  appears to be slight interval increase in bilateral pulmonary opacities concerning for superimposed edema or ARDS on known severe pneumonia. Favor edema given the fluid in the minor fissure. Consider correlation for potential fluid overload.    Signer Name: Rylee Rocha MD   Signed: 9/4/2021 6:45 PM   Workstation Name: [id number]    [facility]    CT Chest Pulmonary Embolism [[id number]] Collected: 09/04/21 1435     Updated: 09/04/21 1437    Narrative:      EXAM:    CT Angiography Chest With Intravenous Contrast     EXAM DATE:    9/4/2021 2:20 PM     CLINICAL HISTORY:    PE suspected, low/intermediate prob, positive D-dimer; U07.1-COVID-19;  J12.82-Pneumonia due to Coronavirus disease 2019; R09.02-Hypoxemia     TECHNIQUE:    Axial computed tomographic angiography images of the chest with  intravenous contrast.  This CT exam was performed using one or more of  the following dose reduction techniques:  automated exposure control,  adjustment of the mA and/or kV according to patient size, and/or use of  iterative reconstruction technique.    MIP reconstructed images were created and reviewed.     COMPARISON:    09/03/2021     FINDINGS:    Pulmonary arteries:  Unremarkable.  No pulmonary embolism.    Aorta:  No acute findings.  No thoracic aortic aneurysm.    Lungs:  Worsened bilateral airspace disease.  No mass.    Pleural space:  Unremarkable.  No significant effusion.  No  pneumothorax.    Heart:  Unremarkable.  No cardiomegaly.  No significant pericardial  effusion.  No evidence of RV dysfunction.    Bones/joints:  No acute fracture.  No dislocation.    Soft tissues:  Unremarkable.    Lymph nodes:  Unremarkable.  No enlarged lymph nodes.       Impression:        Worsened bilateral airspace disease.     This report was finalized on 9/4/2021 2:35 PM by Dr. Leonel Angel MD.                ----------------------------------------------------------------------------------------------------------------------    Assessment/Plan       Assessment/Plan     ASSESSMENT:    1.  Severe sepsis with lactic acid greater than 2 on admission  2.  COVID-19 pneumonia    PLAN:    Patient continues on BiPAP at 100% FiO2.  Afebrile.  CRP improved from 5.05-3.13.  WBC normal at 5.24.  Chest x-ray from 9/4/2021 reports increased bilateral opacities concerning for edema/ARDS.      Procalcitonin normal.  CT of the head unremarkable.  Chest x-ray from 9/2/2021 reports scattered bilateral patchy opacities that may represent COVID-19 pneumonia.  VQ scan indeterminate for exclusion of PE.  CT of the chest from 9/3/2021 reports patchy bilateral airspace disease concerning for COVID-19 pneumonia.  CT of the abdomen and pelvis from 9/3/2013 reports bibasilar airspace disease suggestive of COVID-19 pneumonia, large right inguinal hernia containing nondilated bowel and fat.     Agree with initiation of remdesivir and Decadron.    Patient is receiving baricitinib.     Patient's presentation is typical of COVID-19 infection without superimposed bacterial component, recommend to continue baricitinib, remdesivir, and Decadron for now.  We will continue to follow closely.      Code Status:   Code Status and Medical Interventions:   Ordered at: 09/02/21 2221     Level Of Support Discussed With:    Patient     Code Status:    CPR     Medical Interventions (Level of Support Prior to Arrest):    NELSON Calles  09/05/21  10:02 EDT

## 2021-09-05 NOTE — PROCEDURES
"Intubation    Date/Time: 9/5/2021 1:23 PM  Performed by: Nic Webster MD  Authorized by: Nic Webster MD   Consent: The procedure was performed in an emergent situation. Verbal consent obtained.  Consent given by: patient  Patient understanding: patient states understanding of the procedure being performed  Patient consent: the patient's understanding of the procedure matches consent given  Procedure consent: procedure consent matches procedure scheduled  Relevant documents: relevant documents present and verified  Patient identity confirmed: verbally with patient, arm band and provided demographic data  Time out: Immediately prior to procedure a \"time out\" was called to verify the correct patient, procedure, equipment, support staff and site/side marked as required.  Indications: respiratory failure and  hypoxemia  Intubation method: fiberoptic oral  Patient status: paralyzed (RSI)  Preoxygenation: BiPAP.  Sedatives: etomidate  Paralytic: succinylcholine  Laryngoscope size: Mac 3  Tube size: 8.0 mm  Tube type: cuffed  Number of attempts: 1  Cords visualized: yes  Post-procedure assessment: chest rise and CO2 detector  Breath sounds: equal  Cuff inflated: yes  ETT to lip: 25 cm  Tube secured with: ETT wolf  Chest x-ray findings: endotracheal tube in appropriate position  Patient tolerance: patient tolerated the procedure well with no immediate complications        "

## 2021-09-05 NOTE — CONSULTS
"[facility]   Consult Note    Patient Name: Valdemar Solitario  : 1945  MRN: [id number]  Primary Care Physician:  Laurence Geiger APRN  Referring Physician: No ref. provider found  Date of admission: 2021    Consults  Subjective   Subjective     Reason for Consult/ Chief Complaint: Central line placement    History of Present Illness  Valdemar Solitario is a 76 y.o. male recently intubated with deterioration secondary to COVID-19 pneumonia.  The patient requires central venous access and has no evidence of previous cervical surgeries or contraindications to placement.    Review of Systems   Unable to perform ROS: Intubated        Personal History     Past Medical History:   Diagnosis Date   • Depression    • Diabetes mellitus (CMS/[facility])     states border diabetic, is not on meds for it   • Dyslipidemia    • GERD (gastroesophageal reflux disease)    • Hypertension    • Thoracic spine pain    • Vitamin B12 deficiency (dietary) anemia    • Vitamin D deficiency        Past Surgical History:   Procedure Laterality Date   • APPENDECTOMY     • CATARACT EXTRACTION     • HERNIA REPAIR         Family History: family history includes Hypertension in his mother. Otherwise pertinent FHx was reviewed and not pertinent to current issue.    Social History:  reports that he has quit smoking. He does not have any smokeless tobacco history on file. He reports that he does not drink alcohol and does not use drugs.    Home Medications:   olmesartan, omeprazole, sertraline, simvastatin, and tamsulosin    Allergies:  Allergies   Allergen Reactions   • Codeine Other (See Comments)     Makes him feel \"froggy\"         Objective    Objective     Vitals:  Temp:  [97.4 °F (36.3 °C)-98.4 °F (36.9 °C)] 97.9 °F (36.6 °C)  Heart Rate:  [48-71] 62  Resp:  [18-26] 18  BP: (103-154)/(56-99) 129/69  FiO2 (%):  [100 %] 100 %    Physical Exam  Constitutional:       Appearance: He is well-developed.   HENT:      Head: Normocephalic and " atraumatic.   Eyes:      Conjunctiva/sclera: Conjunctivae normal.      Pupils: Pupils are equal, round, and reactive to light.   Neck:      Thyroid: No thyromegaly.      Vascular: No JVD.      Trachea: No tracheal deviation.   Cardiovascular:      Rate and Rhythm: Normal rate and regular rhythm.      Heart sounds: No murmur heard.   No friction rub. No gallop.    Pulmonary:      Comments: Coarse bilateral breath sounds  Abdominal:      General: There is no distension.      Palpations: Abdomen is soft. There is no hepatomegaly or splenomegaly.      Tenderness: There is no abdominal tenderness.      Hernia: No hernia is present.   Musculoskeletal:         General: No deformity. Normal range of motion.      Cervical back: Neck supple.   Skin:     General: Skin is warm and dry.   Neurological:      Mental Status: He is alert and oriented to person, place, and time.         Result Review    Result Review:  I have personally reviewed the results from the time of this admission to 9/5/2021 14:34 EDT and agree with these findings:  [x]  Laboratory  []  Microbiology  [x]  Radiology  []  EKG/Telemetry   []  Cardiology/Vascular   []  Pathology  []  Old records  []  Other:      Assessment/Plan   Assessment / Plan     Brief Patient Summary:  Valdemar Solitario is a 76 y.o. male who has respiratory failure secondary to COVID-19 pneumonia with deterioration requiring pressure support need for central venous access  Active Hospital Problems:  Active Hospital Problems    Diagnosis    • Pneumonia due to COVID-19 virus    • Acute respiratory failure with hypoxia (CMS/HCC)      Plan:   Bedside central line to be placed    Electronically signed by Fred Roberto MD, 09/05/21, 2:34 PM EDT.

## 2021-09-05 NOTE — PROGRESS NOTES
[facility] HOSPITALIST PROGRESS NOTE     Patient Identification:  Name:  Valdemar Solitario  Age:  76 y.o.  Sex:  male  :  1945  MRN:  [id number]  Visit Number:  [id number]  ROOM: [address]     Primary Care Provider:  Laurence Geiger APRN    Length of stay in inpatient status:  3    Subjective     Chief Compliant:    Chief Complaint   Patient presents with   • Exposure To Known Illness   • Altered Mental Status   • Weakness - Generalized       History of Presenting Illness:    Patient drowsy but easily wakes up on exam. Reports breathing feels about the same. Patient tolerating BiPAP well.     I called and updated patient's son, Janessa Solitario of the plan of care and answered all questions. We discussed possible need to put patient on ventilator if his father did not improve with planned therapies. Patient has 4 children, janessa reported he would update his siblings.     ROS:  Otherwise 10 point ROS negative other than documented above in HPI.     Objective     Current Hospital Meds:baricitinib, 4 mg, Oral, Daily  dexamethasone, 6 mg, Intravenous, Daily  enoxaparin, 1 mg/kg, Subcutaneous, Q12H  pantoprazole, 40 mg, Oral, QAM  remdesivir, 100 mg, Intravenous, Q24H  sertraline, 100 mg, Oral, Daily  sodium chloride, 10 mL, Intravenous, Q12H  sodium chloride, 10 mL, Intravenous, Q12H  tamsulosin, 0.4 mg, Oral, Daily  thiamine (VITAMIN B1) IVPB, 100 mg, Intravenous, Daily    dexmedetomidine, 0.2-1.5 mcg/kg/hr, Last Rate: 0.2 mcg/kg/hr (21 6742)  Pharmacy Consult - Pharmacy to dose,         Current Antimicrobial Therapy:  Anti-Infectives (From admission, onward)    Ordered     Dose/Rate Route Frequency Start Stop    21 0615  remdesivir 100 mg in sodium chloride 0.9 % 270 mL IVPB (powder vial)     Ordering Provider: Mg Augustine MD    100 mg  over 60 Minutes Intravenous Every 24 Hours 21 0900 21 0859    21 0615  remdesivir 200 mg in sodium chloride 0.9 % 290 mL  IVPB (powder vial)     Ordering Provider: Mg Augustine MD    200 mg  over 60 Minutes Intravenous Every 24 Hours 09/03/21 0900 09/03/21 0940        Current Diuretic Therapy:  Diuretics (From admission, onward)    Ordered     Dose/Rate Route Frequency Start Stop    09/05/21 0732  furosemide (LASIX) injection 80 mg     Ordering Provider: Nic Webster MD    80 mg Intravenous Once 09/05/21 0830 09/05/21 0842        ----------------------------------------------------------------------------------------------------------------------  Vital Signs:  Temp:  [97.4 °F (36.3 °C)-98.6 °F (37 °C)] 98.4 °F (36.9 °C)  Heart Rate:  [48-72] 52  Resp:  [22-26] 24  BP: (112-154)/() 154/69  SpO2:  [77 %-99 %] 90 %  on   ;   Device (Oxygen Therapy): NPPV/NIV  Body mass index is 27.11 kg/m².    Wt Readings from Last 3 Encounters:   09/05/21 76.2 kg (167 lb 14.4 oz)   06/14/17 73.9 kg (163 lb)   03/23/17 76.7 kg (169 lb)     Intake & Output (last 3 days)       09/02 0701 - 09/03 0700 09/03 0701 - 09/04 0700 09/04 0701 - 09/05 0700 09/05 0701 - 09/06 0700    P.O.   120     I.V. (mL/kg)  1074.7 (14.1) 91.3 (1.2)     IV Piggyback   250     Total Intake(mL/kg)  1074.7 (14.1) 461.3 (6.1)     Urine (mL/kg/hr) 100 1150 (0.6) 1050 (0.6)     Stool  0      Total Output 100 1150 1050     Net -100 -75.3 -588.7             Urine Unmeasured Occurrence  1 x 1 x         NPO Diet  ----------------------------------------------------------------------------------------------------------------------  Physical exam:  Constitutional:  Well-developed and well-nourished.  No respiratory distress.  Drowsy.     HENT:  Head:  Normocephalic and atraumatic.  Mouth:  Moist mucous membranes.    Eyes:  Conjunctivae and EOM are normal. No scleral icterus.    Neck:  Neck supple.  No JVD present.    Cardiovascular:  Normal rate, regular rhythm and normal heart sounds with no murmur.  Pulmonary/Chest:  No respiratory distress on BiPAP. Coarse decreased  breath sounds bilaterally.   Abdominal:  Soft.  Bowel sounds are normal.  No distension and no tenderness.   Musculoskeletal:  No edema, no tenderness, and no deformity.  No red or swollen joints anywhere.    Neurological:  Alert and oriented to person, place, and time.  No cranial nerve deficit.  No tongue deviation.  No facial droop.  No slurred speech. Follows directions with all extremities that are 5/5 strength.   Skin:  Skin is warm and dry. No rash noted. No pallor.   Peripheral vascular:  Pulses in all 4 extremities with no clubbing, no cyanosis, no edema.  ----------------------------------------------------------------------------------------------------------------------  Tele:    ----------------------------------------------------------------------------------------------------------------------  Results from last 7 days   Lab Units 09/05/21  0227 09/04/21  0043 09/02/21  2328 09/02/21  1301 09/02/21  0950 09/02/21  0950   CRP mg/dL 3.13* 5.05* 7.80*  --   --   --    LACTATE mmol/L 1.4  --  1.2 0.8   < > 2.3*   WBC 10*3/mm3 5.24 5.71 4.02  --    < > 5.51   HEMOGLOBIN g/dL 12.4* 12.3* 12.0*  --    < > 11.8*   HEMATOCRIT % 38.2 37.5 38.0  --    < > 37.2*   MCV fL 86.6 87.0 89.4  --    < > 89.0   MCHC g/dL 32.5 32.8 31.6  --    < > 31.7   PLATELETS 10*3/mm3 135* 131* 124*  --    < > 126*   INR  1.13*  --  0.96  --   --  0.95    < > = values in this interval not displayed.     Results from last 7 days   Lab Units 09/05/21  0328   PH, ARTERIAL pH units 7.454*   PO2 ART mm Hg 56.0*   PCO2, ARTERIAL mm Hg 36.0   HCO3 ART mmol/L 25.2     Results from last 7 days   Lab Units 09/05/21  0227 09/04/21  0043 09/03/21  0703 09/02/21  2328 09/02/21  2328   SODIUM mmol/L 142 137  --   --  138   POTASSIUM mmol/L 4.6 4.9  --   --  5.2   CHLORIDE mmol/L 109* 106  --   --  106   CO2 mmol/L 22.9 20.0*  --   --  22.4   BUN mg/dL 43* 36*  --   --  41*   CREATININE mg/dL 1.16 1.16 1.27   < > 1.41*   EGFR IF NONAFRICN AM  mL/min/1.73 61 61 55*   < > 49*   CALCIUM mg/dL 8.7 8.3*  --   --  8.5*   GLUCOSE mg/dL 173* 136*  --   --  159*   ALBUMIN g/dL 2.94* 2.78* 3.11*   < > 3.16*   BILIRUBIN mg/dL 0.4 0.3 0.3   < > 0.2   ALK PHOS U/L 47 47 50   < > 51   AST (SGOT) U/L 51* 68* 73*   < > 74*   ALT (SGPT) U/L 30 32 32   < > 34    < > = values in this interval not displayed.   Estimated Creatinine Clearance: 58.4 mL/min (by C-G formula based on SCr of 1.16 mg/dL).  No results found for: AMMONIA  Results from last 7 days   Lab Units 09/02/21  1218 09/02/21  0950   TROPONIN T ng/mL 0.016 0.031*     Results from last 7 days   Lab Units 09/02/21  0950   PROBNP pg/mL 370.2         Hemoglobin A1C   Date/Time Value Ref Range Status   09/02/2021 2328 6.30 (H) 4.80 - 5.60 % Final     No results found for: TSH, FREET4  No results found for: PREGTESTUR, PREGSERUM, HCG, HCGQUANT  Pain Management Panel    There is no flowsheet data to display.       Brief Urine Lab Results  (Last result in the past 365 days)      Color   Clarity   Blood   Leuk Est   Nitrite   Protein   CREAT   Urine HCG        09/04/21 1137 Yellow Cloudy Trace Negative Negative Trace             Blood Culture   Date Value Ref Range Status   09/02/2021 No growth at 2 days  Preliminary   09/02/2021 No growth at 2 days  Preliminary   09/02/2021 No growth at 2 days  Preliminary     No results found for: URINECX  No results found for: WOUNDCX  No results found for: STOOLCX  No results found for: RESPCX  No results found for: AFBCX  Results from last 7 days   Lab Units 09/05/21  0227 09/04/21  0043 09/02/21  2328 09/02/21  1301 09/02/21  0950   PROCALCITONIN ng/mL  --   --  0.16  --   --    LACTATE mmol/L 1.4  --  1.2 0.8 2.3*   CRP mg/dL 3.13* 5.05* 7.80*  --   --        I have personally looked at the labs and they are summarized above.  ----------------------------------------------------------------------------------------------------------------------  Detailed radiology reports for the  last 24 hours:    Imaging Results (Last 24 Hours)     Procedure Component Value Units Date/Time    XR Chest 1 View [[id number]] Collected: 09/04/21 1845     Updated: 09/04/21 1847    Narrative:      CR Chest 1 Vw    INDICATION:   Hypoxia, COVID     COMPARISON:    Chest x-ray from 9/2/21    FINDINGS:  Single portable AP view(s) of the chest.    There appears to be diffusely slightly increased bilateral pulmonary opacities, there may be some fluid in the minor fissure. The opacities from known bilateral pneumonia are not well identified. There is no definite evidence of pneumothorax. The heart  is again noted to be diffusely enlarged.      Impression:      There appears to be slight interval increase in bilateral pulmonary opacities concerning for superimposed edema or ARDS on known severe pneumonia. Favor edema given the fluid in the minor fissure. Consider correlation for potential fluid overload.    Signer Name: Rylee Rocha MD   Signed: 9/4/2021 6:45 PM   Workstation Name: [id number]    [facility]    CT Chest Pulmonary Embolism [[id number]] Collected: 09/04/21 1435     Updated: 09/04/21 1437    Narrative:      EXAM:    CT Angiography Chest With Intravenous Contrast     EXAM DATE:    9/4/2021 2:20 PM     CLINICAL HISTORY:    PE suspected, low/intermediate prob, positive D-dimer; U07.1-COVID-19;  J12.82-Pneumonia due to Coronavirus disease 2019; R09.02-Hypoxemia     TECHNIQUE:    Axial computed tomographic angiography images of the chest with  intravenous contrast.  This CT exam was performed using one or more of  the following dose reduction techniques:  automated exposure control,  adjustment of the mA and/or kV according to patient size, and/or use of  iterative reconstruction technique.    MIP reconstructed images were created and reviewed.     COMPARISON:    09/03/2021     FINDINGS:    Pulmonary arteries:  Unremarkable.  No pulmonary embolism.    Aorta:  No acute findings.  No thoracic  aortic aneurysm.    Lungs:  Worsened bilateral airspace disease.  No mass.    Pleural space:  Unremarkable.  No significant effusion.  No  pneumothorax.    Heart:  Unremarkable.  No cardiomegaly.  No significant pericardial  effusion.  No evidence of RV dysfunction.    Bones/joints:  No acute fracture.  No dislocation.    Soft tissues:  Unremarkable.    Lymph nodes:  Unremarkable.  No enlarged lymph nodes.       Impression:        Worsened bilateral airspace disease.     This report was finalized on 9/4/2021 2:35 PM by Dr. Leonel Angel MD.           Assessment & Plan    Mr. Solitario is our 76M PMH diabetes meliltus, depression, GERD, hypertension, dyslipidemia, presented to his place of work in a state of confusion, brought to ER, found to be Covid +. Hospitalization complicated by worsening hypoxia.     #Lactic Acidosis, Acute Metabolic Encephalopathy, Acute Hypoxic Respiratory Failure requiring NIPPV 2/2 PNA, Viral, treating for Covid 19 c/b mild transaminitis - Worse Respiratory Failure  #Elevated D-dimer, cannot rule out PE  - Patient presented w/ increased shortness of breath, confusion, tachypnea, covid + on 9/2.  - Admission labs showed WBC count 5K, CRP 7.8, lactate 2.3, d-dimer 0.95, procal 0.16, MRSA -, covid +, Bcx's NGTD  - B/l Venous Duplex negative for DVT  - VQ scan showed indeterminate study, noted abnormal perfusion, multifocal defects could be related to multifocal airspace disease but also PE.  - CT Head showed no acute intracranial abnormality  - CT Chest showed patchy b/l airspace disease c/w covid 19  - ID consulted; Following  - Repeat CTPE on 9/4 did not reveal PE but did show worsening bilateral airspace disease that is likely worsening COVID pneumonia. Given fluid in fissure on plain film cannot rule out edema.   - Continue 6mg IV Dexamethasone x 10 days  - Continue Remdesivir. Will trend LFTs.  - Baricitinib per ID recs  - Continue Level III AC for Covid 19 thromboembolism Ppx  - Continue  precedex gtt for agitation   - Continue APAP PRN for fevers  - Continue Albuterol Inhaler  - Given improvement in renal function will challenge with 80 IV lasix. Will monitor response along with renal function and electrolytes.   - Trend Covid 19 progression labs w/ CBC, CMP, CK, CRP, Ferritin daily and LDH, D-dimer, Fibrinogen q48hrs.  - Monitor in PCU, enhanced droplet/contact isolation precautions, continue 02 but wean as able but currently Bipap dependent  - ABG this AM 7.454/36/56 on 100% BiPAP 15/5. Increased to 18/8 to hopefully help with hypervolemia, patient saturating 86-94% while I was in room with no respiratory distress.   - If hypoxia does not improve with above therapies, suspect patient will need intubation in next 24 hours. Patient agreeable to wait and see if he responds to diuresis unless it becomes more urgent in the interim.      #HTN/Dyslipidemia  #Elevated Troponin, NSTEMI Type II suspected  #Cardiomyopathy - New  #Pulm HTN - New  - Labs showed trops 0.031->0.016, proBNP 370   - EKG showed NSR, RBBB, no significant ST changes  - Echo showed LVEF 46-50%, mild-mod dilated RV, mild-mod dilated RA, mod-severe Pulm HTN, RVSP 45-55mmHg  - Consulted Cardiology; Recs pending  - Holding home ARB due to lower Bps  - Holding home statin while on Remdesivir   - Continue to monitor on tele, strict I/O's, daily weights, trend HR and BP  - Diuresing as above.     #Nonoliguric MURIEL - Improved   - B/l Cr 0.9-1.0, was up to 2.1 on admission; Now 1.1  - S/p mIVFs, Trend Cr and UOP, avoid nephrotoxins, NSAIDs, dehydration and contrast as able.    #NIDDM Type II, controlled, unknown complications  - Hgb A1c = 6.3%  - Holding home oral agents, continue FSBG and SSI, add long acting as indicated.    #Anxiety/Depression  - Continue home regimen     #GERD  - Continue home PPI     #BPH  - Continue home Flomax    F: NPO  E: Monitor & Replace PRN  N: NPO  Ppx: TLov  Code: Full Code     Dispo: Pending workup and clinical  improvement     *This patient is considered high risk due to acute metabolic encephalopathy, acute respiratory failure, covid 19, monitoring for drug toxicities on Remdesivir, MURIEL, new cardiomyopathy and pulm HTN.      VTE Prophylaxis:   Mechanical Order History:     None      Pharmalogical Order History:      Ordered     Dose Route Frequency Stop    09/03/21 0739  enoxaparin (LOVENOX) syringe 80 mg      1 mg/kg SC Every 12 Hours --    09/03/21 0724  Pharmacy to Dose enoxaparin (LOVENOX)  Status:  Discontinued      -- XX Continuous PRN 09/03/21 0740    09/02/21 1237  enoxaparin (LOVENOX) syringe 40 mg  Status:  Discontinued      40 mg SC Every 24 Hours 09/02/21 1247    09/02/21 1247  enoxaparin (LOVENOX) syringe 40 mg  Status:  Discontinued      0.5 mg/kg SC Every 24 Hours 09/03/21 0549                  Nic Webster MD  [facility]  09/05/21  09:16 EDT

## 2021-09-05 NOTE — PROCEDURES
Insert Central Line At Bedside    Date/Time: 9/5/2021 2:35 PM  Performed by: Fred Roberto MD  Authorized by: Fred Roberto MD   Consent: Verbal consent obtained. Written consent obtained.  Risks and benefits: risks, benefits and alternatives were discussed  Patient identity confirmed: arm band, provided demographic data and hospital-assigned identification number  Indications: vascular access  Anesthesia: local infiltration    Anesthesia:  Local Anesthetic: lidocaine 1% with epinephrine    Sedation:  Patient sedated: no    Preparation: skin prepped with ChloraPrep  Skin prep agent dried: skin prep agent completely dried prior to procedure  Sterile barriers: all five maximum sterile barriers used - cap, mask, sterile gown, sterile gloves, and large sterile sheet  Hand hygiene: hand hygiene performed prior to central venous catheter insertion  Location details: right internal jugular  Patient position: flat  Catheter type: triple lumen  Catheter size: 7 Fr  Pre-procedure: landmarks identified  Ultrasound guidance: yes  Sterile ultrasound techniques: sterile gel and sterile probe covers were used  Number of attempts: 1  Successful placement: yes  Post-procedure: line sutured and dressing applied  Assessment: blood return through all ports,  free fluid flow and placement verified by x-ray  Patient tolerance: patient tolerated the procedure well with no immediate complications

## 2021-09-06 NOTE — NURSING NOTE
0200: no changes from 2000 assessment; pt remains on mechanical ventilation and proned at this time. Vital signs stable. No signs of acute distress noted. Will continue to monitor

## 2021-09-06 NOTE — NURSING NOTE
Attempted to put down a OG in patient. This was tried with 4 different devices and several different nurses. Unable to do this at this time. MD was notified.

## 2021-09-06 NOTE — OP NOTE
Procedure Note    Valdemar Solitario      Pre-op Diagnosis:   Need for feeding access and inability to pass nasogastric or orogastric tube    Post-op Diagnosis: same            EGD with orogastric feeding tube placement    Anesthesia: none    Staff:   Skyler Pak    Findings: successful placement of orogastric feeding tube    Operative Procedure:  Patient placed supine in ICU bed.  After timeout performed the endoscope was implanted into the room.  Femoral-popliteal elevated.  This could be advanced in the distal esophagus there was no formal mucosal abnormality but the tissues did appear tight and required some insufflation and gentle pressure to traverse the distal esophagus.  There was mild antral gastritis.  Retroflexion showed no hiatal hernia.  At this time an orogastric tube was advanced under endoscopic visualization into the gastric lumen.  The endoscope was then removed with the orogastric tube remaining in place.  Patient tolerated procedure well.    Estimated Blood Loss: minimal    Specimens:   none           Drains: orogastric tube    Grafts/Implants:  none    Complications: none      Fred Roberto MD     Date: 9/6/2021  Time: 17:54 EDT

## 2021-09-06 NOTE — CONSULTS
[facility]    Reason for consult: AHRF, COVID19 pneumonia,     HPI:  Mr. Solitario is a 75yo WM with DM he was admitted 9/2 and had to be placed on bipap in the ER, he was intubated 9/5. He is on dexamethasone, remdesivir and barcitinib.  He is currently sedated on vent so history is obtained from the chart.    Past Medical History:   Diagnosis Date   • Depression    • Diabetes mellitus (CMS/[facility])     states border diabetic, is not on meds for it   • Dyslipidemia    • GERD (gastroesophageal reflux disease)    • Hypertension    • Thoracic spine pain    • Vitamin B12 deficiency (dietary) anemia    • Vitamin D deficiency      Social History     Socioeconomic History   • Marital status:      Spouse name: Not on file   • Number of children: Not on file   • Years of education: Not on file   • Highest education level: Not on file   Tobacco Use   • Smoking status: Former Smoker   Substance and Sexual Activity   • Alcohol use: No   • Drug use: No   • Sexual activity: Defer     Family History   Problem Relation Age of Onset   • Hypertension Mother      MEDS: reviewed as per chart  ALL: codeine  ROS: UTO    Vital Sign Min/Max for last 24 hours  Temp  Min: 97 °F (36.1 °C)  Max: 98.8 °F (37.1 °C)   BP  Min: 61/41  Max: 174/89   Pulse  Min: 42  Max: 121   Resp  Min: 18  Max: 21   SpO2  Min: 77 %  Max: 100 %   No data recorded   Weight  Min: 78.1 kg (172 lb 3.2 oz)  Max: 78.1 kg (172 lb 3.2 oz)   1024/1575  GEN:   appears ill,sedated on vent  HEENT: PERRL, , normal sclera, mmm, no jvd, trachea midline, neck supple  CHEST: fair ae bilat, no wheezes, faint right base crackles, no use of accessory muscles  CV: RRR, no m/g/r  ABD: soft, nt, nd +bs, no hepatosplenomegaly  EXT: no c/c/e  SKIN: no rashes, no xanthomas, nl turgor, warm, dry  LYMPH: no palpable cervical or supraclavicular lymphadenopathy  NEURO: CN 2-12 exam limited, nonfocal as best as able  PSYCH: deferred  MSK: no kyphosolciosis, normal muscular  development    Labs: 9/6: reviewed  Glucose 228  Bun 49  Cr 1.2  Na 148  Bicarb 27  crp 2.9  Wbc 14  hgb 13.8  plts 239   d dimer 0.36  7.28/63/232 (80;10;18;450)  9/5: CXR: bialteral infiltrates, line, tube in place  Ct angio 9/4: negative  Echo: with normal ef and diastolic function but pulm htn    A/P:  1. Acute hypoxemic and hypercapnic respiratory failure -- continue vent support, wean as able. He is really doing pretty well at this point all considering. Would wean fi02 at this point to 40% and leave peep at 8.  He is schedule to be supine this afternoon, if he remains at 40% supine I would stop proning and let paralytic lapse  2. COVID19 pneumonia -- CRP is fairly low, d dimer is low -- would keep the current measures for this in place, monitor for secondary bacterial infection. Check procal tomorrow  D dimer is low, neg ct angio and neg venous doppler, consider dropping dose of lovenox, but certainly reasonable to continue current dose as well.   3. DM -- accuechecks ok, continue current  4. Pulmonary hypertension -- no specific therapy for now, repeat echo when better    Feeding tube was unable to be placed, attempt to be made again later today.    He has potential to improve. Vent setting not too bad    CC 40 mins discussed with RN at bedside

## 2021-09-06 NOTE — NURSING NOTE
2330: contacted [facility] at this time to inform them of patient on mechanical ventilation with GCS of 3.     2355: [facility] called to inform us that they have ruled patient out for organ donation and will not be following patient. Informed us to call if/when cardiac time of death occurs.

## 2021-09-06 NOTE — PROGRESS NOTES
[facility] HOSPITALIST PROGRESS NOTE     Patient Identification:  Name:  Valdemar Solitario  Age:  76 y.o.  Sex:  male  :  1945  MRN:  [id number]  Visit Number:  [id number]  ROOM: [address]     Primary Care Provider:  Laurence Geiger APRN    Length of stay in inpatient status:  4    Subjective     Chief Compliant:    Chief Complaint   Patient presents with   • Exposure To Known Illness   • Altered Mental Status   • Weakness - Generalized       History of Presenting Illness:    Patient placed in prone position around 9 pm last night. Oxygenation improved and vent settings have been weaned. Patient on 0.04 of levophed this AM. Nursing staff unable to place OG, NG, or dobhoff tube as it will not advance past 20 cm.     I updated patient's son on plan of care today and answered all questions.     ROS:  Otherwise 10 point ROS negative other than documented above in HPI.     Objective     Current Hospital Meds:artificial tears, , Both Eyes, Q4H  chlorhexidine, 15 mL, Mouth/Throat, Q12H  dexamethasone, 6 mg, Intravenous, Daily  enoxaparin, 1 mg/kg, Subcutaneous, Q12H  famotidine, 20 mg, Intravenous, BID  oxymetazoline, 2 spray, Each Nare, BID  remdesivir, 100 mg, Intravenous, Q24H  sertraline, 100 mg, Oral, Daily  sodium chloride, 10 mL, Intravenous, Q12H  sodium chloride, 10 mL, Intravenous, Q12H  sodium chloride, 10 mL, Intravenous, Q12H  sodium chloride, 10 mL, Intravenous, Q12H  sodium chloride, 10 mL, Intravenous, Q12H  tamsulosin, 0.4 mg, Oral, Daily  thiamine (VITAMIN B1) IVPB, 100 mg, Intravenous, Daily    dexmedetomidine, 0.2-1.5 mcg/kg/hr, Last Rate: Stopped (21 1426)  fentaNYL Citrate, , Last Rate: Stopped (21 1528)  norepinephrine, 0.02-0.3 mcg/kg/min, Last Rate: 0.06 mcg/kg/min (21)  Pharmacy Consult - Pharmacy to dose,   propofol, 5-50 mcg/kg/min, Last Rate: 35 mcg/kg/min (21 0545)  vecuronium (NORCURON) infusion, 0.6-1.2 mcg/kg/min, Last Rate: 0.6 mcg/kg/min  (09/05/21 2122)        Current Antimicrobial Therapy:  Anti-Infectives (From admission, onward)    Ordered     Dose/Rate Route Frequency Start Stop    09/03/21 0615  remdesivir 100 mg in sodium chloride 0.9 % 270 mL IVPB (powder vial)     Ordering Provider: Mg Augustine MD    100 mg  over 60 Minutes Intravenous Every 24 Hours 09/04/21 0900 09/08/21 0859    09/03/21 0615  remdesivir 200 mg in sodium chloride 0.9 % 290 mL IVPB (powder vial)     Ordering Provider: Mg Augustine MD    200 mg  over 60 Minutes Intravenous Every 24 Hours 09/03/21 0900 09/03/21 0940        Current Diuretic Therapy:  Diuretics (From admission, onward)    Ordered     Dose/Rate Route Frequency Start Stop    09/05/21 0732  furosemide (LASIX) injection 80 mg     Ordering Provider: Nic Webster MD    80 mg Intravenous Once 09/05/21 0830 09/05/21 0842        ----------------------------------------------------------------------------------------------------------------------  Vital Signs:  Temp:  [97 °F (36.1 °C)-98.8 °F (37.1 °C)] 97 °F (36.1 °C)  Heart Rate:  [] 104  Resp:  [18-21] 18  BP: ()/(33-89) 153/77  FiO2 (%):  [60 %-100 %] 60 %  SpO2:  [77 %-100 %] 100 %  on   ;   Device (Oxygen Therapy): ventilator  Body mass index is 27.81 kg/m².    Wt Readings from Last 3 Encounters:   09/06/21 78.1 kg (172 lb 3.2 oz)   06/14/17 73.9 kg (163 lb)   03/23/17 76.7 kg (169 lb)     Intake & Output (last 3 days)       09/03 0701 - 09/04 0700 09/04 0701 - 09/05 0700 09/05 0701 - 09/06 0700 09/06 0701 - 09/07 0700    P.O.  120 0     I.V. (mL/kg) 1074.7 (14.1) 91.3 (1.2) 1024.2 (13.1)     IV Piggyback  250      Total Intake(mL/kg) 1074.7 (14.1) 461.3 (6.1) 1024.2 (13.1)     Urine (mL/kg/hr) 1150 (0.6) 1050 (0.6) 1575 (0.8)     Emesis/NG output   0     Stool 0  0     Total Output 1150 1050 1575     Net -75.3 -588.7 -550.8             Urine Unmeasured Occurrence 1 x 1 x          NPO  Diet  ----------------------------------------------------------------------------------------------------------------------  Physical exam:  GENERAL:  Patient intubated and sedated.   SKIN:  Warm, dry without rashes, purpura or petechiae.  EYES:  Pupils equal, round and reactive to light.  EOMs intact.  Conjunctivae normal.  HEAD:  Normocephalic. Atraumatic.   EARS/NOSE/MOUTH/THROAT:  Hearing intact. Septum midline.  No excoriations or nasal discharge. No stomatitis or ulcers.  Lips normal. Oropharynx without lesions or exudates.  NECK:  Supple with good range of motion; no thyromegaly or masses  LYMPHATICS:  No cervical, supraclavicular, axillary adenopathy.  RESP:  Lungs clear to auscultation. Good airflow. Intubated receiving mechanical ventilation.   CARDIAC:  Regular rate and rhythm without murmurs, rubs or gallops. Normal S1,S2. No edema  GI:  Soft, nontender, no hepatosplenomegaly, normal bowel sounds  MSK:  No clubbing or cyanosis, No joint swelling noted in hands  NEUROLOGICAL:  No focal deficit. Pupils equal and reactive.   ----------------------------------------------------------------------------------------------------------------------  Tele:    ----------------------------------------------------------------------------------------------------------------------  Results from last 7 days   Lab Units 09/06/21  0433 09/05/21  0227 09/04/21  0043 09/02/21  2328 09/02/21  2328 09/02/21  1301 09/02/21  0950 09/02/21  0950   CRP mg/dL 2.93* 3.13* 5.05*   < > 7.80*  --   --   --    LACTATE mmol/L  --  1.4  --   --  1.2 0.8   < > 2.3*   WBC 10*3/mm3 14.27* 5.24 5.71   < > 4.02  --    < > 5.51   HEMOGLOBIN g/dL 13.8 12.4* 12.3*   < > 12.0*  --    < > 11.8*   HEMATOCRIT % 43.6 38.2 37.5   < > 38.0  --    < > 37.2*   MCV fL 88.6 86.6 87.0   < > 89.4  --    < > 89.0   MCHC g/dL 31.7 32.5 32.8   < > 31.6  --    < > 31.7   PLATELETS 10*3/mm3 239 135* 131*   < > 124*  --    < > 126*   INR   --  1.13*  --   --  0.96   --   --  0.95    < > = values in this interval not displayed.     Results from last 7 days   Lab Units 09/06/21  0413   PH, ARTERIAL pH units 7.287*   PO2 ART mm Hg 232.0*   PCO2, ARTERIAL mm Hg 63.2*   HCO3 ART mmol/L 30.2*     Results from last 7 days   Lab Units 09/06/21  0433 09/05/21  0227 09/04/21  0043   SODIUM mmol/L 148* 142 137   POTASSIUM mmol/L 4.9 4.6 4.9   CHLORIDE mmol/L 110* 109* 106   CO2 mmol/L 27.2 22.9 20.0*   BUN mg/dL 49* 43* 36*   CREATININE mg/dL 1.20 1.16 1.16   EGFR IF NONAFRICN AM mL/min/1.73 59* 61 61   CALCIUM mg/dL 8.7 8.7 8.3*   GLUCOSE mg/dL 228* 173* 136*   ALBUMIN g/dL 3.30* 2.94* 2.78*   BILIRUBIN mg/dL 0.6 0.4 0.3   ALK PHOS U/L 60 47 47   AST (SGOT) U/L 53* 51* 68*   ALT (SGPT) U/L 38 30 32   Estimated Creatinine Clearance: 51.5 mL/min (by C-G formula based on SCr of 1.2 mg/dL).  No results found for: AMMONIA  Results from last 7 days   Lab Units 09/02/21  1218 09/02/21  0950   TROPONIN T ng/mL 0.016 0.031*     Results from last 7 days   Lab Units 09/02/21  0950   PROBNP pg/mL 370.2         No results found for: HGBA1C, POCGLU  No results found for: TSH, FREET4  No results found for: PREGTESTUR, PREGSERUM, HCG, HCGQUANT  Pain Management Panel    There is no flowsheet data to display.       Brief Urine Lab Results  (Last result in the past 365 days)      Color   Clarity   Blood   Leuk Est   Nitrite   Protein   CREAT   Urine HCG        09/04/21 1137 Yellow Cloudy Trace Negative Negative Trace             Blood Culture   Date Value Ref Range Status   09/02/2021 No growth at 3 days  Preliminary   09/02/2021 No growth at 3 days  Preliminary   09/02/2021 No growth at 3 days  Preliminary     No results found for: URINECX  No results found for: WOUNDCX  No results found for: STOOLCX  No results found for: RESPCX  No results found for: AFBCX  Results from last 7 days   Lab Units 09/06/21  0433 09/05/21  0227 09/04/21  0043 09/02/21  2328 09/02/21  1301 09/02/21  0950   PROCALCITONIN  ng/mL  --   --   --  0.16  --   --    LACTATE mmol/L  --  1.4  --  1.2 0.8 2.3*   CRP mg/dL 2.93* 3.13* 5.05* 7.80*  --   --        I have personally looked at the labs and they are summarized above.  ----------------------------------------------------------------------------------------------------------------------  Detailed radiology reports for the last 24 hours:    Imaging Results (Last 24 Hours)     Procedure Component Value Units Date/Time    XR Chest 1 View [[id number]] Collected: 09/05/21 1523     Updated: 09/05/21 1526    Narrative:      EXAM:    XR Chest, 1 View     EXAM DATE:    9/5/2021 2:35 PM     CLINICAL HISTORY:    central line; U07.1-COVID-19; J12.82-Pneumonia due to Coronavirus  disease 2019; R09.02-Hypoxemia; J96.01-Acute respiratory failure with  hypoxia     TECHNIQUE:    Frontal view of the chest.     COMPARISON:    09/05/2021     FINDINGS:    LUNGS:  Patchy bilateral airspace disease again noted.    PLEURAL SPACE:  Unremarkable.  No pneumothorax.    HEART:  Unremarkable.  No cardiomegaly.    MEDIASTINUM:  Unremarkable.    BONES/JOINTS:  Unremarkable.    TUBES, LINES AND DEVICES:  ET tube tip below level clavicles. Right  central line with tip in SVC.       Impression:        Patchy bilateral airspace disease again noted.     This report was finalized on 9/5/2021 3:24 PM by Dr. Leonel Angel MD.       XR Chest 1 View [[id number]] Collected: 09/05/21 1358     Updated: 09/05/21 1400    Narrative:      EXAM:    XR Chest, 1 View     EXAM DATE:    9/5/2021 1:50 PM     CLINICAL HISTORY:    ETT AND OG TUBE PLACEMENT; U07.1-COVID-19; J12.82-Pneumonia due to  Coronavirus disease 2019; R09.02-Hypoxemia     TECHNIQUE:    Frontal view of the chest.     COMPARISON:    09/04/2021     FINDINGS:    LUNGS:  Coarsened interstitial markings throughout the lungs with some  improved aeration in comparison with prior study.    PLEURAL SPACE:  Unremarkable.  No pneumothorax.    HEART:  Unremarkable.  No  cardiomegaly.    MEDIASTINUM:  Unremarkable.    BONES/JOINTS:  Unremarkable.    TUBES, LINES AND DEVICES:  ET tube tip below level clavicles.       Impression:        Coarsened interstitial markings throughout the lungs with some  improved aeration in comparison with prior study.     This report was finalized on 9/5/2021 1:58 PM by Dr. Leonel Angel MD.           Assessment & Plan    Mr. Solitario is our 76M PMH diabetes meliltus, depression, GERD, hypertension, dyslipidemia, presented to his place of work in a state of confusion, brought to ER, found to be Covid +. Hospitalization complicated by worsening hypoxia.     #Septic shock, Acute Metabolic Encephalopathy, Acute Hypoxic Respiratory Failure requiring mechanical ventilation 2/2 viral pneumonia treating for Covid 19 c/b mild transaminitis  - Patient presented w/ increased shortness of breath, confusion, tachypnea, covid + on 9/2.  - Admission labs showed WBC count 5K, CRP 7.8, lactate 2.3, d-dimer 0.95, procal 0.16, MRSA -, covid +, Bcx's NGTD  - B/l Venous Duplex negative for DVT  - VQ scan showed indeterminate study, noted abnormal perfusion, multifocal defects could be related to multifocal airspace disease but also PE.  - CT Head showed no acute intracranial abnormality  - CT Chest showed patchy b/l airspace disease c/w covid 19  - ID consulted; Following  - Repeat CTPE on 9/4 did not reveal PE but did show worsening bilateral airspace disease that is likely worsening COVID pneumonia. Given fluid in fissure on plain film could not rule out component of edema  - Continue 6mg IV Dexamethasone x 10 days  - Continue Remdesivir. Will trend LFTs.  - Baricitinib per ID recs  - Continue Level III AC for Covid 19 thromboembolism Ppx  - Continue APAP PRN for fevers  - Continue Albuterol Inhaler  - Given improvement in renal function gave 80 IV lasix yesterday with good urinary output but unfortunately patient became more hypoxic.   - Patient intubated for worsening  hypoxia on 9/5.   - Trend Covid 19 progression labs w/ CBC, CMP, CK, CRP, Ferritin daily and LDH, D-dimer, Fibrinogen q48hrs.  - Enhanced droplet/contact isolation precautions  - P/F ratio post intubation 80 likely representing severe ARDS. Patient paralyzed and prone positioned.   - ABG this AM 7.287/63/232 on 80% FiO2, PEEP of 10, , and RR 18 while prone positioned. FiO2 turned down to 60% and I turned PEEP down to 8. Pplateau pressure around 21 on these settings. Will flip supine around 1 pm and repeat gas.   - Nursing staff unable to place OG. Surgery to place one bedside via endoscopy non-urgently.   - Suspect pressor requirement a combination of sedation and sepsis. Currently on 0.04 of levophed.    - Pulmonology consulted.     #HTN/Dyslipidemia  #Elevated Troponin, NSTEMI Type II suspected  #Cardiomyopathy - New  #Pulm HTN - New  - Labs showed trops 0.031->0.016, proBNP 370   - EKG showed NSR, RBBB, no significant ST changes  - Echo showed LVEF 46-50%, mild-mod dilated RV, mild-mod dilated RA, mod-severe Pulm HTN, RVSP 45-55mmHg  - Consulted Cardiology; Recs pending  - Holding home ARB due to lower Bps  - Holding home statin while on Remdesivir   - Continue to monitor on tele, strict I/O's, daily weights, trend HR and BP  - Given pressor requirement will hold diuresis at this time.     #Nonoliguric MURIEL - Improved   - B/l Cr 0.9-1.0, was up to 2.1 on admission; Now 1.2  - S/p mIVFs, Trend Cr and UOP, avoid nephrotoxins, NSAIDs, dehydration and contrast as able.    #NIDDM Type II, controlled, unknown complications  - Hgb A1c = 6.3%  - Holding home oral agents, continue FSBG and SSI, add long acting as indicated.    #Anxiety/Depression  - Continue home regimen     #GERD  - Continue home PPI     #BPH  - Continue home Flomax    F: NPO pending OG placement   A: Fentanyl   S: Propofol   T: Lovenox  H: HOB elevated   U: Famotidine   G: PRN  S: Not ready   B: PRN  I: ETT 9/5, RIJ 9/5  D: None     Code status:  Full. Guarded prognosis.      Dispo: Pending workup and clinical improvement     *This patient is considered high risk due to acute metabolic encephalopathy, acute respiratory failure, covid 19, monitoring for drug toxicities on Remdesivir, MURIEL, new cardiomyopathy and pulm HTN. Patient now critically ill requiring mechanical ventilation. 40 minutes of critical care time spent on patient, with greater than 50% of this time bedside or discussing care with hospital staff.         VTE Prophylaxis:   Mechanical Order History:     None      Pharmalogical Order History:      Ordered     Dose Route Frequency Stop    09/03/21 0739  enoxaparin (LOVENOX) syringe 80 mg      1 mg/kg SC Every 12 Hours --    09/03/21 0724  Pharmacy to Dose enoxaparin (LOVENOX)  Status:  Discontinued      -- XX Continuous PRN 09/03/21 0740    09/02/21 1237  enoxaparin (LOVENOX) syringe 40 mg  Status:  Discontinued      40 mg SC Every 24 Hours 09/02/21 1247    09/02/21 1247  enoxaparin (LOVENOX) syringe 40 mg  Status:  Discontinued      0.5 mg/kg SC Every 24 Hours 09/03/21 0549                  Nic Webster MD  [facility]  09/06/21  09:29 EDT

## 2021-09-06 NOTE — PROGRESS NOTES
PROGRESS NOTE         Patient Identification:  Name:  Valdemar Solitario  Age:  76 y.o.  Sex:  male  :  1945  MRN:  [id number]  Visit Number:  [id number]  Primary Care Provider:  Laurence Geiger APRN         LOS: 4 days       ----------------------------------------------------------------------------------------------------------------------  Subjective       Chief Complaints:    Exposure To Known Illness, Altered Mental Status, and Weakness - Generalized        Interval History:      Patient was urgently intubated yesterday on 2021.  FiO2 is 60%.  Afebrile.  Has been prone since around 9 PM last night.  Lactate 1.4.  CRP improved from 3.13-2.93.  WBC elevated from 5.2-14.27.  Chest x-ray from on 521 reports patchy bilateral airspace disease again noted.    Review of Systems:    Deferred due to altered mental status  ----------------------------------------------------------------------------------------------------------------------      Objective       Current Hospital Meds:  artificial tears, , Both Eyes, Q4H  chlorhexidine, 15 mL, Mouth/Throat, Q12H  dexamethasone, 6 mg, Intravenous, Daily  enoxaparin, 1 mg/kg, Subcutaneous, Q12H  famotidine, 20 mg, Intravenous, BID  oxymetazoline, 2 spray, Each Nare, BID  remdesivir, 100 mg, Intravenous, Q24H  sertraline, 100 mg, Oral, Daily  sodium chloride, 10 mL, Intravenous, Q12H  sodium chloride, 10 mL, Intravenous, Q12H  sodium chloride, 10 mL, Intravenous, Q12H  sodium chloride, 10 mL, Intravenous, Q12H  sodium chloride, 10 mL, Intravenous, Q12H  tamsulosin, 0.4 mg, Oral, Daily  thiamine (VITAMIN B1) IVPB, 100 mg, Intravenous, Daily      dexmedetomidine, 0.2-1.5 mcg/kg/hr, Last Rate: Stopped (21 1426)  fentaNYL Citrate, , Last Rate: Stopped (21 1528)  norepinephrine, 0.02-0.3 mcg/kg/min, Last Rate: 0.06 mcg/kg/min (21 9944)  Pharmacy Consult - Pharmacy to dose,   propofol, 5-50 mcg/kg/min, Last Rate: 35 mcg/kg/min (21  1024)  vecuronium (NORCURON) infusion, 0.6-1.2 mcg/kg/min, Last Rate: 0.6 mcg/kg/min (09/05/21 2122)      ----------------------------------------------------------------------------------------------------------------------    Vital Signs:  Temp:  [97 °F (36.1 °C)-98.8 °F (37.1 °C)] 97 °F (36.1 °C)  Heart Rate:  [] 64  Resp:  [18-21] 18  BP: ()/(33-89) 153/77  FiO2 (%):  [40 %-100 %] 40 %  Mean Arterial Pressure (Non-Invasive) for the past 24 hrs (Last 3 readings):   Noninvasive MAP (mmHg)   09/06/21 0717 99   09/06/21 0702 90   09/06/21 0638 83     SpO2 Percentage    09/06/21 0717 09/06/21 1030 09/06/21 1031   SpO2: 100% 100% 99%     SpO2:  [77 %-100 %] 99 %  on   ;   Device (Oxygen Therapy): ventilator    Body mass index is 27.81 kg/m².  Wt Readings from Last 3 Encounters:   09/06/21 78.1 kg (172 lb 3.2 oz)   06/14/17 73.9 kg (163 lb)   03/23/17 76.7 kg (169 lb)        Intake/Output Summary (Last 24 hours) at 9/6/2021 1040  Last data filed at 9/6/2021 0539  Gross per 24 hour   Intake 1024.19 ml   Output 1575 ml   Net -550.81 ml     NPO Diet  ----------------------------------------------------------------------------------------------------------------------      Physical Exam:    Deferred due to COVID-19 isolation      ----------------------------------------------------------------------------------------------------------------------  Results from last 7 days   Lab Units 09/02/21  1218 09/02/21  0950   TROPONIN T ng/mL 0.016 0.031*     Results from last 7 days   Lab Units 09/02/21  0950   PROBNP pg/mL 370.2       Results from last 7 days   Lab Units 09/06/21  0413   PH, ARTERIAL pH units 7.287*   PO2 ART mm Hg 232.0*   PCO2, ARTERIAL mm Hg 63.2*   HCO3 ART mmol/L 30.2*     Results from last 7 days   Lab Units 09/06/21  0433 09/05/21  0227 09/04/21  0043 09/02/21  2328 09/02/21  2328 09/02/21  1301 09/02/21  0950 09/02/21  0950   CRP mg/dL 2.93* 3.13* 5.05*   < > 7.80*  --   --   --    LACTATE  mmol/L  --  1.4  --   --  1.2 0.8   < > 2.3*   WBC 10*3/mm3 14.27* 5.24 5.71   < > 4.02  --    < > 5.51   HEMOGLOBIN g/dL 13.8 12.4* 12.3*   < > 12.0*  --    < > 11.8*   HEMATOCRIT % 43.6 38.2 37.5   < > 38.0  --    < > 37.2*   MCV fL 88.6 86.6 87.0   < > 89.4  --    < > 89.0   MCHC g/dL 31.7 32.5 32.8   < > 31.6  --    < > 31.7   PLATELETS 10*3/mm3 239 135* 131*   < > 124*  --    < > 126*   INR   --  1.13*  --   --  0.96  --   --  0.95    < > = values in this interval not displayed.     Results from last 7 days   Lab Units 09/06/21  0433 09/05/21  0227 09/04/21  0043   SODIUM mmol/L 148* 142 137   POTASSIUM mmol/L 4.9 4.6 4.9   CHLORIDE mmol/L 110* 109* 106   CO2 mmol/L 27.2 22.9 20.0*   BUN mg/dL 49* 43* 36*   CREATININE mg/dL 1.20 1.16 1.16   EGFR IF NONAFRICN AM mL/min/1.73 59* 61 61   CALCIUM mg/dL 8.7 8.7 8.3*   GLUCOSE mg/dL 228* 173* 136*   ALBUMIN g/dL 3.30* 2.94* 2.78*   BILIRUBIN mg/dL 0.6 0.4 0.3   ALK PHOS U/L 60 47 47   AST (SGOT) U/L 53* 51* 68*   ALT (SGPT) U/L 38 30 32   Estimated Creatinine Clearance: 51.5 mL/min (by C-G formula based on SCr of 1.2 mg/dL).  No results found for: AMMONIA    No results found for: HGBA1C, POCGLU  Lab Results   Component Value Date    HGBA1C 6.30 (H) 09/02/2021     No results found for: TSH, FREET4    Blood Culture   Date Value Ref Range Status   09/02/2021 No growth at 24 hours  Preliminary   09/02/2021 No growth at 24 hours  Preliminary   09/02/2021 No growth at 24 hours  Preliminary     No results found for: URINECX  No results found for: WOUNDCX  No results found for: STOOLCX  No results found for: RESPCX  Pain Management Panel    There is no flowsheet data to display.           ----------------------------------------------------------------------------------------------------------------------  Imaging Results (Last 24 Hours)       Procedure Component Value Units Date/Time    XR Chest 1 View [[id number]] Collected: 09/05/21 1523     Updated: 09/05/21 1526     Narrative:      EXAM:    XR Chest, 1 View     EXAM DATE:    9/5/2021 2:35 PM     CLINICAL HISTORY:    central line; U07.1-COVID-19; J12.82-Pneumonia due to Coronavirus  disease 2019; R09.02-Hypoxemia; J96.01-Acute respiratory failure with  hypoxia     TECHNIQUE:    Frontal view of the chest.     COMPARISON:    09/05/2021     FINDINGS:    LUNGS:  Patchy bilateral airspace disease again noted.    PLEURAL SPACE:  Unremarkable.  No pneumothorax.    HEART:  Unremarkable.  No cardiomegaly.    MEDIASTINUM:  Unremarkable.    BONES/JOINTS:  Unremarkable.    TUBES, LINES AND DEVICES:  ET tube tip below level clavicles. Right  central line with tip in SVC.       Impression:        Patchy bilateral airspace disease again noted.     This report was finalized on 9/5/2021 3:24 PM by Dr. Leonel Angel MD.               ----------------------------------------------------------------------------------------------------------------------    Assessment/Plan       Assessment/Plan     ASSESSMENT:    1.  Severe sepsis with lactic acid greater than 2 on admission  2.  COVID-19 pneumonia    PLAN:    Patient was urgently intubated yesterday on 9/5/2021.  FiO2 is 60%.  Afebrile.  Has been prone since around 9 PM last night.  Lactate 1.4.  CRP improved from 3.13-2.93.  WBC elevated from 5.2-14.27.  Chest x-ray from on 521 reports patchy bilateral airspace disease again noted.    Procalcitonin normal.  CT of the head unremarkable.  Chest x-ray from 9/2/2021 reports scattered bilateral patchy opacities that may represent COVID-19 pneumonia.  VQ scan indeterminate for exclusion of PE.  CT of the chest from 9/3/2021 reports patchy bilateral airspace disease concerning for COVID-19 pneumonia.  CT of the abdomen and pelvis from 9/3/2013 reports bibasilar airspace disease suggestive of COVID-19 pneumonia, large right inguinal hernia containing nondilated bowel and fat.     Agree with initiation of remdesivir and Decadron.    Baricitinib has been  discontinued in the setting of emergent intubation.  The patient is at increased risk for fungal/bacterial pneumonia.  We will continue to follow very closely and consider empiric antibiotics if the patient shows any indication of worsening.       Code Status:   Code Status and Medical Interventions:   Ordered at: 09/02/21 6668     Level Of Support Discussed With:    Patient     Code Status:    CPR     Medical Interventions (Level of Support Prior to Arrest):    Full       NELSON Torres  09/06/21  10:40 EDT

## 2021-09-06 NOTE — PROGRESS NOTES
Nutrition Services    Patient Name:  Valdemar Solitario  YOB: 1945  MRN: [id number]  Admit Date:  9/2/2021    Nutrition Note:  MD consult received for enteral nutrition.  Noted awaiting surgery to place tube for nutrition support.  When tube placed recommend Peptamen Intense @ 10 ml/hr and increase to 20 ml/hr / trophic feeds patient proning.  Patient will need a goal rate of 45 ml/hr.  Will follow tolerance to TF.     Electronically signed by:  Malaika Rowley RD  09/06/21 11:18 EDT

## 2021-09-07 NOTE — PROGRESS NOTES
PROGRESS NOTE         Patient Identification:  Name:  Valdemar Solitario  Age:  76 y.o.  Sex:  male  :  1945  MRN:  [id number]  Visit Number:  [id number]  Primary Care Provider:  Laurence Geiger APRN         LOS: 5 days       ----------------------------------------------------------------------------------------------------------------------  Subjective       Chief Complaints:    Exposure To Known Illness, Altered Mental Status, and Weakness - Generalized        Interval History:      Remains sedated and intubated at 40% FiO2.  Continues to be in the prone position, paralytics infusing.  CRP improving from 2.93-2.54.  WBC stable at 15.63.  Lactate 1.3.  Chest x-ray from 2021 reports continued diffuse bilateral alveolar infiltrates with developing left lower lobe air bronchograms compatible with diffuse pneumonia.    Review of Systems:    Deferred due to altered mental status  ----------------------------------------------------------------------------------------------------------------------      Objective       Current Hospital Meds:  artificial tears, , Both Eyes, Q4H  chlorhexidine, 15 mL, Mouth/Throat, Q12H  dexamethasone, 6 mg, Intravenous, Daily  enoxaparin, 40 mg, Subcutaneous, Q24H  famotidine, 20 mg, Intravenous, BID  oxymetazoline, 2 spray, Each Nare, BID  sertraline, 100 mg, Oral, Daily  sodium chloride, 10 mL, Intravenous, Q12H  sodium chloride, 10 mL, Intravenous, Q12H  sodium chloride, 10 mL, Intravenous, Q12H  sodium chloride, 10 mL, Intravenous, Q12H  sodium chloride, 10 mL, Intravenous, Q12H  tamsulosin, 0.4 mg, Oral, Daily      dexmedetomidine, 0.2-1.5 mcg/kg/hr, Last Rate: Stopped (21 1426)  fentaNYL Citrate, , Last Rate: Stopped (21 1528)  norepinephrine, 0.02-0.3 mcg/kg/min, Last Rate: 0.05 mcg/kg/min (21 1218)  Pharmacy Consult - Pharmacy to dose,   propofol, 5-50 mcg/kg/min, Last Rate: 35 mcg/kg/min (21 0949)  vecuronium (NORCURON) infusion,  0.6-1.2 mcg/kg/min, Last Rate: 0.6 mcg/kg/min (09/07/21 0019)      ----------------------------------------------------------------------------------------------------------------------    Vital Signs:  Temp:  [97.4 °F (36.3 °C)-99 °F (37.2 °C)] 99 °F (37.2 °C)  Heart Rate:  [] 106  Resp:  [18] 18  BP: ()/(41-84) 167/78  FiO2 (%):  [40 %-60 %] 40 %  Mean Arterial Pressure (Non-Invasive) for the past 24 hrs (Last 3 readings):   Noninvasive MAP (mmHg)   09/07/21 1132 115   09/07/21 1117 116   09/07/21 1102 91     SpO2 Percentage    09/07/21 1102 09/07/21 1117 09/07/21 1132   SpO2: 95% 95% 95%     SpO2:  [83 %-100 %] 95 %  on   ;   Device (Oxygen Therapy): ventilator    Body mass index is 24.22 kg/m².  Wt Readings from Last 3 Encounters:   09/07/21 68 kg (150 lb)   06/14/17 73.9 kg (163 lb)   03/23/17 76.7 kg (169 lb)        Intake/Output Summary (Last 24 hours) at 9/7/2021 1256  Last data filed at 9/7/2021 0506  Gross per 24 hour   Intake 1922.44 ml   Output 1175 ml   Net 747.44 ml     NPO Diet  ----------------------------------------------------------------------------------------------------------------------      Physical Exam:    Deferred due to COVID-19 isolation      ----------------------------------------------------------------------------------------------------------------------  Results from last 7 days   Lab Units 09/02/21  1218 09/02/21  0950   TROPONIN T ng/mL 0.016 0.031*     Results from last 7 days   Lab Units 09/02/21  0950   PROBNP pg/mL 370.2       Results from last 7 days   Lab Units 09/07/21  0427   PH, ARTERIAL pH units 7.360   PO2 ART mm Hg 101.0   PCO2, ARTERIAL mm Hg 55.5*   HCO3 ART mmol/L 31.4*     Results from last 7 days   Lab Units 09/07/21  0320 09/06/21  0433 09/05/21  0227 09/04/21  0043 09/02/21  2328 09/02/21  1301 09/02/21  0950   CRP mg/dL 2.54* 2.93* 3.13*   < > 7.80*  --   --    LACTATE mmol/L 1.3  --  1.4  --  1.2   < > 2.3*   WBC 10*3/mm3 15.63* 14.27* 5.24   < >  4.02   < > 5.51   HEMOGLOBIN g/dL 13.8 13.8 12.4*   < > 12.0*   < > 11.8*   HEMATOCRIT % 44.1 43.6 38.2   < > 38.0   < > 37.2*   MCV fL 91.1 88.6 86.6   < > 89.4   < > 89.0   MCHC g/dL 31.3* 31.7 32.5   < > 31.6   < > 31.7   PLATELETS 10*3/mm3 232 239 135*   < > 124*   < > 126*   INR  1.23*  --  1.13*  --  0.96  --  0.95    < > = values in this interval not displayed.     Results from last 7 days   Lab Units 09/07/21  0320 09/06/21  0433 09/05/21  0227   SODIUM mmol/L 149* 148* 142   POTASSIUM mmol/L 5.1 4.9 4.6   CHLORIDE mmol/L 112* 110* 109*   CO2 mmol/L 26.7 27.2 22.9   BUN mg/dL 41* 49* 43*   CREATININE mg/dL 1.00 1.20 1.16   EGFR IF NONAFRICN AM mL/min/1.73 73 59* 61   CALCIUM mg/dL 8.8 8.7 8.7   GLUCOSE mg/dL 209* 228* 173*   ALBUMIN g/dL 2.94* 3.30* 2.94*   BILIRUBIN mg/dL 0.5 0.6 0.4   ALK PHOS U/L 54 60 47   AST (SGOT) U/L 63* 53* 51*   ALT (SGPT) U/L 41 38 30   Estimated Creatinine Clearance: 60.4 mL/min (by C-G formula based on SCr of 1 mg/dL).  No results found for: AMMONIA    No results found for: HGBA1C, POCGLU  Lab Results   Component Value Date    HGBA1C 6.30 (H) 09/02/2021     No results found for: TSH, FREET4    Blood Culture   Date Value Ref Range Status   09/02/2021 No growth at 24 hours  Preliminary   09/02/2021 No growth at 24 hours  Preliminary   09/02/2021 No growth at 24 hours  Preliminary     No results found for: URINECX  No results found for: WOUNDCX  No results found for: STOOLCX  No results found for: RESPCX  Pain Management Panel    There is no flowsheet data to display.           ----------------------------------------------------------------------------------------------------------------------  Imaging Results (Last 24 Hours)       Procedure Component Value Units Date/Time    XR Chest 1 View [[id number]] Resulted: 09/07/21 0833     Updated: 09/07/21 0949             ----------------------------------------------------------------------------------------------------------------------    Assessment/Plan       Assessment/Plan     ASSESSMENT:    1.  Severe sepsis with lactic acid greater than 2 on admission  2.  COVID-19 pneumonia    PLAN:    Remains sedated and intubated at 40% FiO2.  Continues to be in the prone position, paralytics infusing.  CRP improving from 2.93-2.54.  WBC stable at 15.63.  Lactate 1.3.  Chest x-ray from 9/6/2021 reports continued diffuse bilateral alveolar infiltrates with developing left lower lobe air bronchograms compatible with diffuse pneumonia.  Chest x-ray from 9/7/2021 pending.    Procalcitonin normal.  CT of the head unremarkable.  Chest x-ray from 9/2/2021 reports scattered bilateral patchy opacities that may represent COVID-19 pneumonia.  VQ scan indeterminate for exclusion of PE.  CT of the chest from 9/3/2021 reports patchy bilateral airspace disease concerning for COVID-19 pneumonia.  CT of the abdomen and pelvis from 9/3/2013 reports bibasilar airspace disease suggestive of COVID-19 pneumonia, large right inguinal hernia containing nondilated bowel and fat.     Agree with initiation of remdesivir and Decadron.    Baricitinib has been discontinued in the setting of emergent intubation.      We are concerned about possible development of bacterial pneumonia in the setting of prior treatment with baricitinib.  Would recommend broad-spectrum coverage with vancomycin and meropenem if today's chest x-ray shows worsening.       Code Status:   Code Status and Medical Interventions:   Ordered at: 09/02/21 2226     Level Of Support Discussed With:    Patient     Code Status:    CPR     Medical Interventions (Level of Support Prior to Arrest):    Full     Scribed for Brock Metzger MD by NELSON Torres. 9/7/2021  12:59 EDT       NELSON Torres  09/07/21  12:56 EDT    Physician Attestation:    The documentation recorded by the scribveto  accurately reflects the service I personally performed and the decisions made by me.    Brock Metzger MD  09/08/21  03:11 EDT

## 2021-09-07 NOTE — PROGRESS NOTES
[facility] HOSPITALIST PROGRESS NOTE     Patient Identification:  Name:  Valdemar Solitario  Age:  76 y.o.  Sex:  male  :  1945  MRN:  [id number]  Visit Number:  [id number]  ROOM: [address]     Primary Care Provider:  Laurence Geiger APRN    Length of stay in inpatient status:  5    Subjective     Chief Compliant:    Chief Complaint   Patient presents with   • Exposure To Known Illness   • Altered Mental Status   • Weakness - Generalized       History of Presenting Illness:    Patient's oxygenation worsened after being flipped supine yesterday and patient was placed in prone position after 8 hours. While supine, surgery was able to pass an OG tube via endoscopic guidance. Patient on 0.04 of levophed this AM. Called and updated patient's son on plan of care and answered all questions.     ROS:  Otherwise 10 point ROS negative other than documented above in HPI.     Objective     Current Hospital Meds:artificial tears, , Both Eyes, Q4H  chlorhexidine, 15 mL, Mouth/Throat, Q12H  dexamethasone, 6 mg, Intravenous, Daily  enoxaparin, 1 mg/kg, Subcutaneous, Q12H  famotidine, 20 mg, Intravenous, BID  oxymetazoline, 2 spray, Each Nare, BID  remdesivir, 100 mg, Intravenous, Q24H  sertraline, 100 mg, Oral, Daily  sodium chloride, 10 mL, Intravenous, Q12H  sodium chloride, 10 mL, Intravenous, Q12H  sodium chloride, 10 mL, Intravenous, Q12H  sodium chloride, 10 mL, Intravenous, Q12H  sodium chloride, 10 mL, Intravenous, Q12H  tamsulosin, 0.4 mg, Oral, Daily    dexmedetomidine, 0.2-1.5 mcg/kg/hr, Last Rate: Stopped (21)  fentaNYL Citrate, , Last Rate: Stopped (21)  norepinephrine, 0.02-0.3 mcg/kg/min, Last Rate: 0.04 mcg/kg/min (21)  Pharmacy Consult - Pharmacy to dose,   propofol, 5-50 mcg/kg/min, Last Rate: 35 mcg/kg/min (21)  vecuronium (NORCURON) infusion, 0.6-1.2 mcg/kg/min, Last Rate: 0.6 mcg/kg/min (21)        Current Antimicrobial  Therapy:  Anti-Infectives (From admission, onward)    Ordered     Dose/Rate Route Frequency Start Stop    09/03/21 0615  remdesivir 100 mg in sodium chloride 0.9 % 270 mL IVPB (powder vial)     Ordering Provider: Mg Augustine MD    100 mg  over 60 Minutes Intravenous Every 24 Hours 09/04/21 0900 09/08/21 0859    09/03/21 0615  remdesivir 200 mg in sodium chloride 0.9 % 290 mL IVPB (powder vial)     Ordering Provider: Mg Augustine MD    200 mg  over 60 Minutes Intravenous Every 24 Hours 09/03/21 0900 09/03/21 0940        Current Diuretic Therapy:  Diuretics (From admission, onward)    Ordered     Dose/Rate Route Frequency Start Stop    09/05/21 0732  furosemide (LASIX) injection 80 mg     Ordering Provider: Nic Webster MD    80 mg Intravenous Once 09/05/21 0830 09/05/21 0842        ----------------------------------------------------------------------------------------------------------------------  Vital Signs:  Temp:  [97.4 °F (36.3 °C)-97.7 °F (36.5 °C)] 97.7 °F (36.5 °C)  Heart Rate:  [51-99] 94  Resp:  [18] 18  BP: ()/(41-82) 100/59  FiO2 (%):  [40 %-60 %] 40 %  SpO2:  [83 %-100 %] 99 %  on   ;   Device (Oxygen Therapy): ventilator  Body mass index is 24.22 kg/m².    Wt Readings from Last 3 Encounters:   09/07/21 68 kg (150 lb)   06/14/17 73.9 kg (163 lb)   03/23/17 76.7 kg (169 lb)     Intake & Output (last 3 days)       09/04 0701 - 09/05 0700 09/05 0701 - 09/06 0700 09/06 0701 - 09/07 0700 09/07 0701 - 09/08 0700    P.O. 120 0      I.V. (mL/kg) 91.3 (1.2) 1024.2 (13.1) 1618.4 (23.8)     Other   202     NG/GT   102     IV Piggyback 250       Total Intake(mL/kg) 461.3 (6.1) 1024.2 (13.1) 1922.4 (28.3)     Urine (mL/kg/hr) 1050 (0.6) 1575 (0.8) 1175 (0.7)     Emesis/NG output  0      Stool  0 0     Total Output 1050 1575 1175     Net -588.7 -550.8 +747.4             Urine Unmeasured Occurrence 1 x           NPO  Diet  ----------------------------------------------------------------------------------------------------------------------  Physical exam:  GENERAL:  Patient intubated and sedated.   SKIN:  Warm, dry without rashes, purpura or petechiae.  EYES:  Pupils equal, round and reactive to light.  EOMs intact.  Conjunctivae normal.  HEAD:  Normocephalic. Atraumatic.   EARS/NOSE/MOUTH/THROAT:  Hearing intact. Septum midline.  No excoriations or nasal discharge. No stomatitis or ulcers.  Lips normal. Oropharynx without lesions or exudates.  NECK:  Supple with good range of motion; no thyromegaly or masses  LYMPHATICS:  No cervical, supraclavicular, axillary adenopathy.  RESP:  Lungs clear to auscultation. Good airflow. Intubated receiving mechanical ventilation.   CARDIAC:  Regular rate and rhythm without murmurs, rubs or gallops. Normal S1,S2. No edema  GI:  Soft, nontender, no hepatosplenomegaly, normal bowel sounds  MSK:  No clubbing or cyanosis, No joint swelling noted in hands  NEUROLOGICAL:  No focal deficit. Pupils equal and reactive.   ----------------------------------------------------------------------------------------------------------------------  Tele:    ----------------------------------------------------------------------------------------------------------------------  Results from last 7 days   Lab Units 09/07/21  0320 09/06/21  0433 09/05/21  0227 09/04/21  0043 09/02/21  2328 09/02/21  1301 09/02/21  0950   CRP mg/dL 2.54* 2.93* 3.13*   < > 7.80*  --   --    LACTATE mmol/L 1.3  --  1.4  --  1.2   < > 2.3*   WBC 10*3/mm3 15.63* 14.27* 5.24   < > 4.02   < > 5.51   HEMOGLOBIN g/dL 13.8 13.8 12.4*   < > 12.0*   < > 11.8*   HEMATOCRIT % 44.1 43.6 38.2   < > 38.0   < > 37.2*   MCV fL 91.1 88.6 86.6   < > 89.4   < > 89.0   MCHC g/dL 31.3* 31.7 32.5   < > 31.6   < > 31.7   PLATELETS 10*3/mm3 232 239 135*   < > 124*   < > 126*   INR  1.23*  --  1.13*  --  0.96  --  0.95    < > = values in this interval not  displayed.     Results from last 7 days   Lab Units 09/07/21  0427   PH, ARTERIAL pH units 7.360   PO2 ART mm Hg 101.0   PCO2, ARTERIAL mm Hg 55.5*   HCO3 ART mmol/L 31.4*     Results from last 7 days   Lab Units 09/07/21  0320 09/06/21  0433 09/05/21  0227   SODIUM mmol/L 149* 148* 142   POTASSIUM mmol/L 5.1 4.9 4.6   CHLORIDE mmol/L 112* 110* 109*   CO2 mmol/L 26.7 27.2 22.9   BUN mg/dL 41* 49* 43*   CREATININE mg/dL 1.00 1.20 1.16   EGFR IF NONAFRICN AM mL/min/1.73 73 59* 61   CALCIUM mg/dL 8.8 8.7 8.7   GLUCOSE mg/dL 209* 228* 173*   ALBUMIN g/dL 2.94* 3.30* 2.94*   BILIRUBIN mg/dL 0.5 0.6 0.4   ALK PHOS U/L 54 60 47   AST (SGOT) U/L 63* 53* 51*   ALT (SGPT) U/L 41 38 30   Estimated Creatinine Clearance: 60.4 mL/min (by C-G formula based on SCr of 1 mg/dL).  No results found for: AMMONIA  Results from last 7 days   Lab Units 09/02/21  1218 09/02/21  0950   TROPONIN T ng/mL 0.016 0.031*     Results from last 7 days   Lab Units 09/02/21  0950   PROBNP pg/mL 370.2         No results found for: HGBA1C, POCGLU  No results found for: TSH, FREET4  No results found for: PREGTESTUR, PREGSERUM, HCG, HCGQUANT  Pain Management Panel    There is no flowsheet data to display.       Brief Urine Lab Results  (Last result in the past 365 days)      Color   Clarity   Blood   Leuk Est   Nitrite   Protein   CREAT   Urine HCG        09/04/21 1137 Yellow Cloudy Trace Negative Negative Trace             Blood Culture   Date Value Ref Range Status   09/02/2021 No growth at 4 days  Preliminary   09/02/2021 No growth at 4 days  Preliminary   09/02/2021 No growth at 4 days  Preliminary     No results found for: URINECX  No results found for: WOUNDCX  No results found for: STOOLCX  No results found for: RESPCX  No results found for: AFBCX  Results from last 7 days   Lab Units 09/07/21  0320 09/06/21  0433 09/05/21  0227 09/04/21  0043 09/02/21  2328 09/02/21  1301 09/02/21  0950   PROCALCITONIN ng/mL  --   --   --   --  0.16  --   --     LACTATE mmol/L 1.3  --  1.4  --  1.2 0.8 2.3*   CRP mg/dL 2.54* 2.93* 3.13* 5.05* 7.80*  --   --        I have personally looked at the labs and they are summarized above.  ----------------------------------------------------------------------------------------------------------------------  Detailed radiology reports for the last 24 hours:    Imaging Results (Last 24 Hours)     Procedure Component Value Units Date/Time    XR Chest 1 View [[id number]] Resulted: 09/07/21 0833     Updated: 09/07/21 0833    XR Chest 1 View [[id number]] Collected: 09/06/21 1531     Updated: 09/06/21 1533    Narrative:      CR Chest 1 Vw    INDICATION:   Confirm endotracheal tube placement.     COMPARISON:    9/5/2021    FINDINGS:  Portable AP view(s) of the chest.    Endotracheal tube 1.3 cm above the beltran. Pull back about 1.5 to 2 cm may allow for more optimal positioning. Nasogastric tube distal tip within the stomach and proximal port below the GE junction. Right neck approach central line unchanged.    Lower lung volumes with cardiomegaly. Diffuse haziness over both lungs may represent diffuse infiltrates. No dense consolidation or sizable effusions. No pneumothorax.       Impression:      Endotracheal tube just above the beltran. Slight pull back 1.5 to 2 cm may allow for more optimal positioning.    NG tube and right neck approach central line in satisfactory position.    Continued diffuse bilateral alveolar infiltrates with developing left lower lobe air bronchograms compatible with diffuse pneumonia.    NOTIFICATION: Critical Value/emergent results were called by telephone at the time of interpretation on 9/6/2021 3:30 PM to Gerard the patient's ICU nurse who verbally acknowledged these results.    Signer Name: LISE Lopes MD   Signed: 9/6/2021 3:31 PM   Workstation Name: [facility]-    [facility]        Assessment & Plan    Mr. Solitario is our 76M PMH diabetes meliltus, depression, GERD,  hypertension, dyslipidemia, presented to his place of work in a state of confusion, brought to ER, found to be Covid +. Hospitalization complicated by worsening hypoxia.     #Septic shock, Acute Metabolic Encephalopathy, Acute Hypoxic Respiratory Failure requiring mechanical ventilation 2/2 viral pneumonia treating for Covid 19 c/b mild transaminitis  - Patient presented w/ increased shortness of breath, confusion, tachypnea, covid + on 9/2.  - Admission labs showed WBC count 5K, CRP 7.8, lactate 2.3, d-dimer 0.95, procal 0.16, MRSA -, covid +, Bcx's NGTD  - B/l Venous Duplex negative for DVT  - VQ scan showed indeterminate study, noted abnormal perfusion, multifocal defects could be related to multifocal airspace disease but also PE.  - CT Head showed no acute intracranial abnormality  - CT Chest showed patchy b/l airspace disease c/w covid 19  - ID consulted; Following  - CTPE on 9/4 did not reveal PE but did show worsening bilateral airspace disease that is likely worsening COVID pneumonia. Given fluid in fissure on plain film could not rule out component of edema  - Continue 6mg IV Dexamethasone x 10 days  - Finished course of remdesivir.   - - Baricitinib per ID recs. Has been d/c by ID in interim due to intubation.   - Continue Level I AC for Covid 19 thromboembolism Ppx  - Continue APAP PRN for fevers  - Continue Albuterol Inhaler  - Patient intubated for worsening hypoxia on 9/5.   - Trend Covid 19 progression labs w/ CBC, CMP, CK, CRP, Ferritin daily and LDH, D-dimer, Fibrinogen q48hrs.  - Enhanced droplet/contact isolation precautions  - P/F ratio post intubation 80 likely representing severe ARDS. Patient paralyzed and prone positioned. Oxygenation improved while prone but when supinated yesterday P/F ratio dropped back down to 90 on 60% FiO2.   - ABG this AM 7.360/55/101 on 45% FiO2, PEEP of 8, , and RR 18 while prone positioned. Pplateau pressure around 21 on these settings. Will flip supine  around 1:30 pm and repeat gas after.   - Suspect pressor requirement a combination of sedation and sepsis. Currently on 0.04 of levophed.    - Pulmonology consulted. Appreciate recs.     #HTN/Dyslipidemia  #Elevated Troponin, NSTEMI Type II suspected  #Cardiomyopathy - New  #Pulm HTN - New  - Labs showed trops 0.031->0.016, proBNP 370   - EKG showed NSR, RBBB, no significant ST changes  - Echo showed LVEF 46-50%, mild-mod dilated RV, mild-mod dilated RA, mod-severe Pulm HTN, RVSP 45-55mmHg  - Consulted Cardiology; Recs pending  - Holding home ARB due to lower Bps  - Holding home statin while on Remdesivir   - Continue to monitor on tele, strict I/O's, daily weights, trend HR and BP  - Given pressor requirement will hold diuresis at this time.     #Hypernatremia  - Corrected sodium 150  - Increased free water flushes from 20 to 40 cc/hr       #Nonoliguric MURIEL - Improved   - B/l Cr 0.9-1.0, was up to 2.1 on admission; Now 1.2  - S/p mIVFs, Trend Cr and UOP, avoid nephrotoxins, NSAIDs, dehydration and contrast as able.    #NIDDM Type II, controlled, unknown complications  - Hgb A1c = 6.3%  - Holding home oral agents, continue FSBG and SSI, add long acting as indicated.    #Anxiety/Depression  - Continue home regimen     #GERD  - Continue home PPI     #BPH  - Continue home Flomax    F: TFs  A: Fentanyl   S: Propofol   T: Lovenox  H: HOB elevated   U: Famotidine   G: PRN  S: Not ready   B: PRN  I: ETT 9/5, RIJ 9/5  D: None     Code status: Full. Guarded prognosis.      Dispo: Pending workup and clinical improvement     *This patient is considered high risk due to acute metabolic encephalopathy, acute respiratory failure, covid 19, monitoring for drug toxicities on Remdesivir, MURIEL, new cardiomyopathy and pulm HTN. Patient now critically ill requiring mechanical ventilation. 40 minutes of critical care time spent on patient, with greater than 50% of this time bedside or discussing care with hospital staff.     VTE  Prophylaxis:   Mechanical Order History:     None      Pharmalogical Order History:      Ordered     Dose Route Frequency Stop    09/03/21 0739  enoxaparin (LOVENOX) syringe 80 mg      1 mg/kg SC Every 12 Hours --    09/03/21 0724  Pharmacy to Dose enoxaparin (LOVENOX)  Status:  Discontinued      -- XX Continuous PRN 09/03/21 0740    09/02/21 1237  enoxaparin (LOVENOX) syringe 40 mg  Status:  Discontinued      40 mg SC Every 24 Hours 09/02/21 1247    09/02/21 1247  enoxaparin (LOVENOX) syringe 40 mg  Status:  Discontinued      0.5 mg/kg SC Every 24 Hours 09/03/21 0549                Nic Webster MD  [facility]  09/07/21  08:46 EDT

## 2021-09-07 NOTE — CASE MANAGEMENT/SOCIAL WORK
"Discharge Planning Assessment   [place]     Patient Name: Valdemar Solitario  MRN: [id number]  Today's Date: 9/7/2021    Admit Date: 9/2/2021    Discharge Plan     Row Name 09/07/21 1401       Plan    Plan SS received consult for \"CCU pt. Assist with dc planning, dc needs.\" CM completed initial assessment. Pt lives alone and plans to return home at discharge with family providing transportation. Pt does not utilize home health services or DME at this time. PCP is Laurence Geiger. SS to follow and assist.        SHALONDA Lewis    "

## 2021-09-07 NOTE — PAYOR COMM NOTE
"CONTACT:  SEBASTIEN RICHARDS MSN, APRN  UTILIZATION MANAGEMENT DEPT.  [facility]  [address] [facility] [place], [address]  PHONE:  [phone]  FAX: [phone]    CLINICAL PER REQUEST.    REFERENCE # [id number]    CLINICAL INITIALLY FAXED ON 9/4/21.     [facility]        Fax number: [phone]*      Phone number: None entered      Contact name: None entered      Last communication          Document Sent Last Sent Sent By Status First Sent View               Payor Comm Note 9/4/21 10:30 Annabella Lopes LPN Completed 9/4/21 10:30                   MikeValdemar pritchett (76 y.o. Male)     Date of Birth Social Security Number Address Home Phone MRN    1945  [address]   [facility] [id number] [phone] [id number]    Christianity Marital Status          Nondenominational        Admission Date Admission Type Admitting Provider Attending Provider Department, Room/Bed    9/2/21 Emergency Jose De Jesus Mccord MD Hacker, Bill J, MD [facility] CRITICAL CARE, CC03/1C    Discharge Date Discharge Disposition Discharge Destination                       Attending Provider: Nic Webster MD    Allergies: Codeine    Isolation: Enh Drop/Con   Infection: COVID (confirmed) (09/02/21)   Code Status: CPR    Ht: 167.6 cm (65.98\")   Wt: 68 kg (150 lb)    Admission Cmt: None   Principal Problem: None                Active Insurance as of 9/2/2021     Primary Coverage     Payor Plan Insurance Group Employer/Plan Group    [facility] [id number]     Payor Plan Address Payor Plan Phone Number Payor Plan Fax Number Effective Dates    [address] [address] [phone]  1/1/2020 - None Entered    [facility] [id number]       Subscriber Name Subscriber Birth Date Member ID       VALDEMAR LECHUGA 1945 [id number]                 Emergency Contacts      (Rel.) Home Phone Work Phone Mobile Phone    MarianelaJosef (Brother) [phone] -- --    marianela Janessa (Son) -- -- [phone]    jaquelin nino (Sister) -- -- [phone]    "            History & Physical      Margarita Rowe PA-C at 21 [phone]     Attestation signed by Jose De Jesus Mccord MD at 21 1301    I have evaluated the patient independently, I have reviewed H&P, all labs and images from today.  I have discussed w/ KVNG Rowe and agree.                          [facility] Medicine Services  History & Physical    Patient Identification:  Name:  Valdemar Solitario  Age:  76 y.o.  Sex:  male  :  1945  MRN:  [id number]   Visit Number:  [id number]  Admit Date: 2021   Primary Care Physician:  Laurence Geiger APRN    Subjective     Chief complaint: Picked up from his work due to confusion, dyspnea, and weakness    History of presenting illness:      Valdemar Solitario is a 76 y.o. male with past medical history significant for diabetes meliltus, depression, GERD, hypertension, dyslipidemia.  He arrived to the ED via EMS after presenting to his place of work in a state of confusion.  He reported he had been feeling bad for a few days which worsened on 2021.  He reported while at work he was unable to remember his locker combination and was experiencing shortness of breath and generalized weakness.    Upon arrival to the ED, vital signs were T 98.8F, pulse 85, RR 14, and BP 96/52 with room air oxygen saturation of 82%.  Initial arterial blood gas revealed pH of 7.324 with PCO2 of 42.4 and PO2 of 47.9 on room air.  Troponin T was found to be 0.031.  Creatinine was found to be elevated at 2.10.  Lactate was found to be elevated at 2.3 with D-dimer of 0.95.  Hemoglobin was found to be 11.8.    Mr. Solitario is evaluated in the ED currently on BiPAP.  He reports feeling unwell for a few days but has difficulty talking due to BiPAP. He reports he has been making urine, though it is unclear at present if he has produced a specimen while in the ED.  He reports cough, weakness, and dyspnea.  He is alert to self.  He follows commands but is  wearing BiPAP during examination. He denies chest pain and known dysuria.  He reports he has had some pain in his low back.         ---------------------------------------------------------------------------------------------------------------------   Review of Systems   Constitutional: Positive for fatigue.   HENT: Negative for congestion and drooling.    Eyes: Negative for pain and discharge.   Respiratory: Positive for cough and shortness of breath.    Cardiovascular: Negative for chest pain and leg swelling.   Gastrointestinal: Negative for abdominal distention, diarrhea and nausea.   Endocrine: Negative for cold intolerance and heat intolerance.   Genitourinary: Negative for difficulty urinating and dysuria.   Musculoskeletal: Negative for arthralgias and gait problem.   Skin: Negative for color change and pallor.   Allergic/Immunologic: Negative for environmental allergies and food allergies.   Neurological: Positive for weakness. Negative for dizziness.   Hematological: Negative for adenopathy. Does not bruise/bleed easily.   Psychiatric/Behavioral: Negative for agitation and confusion.        ---------------------------------------------------------------------------------------------------------------------   Past Medical History:   Diagnosis Date   • Depression    • Diabetes mellitus (CMS/[facility])     states border diabetic, is not on meds for it   • Dyslipidemia    • GERD (gastroesophageal reflux disease)    • Hypertension    • Thoracic spine pain    • Vitamin B12 deficiency (dietary) anemia    • Vitamin D deficiency      Past Surgical History:   Procedure Laterality Date   • APPENDECTOMY     • CATARACT EXTRACTION     • HERNIA REPAIR       Family History   Problem Relation Age of Onset   • Hypertension Mother      Social History     Socioeconomic History   • Marital status:      Spouse name: Not on file   • Number of children: Not on file   • Years of education: Not on file   • Highest education  level: Not on file   Tobacco Use   • Smoking status: Former Smoker   Substance and Sexual Activity   • Alcohol use: No   • Drug use: No   • Sexual activity: Defer     ---------------------------------------------------------------------------------------------------------------------   Allergies:  Codeine  ---------------------------------------------------------------------------------------------------------------------   Home medications:    Medications below are reported home medications pulling from within the system; at this time, these medications have not been reconciled unless otherwise specified and are in the verification process for further verifcation as current home medications.  (Not in a hospital admission)      Hospital Scheduled Meds:  enoxaparin, 0.5 mg/kg, Subcutaneous, Q24H           Current listed hospital scheduled medications may not yet reflect those currently placed in orders that are signed and held awaiting patient's arrival to floor.   ---------------------------------------------------------------------------------------------------------------------     Objective     Vital Signs:  Temp:  [98.8 °F (37.1 °C)] 98.8 °F (37.1 °C)  Heart Rate:  [65-86] 69  Resp:  [14-24] 24  BP: ()/(45-72) 114/62      09/02/21  0931   Weight: 81.6 kg (180 lb)     Body mass index is 29.05 kg/m².  ---------------------------------------------------------------------------------------------------------------------       Physical Exam  Vitals and nursing note reviewed.   Constitutional:       Interventions: Face mask in place.   HENT:      Head: Normocephalic and atraumatic.   Eyes:      General: Lids are normal.      Conjunctiva/sclera:      Right eye: Right conjunctiva is not injected.      Left eye: Left conjunctiva is not injected.   Cardiovascular:      Rate and Rhythm: Normal rate and regular rhythm.      Heart sounds: S1 normal and S2 normal.   Pulmonary:      Effort: No tachypnea or bradypnea.       Breath sounds: Examination of the right-lower field reveals decreased breath sounds. Examination of the left-lower field reveals decreased breath sounds. Decreased breath sounds present. No wheezing, rhonchi or rales.   Abdominal:      General: Bowel sounds are normal.      Palpations: Abdomen is soft.      Tenderness: There is no abdominal tenderness.   Musculoskeletal:      Right lower leg: No edema.      Left lower leg: No edema.   Skin:     General: Skin is warm and dry.   Neurological:      Mental Status: He is alert.      Comments: Alert to self.  Follows commands but wearing BiPAP mask making it difficult to assess to some degree.  He is alert to self and location.    Psychiatric:         Attention and Perception: Attention normal.         Mood and Affect: Mood is anxious.                 I have personally reviewed the above radiology images and read the final radiology report on 09/02/21  ---------------------------------------------------------------------------------------------------------------------  Assessment / Plan     Active Hospital Problems    Diagnosis  POA   • Pneumonia due to COVID-19 virus [U07.1, J12.82]  Yes   • Acute respiratory failure with hypoxia (CMS/HCC) [J96.01]  Yes       ASSESSMENT/PLAN:  -COVID-19 Pneumonia  -Lactic acidosis  Date of initial positive swab: 9/2/21  IV decadron 6mg on board  IV Remdesivir on board per pharmacy dosing with monitoring of renal and hepatic function.   COVID 19 progression labs have been ordered with q24 hour CBC, CMP, CK, CRP, and Ferritin level in addition to q48 hour LDH,D-dimer, and fibrinogen levels.  Vaccination status: Unvaccinated.    Continuous pulse oximetry monitoring.   Continuous cardiac monitoring.   Imaging studies: CXR with COVID-19, CT chest without contrast added.   Full dose anticoagulation at present given indeterminate VQ scan  • Obtain UA.   • Blood cultures added x2. One culture obtained around 9AM this morning, would like to  obtain true set given increased findings of bacteremia in recent COVID-19 admission.        -Acute hypoxemic respiratory failure likely 2/2 COVID-19:   · Continuous cardiac monitoring.   · Treat COVID-19 as outlined within this document.   · ABGs reviewed with repeat pO2 improved with increasing O2.     -Metabolic encephalopathy upon presentation likely 2/2 COVID-19 & Hypoxia:   · CT head unremarkable.   · Check UA.   · Add neurochecks.   · Treat COVID-19 as outlined.     -Mildly elevated troponin T likely 2/2 acute hypoxemia:   · Repeat troponin T improved.   · Continuous cardiac monitoring.     -Acute kidney injury:   · Avoid nephrotoxins when possible.   · Received 1L of NS in the ED.   · Add UA.   · Obtain CT abd/pelvis during CT chest without contrast to rule out possible underlying obstructive process.    · Urine output: Add I/O monitoring.  Reports adequate output but no current available specimen.      -Elevated d-dimer:   · VQ indeterminate.   · Add US venous doppler.   · Full dose lovenox per pharmacy dosing for now.     -Mildly elevated AST:   · Hepatitis panel pending.     -Hx borderline DM:   · Check hemoglobin a1c.   ----------  -DVT prophylaxis: SQ Lovenox  -Activity: Up with assist  -Expected length of stay: INPATIENT status due to the need for care which can only be reasonably provided in an hospital setting such as aggressive/expedited ancillary services and/or consultation services, the necessity for IV medications, close physician monitoring and/or the possible need for procedures.  In such, I feel patient’s risk for adverse outcomes and need for care warrant INPATIENT evaluation and predict the patient’s care encounter to likely last beyond 2 midnights.  -Disposition: Plans to return home at discharge.    High risk secondary to acute hypoxemic respiratory failure 2/2 COVID-19 pneumonia        Margarita Rowe PA-C  09/02/21  23:54 EDT  Pager #  [phone]  ---------------------------------------------------------------------------------------------------------------------             Electronically signed by Jose De Jesus Mccord MD at 09/03/21 1301        09/03/21 2200   --   --   --   26   108/52   Lying   --    09/03/21 2108   99.4 (37.4)   Axillary   81   23   122/62   Lying   94    09/03/21 1849   --   --   81   26   --   --   98    09/03/21 1712   --   --   --   --   --   --   (!) 87    09/03/21 1136   --   --   81   28   --   --   93    09/03/21 1023   97.3 (36.3)   Axillary   75   28   124/71   Lying   91    09/03/21 0417   --   --   61   --   93/53   --   90    09/03/21 0402   --   --   61   --   93/58   --   (!) 89    09/03/21 0347   --   --   62   --   94/56   --   90    09/03/21 0332   --   --   61   --   110/63   --   (!) 89    09/03/21 0317   --   --   63   --   94/60   --   (!) 88    09/03/21 0302   --   --   63   --   102/57   --   (!) 87    09/03/21 0247   --   --   71   --   120/70   --   (!) 88    09/03/21 0233   --   --   77   --   132/72   --   (!) 89    09/03/21 0217   --   --   70   --   95/57   --   90    09/03/21 0202   --   --   63   --   97/55   --   92    09/03/21 0147   --   --   67   --   (!) 78/61   --   95    09/03/21 0132   --   --   64   --   97/60   --   (!) 89    09/03/21 0117   --   --   63   --   96/56   --   (!) 89    09/03/21 0102   --   --   71   --   102/61   --   (!) 86    09/03/21 0048   --   --   63   --   122/67   --   (!) 87    09/03/21 0035   --   --   80   20   --   --   91    09/03/21 0033   --   --   78   --   129/72   --   92    09/03/21 0016   --   --   64   --   102/60   --   91    09/03/21 0002   --   --   62   --   91/55   --   90 09/02/21 2347   --   --   65   --   103/58   --   92    09/02/21 2333   --   --   69   --   114/62   --   91    09/02/21 2318   --   --   65   --   97/52   --   (!) 85 09/02/21 2303   --   --   70   --   105/60   --   90 09/02/21 2248   --   --   85   --   (!) 85/45   --    91    09/02/21 2247   --   --   86   24   --   --   92    09/02/21 2232   --   --   67   --   98/54   --   92    09/02/21 2216   --   --   69   --   101/55   --   91    09/02/21 2202   --   --   71   --   114/66   --   91    09/02/21 2158   --   --   76   --   115/60   --   (!) 89    09/02/21 1555   --   --   74   --   --   --   97    09/02/21 1203   --   --   --   --   105/57   --   94    09/02/21 1143   --   --   --   --   111/55   --   93    09/02/21 1123   --   --   78   --   107/56   --   96    09/02/21 1103   --   --   81   --   111/59   --   97    09/02/21 0931   98.8 (37.1)   Tympanic   85   14   96/52   Sitting   (!) 82    SpO2: Informed ED MD, Dr. Boyer and lead RN who instruct for pt to be placed on 2L NC in triage.  at 09/02/21 0931              Intake & Output       09/02 0701 - 09/03 0700 09/03 0701 - 09/04 0700 09/04 0701 - 09/05 0700    P.O.   120    I.V. (mL/kg)  1074.7 (14.1) 91.3 (1.2)    Other       NG/GT       IV Piggyback   250    Total Intake(mL/kg)  1074.7 (14.1) 461.3 (6.1)    Urine (mL/kg/hr) 100 1150 (0.6) 1050 (0.6)    Emesis/NG output       Stool  0     Total Output 100 1150 1050    Net -100 -75.3 -588.7           Urine Unmeasured Occurrence  1 x 1 x            Medication Administration Report for Valdemar Solitario as of 09/07/21 1051   Medications 09/03/21 09/04/21 09/05/21 09/06/21 09/07/21    artificial tears ophthalmic ointment  Freq: Every 4 Hours Route: Both Eyes  Start: 09/05/21 1530    Admin Instructions:   Apply to each eye while patient on neuromuscular blockade.       (4837) 3370 0616       0413       (6430) [C]       (1130) [C]       1603       2014       4807        022       (5710) [C]       (4730) [C]       1130       1539       1934       2337           chlorhexidine (PERIDEX) 0.12 % solution 15 mL  Dose: 15 mL  Freq: Every 12 Hours Scheduled Route: MT  Start: 09/05/21 1400    Admin Instructions:    Do not swallow.       (8159) 0532         1201       2116        1002       2100           dexamethasone (DECADRON) injection 6 mg  Dose: 6 mg  Freq: Daily Route: IV  Start: 09/03/21 0900    Admin Instructions:   For IV administration. May be pushed over a minimum of 1 minute.     0836        0854        0849        0953        1006           famotidine (PEPCID) injection 20 mg  Dose: 20 mg  Freq: 2 Times Daily Route: IV  Start: 09/05/21 2100    Admin Instructions:   Dilute to 10 mL total volume and give IV push over 2 minutes.       2036 0948       2116        1008 2100           oxymetazoline (AFRIN) nasal spray 2 spray  Dose: 2 spray  Freq: 2 Times Daily Route: EACH NARE  Start: 09/06/21 1000   End: 09/09/21 0859    Admin Instructions:   Not to exceed 3 days          1200       2119        1000       2100           remdesivir 200 mg in sodium chloride 0.9 % 290 mL IVPB (powder vial)  Dose: 200 mg  Freq: Every 24 Hours Route: IV  Start: 09/03/21 0900   End: 09/03/21 0940    Admin Instructions:   Ensure all of the drug is administered. Flush line with 30mL NS after administration.     0840              Followed by  remdesivir 100 mg in sodium chloride 0.9 % 270 mL IVPB (powder vial)  Dose: 100 mg  Freq: Every 24 Hours Route: IV  Start: 09/04/21 0900   End: 09/08/21 0859    Admin Instructions:   Ensure all of the drug is administered. Flush line with 30mL NS after administration.      0852        0854        0943        0958           sertraline (ZOLOFT) tablet 100 mg  Dose: 100 mg  Freq: Daily Route: PO  Start: 09/03/21 0900    0840        0853        0850)        (1613)        1022           sodium chloride 0.9 % flush 10 mL  Dose: 10 mL  Freq: Every 12 Hours Scheduled Route: IV  Start: 09/06/21 0015    Admin Instructions:   Lumen #3               0952       2118        1020       2100           sodium chloride 0.9 % flush 10 mL  Dose: 10 mL  Freq: Every 12 Hours Scheduled Route: IV  Start: 09/06/21 0015    Admin Instructions:   Lumen #2                0953       2118 1020 2100           sodium chloride 0.9 % flush 10 mL  Dose: 10 mL  Freq: Every 12 Hours Scheduled Route: IV  Start: 09/06/21 0015    Admin Instructions:   Lumen #1               0953       2118        1021 2100           sodium chloride 0.9 % flush 10 mL  Dose: 10 mL  Freq: Every 12 Hours Scheduled Route: IV  Start: 09/03/21 2100    2218        0854       2125        0854       2036        0954       2119        1021 2100           sodium chloride 0.9 % flush 10 mL  Dose: 10 mL  Freq: Every 12 Hours Scheduled Route: IV  Start: 09/03/21 0900    0840       2218        0855       2125        0854       2037        0954       2120        1021 2100           tamsulosin (FLOMAX) 24 hr capsule 0.4 mg  Dose: 0.4 mg  Freq: Daily Route: PO  Start: 09/03/21 0900    Admin Instructions:   Swallow whole. Do not crush or chew.     0840        0853        (6957) (9646)        1026          Future Medications  Medications 09/03/21 09/04/21 09/05/21 09/06/21 09/07/21       enoxaparin (LOVENOX) syringe 40 mg  Dose: 40 mg  Freq: Every 24 Hours Route: SC  Indications of Use: PROPHYLAXIS OF VENOUS THROMBOEMBOLISM  Start: 09/07/21 2100    Admin Instructions:   Give subcutaneous in abdomen only. Do not massage site after injection.         2100          Completed Medications  Medications 09/03/21 09/04/21 09/05/21 09/06/21 09/07/21       furosemide (LASIX) injection 80 mg  Dose: 80 mg  Freq: Once Route: IV  Start: 09/05/21 0830   End: 09/05/21 0842      0842             hydrOXYzine (ATARAX) tablet 25 mg  Dose: 25 mg  Freq: Once Route: PO  Start: 09/03/21 0845   End: 09/03/21 0926    Admin Instructions:   Caution: Look alike/sound alike drug alert     0926               iopamidol (ISOVUE-370) 76 % injection 100 mL  Dose: 100 mL  Freq: Once in Imaging Route: IV  Start: 09/04/21 1530   End: 09/04/21 1432     1432              sodium chloride 0.9 % bolus 250 mL  Dose: 250  mL  Freq: Once Route: IV  Start: 09/05/21 1515   End: 09/05/21 1600      1500             technetium albumin aggregated (MAA) solution 1 dose  Dose: 1 dose  Freq: Once in Imaging Route: IV  Start: 09/02/21 1859   End: 09/02/21 1857    Order specific questions:   Millicuries: 4.2              thiamine (B-1) 100 mg in sodium chloride 0.9 % 100 mL IVPB  Dose: 100 mg  Freq: Daily Route: IV  Start: 09/04/21 0400   End: 09/06/21 1051    Admin Instructions:   Protect from light.      0434        1037        1021            vecuronium (NORCURON) injection 7.6 mg  Dose: 100 mcg/kg  Weight Dosing Info: 76.2 kg  Freq: Once Route: IV  Start: 09/05/21 1615   End: 09/05/21 1523    Admin Instructions:   Dilute to 10mL total volume with 0.9% NaCl. CAUTION: Paralytic       1523            Discontinued Medications  Medications 09/03/21 09/04/21 09/05/21 09/06/21 09/07/21       artificial tears ophthalmic ointment  Freq: Every 4 Hours Route: Both Eyes  Start: 09/05/21 1615   End: 09/05/21 1527    Admin Instructions:   Apply to each eye while patient on neuromuscular blockade.             artificial tears ophthalmic ointment  Freq: Every 4 Hours Route: Both Eyes  Start: 09/05/21 1530   End: 09/05/21 2312      (1530)       (1615) [C]             artificial tears ophthalmic ointment  Freq: Every 4 Hours Route: Both Eyes  Start: 09/05/21 1400   End: 09/05/21 1320    Admin Instructions:   Apply to each eye while patient on neuromuscular blockade.             baricitinib (OLUMIANT) tablet 4 mg  Dose: 4 mg  Freq: Daily Route: PO  Start: 09/03/21 2000   End: 09/06/21 0857    2217 [C]        0855        (3432)             enoxaparin (LOVENOX) syringe 80 mg  Dose: 1 mg/kg  Weight Dosing Info: 75.8 kg  Freq: Every 12 Hours Route: SC  Indications of Use: PROPHYLAXIS OF VENOUS THROMBOEMBOLISM  Indications Comment: Level III asmita  Start: 09/03/21 0900   End: 09/07/21 0859    Admin Instructions:   Give subcutaneous in abdomen only. Do not massage  site after injection.     0926       2217 [C]        0853       2115        1113       2036        0955       2117            pantoprazole (PROTONIX) EC tablet 40 mg  Dose: 40 mg  Freq: Every Morning Route: PO  Start: 09/03/21 0700   End: 09/05/21 1308    Admin Instructions:   Swallow whole; do not crush, split, or chew.     0840        0852 [C]        0609 [C]             sterile water (preservative free) injection  - ADS Override Pull  Start: 09/05/21 1521   End: 09/06/21 0329    Admin Instructions:   Created by cabinet override       (1530)                   and   Medication Administration Report for Valdemar Solitario as of 09/07/21 1051   Medications 09/03/21 09/04/21 09/05/21 09/06/21 09/07/21    dexmedetomidine (PRECEDEX) 400 mcg in 100 mL NS infusion  Rate: 3.8-28.4 mL/hr Dose: 0.2-1.5 mcg/kg/hr  Weight Dosing Info: 75.8 kg  Freq: Titrated Route: IV  Start: 09/04/21 0300    Admin Instructions:   Begin infusion at 0.2 mcg/kg/hr and titrate by 0.1 mcg/kg/hr every 15 minutes to maintain RASS goal.  Maximum rate 1.5 mcg/kg/hr.  Notify MD if not at RASS goal and requiring 1.5 mcg/kg/hr or heart rate is less than 50 beats per minute or MAP is less than 60 mmHg.     Order specific questions:   RASS Goal: 0 TO -1       0323       0434       4302 6825       1428             fentaNYL (SUBLIMAZE) PCA 1500 mcg/30 mL syringe  Nurse Loading Dose: 0 mcg  Patient Bolus Dose: 0 mcg  Lockout Interval: 10 Minutes  Basal Rate: 0 mcg/hr  One Hour Dose Limit: 300 mcg  Freq: Titrated Route: IV  Start: 09/05/21 1400   End: 09/08/21 1304    Admin Instructions:   Begin infusion at 50 mcg/hr. Titrate up or down by 50 mcg/hr every 5 minutes for NRS 7-10 or CPOT 5-8. Max. dose of 300 mcg/hr.    Caution: Look alike/sound alike drug alert    If given for pain, use the following pain scale:  Mild Pain = Pain Score of 1-3, CPOT 1-2  Moderate Pain = Pain Score of 4-6, CPOT 3-4  Severe Pain = Pain Score of 7-10, CPOT 5-8     Order  specific questions:   RASS Goal: -1 TO -2        1305       1310       1315       1325       1450       1457       1528 [C]             norepinephrine (LEVOPHED) 8 mg in 250 mL NS infusion (premix)  Rate: 2.86-42.86 mL/hr Dose: 0.02-0.3 mcg/kg/min  Weight Dosing Info: 76.2 kg  Freq: Titrated Route: IV  Start: 09/05/21 1515    Admin Instructions:   Initiate infusion at 0.02 mcg/kg/min and titrate up or down by 0.02 mcg/kg/min every 5 minutes to use the lowest dose possible to maintain a MAP greater than or equal To 65 mm Hg. Maximum Dose = 0.3 mcg/kg/min. Contact provider if unable to maintain MAP target at the maximum dose or if MAP is greater than 95 mm Hg. Once MAP target achieved, obtain vitals a minimum of every 30 minutes.  With provider order may titrate to maximum dose of 0.5 mcg/kg/min.  Concentration 8 mg/250 mL Central line preferred, if unavailable use large bore IV access with frequent nurse monitoring of IV site.       1432       1520       1529 [C]       1834       1943       2145       2150        2116        0222           propofol (DIPRIVAN) infusion 10 mg/mL 100 mL  Rate: 2.29-22.86 mL/hr Dose: 5-50 mcg/kg/min  Weight Dosing Info: 76.2 kg  Freq: Titrated Route: IV  Start: 09/05/21 1400    Admin Instructions:   Begin infusion at 5 mcg/kg/min and titrate up or down by 5 mcg/kg/min every 5 minutes to maintain RASS goal.  Maximum dose = 50 mcg/kg/min.  Notify MD if not at goal and requiring more than 50 mcg/kg/min. Infuse into dedicated line, change vial and tube every 12 hours. Patient must be intubated.    RASS Level:   0 (Alert & Calm) - Alert & Calm Spontaneously pays attention to caregiver.  -1 (Drowsy) - Not fully alert, but has sustained awakening to voice (eye opening & contact >10 sec)  -2 (Light Sedation) - Briefly awakens to voice (eyes open & contact <10 sec)  -3 (Moderate Sedation) - Movement or eye opening to voice (no eye contact)  -4 (Deep Sedation) - No response to voice, but movement  or eye opening to physical stimulation  -5 (Unarousable) - No response to voice or physical stimulation     Order specific questions:   RASS Goal: -1 TO -2        1305       1310       1315       1320       1426       1431       1450       1458       2121       2130       2135        0425       1024       1603       2115        022       0972           vecuronium (NORCURON) 100 mg in sodium chloride 0.9 % 100 mL (1 mg/mL) infusion  Rate: 2.74-5.49 mL/hr Dose: 0.6-1.2 mcg/kg/min  Weight Dosing Info: 76.2 kg  Freq: Titrated Route: IV  Start: 09/05/21 1530    Admin Instructions:   Begin infusion at 0.6 mcg/kg/min.  If need to increase rate, REBOLUS with 50 mcg/kg and increase infusion rate by 0.2 mcg/kg/minute every 30 minutes.  Maximum rate = 1.2 mcg/kg/min.  Train of Four Monitoring: Twitches = 0 - Hold Infusion, Recheck TOF Every 30-60 Minutes Until Response Occurs. Resume Infusion Once TOF is 3-4 Twitches Twitches = 1-2 - Adjust Per Medication Orders Twitches = 3-4 Or No Diaphragmatic Movement - Maintain Infusion & Assess Every 4 Hours       1503       1517       1537 [C]       1944 [C]       2127         0013                  Lab Results (all)     Procedure Component Value Units Date/Time    Specimen: Arterial Blood Updated: 09/04/21 2005     Site Right Brachial     Aj's Test N/A     pH, Arterial 7.442 pH units      pCO2, Arterial 36.6 mm Hg      pO2, Arterial 60.6 mm Hg      Comment: 84 Value below reference range        HCO3, Arterial 24.9 mmol/L      Base Excess, Arterial 1.0 mmol/L      O2 Saturation, Arterial 91.9 %      Comment: 84 Value below reference range        Hemoglobin, Blood Gas 12.7 g/dL      Comment: 84 Value below reference range        Hematocrit, Blood Gas 39.0 %      Oxyhemoglobin 91.1 %      Comment: 84 Value below reference range        Methemoglobin 0.20 %      Carboxyhemoglobin 0.7 %      A-a Gradiant 581.8 mmHg      CO2 Content 26.1 mmol/L      Temperature 0.0 C      Barometric  Pressure for Blood Gas 726 mmHg      Modality BiPap     FIO2 100 %      Ventilator Mode BiPAP     Set [facility] Resp Rate 20.0     IPAP 15     Comment: Meter: [id number]     :  [id number]        EPAP 5     Note --     Collected by [id number]     pH, Temp Corrected --     pCO2, Temperature Corrected --     pO2, Temperature Corrected --    Urinalysis, Microscopic Only - Urine, Random Void [[id number]] Collected: 09/04/21 1137    Specimen: Urine, Random Void Updated: 09/04/21 1229     RBC, UA 0-2 /HPF      WBC, UA 0-2 /HPF      Bacteria, UA None Seen /HPF      Squamous Epithelial Cells, UA 0-2 /HPF      Hyaline Casts, UA None Seen /LPF      Uric Acid Crystals, UA Moderate/2+ /HPF      Methodology Manual Light Microscopy    Urinalysis With Culture If Indicated - Urine, Random Void [[id number]]  (Abnormal) Collected: 09/04/21 1137    Specimen: Urine, Random Void Updated: 09/04/21 1209     Color, UA Yellow     Appearance, UA Cloudy     pH, UA 5.5     Specific Gravity, UA 1.021     Glucose, UA Negative     Ketones, UA Negative     Bilirubin, UA Negative     Blood, UA Trace     Protein, UA Trace     Leuk Esterase, UA Negative     Nitrite, UA Negative     Urobilinogen, UA 0.2 E.U./dL    Blood Gas, Arterial With Co-Ox [[id number]]  (Abnormal) Collected: 09/04/21 0515    Specimen: Arterial Blood Updated: 09/04/21 0526     Site Right Brachial     Aj's Test N/A     pH, Arterial 7.417 pH units      pCO2, Arterial 38.6 mm Hg      pO2, Arterial 64.3 mm Hg      Comment: 84 Value below reference range        HCO3, Arterial 24.9 mmol/L      Base Excess, Arterial 0.4 mmol/L      O2 Saturation, Arterial 92.9 %      Comment: 84 Value below reference range        Hemoglobin, Blood Gas 13.9 g/dL      Hematocrit, Blood Gas 42.6 %      Oxyhemoglobin 92.2 %      Comment: 84 Value below reference range        Methemoglobin 0.10 %      Carboxyhemoglobin 0.7 %      A-a Gradiant 577.7 mmHg      CO2 Content 26.0 mmol/L      Temperature 0.0 C       Barometric Pressure for Blood Gas 728 mmHg      Modality BiPap     FIO2 100 %      Ventilator Mode BiPAP     Set [facility] Resp Rate 20.0     IPAP 15     Comment: Meter: [id number]     :  [id number]        EPAP 5     Note --     Collected by [id number]     pH, Temp Corrected --     pCO2, Temperature Corrected --     pO2, Temperature Corrected --    Ferritin [[id number]]  (Abnormal) Collected: 09/04/21 0043    Specimen: Blood Updated: 09/04/21 0154     Ferritin 2,572.00 ng/mL     Narrative:      Results may be falsely decreased if patient taking Biotin.      Comprehensive Metabolic Panel [[id number]]  (Abnormal) Collected: 09/04/21 0043    Specimen: Blood Updated: 09/04/21 0132     Glucose 136 mg/dL      BUN 36 mg/dL      Creatinine 1.16 mg/dL      Sodium 137 mmol/L      Potassium 4.9 mmol/L      Chloride 106 mmol/L      CO2 20.0 mmol/L      Calcium 8.3 mg/dL      Total Protein 6.0 g/dL      Albumin 2.78 g/dL      ALT (SGPT) 32 U/L      AST (SGOT) 68 U/L      Alkaline Phosphatase 47 U/L      Total Bilirubin 0.3 mg/dL      eGFR Non African Amer 61 mL/min/1.73      Globulin 3.2 gm/dL      A/G Ratio 0.9 g/dL      BUN/Creatinine Ratio 31.0     Anion Gap 11.0 mmol/L     Bilirubin, Direct [[id number]]  (Normal) Collected: 09/04/21 0043    Specimen: Blood Updated: 09/04/21 0132     Bilirubin, Direct <0.2 mg/dL     C-reactive Protein [[id number]]  (Abnormal) Collected: 09/04/21 0043    Specimen: Blood Updated: 09/04/21 0132     C-Reactive Protein 5.05 mg/dL     CBC Auto Differential [[id number]]  (Abnormal) Collected: 09/04/21 0043    Specimen: Blood Updated: 09/04/21 0110     WBC 5.71 10*3/mm3      RBC 4.31 10*6/mm3      Hemoglobin 12.3 g/dL      Hematocrit 37.5 %      MCV 87.0 fL      MCH 28.5 pg      MCHC 32.8 g/dL      RDW 13.2 %      RDW-SD 42.7 fl      MPV 11.0 fL      Platelets 131 10*3/mm3      Neutrophil % 85.7 %      Lymphocyte % 7.5 %      Monocyte % 6.3 %      Eosinophil % 0.0 %      Basophil % 0.0 %       Immature Grans % 0.5 %      Neutrophils, Absolute 4.89 10*3/mm3      Lymphocytes, Absolute 0.43 10*3/mm3      Monocytes, Absolute 0.36 10*3/mm3      Eosinophils, Absolute 0.00 10*3/mm3      Basophils, Absolute 0.00 10*3/mm3      Immature Grans, Absolute 0.03 10*3/mm3      nRBC 0.0 /100 WBC     MRSA Screen, PCR (Inpatient) - Swab, Nares [[id number]]  (Normal) Collected: 09/03/21 1833    Specimen: Swab from Nares Updated: 09/03/21 2016     MRSA PCR Negative     Staph aureus by PCR Negative    Blood Gas, Arterial With Co-Ox [[id number]]  (Abnormal) Collected: 09/03/21 1854    Specimen: Arterial Blood Updated: 09/03/21 1902     Site Left Radial     Aj's Test Positive     pH, Arterial 7.418 pH units      pCO2, Arterial 35.4 mm Hg      pO2, Arterial 66.7 mm Hg      Comment: 84 Value below reference range        HCO3, Arterial 22.8 mmol/L      Base Excess, Arterial -1.2 mmol/L      O2 Saturation, Arterial 94.2 %      Hemoglobin, Blood Gas 12.7 g/dL      Comment: 84 Value below reference range        Hematocrit, Blood Gas 39.0 %      Oxyhemoglobin 93.4 %      Comment: 84 Value below reference range        Methemoglobin 0.20 %      Carboxyhemoglobin 0.7 %      A-a Gradiant 441.4 mmHg      CO2 Content 23.9 mmol/L      Temperature 0.0 C      Barometric Pressure for Blood Gas 728 mmHg      Modality BiPap     FIO2 80 %      Ventilator Mode NA     Set Mech Resp Rate 20.0     IPAP 15     Comment: Meter: [id number]     :  [id number]        [facility] 5     Note --     Collected by [id number]     pH, Temp Corrected --     pCO2, Temperature Corrected --     pO2, Temperature Corrected --    Procalcitonin [[id number]]  (Normal) Collected: 09/02/21 2328    Specimen: Blood Updated: 09/03/21 1240     Procalcitonin 0.16 ng/mL     Hemoglobin A1c [[id number]]  (Abnormal) Collected: 09/02/21 2328    Specimen: Blood Updated: 09/03/21 0829     Hemoglobin A1C 6.30 %     Creatinine, Serum [[id number]]  (Abnormal) Collected: 09/03/21 0703     Specimen: Blood Updated: 09/03/21 0821     Creatinine 1.27 mg/dL      eGFR Non African Amer 55 mL/min/1.73     Hepatic Function Panel [[id number]]  (Abnormal) Collected: 09/03/21 0703    Specimen: Blood Updated: 09/03/21 0821     Total Protein 6.5 g/dL      Albumin 3.11 g/dL      ALT (SGPT) 32 U/L      AST (SGOT) 73 U/L      Alkaline Phosphatase 50 U/L      Total Bilirubin 0.3 mg/dL      Bilirubin, Direct <0.2 mg/dL      Bilirubin, Indirect --     Comment: Unable to calculate       Ferritin [[id number]]  (Abnormal) Collected: 09/02/21 2328    Specimen: Blood Updated: 09/03/21 0007     Ferritin 1,666.00 ng/mL     Narrative:      Results may be falsely decreased if patient taking Biotin.      Lactate Dehydrogenase [[id number]]  (Abnormal) Collected: 09/02/21 2328    Specimen: Blood Updated: 09/03/21 0001      U/L     Comprehensive Metabolic Panel [[id number]]  (Abnormal) Collected: 09/02/21 2328    Specimen: Blood Updated: 09/03/21 0001     Glucose 159 mg/dL      BUN 41 mg/dL      Creatinine 1.41 mg/dL      Sodium 138 mmol/L      Potassium 5.2 mmol/L      Chloride 106 mmol/L      CO2 22.4 mmol/L      Calcium 8.5 mg/dL      Total Protein 6.7 g/dL      Albumin 3.16 g/dL      ALT (SGPT) 34 U/L      AST (SGOT) 74 U/L      Alkaline Phosphatase 51 U/L      Total Bilirubin 0.2 mg/dL      eGFR Non African Amer 49 mL/min/1.73      Globulin 3.5 gm/dL      A/G Ratio 0.9 g/dL      BUN/Creatinine Ratio 29.1     Anion Gap 9.6 mmol/L     C-reactive Protein [[id number]]  (Abnormal) Collected: 09/02/21 2328    Specimen: Blood Updated: 09/03/21 0001     C-Reactive Protein 7.80 mg/dL     Lactic Acid, Plasma [[id number]]  (Normal) Collected: 09/02/21 2328    Specimen: Blood Updated: 09/02/21 2357     Lactate 1.2 mmol/L     D-dimer, Quantitative [[id number]]  (Abnormal) Collected: 09/02/21 2328    Specimen: Blood Updated: 09/02/21 4872     D-Dimer, Quantitative 0.71 MCGFEU/mL     Protime-INR [[id number]]  (Normal) Collected: 09/02/21 0531     Specimen: Blood Updated: 09/02/21 2356     Protime 13.2 Seconds      INR 0.96    aPTT [[id number]]  (Normal) Collected: 09/02/21 2328    Specimen: Blood Updated: 09/02/21 2356     PTT 33.1 seconds     Narrative:      CBC Auto Differential [[id number]]  (Abnormal) Collected: 09/02/21 2328    Specimen: Blood Updated: 09/02/21 2342     WBC 4.02 10*3/mm3      RBC 4.25 10*6/mm3      Hemoglobin 12.0 g/dL      Hematocrit 38.0 %      MCV 89.4 fL      MCH 28.2 pg      MCHC 31.6 g/dL      RDW 13.7 %      RDW-SD 44.9 fl      MPV 10.5 fL      Platelets 124 10*3/mm3      Neutrophil % 85.8 %      Lymphocyte % 9.2 %      Monocyte % 4.5 %      Eosinophil % 0.0 %      Basophil % 0.0 %      Immature Grans % 0.5 %      Neutrophils, Absolute 3.45 10*3/mm3      Lymphocytes, Absolute 0.37 10*3/mm3      Monocytes, Absolute 0.18 10*3/mm3      Eosinophils, Absolute 0.00 10*3/mm3      Basophils, Absolute 0.00 10*3/mm3      Immature Grans, Absolute 0.02 10*3/mm3      nRBC 0.0 /100 WBC     Blood Gas, Arterial With Co-Ox [[id number]]  (Abnormal) Collected: 09/02/21 2241    Specimen: Arterial Blood Updated: 09/02/21 2242     Site Left Radial     Aj's Test Positive     pH, Arterial 7.323 pH units      Comment: 84 Value below reference range        pCO2, Arterial 44.7 mm Hg      pO2, Arterial 68.8 mm Hg      Comment: 84 Value below reference range        HCO3, Arterial 23.2 mmol/L      Base Excess, Arterial -3.0 mmol/L      O2 Saturation, Arterial 95.0 %      Hemoglobin, Blood Gas 12.0 g/dL      Comment: 84 Value below reference range        Hematocrit, Blood Gas 36.8 %      Comment: 84 Value below reference range        Oxyhemoglobin 94.1 %      Methemoglobin 0.00 %      Carboxyhemoglobin 0.9 %      A-a Gradiant 100.0 mmHg      CO2 Content 24.5 mmol/L      Temperature 0.0 C      Barometric Pressure for Blood Gas 729 mmHg      Modality Nasal Cannula     FIO2 32 %      Flow Rate 3.0 lpm      Ventilator Mode NA     Note --     Collected by [id number]      Comment: Meter: [id number]     :  [id number]        pH, Temp Corrected --     pCO2, Temperature Corrected --     pO2, Temperature Corrected --    Blood Gas, Arterial With Co-Ox [[id number]]  (Abnormal) Collected: 09/02/21 1938    Specimen: Arterial Blood Updated: 09/02/21 1943     Site Left Radial     Aj's Test Positive     pH, Arterial 7.322 pH units      Comment: 84 Value below reference range        pCO2, Arterial 43.3 mm Hg      pO2, Arterial 51.9 mm Hg      Comment: 85 Value below critical limit        HCO3, Arterial 22.4 mmol/L      Base Excess, Arterial -3.6 mmol/L      O2 Saturation, Arterial 86.6 %      Comment: 84 Value below reference range        Hemoglobin, Blood Gas 11.9 g/dL      Comment: 84 Value below reference range        Hematocrit, Blood Gas 36.6 %      Comment: 84 Value below reference range        Oxyhemoglobin 85.3 %      Comment: 84 Value below reference range        Methemoglobin 0.30 %      Carboxyhemoglobin 1.2 %      A-a Gradiant 117.8 mmHg      CO2 Content 23.7 mmol/L      Temperature 0.0 C      Barometric Pressure for Blood Gas 728 mmHg      Modality Nasal Cannula     FIO2 32 %      Flow Rate 3.0 lpm      Ventilator Mode NA     Note Read back and acknowledge     Notified Who B DELVIN RIVERA RN     Notified By [id number]     Notified Time 09/02/2021 19:44     Collected by [id number]     Comment: Meter: [id number]     :  [id number]        pH, Temp Corrected --     pCO2, Temperature Corrected --     pO2, Temperature Corrected --    STAT Lactic Acid, Reflex [[id number]]  (Normal) Collected: 09/02/21 1301    Specimen: Blood from Arm, Left Updated: 09/02/21 1335     Lactate 0.8 mmol/L     Troponin [[id number]]  (Normal) Collected: 09/02/21 1218    Specimen: Blood Updated: 09/02/21 1248     Troponin T 0.016 ng/mL     D-dimer, Quantitative [[id number]]  (Abnormal) Collected: 09/02/21 0950    Specimen: Blood from Arm, Right Updated: 09/02/21 1248     D-Dimer, Quantitative 0.95  MCGFEU/mL     COVID-19 and FLU A/B PCR - Swab, Nasopharynx [[id number]]  (Abnormal) Collected: 09/02/21 1057    Specimen: Swab from Nasopharynx Updated: 09/02/21 1143     COVID19 Detected     Influenza A PCR Not Detected     Influenza B PCR Not Detected    Lactic Acid, Plasma [[id number]]  (Abnormal) Collected: 09/02/21 0950    Specimen: Blood from Arm, Right Updated: 09/02/21 1042     Lactate 2.3 mmol/L     Troponin [[id number]]  (Abnormal) Collected: 09/02/21 0950    Specimen: Blood from Arm, Right Updated: 09/02/21 1042     Troponin T 0.031 ng/mL     Comprehensive Metabolic Panel [[id number]]  (Abnormal) Collected: 09/02/21 0950    Specimen: Blood from Arm, Right Updated: 09/02/21 1023     Glucose 124 mg/dL      BUN 55 mg/dL      Creatinine 2.10 mg/dL      Sodium 136 mmol/L      Potassium 4.7 mmol/L      Comment: Slight hemolysis detected by analyzer. Results may be affected.        Chloride 101 mmol/L      CO2 21.9 mmol/L      Calcium 8.4 mg/dL      Total Protein 6.7 g/dL      Albumin 3.65 g/dL      ALT (SGPT) 39 U/L      AST (SGOT) 88 U/L      Alkaline Phosphatase 56 U/L      Total Bilirubin 0.3 mg/dL      eGFR Non African Amer 31 mL/min/1.73      Globulin 3.1 gm/dL      A/G Ratio 1.2 g/dL      BUN/Creatinine Ratio 26.2     Anion Gap 13.1 mmol/L     BNP [[id number]]  (Normal) Collected: 09/02/21 0950    Specimen: Blood from Arm, Right Updated: 09/02/21 1021     proBNP 370.2 pg/mL     Protime-INR [[id number]]  (Normal) Collected: 09/02/21 0950    Specimen: Blood from Arm, Right Updated: 09/02/21 1005     Protime 13.1 Seconds      INR 0.95    CBC Auto Differential [[id number]]  (Abnormal) Collected: 09/02/21 0950    Specimen: Blood from Arm, Right Updated: 09/02/21 0959     WBC 5.51 10*3/mm3      RBC 4.18 10*6/mm3      Hemoglobin 11.8 g/dL      Hematocrit 37.2 %      MCV 89.0 fL      MCH 28.2 pg      MCHC 31.7 g/dL      RDW 13.8 %      RDW-SD 45.1 fl      MPV 10.7 fL      Platelets 126 10*3/mm3      Neutrophil % 80.9 %       Lymphocyte % 11.4 %      Monocyte % 7.1 %      Eosinophil % 0.0 %      Basophil % 0.2 %      Immature Grans % 0.4 %      Neutrophils, Absolute 4.46 10*3/mm3      Lymphocytes, Absolute 0.63 10*3/mm3      Monocytes, Absolute 0.39 10*3/mm3      Eosinophils, Absolute 0.00 10*3/mm3      Basophils, Absolute 0.01 10*3/mm3      Immature Grans, Absolute 0.02 10*3/mm3      nRBC 0.0 /100 WBC     Blood Gas, Arterial With Co-Ox [[id number]]  (Abnormal) Collected: 09/02/21 0940    Specimen: Arterial Blood Updated: 09/02/21 0945     Site Left Brachial     Aj's Test N/A     pH, Arterial 7.324 pH units      Comment: 84 Value below reference range        pCO2, Arterial 42.4 mm Hg      pO2, Arterial 47.9 mm Hg      Comment: 85 Value below critical limit        HCO3, Arterial 22.0 mmol/L      Base Excess, Arterial -3.9 mmol/L      O2 Saturation, Arterial 82.2 %      Comment: 84 Value below reference range        Hemoglobin, Blood Gas 12.2 g/dL      Comment: 84 Value below reference range        Hematocrit, Blood Gas 37.3 %      Comment: 84 Value below reference range        Oxyhemoglobin 81.2 %      Comment: 84 Value below reference range        Methemoglobin 0.20 %      Carboxyhemoglobin 1.0 %      A-a Gradiant 47.4 mmHg      CO2 Content 23.3 mmol/L      Temperature 0.0 C      Barometric Pressure for Blood Gas 729 mmHg      Modality Room Air     FIO2 21 %      Ventilator Mode NA     Note --     Notified Who JUAN RUIZ AND ER RN     Notified By [id number]     Notified Time 09/02/2021 09:47     Collected by [id number]     Comment: Meter: [id number]     :  [id number]        pH, Temp Corrected --     pCO2, Temperature Corrected --     pO2, Temperature Corrected --          Imaging Results (All)     Procedure Component Value Units Date/Time    CT Abdomen Pelvis Without Contrast [[id number]] Collected: 09/03/21 1139     Updated: 09/03/21 1141    Narrative:      EXAM:    CT Abdomen and Pelvis Without Intravenous Contrast     EXAM  DATE:    9/3/2021 11:20 AM     CLINICAL HISTORY:    Urinary retention; U07.1-COVID-19; J12.82-Pneumonia due to Coronavirus  disease 2019; R09.02-Hypoxemia     TECHNIQUE:    Axial computed tomography images of the abdomen and pelvis without  intravenous contrast.  Sagittal and coronal reformatted images were  created and reviewed.  This CT exam was performed using one or more of  the following dose reduction techniques:  automated exposure control,  adjustment of the mA and/or kV according to patient size, and/or use of  iterative reconstruction technique.     COMPARISON:    08/04/2015     FINDINGS:    LUNG BASES:  Bibasilar airspace disease suggestive of COVID-19  pneumonia.      ABDOMEN:    LIVER:  Unremarkable.    GALLBLADDER AND BILE DUCTS:  Unremarkable.  No calcified stones.  No  ductal dilation.    PANCREAS:  Unremarkable.  No ductal dilation.    SPLEEN:  Unremarkable.  No splenomegaly.    ADRENALS:  Unremarkable.  No mass.    KIDNEYS AND URETERS:  Unremarkable.  No obstructing stones.  No  hydronephrosis.    STOMACH AND BOWEL:  Large right inguinal hernia containing nondilated  bowel and fat.  Sigmoid colonic diverticulosis.  No mucosal thickening.      PELVIS:    APPENDIX:  No findings to suggest acute appendicitis.    BLADDER:  Unremarkable.  No stones.    REPRODUCTIVE:  Unremarkable as visualized.      ABDOMEN and PELVIS:    INTRAPERITONEAL SPACE:  Unremarkable.  No free air.  No significant  fluid collection.    BONES/JOINTS:  No acute fracture.  No dislocation.    SOFT TISSUES:  See above.    VASCULATURE:  Unremarkable.  No abdominal aortic aneurysm.    LYMPH NODES:  Unremarkable.  No enlarged lymph nodes.       Impression:      1.  Bibasilar airspace disease suggestive of COVID-19 pneumonia.  2.  Large right inguinal hernia containing nondilated bowel and fat.     This report was finalized on 9/3/2021 11:39 AM by Dr. Leonel Angel MD.       NM Lung Scan Perfusion Particulate [[id number]] Collected:  09/02/21 2024     Updated: 09/02/21 2027    Narrative:      RADIONUCLIDE PERFUSION-ONLY LUNG SCAN, 9/2/2021    HISTORY:    76-year-old male in the ED with multifocal pulmonary infiltrates and positive COVID-19 testing. Short of air. Hypoxemia. Assess for pulmonary embolism.      DOSE:  4.0 mCi mCi technetium labeled MAA intravenously.    COMPARISON:  Chest x-ray tonight.    FINDINGS:  NOTE: Ventilation imaging is currently restricted during the COVID pandemic as an aerosol-generating procedure. A perfusion-only examination was performed per current protocol.    Multifocal regions of decreased perfusion scattered throughout both lungs. These may correspond to multifocal regions of pneumonitis visible on chest x-ray. Pulmonary embolism could have a similar appearance. The findings are therefore indeterminate for  the presence of pulmonary embolism.      Impression:      1.  Indeterminant examination for the exclusion of pulmonary embolism.  2.  Abnormal perfusion-only lung imaging. Multifocal perfusion defects may be attributable to the patient's multifocal air space pneumonia but pulmonary embolism is also possible.                      Signer Name: Kael Hamilton MD   Signed: 9/2/2021 8:24 PM   [facility]    [facility]    XR Chest 2 View [[id number]] Collected: 09/02/21 1018     Updated: 09/02/21 1020    Narrative:      EXAM:    XR Chest, 2 Views     EXAM DATE:    9/2/2021 10:15 AM     CLINICAL HISTORY:    CHF/COPD Protocol     TECHNIQUE:    Frontal and lateral views of the chest.     COMPARISON:    No relevant prior studies available.     FINDINGS:    LUNGS:  Scattered bilateral patchy opacities may represent COVID-19  pneumonia.    PLEURAL SPACE:  Unremarkable.  No pneumothorax.    HEART:  Unremarkable.  No cardiomegaly.    MEDIASTINUM:  Unremarkable.    BONES/JOINTS:  Unremarkable.       Impression:        Scattered bilateral patchy opacities may represent COVID-19  pneumonia.     This report was finalized on 9/2/2021 10:18 AM by Dr. Leonel Angel MD.       CT Head Without Contrast [[id number]] Collected: 09/02/21 1007     Updated: 09/02/21 1009    Narrative:      EXAM:    CT Head Without Intravenous Contrast     EXAM DATE:    9/2/2021 9:54 AM     CLINICAL HISTORY:    altered mental status     TECHNIQUE:    Axial computed tomography images of the head/brain without intravenous  contrast.  Sagittal and coronal reformatted images were created and  reviewed.  This CT exam was performed using one or more of the following  dose reduction techniques:  automated exposure control, adjustment of  the mA and/or kV according to patient size, and/or use of iterative  reconstruction technique.     COMPARISON:    No relevant prior studies available.     FINDINGS:    BRAIN:  Unremarkable.  No hemorrhage.  No significant white matter  disease.  No edema.    VENTRICLES:  Unremarkable.  No ventriculomegaly.    BONES/JOINTS:  Unremarkable.  No acute fracture.    SOFT TISSUES:  Unremarkable.    SINUSES:  Unremarkable as visualized.  No acute sinusitis.    MASTOID AIR CELLS:  Unremarkable as visualized.  No mastoid effusion.       Impression:        Unremarkable exam demonstrating no CT evidence of acute intracranial  findings.     This report was finalized on 9/2/2021 10:07 AM by Dr. Leonel Angel MD.             Orders (all)      Start     Ordered    09/04/21 0918  Inpatient Cardiology Consult  Once     Specialty:  Cardiology  Provider:  Smooth Espino MD    09/04/21 0917    09/03/21 1715  Transfer Patient  Once      09/03/21 1714    09/03/21 0800  Vital Signs  Every 8 Hours     Comments: Per per hospital policy    09/03/21 0549    09/03/21 0550  Pulse Oximetry, Continuous  Continuous      09/03/21 0549    09/03/21 0550  Cardiac Monitoring  Continuous      09/03/21 0549    09/03/21 0550  Activity - Bed Rest With Exceptions (Specify)  Until Discontinued      09/03/21 0549    09/03/21 0550  Daily  Weights  Daily      09/03/21 0549    09/03/21 0550  Fall Precautions  Continuous      09/03/21 0549    09/03/21 0550  Inpatient Infectious Diseases Consult  Once     Specialty:  Infectious Diseases  Provider:  Brock Metzger MD    09/03/21 0549    09/02/21 2225  Code Status and Medical Interventions:  Continuous      09/02/21 2226    09/02/21 2224  Inpatient Admission  Once      09/02/21 2226    09/02/21 2223  NIPPV (CPAP or BIPAP)  Until Discontinued,   Status:  Canceled      09/02/21 2223                Ventilator/Non-Invasive Ventilation Settings (From admission, onward)     Start     Ordered    09/05/21 1306  Ventilator - AC/VC; (18); 100; 90%; Other (see comments); 16; 450  Continuous     Question Answer Comment   Vent Mode AC/VC    Breath rate  18   FiO2 100    FiO2 titrate for Sp02% =/> 90%    PEEP Other (see comments)    PEEP: 16    Tidal Volume 450        09/05/21 1308    09/02/21 2228  NIPPV (CPAP or BIPAP)  Until Discontinued,   Status:  Canceled     Question Answer Comment   Indication: Acute Respiratory Failure    Type: BIPAP    NIPPV Mask Interface: Per Patient Preference    IPAP 15    EPAP 5    Oxygen FIO2    FIO2 % 32    Breath Rate 20    Titrate for SPO2 90%        09/02/21 2227 09/02/21 2223  NIPPV (CPAP or BIPAP)  Until Discontinued,   Status:  Canceled     Question Answer Comment   Type: BIPAP    NIPPV Mask Interface: Per Patient Preference        09/02/21 2223                   Operative/Procedure Notes (all)      Nic Webster MD at 09/05/21 1323  Version 1 of 1     Procedure Orders    1. Intubation [[id number]] ordered by Nic Webster MD           Post-procedure Diagnoses    1. Pneumonia due to COVID-19 virus [U07.1, J12.82]             Intubation    Date/Time: 9/5/2021 1:23 PM  Performed by: Nic Webster MD  Authorized by: Nic Webster MD   Consent: The procedure was performed in an emergent situation. Verbal consent obtained.  Consent given by: patient  Patient  "understanding: patient states understanding of the procedure being performed  Patient consent: the patient's understanding of the procedure matches consent given  Procedure consent: procedure consent matches procedure scheduled  Relevant documents: relevant documents present and verified  Patient identity confirmed: verbally with patient, arm band and provided demographic data  Time out: Immediately prior to procedure a \"time out\" was called to verify the correct patient, procedure, equipment, support staff and site/side marked as required.  Indications: respiratory failure and  hypoxemia  Intubation method: fiberoptic oral  Patient status: paralyzed (RSI)  Preoxygenation: BiPAP.  Sedatives: etomidate  Paralytic: succinylcholine  Laryngoscope size: Mac 3  Tube size: 8.0 mm  Tube type: cuffed  Number of attempts: 1  Cords visualized: yes  Post-procedure assessment: chest rise and CO2 detector  Breath sounds: equal  Cuff inflated: yes  ETT to lip: 25 cm  Tube secured with: ETT wolf  Chest x-ray findings: endotracheal tube in appropriate position  Patient tolerance: patient tolerated the procedure well with no immediate complications          Electronically signed by Nic Webster MD at 09/05/21 1325     Fred Roberto MD at 09/05/21 1435  Version 1 of 1     Procedure Orders    1. Insert Central Line At Bedside [[id number]] ordered by Fred Roberto MD           Post-procedure Diagnoses    1. Pneumonia due to COVID-19 virus [U07.1, J12.82]    2. Hypoxia [R09.02]    3. Acute respiratory failure with hypoxia (CMS/HCC) [J96.01]             Insert Central Line At Bedside    Date/Time: 9/5/2021 2:35 PM  Performed by: Fred Roberto MD  Authorized by: Fred Roberto MD   Consent: Verbal consent obtained. Written consent obtained.  Risks and benefits: risks, benefits and alternatives were discussed  Patient identity confirmed: arm band, provided demographic data and hospital-assigned " identification number  Indications: vascular access  Anesthesia: local infiltration    Anesthesia:  Local Anesthetic: lidocaine 1% with epinephrine    Sedation:  Patient sedated: no    Preparation: skin prepped with ChloraPrep  Skin prep agent dried: skin prep agent completely dried prior to procedure  Sterile barriers: all five maximum sterile barriers used - cap, mask, sterile gown, sterile gloves, and large sterile sheet  Hand hygiene: hand hygiene performed prior to central venous catheter insertion  Location details: right internal jugular  Patient position: flat  Catheter type: triple lumen  Catheter size: 7 Fr  Pre-procedure: landmarks identified  Ultrasound guidance: yes  Sterile ultrasound techniques: sterile gel and sterile probe covers were used  Number of attempts: 1  Successful placement: yes  Post-procedure: line sutured and dressing applied  Assessment: blood return through all ports,  free fluid flow and placement verified by x-ray  Patient tolerance: patient tolerated the procedure well with no immediate complications          Electronically signed by Fred Roberto MD at 09/05/21 1436     Fred Roberto MD at 09/06/21 1753  Version 1 of 1         Procedure Note    Valdemar Solitario      Pre-op Diagnosis:   Need for feeding access and inability to pass nasogastric or orogastric tube    Post-op Diagnosis: same            EGD with orogastric feeding tube placement    Anesthesia: none    Staff:   Skyler Pak    Findings: successful placement of orogastric feeding tube    Operative Procedure:  Patient placed supine in ICU bed.  After timeout performed the endoscope was implanted into the room.  Femoral-popliteal elevated.  This could be advanced in the distal esophagus there was no formal mucosal abnormality but the tissues did appear tight and required some insufflation and gentle pressure to traverse the distal esophagus.  There was mild antral gastritis.  Retroflexion showed no hiatal  hernia.  At this time an orogastric tube was advanced under endoscopic visualization into the gastric lumen.  The endoscope was then removed with the orogastric tube remaining in place.  Patient tolerated procedure well.    Estimated Blood Loss: minimal    Specimens:   none           Drains: orogastric tube    Grafts/Implants:  none    Complications: none      Fred Roberto MD     Date: 2021  Time: 17:54 EDT      Electronically signed by Fred Roberto MD at 21 1756          Physician Progress Notes (all)        Brock Metzger MD at 21 0992                     PROGRESS NOTE         Patient Identification:  Name:  Valdemar Solitario  Age:  76 y.o.  Sex:  male  :  1945  MRN:  [id number]  Visit Number:  [id number]  Primary Care Provider:  Laurence Geiger APRN         LOS: 2 days       ----------------------------------------------------------------------------------------------------------------------  Subjective       Chief Complaints:    Exposure To Known Illness, Altered Mental Status, and Weakness - Generalized        Interval History:      Patient is on BiPAP at 100% FiO2.  Afebrile.  CRP improved from 7.82-5.05.  WBC stable at 5.71.  Blood cultures from 2021 report no growth to date.  CT chest without contrast from 9/3/2021 reports patchy bilateral airspace disease concerning for COVID-19 pneumonia.    Review of Systems:    Deferred due to altered mental status  ----------------------------------------------------------------------------------------------------------------------      Objective       ----------------------------------------------------------------------------------------------------------------------    Vital Signs:  Temp:  [97.3 °F (36.3 °C)-99.4 °F (37.4 °C)] 98.4 °F (36.9 °C)  Heart Rate:  [51-85] 57  Resp:  [20-28] 22  BP: ()/() 125/119  Mean Arterial Pressure (Non-Invasive) for the past 24 hrs (Last 3 readings):   Noninvasive MAP  (mmHg)   09/04/21 0918 121   09/04/21 0902 85   09/04/21 0802 94     SpO2 Percentage    09/04/21 0802 09/04/21 0902 09/04/21 0918   SpO2: 96% 94% 94%     SpO2:  [87 %-98 %] 94 %  on  Flow (L/min):  [10-15] 15;   Device (Oxygen Therapy): nasal prongs    Body mass index is 27.17 kg/m².  Wt Readings from Last 3 Encounters:   09/04/21 76.3 kg (168 lb 4 oz)   06/14/17 73.9 kg (163 lb)   03/23/17 76.7 kg (169 lb)        Intake/Output Summary (Last 24 hours) at 9/4/2021 0934  Last data filed at 9/4/2021 0852  Gross per 24 hour   Intake 1324.69 ml   Output 1150 ml   Net 174.69 ml     NPO Diet  ----------------------------------------------------------------------------------------------------------------------      Physical Exam:    Deferred due to COVID-19 isolation      ----------------------------------------------------------------------------------------------------------------------    Assessment/Plan       Assessment/Plan     ASSESSMENT:    1.  Severe sepsis with lactic acid greater than 2 on admission  2.  COVID-19 pneumonia    PLAN:    Patient is on BiPAP at 100% FiO2.  Afebrile.  CRP improved from 7.82-5.05.  WBC stable at 5.71.  Blood cultures from 9/2/2021 report no growth to date.      Procalcitonin normal.  CT of the head unremarkable.  Chest x-ray from 9/2/2021 reports scattered bilateral patchy opacities that may represent COVID-19 pneumonia.  VQ scan indeterminate for exclusion of PE.  CT of the chest from 9/3/2021 reports patchy bilateral airspace disease concerning for COVID-19 pneumonia.  CT of the abdomen and pelvis from 9/3/2013 reports bibasilar airspace disease suggestive of COVID-19 pneumonia, large right inguinal hernia containing nondilated bowel and fat.     Agree with initiation of remdesivir and Decadron.    Patient is receiving baricitinib.     Patient's presentation is typical of COVID-19 infection without superimposed bacterial component, recommend to continue baricitinib, remdesivir, and  Decadron for now.  We will continue to follow closely.      Code Status:   Code Status and Medical Interventions:   Ordered at: 216     Level Of Support Discussed With:    Patient     Code Status:    CPR     Medical Interventions (Level of Support Prior to Arrest):    Full       NELSON Torres  21  09:34 EDT    Electronically signed by Brock Metzger MD at 21 0854       Jose De Jesus Mccord MD at 21 0812              [facility] HOSPITALIST PROGRESS NOTE     Patient Identification:  Name:  Valdemar Solitario  Age:  76 y.o.  Sex:  male  :  1945  MRN:  [id number]  Visit Number:  [id number]  ROOM: [facility]/     Primary Care Provider:  Laurence Geiger APRN    Length of stay in inpatient status:  1    Subjective     Chief Compliant:    Chief Complaint   Patient presents with   • Exposure To Known Illness   • Altered Mental Status   • Weakness - Generalized     History of Presenting Illness:    Patient remains ill, no acute events overnight, no new complaints, has workup still pending, VQ scan indeterminate, have ordered pharmacy to dose therapeutic lovenox, Cr improved some, continued on mIVFs, pending improved Cr may be able to order CTPE, has non-contrasted studies pending for now, venous duplex ordered and pending, he is continued on steroids and remdesivir, ID consult pending, trops elevated and downtrending, echo ordered and pending, no chest pain, he denies any fevers or chills.   Objective       ----------------------------------------------------------------------------------------------------------------------  Vital Signs:  Temp:  [97.2 °F (36.2 °C)-98.8 °F (37.1 °C)] 97.5 °F (36.4 °C)  Heart Rate:  [61-86] 82  Resp:  [14-24] 20  BP: ()/(45-83) 128/83  SpO2:  [82 %-98 %] 93 %  on   ;   Device (Oxygen Therapy): NPPV/NIV  Body mass index is 26.98 kg/m².    Wt Readings from Last 3 Encounters:   21 75.8 kg (167 lb 1.6 oz)   17 73.9 kg (163 lb)    03/23/17 76.7 kg (169 lb)     Intake & Output (last 3 days)       08/31 0701 - 09/01 0700 09/01 0701 - 09/02 0700 09/02 0701 - 09/03 0700 09/03 0701 - 09/04 0700    Urine (mL/kg/hr)   100     Total Output   100     Net   -100                 NPO Diet  ----------------------------------------------------------------------------------------------------------------------  This physical exam has been personally performed remotely in the unit aided by real-time audio/visual communication tools. RN present at bedside during this exam and assisted during exam. The use of a video visit has been reviewed with the patient and verbal informed consent has been obtained.     Physical Exam:  General: Patient appears awake, alert, and in no acute distress.  Head: Normocephalic, atraumatic  Eyes: EOMI. Conjunctivae and sclerae normal.  Ears: Ears appear intact with no abnormalities noted.   Neck: Trachea midline. No obvious JVD.  Heart: Tele reveals regular rate and rhtyhm  Lungs: Respirations appear to be regular, even and unlabored with no signs of respiratory distress. No audible wheezing. On 2LNC  Abdomen: No obvious abdominal distension.  MS: Muscle tone appears normal. No gross deformities.  Extremities: No clubbing, cyanosis or edema noted.  Skin: No visible bleeding, bruising, or rash.  Neurologic: Alert and oriented x3. No gross focal deficits.   ----------------------------------------------------------------------------------------------------------------------    Assessment & Plan    76 y.o. male with past medical history significant for diabetes meliltus, depression, GERD, hypertension, dyslipidemia.  He arrived to the ED via EMS after presenting to his place of work in a state of confusion    #Lactic Acidosis, Acute Metabolic Encephalopathy, Acute Hypoxic Respiratory Failure 2/2 PNA, Viral, treating for Covid 19 c/b mild transaminitis  #Elevated D-dimer, cannot rule out PE  - Patient presented w/ increased shortness  of breath, confusion, tachypnea, covid + on 9/2.  - Admission labs showed WBC count 5K, CRP 7.8, lactate 2.3, d-dimer 0.95, covid +, Bcx's pending  - VQ scan showed indeterminate study, noted abnormal perfusion, multifocal defects could be related to multifocal airspace disease but also PE.  - CT Head showed no acute intracranial abnormality  - ID consulted; Recs pending  - Have ordered TTE for cardiac function baseline  - B/l Venous Duplex to rule out DVT pending  - CT Chest, Abd/Pelvis pending  - Continue 6mg IV Dexamethasone x 10 days  - Continue Remdesivir. Will trend LFTs.  - Have consulted pharmacy to dose Level III AC for Covid 19 thromboembolism Ppx, will order CTPE if Cr improves  - Continue APAP PRN for fevers  - Continue Albuterol Inhaler  - Trend Covid 19 progression labs w/ CBC, CMP, CK, CRP, Ferritin daily and LDH, D-dimer, Fibrinogen q48hrs.  - Monitor in PCU, enhanced droplet/contact isolation precautions, continue 02 but wean as able     #HTN/Dyslipidemia  #Elevated Troponin  - Labs showed trops 0.031->0.016, proBNP 370   - EKG showed NSR, RBBB, no significant ST changes  - Echo pending as per above  - Home med rec pending  - Continue to monitor on tele, strict I/O's, daily weights, trend HR and BP    #Nonoliguric MURIEL - Improved   - B/l Cr 0.9-1.0, was up to 2.1 on admission; Now 1.4  - Continue mIVFs, Trend Cr and UOP, avoid nephrotoxins, NSAIDs, dehydration and contrast as able.    #NIDDM Type II, unknown control, unknown complications  - Hgb A1c = Pending  - Holding home oral agents, continue FSBG and SSI, add long acting as indicated.    #Anxiety/Depression  - Continue home regimen pending med rec     F: NPO, mIVFs  E: Monitor & Replace PRN  N: NPO  Ppx: TLov  Code: Full Code     Dispo: Pending workup and clinical improvement     *This patient is considered high risk due to acute metabolic encephalopathy, acute respiratory failure, covid 19, monitoring for drug toxicities on Remdesivir,  MURIEL.  Jose De Jesus Mccord MD  [facility]  09/03/21  08:12 EDT    Electronically signed by Jose De Jesus Mccord MD at 09/03/21 0954          Consult Notes (all)      Manuelito Adame MD at 09/06/21 0858      Consult Orders    1. Inpatient Pulmonology Consult [[id number]] ordered by Nic Webster MD at 09/06/21 0742               [facility]    Reason for consult: AHRF, COVID19 pneumonia,     HPI:  Mr. Solitario is a 75yo WM with DM he was admitted 9/2 and had to be placed on bipap in the ER, he was intubated 9/5. He is on dexamethasone, remdesivir and barcitinib.  He is currently sedated on vent so history is obtained from the chart.  MEDS: reviewed as per chart  ALL: codeine  ROS: UTO    Vital Sign Min/Max for last 24 hours  Temp  Min: 97 °F (36.1 °C)  Max: 98.8 °F (37.1 °C)   BP  Min: 61/41  Max: 174/89   Pulse  Min: 42  Max: 121   Resp  Min: 18  Max: 21   SpO2  Min: 77 %  Max: 100 %   No data recorded   Weight  Min: 78.1 kg (172 lb 3.2 oz)  Max: 78.1 kg (172 lb 3.2 oz)   1024/1575  GEN:   appears ill,sedated on vent  HEENT: PERRL, , normal sclera, mmm, no jvd, trachea midline, neck supple  CHEST: fair ae bilat, no wheezes, faint right base crackles, no use of accessory muscles  CV: RRR, no m/g/r  ABD: soft, nt, nd +bs, no hepatosplenomegaly  EXT: no c/c/e  SKIN: no rashes, no xanthomas, nl turgor, warm, dry  LYMPH: no palpable cervical or supraclavicular lymphadenopathy  NEURO: CN 2-12 exam limited, nonfocal as best as able  PSYCH: deferred  MSK: no kyphosolciosis, normal muscular development    Labs: 9/6: reviewed  Glucose 228  Bun 49  Cr 1.2  Na 148  Bicarb 27  crp 2.9  Wbc 14  hgb 13.8  plts 239   d dimer 0.36  7.28/63/232 (80;10;18;450)  9/5: CXR: bialteral infiltrates, line, tube in place  Ct angio 9/4: negative  Echo: with normal ef and diastolic function but pulm htn    A/P:  1. Acute hypoxemic and hypercapnic respiratory failure -- continue vent support, wean as able. He is really  doing pretty well at this point all considering. Would wean fi02 at this point to 40% and leave peep at 8.  He is schedule to be supine this afternoon, if he remains at 40% supine I would stop proning and let paralytic lapse  2. COVID19 pneumonia -- CRP is fairly low, d dimer is low -- would keep the current measures for this in place, monitor for secondary bacterial infection. Check procal tomorrow  D dimer is low, neg ct angio and neg venous doppler, consider dropping dose of lovenox, but certainly reasonable to continue current dose as well.   3. DM -- accuechecks ok, continue current  4. Pulmonary hypertension -- no specific therapy for now, repeat echo when better    Feeding tube was unable to be placed, attempt to be made again later today.    He has potential to improve. Vent setting not too bad    CC 40 mins discussed with RN at bedside    Electronically signed by Manuelito Adame MD at 21 0938       Brock Metzger MD at 21 1103      Consult Orders    1. Inpatient Infectious Diseases Consult [[id number]] ordered by Mg Augustine MD at 21 2226                       INFECTIOUS DISEASE CONSULTATION REPORT        Patient Identification:  Name:  Valdemar Solitario  Age:  76 y.o.  Sex:  male  :  1945  MRN:  [id number]   Visit Number:  [id number]  Primary Care Physician:  Laurence Geiger APRN       LOS: 1 day        Subjective       Subjective     History of present illness:      Thank you Dr. Mccord for allowing us to participate in the care of your patient.  As you well know, Mr. Valdemar Solitario is a 76 y.o. male with past medical history significant for diabetes, depression, GERD, hypertension, dyslipidemia, who presented to [facility] Emergency Department on 2021 for confusion.  COVID-19 PCR positive.  WBC normal.  CRP 7.80.  Lactic acid 2.3.  Procalcitonin normal.  CT of the head unremarkable.  Chest x-ray from 2021 reports scattered bilateral patchy  opacities that may represent COVID-19 pneumonia.  VQ scan indeterminate for exclusion of PE.  CT of the chest from 9/3/2021 reports patchy bilateral airspace disease concerning for COVID-19 pneumonia.  CT of the abdomen and pelvis from 9/3/2013 reports bibasilar airspace disease suggestive of COVID-19 pneumonia, large right inguinal hernia containing nondilated bowel and fat.      Infectious Disease consultation was requested for antimicrobial management.      ---------------------------------------------------------------------------------------------------------------------     Review Of Systems:    Constitutional: no fever, chills and night sweats. No appetite change or unexpected weight change. No fatigue.  Eyes: no eye drainage, itching or redness.  HEENT: no mouth sores, dysphagia or nose bleed.  Respiratory: Positive shortness of breath, no cough or production of sputum.  Cardiovascular: no chest pain, no palpitations, no orthopnea.  Gastrointestinal: no nausea, vomiting or diarrhea. No abdominal pain, hematemesis or rectal bleeding.  Genitourinary: no dysuria or polyuria.  Hematologic/lymphatic: no lymph node abnormalities, no easy bruising or easy bleeding.  Musculoskeletal: no muscle or joint pain.  Skin: No rash and no itching.  Neurological: no loss of consciousness, no seizure, no headache.  Behavioral/Psych: no depression or suicidal ideation.  Endocrine: no hot flashes.  Immunologic: negative.    ---------------------------------------------------------------------------------------------------------------------       ---------------------------------------------------------------------------------------------------------------------     Physical Exam:    Deferred due to COVID-19 isolation.    ---------------------------------------------------------------------------------------------------------------------      I have personally reviewed the above radiology results.    ---------------------------------------------------------------------------------------------------------------------      Assessment & Plan        Assessment/Plan       ASSESSMENT:    1.  Severe sepsis with lactic acid greater than 2 on admission  2.  COVID-19 pneumonia    PLAN:    Patient presents with confusion.  COVID-19 PCR positive.  WBC normal.  CRP 7.80.  Lactic acid 2.3.  Procalcitonin normal.  CT of the head unremarkable.  Chest x-ray from 9/2/2021 reports scattered bilateral patchy opacities that may represent COVID-19 pneumonia.  VQ scan indeterminate for exclusion of PE.  CT of the chest from 9/3/2021 reports patchy bilateral airspace disease concerning for COVID-19 pneumonia.  CT of the abdomen and pelvis from 9/3/2013 reports bibasilar airspace disease suggestive of COVID-19 pneumonia, large right inguinal hernia containing nondilated bowel and fat.    Agree with initiation of remdesivir and Decadron.    Patient's presentation is typical of COVID-19 infection without superimposed bacterial component, recommend initiation of baricitinib.  We will continue to follow closely.    Again, thank you Dr. Mccord for allowing us to participate in the care of your patient and please feel free to call for any questions you may have.        Code Status:     Code Status and Medical Interventions:   Ordered at: 09/02/21 2296     Level Of Support Discussed With:    Patient     Code Status:    CPR     Medical Interventions (Level of Support Prior to Arrest):    Full         NELSON Medina  09/03/21  14:03 EDT    Electronically signed by Brock Metzger MD at 09/04/21 8996

## 2021-09-08 NOTE — PROGRESS NOTES
PROGRESS NOTE         Patient Identification:  Name:  Valdemar Solitario  Age:  76 y.o.  Sex:  male  :  1945  MRN:  [id number]  Visit Number:  [id number]  Primary Care Provider:  Laurence Geiger APRN         LOS: 6 days       ----------------------------------------------------------------------------------------------------------------------  Subjective       Chief Complaints:    Exposure To Known Illness, Altered Mental Status, and Weakness - Generalized        Interval History:      Remains sedated and intubated at 55% FiO2.  T-max 99.5.  CRP improved from 2.9-2.54.  White count stable at 15.56.    Review of Systems:    Deferred due to altered mental status  ----------------------------------------------------------------------------------------------------------------------      Objective       Current Hospital Meds:  artificial tears, , Both Eyes, Q4H  chlorhexidine, 15 mL, Mouth/Throat, Q12H  dexamethasone, 6 mg, Intravenous, Daily  enoxaparin, 40 mg, Subcutaneous, Q24H  famotidine, 20 mg, Intravenous, BID  insulin aspart, 0-9 Units, Subcutaneous, Q6H  insulin detemir, 5 Units, Subcutaneous, Nightly  oxymetazoline, 2 spray, Each Nare, BID  sertraline, 100 mg, Oral, Daily  sodium chloride, 10 mL, Intravenous, Q12H  sodium chloride, 10 mL, Intravenous, Q12H  sodium chloride, 10 mL, Intravenous, Q12H  sodium chloride, 10 mL, Intravenous, Q12H  sodium chloride, 10 mL, Intravenous, Q12H  tamsulosin, 0.4 mg, Oral, Daily      dexmedetomidine, 0.2-1.5 mcg/kg/hr, Last Rate: Stopped (21 1426)  fentaNYL Citrate, , Last Rate: Stopped (21 1528)  norepinephrine, 0.02-0.3 mcg/kg/min, Last Rate: 0.1 mcg/kg/min (21 7055)  Pharmacy Consult - Pharmacy to dose,   propofol, 5-50 mcg/kg/min, Last Rate: 35 mcg/kg/min (21 0800)  vecuronium (NORCURON) infusion, 0.6-1.2 mcg/kg/min, Last Rate: Stopped (21  0810)      ----------------------------------------------------------------------------------------------------------------------    Vital Signs:  Temp:  [98 °F (36.7 °C)-99.5 °F (37.5 °C)] 98 °F (36.7 °C)  Heart Rate:  [] 79  Resp:  [18-19] 18  BP: ()/(41-84) 106/49  FiO2 (%):  [40 %-55 %] 55 %  Mean Arterial Pressure (Non-Invasive) for the past 24 hrs (Last 3 readings):   Noninvasive MAP (mmHg)   09/08/21 1000 70   09/08/21 0900 75   09/08/21 0800 89     SpO2 Percentage    09/08/21 0800 09/08/21 0900 09/08/21 1000   SpO2: 92% 92% 100%     SpO2:  [76 %-100 %] 100 %  on   ;   Device (Oxygen Therapy): ventilator    Body mass index is 24.43 kg/m².  Wt Readings from Last 3 Encounters:   09/08/21 68.6 kg (151 lb 4.8 oz)   06/14/17 73.9 kg (163 lb)   03/23/17 76.7 kg (169 lb)        Intake/Output Summary (Last 24 hours) at 9/8/2021 1044  Last data filed at 9/8/2021 0900  Gross per 24 hour   Intake 1239.38 ml   Output 1160 ml   Net 79.38 ml     NPO Diet  ----------------------------------------------------------------------------------------------------------------------      Physical Exam:    Deferred due to COVID-19 isolation      ----------------------------------------------------------------------------------------------------------------------  Results from last 7 days   Lab Units 09/02/21  1218 09/02/21  0950   TROPONIN T ng/mL 0.016 0.031*     Results from last 7 days   Lab Units 09/02/21  0950   PROBNP pg/mL 370.2       Results from last 7 days   Lab Units 09/08/21  0442   PH, ARTERIAL pH units 7.391   PO2 ART mm Hg 74.5*   PCO2, ARTERIAL mm Hg 49.9*   HCO3 ART mmol/L 30.2*     Results from last 7 days   Lab Units 09/08/21  0234 09/07/21  0320 09/06/21  0433 09/05/21  0227 09/05/21  0227 09/04/21  0043 09/02/21  2328 09/02/21  1301 09/02/21  0950   CRP mg/dL  --  2.54* 2.93*  --  3.13*   < > 7.80*  --   --    LACTATE mmol/L  --  1.3  --   --  1.4  --  1.2   < > 2.3*   WBC 10*3/mm3 15.56* 15.63* 14.27*    < > 5.24   < > 4.02   < > 5.51   HEMOGLOBIN g/dL 13.1 13.8 13.8   < > 12.4*   < > 12.0*   < > 11.8*   HEMATOCRIT % 42.9 44.1 43.6   < > 38.2   < > 38.0   < > 37.2*   MCV fL 92.7 91.1 88.6   < > 86.6   < > 89.4   < > 89.0   MCHC g/dL 30.5* 31.3* 31.7   < > 32.5   < > 31.6   < > 31.7   PLATELETS 10*3/mm3 165 232 239   < > 135*   < > 124*   < > 126*   INR   --  1.23*  --   --  1.13*  --  0.96  --  0.95    < > = values in this interval not displayed.     Results from last 7 days   Lab Units 09/08/21  0233 09/07/21  0320 09/06/21  0433   SODIUM mmol/L 148* 149* 148*   POTASSIUM mmol/L 5.1 5.1 4.9   CHLORIDE mmol/L 112* 112* 110*   CO2 mmol/L 28.6 26.7 27.2   BUN mg/dL 56* 41* 49*   CREATININE mg/dL 1.19 1.00 1.20   EGFR IF NONAFRICN AM mL/min/1.73 59* 73 59*   CALCIUM mg/dL 8.7 8.8 8.7   GLUCOSE mg/dL 278* 209* 228*   ALBUMIN g/dL 2.76* 2.94* 3.30*   BILIRUBIN mg/dL 0.5 0.5 0.6   ALK PHOS U/L 63 54 60   AST (SGOT) U/L 55* 63* 53*   ALT (SGPT) U/L 40 41 38   Estimated Creatinine Clearance: 51.2 mL/min (by C-G formula based on SCr of 1.19 mg/dL).  No results found for: AMMONIA    Glucose   Date/Time Value Ref Range Status   09/08/2021 0944 216 (H) 70 - 130 mg/dL Final     Comment:     Meter: [id number] : [id number] [place] Heredia     Lab Results   Component Value Date    HGBA1C 6.30 (H) 09/02/2021     No results found for: TSH, FREET4    Blood Culture   Date Value Ref Range Status   09/02/2021 No growth at 24 hours  Preliminary   09/02/2021 No growth at 24 hours  Preliminary   09/02/2021 No growth at 24 hours  Preliminary     No results found for: URINECX  No results found for: WOUNDCX  No results found for: STOOLCX  No results found for: RESPCX  Pain Management Panel    There is no flowsheet data to display.           ----------------------------------------------------------------------------------------------------------------------  Imaging Results (Last 24 Hours)       Procedure Component Value Units  Date/Time    XR Chest 1 View [[id number]] Resulted: 09/08/21 0807     Updated: 09/08/21 1032            ----------------------------------------------------------------------------------------------------------------------    Assessment/Plan       Assessment/Plan     ASSESSMENT:    1.  Severe sepsis with lactic acid greater than 2 on admission  2.  COVID-19 pneumonia    PLAN:    Remains sedated and intubated at 55% FiO2.  T-max 99.5.  CRP improved from 2.9-2.54.  White count stable at 15.56.  Remains proned with paralytics infusing.    Chest x-ray from 9/6/2021 reports continued diffuse bilateral alveolar infiltrates with developing left lower lobe air bronchograms compatible with diffuse pneumonia.  Chest x-ray from 9/7/2021 pending.    Procalcitonin normal.  CT of the head unremarkable.  Chest x-ray from 9/2/2021 reports scattered bilateral patchy opacities that may represent COVID-19 pneumonia.  VQ scan indeterminate for exclusion of PE.  CT of the chest from 9/3/2021 reports patchy bilateral airspace disease concerning for COVID-19 pneumonia.  CT of the abdomen and pelvis from 9/3/2013 reports bibasilar airspace disease suggestive of COVID-19 pneumonia, large right inguinal hernia containing nondilated bowel and fat.     Agree with initiation of remdesivir and Decadron.    Baricitinib has been discontinued in the setting of emergent intubation.      We are concerned about possible development of bacterial pneumonia in the setting of prior treatment with baricitinib.  Would recommend broad-spectrum coverage with vancomycin and meropenem if new chest x-ray reports worsening.       Code Status:   Code Status and Medical Interventions:   Ordered at: 09/02/21 3699     Level Of Support Discussed With:    Patient     Code Status:    CPR     Medical Interventions (Level of Support Prior to Arrest):    Full     Scribed for Brock Metzger MD by NELSON Torres. 9/8/2021  10:44 EDT       Jeny Lopes,  APRN  09/08/21  10:44 EDT    Physician Attestation:    The documentation recorded by the scribe accurately reflects the service I personally performed and the decisions made by me.    Brock Metzger MD  09/08/21  10:44 EDT

## 2021-09-08 NOTE — PROGRESS NOTES
[facility] HOSPITALIST PROGRESS NOTE     Patient Identification:  Name:  Valdemar Solitario  Age:  76 y.o.  Sex:  male  :  1945  MRN:  [id number]  Visit Number:  [id number]  ROOM: [address]     Primary Care Provider:  Laurence Geiger APRN    Length of stay in inpatient status:  6    Subjective     Chief Compliant:    Chief Complaint   Patient presents with   • Exposure To Known Illness   • Altered Mental Status   • Weakness - Generalized       History of Presenting Illness:    Patient remains intubated and sedated. He is on 0.1 of levophed this morning. Patient net positive 200 cc yesterday with adequate urinary output. I called and updated patient's son, Janessa on plan of care.     ROS:  Otherwise 10 point ROS negative other than documented above in HPI.     Objective     Current Hospital Meds:artificial tears, , Both Eyes, Q4H  chlorhexidine, 15 mL, Mouth/Throat, Q12H  dexamethasone, 6 mg, Intravenous, Daily  enoxaparin, 40 mg, Subcutaneous, Q24H  famotidine, 20 mg, Intravenous, BID  furosemide, 40 mg, Intravenous, Once  insulin aspart, 0-9 Units, Subcutaneous, Q6H  insulin detemir, 5 Units, Subcutaneous, Nightly  oxymetazoline, 2 spray, Each Nare, BID  sertraline, 100 mg, Oral, Daily  sodium chloride, 10 mL, Intravenous, Q12H  sodium chloride, 10 mL, Intravenous, Q12H  sodium chloride, 10 mL, Intravenous, Q12H  sodium chloride, 10 mL, Intravenous, Q12H  sodium chloride, 10 mL, Intravenous, Q12H  tamsulosin, 0.4 mg, Oral, Daily    dexmedetomidine, 0.2-1.5 mcg/kg/hr, Last Rate: Stopped (21 1426)  fentaNYL Citrate, , Last Rate: Stopped (21 1528)  norepinephrine, 0.02-0.3 mcg/kg/min, Last Rate: 0.1 mcg/kg/min (21 4965)  Pharmacy Consult - Pharmacy to dose,   propofol, 5-50 mcg/kg/min, Last Rate: 35 mcg/kg/min (21 0800)  vecuronium (NORCURON) infusion, 0.6-1.2 mcg/kg/min, Last Rate: 0.6 mcg/kg/min (21 0617)        Current Antimicrobial Therapy:  Anti-Infectives (From  admission, onward)    Ordered     Dose/Rate Route Frequency Start Stop    09/03/21 0615  remdesivir 100 mg in sodium chloride 0.9 % 270 mL IVPB (powder vial)     Ordering Provider: Mg Augustine MD    100 mg  over 60 Minutes Intravenous Every 24 Hours 09/04/21 0900 09/07/21 1055    09/03/21 0615  remdesivir 200 mg in sodium chloride 0.9 % 290 mL IVPB (powder vial)     Ordering Provider: Mg Augustine MD    200 mg  over 60 Minutes Intravenous Every 24 Hours 09/03/21 0900 09/03/21 0940        Current Diuretic Therapy:  Diuretics (From admission, onward)    Ordered     Dose/Rate Route Frequency Start Stop    09/08/21 0736  furosemide (LASIX) injection 40 mg     Ordering Provider: Manuelito Adame MD    40 mg Intravenous Once 09/08/21 0800 09/05/21 0732  furosemide (LASIX) injection 80 mg     Ordering Provider: Nic Webster MD    80 mg Intravenous Once 09/05/21 0830 09/05/21 0842        ----------------------------------------------------------------------------------------------------------------------  Vital Signs:  Temp:  [98 °F (36.7 °C)-99.5 °F (37.5 °C)] 99.5 °F (37.5 °C)  Heart Rate:  [] 96  Resp:  [18-19] 18  BP: ()/(41-84) 113/50  FiO2 (%):  [40 %-55 %] 55 %  SpO2:  [76 %-100 %] 91 %  on   ;   Device (Oxygen Therapy): ventilator  Body mass index is 24.43 kg/m².    Wt Readings from Last 3 Encounters:   09/08/21 68.6 kg (151 lb 4.8 oz)   06/14/17 73.9 kg (163 lb)   03/23/17 76.7 kg (169 lb)     Intake & Output (last 3 days)       09/05 0701 - 09/06 0700 09/06 0701 - 09/07 0700 09/07 0701 - 09/08 0700 09/08 0701 - 09/09 0700    P.O. 0       I.V. (mL/kg) 1024.2 (13.1) 1618.4 (23.8) 904.4 (13.2)     Other  202 244     NG/GT  102 91     IV Piggyback        Total Intake(mL/kg) 1024.2 (13.1) 1922.4 (28.3) 1239.4 (18.1)     Urine (mL/kg/hr) 1575 (0.8) 1175 (0.7) 950 (0.6)     Emesis/NG output 0       Stool 0 0 0     Total Output 1575 1175 950     Net -550.8 +747.4 +289.4                  NPO Diet  ----------------------------------------------------------------------------------------------------------------------  Physical exam:  GENERAL:  Patient intubated and sedated.   SKIN:  Warm, dry without rashes, purpura or petechiae.  EYES:  Pupils equal, round and reactive to light.  EOMs intact.  Conjunctivae normal.  HEAD:  Normocephalic. Atraumatic.   EARS/NOSE/MOUTH/THROAT:  Hearing intact. Septum midline.  No excoriations or nasal discharge. No stomatitis or ulcers.  Lips normal. Oropharynx without lesions or exudates.  NECK:  Supple with good range of motion; no thyromegaly or masses  LYMPHATICS:  No cervical, supraclavicular, axillary adenopathy.  RESP:  Lungs clear to auscultation. Good airflow. Intubated receiving mechanical ventilation.   CARDIAC:  Regular rate and rhythm without murmurs, rubs or gallops. Normal S1,S2. No edema  GI:  Soft, nontender, no hepatosplenomegaly, normal bowel sounds  MSK:  No clubbing or cyanosis, No joint swelling noted in hands  NEUROLOGICAL:  No focal deficit. Pupils equal and reactive.   ----------------------------------------------------------------------------------------------------------------------  Tele:    ----------------------------------------------------------------------------------------------------------------------  Results from last 7 days   Lab Units 09/08/21  0234 09/07/21  0320 09/06/21  0433 09/05/21  0227 09/05/21  0227 09/04/21  0043 09/02/21  2328 09/02/21  1301 09/02/21  0950   CRP mg/dL  --  2.54* 2.93*  --  3.13*   < > 7.80*  --   --    LACTATE mmol/L  --  1.3  --   --  1.4  --  1.2   < > 2.3*   WBC 10*3/mm3 15.56* 15.63* 14.27*   < > 5.24   < > 4.02   < > 5.51   HEMOGLOBIN g/dL 13.1 13.8 13.8   < > 12.4*   < > 12.0*   < > 11.8*   HEMATOCRIT % 42.9 44.1 43.6   < > 38.2   < > 38.0   < > 37.2*   MCV fL 92.7 91.1 88.6   < > 86.6   < > 89.4   < > 89.0   MCHC g/dL 30.5* 31.3* 31.7   < > 32.5   < > 31.6   < > 31.7   PLATELETS 10*3/mm3 165  232 239   < > 135*   < > 124*   < > 126*   INR   --  1.23*  --   --  1.13*  --  0.96  --  0.95    < > = values in this interval not displayed.     Results from last 7 days   Lab Units 09/08/21  0442   PH, ARTERIAL pH units 7.391   PO2 ART mm Hg 74.5*   PCO2, ARTERIAL mm Hg 49.9*   HCO3 ART mmol/L 30.2*     Results from last 7 days   Lab Units 09/08/21  0233 09/07/21  0320 09/06/21  0433   SODIUM mmol/L 148* 149* 148*   POTASSIUM mmol/L 5.1 5.1 4.9   CHLORIDE mmol/L 112* 112* 110*   CO2 mmol/L 28.6 26.7 27.2   BUN mg/dL 56* 41* 49*   CREATININE mg/dL 1.19 1.00 1.20   EGFR IF NONAFRICN AM mL/min/1.73 59* 73 59*   CALCIUM mg/dL 8.7 8.8 8.7   GLUCOSE mg/dL 278* 209* 228*   ALBUMIN g/dL 2.76* 2.94* 3.30*   BILIRUBIN mg/dL 0.5 0.5 0.6   ALK PHOS U/L 63 54 60   AST (SGOT) U/L 55* 63* 53*   ALT (SGPT) U/L 40 41 38   Estimated Creatinine Clearance: 51.2 mL/min (by C-G formula based on SCr of 1.19 mg/dL).  No results found for: AMMONIA  Results from last 7 days   Lab Units 09/02/21  1218 09/02/21  0950   TROPONIN T ng/mL 0.016 0.031*     Results from last 7 days   Lab Units 09/02/21  0950   PROBNP pg/mL 370.2         No results found for: HGBA1C, POCGLU  No results found for: TSH, FREET4  No results found for: PREGTESTUR, PREGSERUM, HCG, HCGQUANT  Pain Management Panel    There is no flowsheet data to display.       Brief Urine Lab Results  (Last result in the past 365 days)      Color   Clarity   Blood   Leuk Est   Nitrite   Protein   CREAT   Urine HCG        09/04/21 1137 Yellow Cloudy Trace Negative Negative Trace             Blood Culture   Date Value Ref Range Status   09/02/2021 No growth at 5 days  Final   09/02/2021 No growth at 5 days  Final   09/02/2021 No growth at 5 days  Final     No results found for: URINECX  No results found for: WOUNDCX  No results found for: STOOLCX  No results found for: RESPCX  No results found for: AFBCX  Results from last 7 days   Lab Units 09/07/21  0320 09/06/21  0433 09/05/21  0227  09/04/21  0043 09/02/21  2328 09/02/21  1301 09/02/21  0950   PROCALCITONIN ng/mL 0.11  --   --   --  0.16  --   --    LACTATE mmol/L 1.3  --  1.4  --  1.2 0.8 2.3*   CRP mg/dL 2.54* 2.93* 3.13* 5.05* 7.80*  --   --        I have personally looked at the labs and they are summarized above.  ----------------------------------------------------------------------------------------------------------------------  Detailed radiology reports for the last 24 hours:    Imaging Results (Last 24 Hours)     Procedure Component Value Units Date/Time    XR Chest 1 View [[id number]] Resulted: 09/08/21 0807     Updated: 09/08/21 0807        Assessment & Plan    Mr. Solitario is our 76M PMH diabetes meliltus, depression, GERD, hypertension, dyslipidemia, presented to his place of work in a state of confusion, brought to ER, found to be Covid +. Hospitalization complicated by worsening hypoxia.     #Septic shock, Acute Metabolic Encephalopathy, Acute Hypoxic Respiratory Failure requiring mechanical ventilation 2/2 viral pneumonia treating for Covid 19 c/b mild transaminitis  - Patient presented w/ increased shortness of breath, confusion, tachypnea, covid + on 9/2.  - Admission labs showed WBC count 5K, CRP 7.8, lactate 2.3, d-dimer 0.95, procal 0.16, MRSA -, covid +, Bcx's NGTD  - B/l Venous Duplex negative for DVT  - VQ scan showed indeterminate study, noted abnormal perfusion, multifocal defects could be related to multifocal airspace disease but also PE.  - CT Head showed no acute intracranial abnormality  - CT Chest showed patchy b/l airspace disease c/w covid 19  - ID consulted; Following  - CTPE on 9/4 did not reveal PE but did show worsening bilateral airspace disease that is likely worsening COVID pneumonia. Given fluid in fissure on plain film could not rule out component of edema  - Continue 6mg IV Dexamethasone x 10 days  - Finished course of remdesivir.   - - Baricitinib per ID recs. Has been d/c by ID in interim due to  intubation.   - Continue Level I AC for Covid 19 thromboembolism Ppx  - Continue APAP PRN for fevers  - Continue Albuterol Inhaler  - Patient intubated for worsening hypoxia on 9/5.   - Trend Covid 19 progression labs w/ CBC, CMP, CK, CRP, Ferritin daily and LDH, D-dimer, Fibrinogen q48hrs.  - Enhanced droplet/contact isolation precautions  - P/F ratio post intubation 80 likely representing severe ARDS. Patient paralyzed and prone positioned for first 2 days. Oxygenation improved and patient has been supine since 1:30 pm on 9/7.   - ABG this AM 7.391/50/74 on 55% FiO2, PEEP of 8, , and RR 18 while prone positioned. Pplateau pressure around 21 on these settings.   - Suspect pressor requirement a combination of sedation and sepsis. Currently on 0.10 of levophed.    - Pulmonology consulted. Appreciate recs.     #HTN/Dyslipidemia  #Elevated Troponin, NSTEMI Type II suspected  #Cardiomyopathy - New  #Pulm HTN - New  - Labs showed trops 0.031->0.016, proBNP 370   - EKG showed NSR, RBBB, no significant ST changes  - Echo showed LVEF 46-50%, mild-mod dilated RV, mild-mod dilated RA, mod-severe Pulm HTN, RVSP 45-55mmHg  - Consulted Cardiology; Recs pending  - Holding home ARB due to lower Bps  - Holding home statin while on Remdesivir   - Continue to monitor on tele, strict I/O's, daily weights, trend HR and BP  - Given pressor requirement will hold diuresis at this time. Will transduce CVP to get better idea of fluid status.     #Hypernatremia  - Corrected sodium stable at 150  - Increase free water from 40 to 50 cc/hr.     #Nonoliguric MURIEL - Improved   - B/l Cr 0.9-1.0, was up to 2.1 on admission; Now 1.2  - S/p mIVFs, Trend Cr and UOP, avoid nephrotoxins, NSAIDs, dehydration and contrast as able.    #NIDDM Type II, controlled, unknown complications  - Hgb A1c = 6.3%  - Holding home oral agents, continue FSBG and SSI, add long acting as indicated.    #Anxiety/Depression  - Continue home regimen     #GERD  - Continue  home PPI     #BPH  - Continue home Flomax    F: TFs  A: Fentanyl   S: Propofol   T: Lovenox  H: HOB elevated   U: Famotidine   G: PRN  S: Not ready   B: PRN  I: ETT 9/5, RIJ 9/5  D: None     Code status: Full. Guarded prognosis but overall improving.      Dispo: Pending workup and clinical improvement     *This patient is considered high risk due to acute metabolic encephalopathy, acute respiratory failure, covid 19, monitoring for drug toxicities on Remdesivir, MURIEL, new cardiomyopathy and pulm HTN. Patient now critically ill requiring mechanical ventilation. 40 minutes of critical care time spent on patient, with greater than 50% of this time bedside or discussing care with hospital staff.       VTE Prophylaxis:   Mechanical Order History:     None      Pharmalogical Order History:      Ordered     Dose Route Frequency Stop    09/07/21 0859  enoxaparin (LOVENOX) syringe 40 mg      40 mg SC Every 24 Hours --    09/03/21 0739  enoxaparin (LOVENOX) syringe 80 mg  Status:  Discontinued      1 mg/kg SC Every 12 Hours 09/07/21 0859    09/03/21 0724  Pharmacy to Dose enoxaparin (LOVENOX)  Status:  Discontinued      -- XX Continuous PRN 09/03/21 0740    09/02/21 1237  enoxaparin (LOVENOX) syringe 40 mg  Status:  Discontinued      40 mg SC Every 24 Hours 09/02/21 1247    09/02/21 1247  enoxaparin (LOVENOX) syringe 40 mg  Status:  Discontinued      0.5 mg/kg SC Every 24 Hours 09/03/21 0549                Nic Webster MD  [facility] Hospitalist  09/08/21  08:46 EDT

## 2021-09-08 NOTE — CASE MANAGEMENT/SOCIAL WORK
Discharge Planning Assessment  AMIE Knox     Patient Name: Valdemar Solitario  MRN: [id number]  Today's Date: 9/8/2021    Admit Date: 9/2/2021        Discharge Plan     Row Name 09/08/21 1405       Plan    Plan  Pt was admitted on 9/2/21. Pt is intubated and sedated. Pt's discharge plan is to be determined at this time. SS will continue to follow and assist with discharge planning.    Patient/Family in Agreement with Plan  yes            Expected Discharge Date and Time     Expected Discharge Date Expected Discharge Time    Sep 10, 2021             DAQUAN Garsia

## 2021-09-08 NOTE — PROGRESS NOTES
[facility]      Mar/chart reviewed  [facility]  Sedated on vent unable to provide subjective  Has remained supine, off paralytic    Vital Sign Min/Max for last 24 hours  Temp  Min: 98 °F (36.7 °C)  Max: 99.5 °F (37.5 °C)   BP  Min: 83/46  Max: 172/84   Pulse  Min: 75  Max: 113   Resp  Min: 18  Max: 19   SpO2  Min: 76 %  Max: 100 %   No data recorded   Weight  Min: 68.6 kg (151 lb 4.8 oz)  Max: 68.6 kg (151 lb 4.8 oz)   1239/950    Appears ill, sedated on vent  perrl, eomi, normal sclera  mmm, no jvd, trachea midline, neck supple,  chest cta bilaterally, no crackles, no wheezes,   rrr,   soft, nt, nd +bs,  no c/c/ e  Skin warm, dry no rashes    Labs: 9/8: reviewed:  7.39/49/74 (55%;8;450;18)  Wbc 15  hgb 13.1  plts 165  Glucose 278  Bun 56  Cr 1.19  Na 148  k 5.1  Bicarb 28  CXR: 9/6: reviewed  9/7: procal 0.11    A/P:  1. Acute hypoxemic and hypercapnic respiratory failure -- continue vent support, wean as able. He is really doing pretty well at this point all considering. Would wean fi02 at this point to 40% can try and wean peep now.   can leave supine, if he remains at 40% supine I would stop proning and let paralytic lapse  2. COVID19 pneumonia -- CRP is fairly low, d dimer is low -- would keep the current measures for this in place, monitor for secondary bacterial infection. Check procal tomorrow  D dimer is low, neg ct angio and neg venous doppler, continue prophylactic lovenox  3. DM -- accuechecks a little high, will add ssi and levemir,   4. Pulmonary hypertension -- will try some diuresis as renal function allows.     Patient's oxygenation is slightly better when prone. He does appear to have some bleeding around et tube on face, so I will discuss with nurse if we should abandon proning due to skin breakdown issues.  He has unfortnately had to remain on paralytic despite being supine    CC 35 mins.

## 2021-09-09 NOTE — PROGRESS NOTES
Kinetics :  Vancomycin  Day 1    The patient has been evaluated for vancomycin therapy for pneumonia.  We will load the patient with vancomycin 1250mg x 1 and follow with 1gm q 24 hrs to maximize the auc / trough level projections and monitor with you.

## 2021-09-09 NOTE — PROGRESS NOTES
[facility]      Mar/chart reviewed  [facility]  Sedated on vent unable to provide subjective  Restarted  proning yesterday due to increasing hypoxemia while supine.    Vital Sign Min/Max for last 24 hours  Temp  Min: 97.4 °F (36.3 °C)  Max: 100 °F (37.8 °C)   BP  Min: 81/53  Max: 142/61   Pulse  Min: 72  Max: 169   Resp  Min: 18  Max: 26   SpO2  Min: 86 %  Max: 100 %   No data recorded   Weight  Min: 67.9 kg (149 lb 9.6 oz)  Max: 67.9 kg (149 lb 9.6 oz)   1977/1260  Appears ill, sedated on vent  perrl, eomi, normal sclera  mmm, no jvd, trachea midline, neck supple,  chest cta bilaterally, no crackles, no wheezes,   rrr,   soft, nt, nd +bs,  no c/c/ e  Skin warm, dry no rashes    Labs: 9/9: reviewed:  7.31/62/116  Glucose 238  Bun 69  Cr 1.3  Na 149  Bicarb 29  Wbc 21 (was 15)  hgb 13.6  plts 216  crp 13  9/8: procal 0.22    A/P:  1. Acute hypoxemic and hypercapnic respiratory failure -- continue vent support, wean oxygen as able.  Would wean fi02 at this point to 40% then try and wean peep. Progress seems to have stalled at this point, would continue pronging  2. COVID19 pneumonia -- CRP jumped today, d dimer is low -- would keep the current measures for this in place, monitor for secondary bacterial infection.  procal low 9/9  D dimer is low, neg ct angio and neg venous doppler, continue prophylactic lovenox  3. DM -- accuechecks a little high,  ssi increased levemir 5-->15 on 9/9   4. Pulmonary hypertension --dry and keep on dry side    crp jumped today so will increase dexamethasone, continue proning, levemir adjusted.    CC 35 mins.

## 2021-09-09 NOTE — NURSING NOTE
Patient supine. Tolerated move. Lines/drains still intact. Vecc turned off. Desated to 83% but recovered and maintaining adequate sats at this time. VSS. WCTM.

## 2021-09-09 NOTE — PROGRESS NOTES
[facility] HOSPITALIST PROGRESS NOTE     Patient Identification:  Name:  Valdemar Solitario  Age:  76 y.o.  Sex:  male  :  1945  MRN:  [id number]  Visit Number:  [id number]  ROOM: [address]     Primary Care Provider:  Laurence Geiger APRN    Length of stay in inpatient status:  7    Subjective     Chief Compliant:    Chief Complaint   Patient presents with   • Exposure To Known Illness   • Altered Mental Status   • Weakness - Generalized       History of Presenting Illness:    Patient became more hypoxic yesterday evening and patient placed back in prone positioning. Patient had aflutter overnight, currently rate controlled. Attempted to call patient's son Janessa this AM, but no answer.     ROS:  Otherwise 10 point ROS negative other than documented above in HPI.     Objective     Current Hospital Meds:artificial tears, , Both Eyes, Q4H  chlorhexidine, 15 mL, Mouth/Throat, Q12H  dexamethasone, 6 mg, Intravenous, BID  enoxaparin, 1 mg/kg, Subcutaneous, Q12H  famotidine, 20 mg, Intravenous, BID  insulin aspart, 0-9 Units, Subcutaneous, Q6H  insulin detemir, 15 Units, Subcutaneous, Nightly  meropenem, 1 g, Intravenous, Once  meropenem, 1 g, Intravenous, Q12H  oxymetazoline, 2 spray, Each Nare, BID  sertraline, 100 mg, Oral, Daily  sodium chloride, 10 mL, Intravenous, Q12H  sodium chloride, 10 mL, Intravenous, Q12H  sodium chloride, 10 mL, Intravenous, Q12H  sodium chloride, 10 mL, Intravenous, Q12H  sodium chloride, 10 mL, Intravenous, Q12H  tamsulosin, 0.4 mg, Oral, Daily  [START ON 9/10/2021] vancomycin, 1,000 mg, Intravenous, Q24H  vancomycin, 1,250 mg, Intravenous, Once    dexmedetomidine, 0.2-1.5 mcg/kg/hr, Last Rate: Stopped (21 142)  fentaNYL Citrate, , Last Rate: Stopped (21)  Pharmacy Consult - Pharmacy to dose,   Pharmacy Consult - Pharmacy to dose,   Pharmacy to Dose enoxaparin (LOVENOX),   Pharmacy to dose vancomycin,   phenylephrine, 0.5-3 mcg/kg/min, Last Rate: 3  mcg/kg/min (09/09/21 0643)  propofol, 5-50 mcg/kg/min, Last Rate: 40 mcg/kg/min (09/09/21 0801)  vasopressin (PITRESSIN) 20 units in 100 mL infusion, 0.03 Units/min, Last Rate: 0.03 Units/min (09/09/21 0004)  vecuronium (NORCURON) infusion, 0.6-1.2 mcg/kg/min, Last Rate: 0.6 mcg/kg/min (09/08/21 1705)        Current Antimicrobial Therapy:  Anti-Infectives (From admission, onward)    Ordered     Dose/Rate Route Frequency Start Stop    09/09/21 0949  vancomycin 1 g/250 mL 0.9% NS (vial-mate)     Ordering Provider: Brock Metzger MD    1,000 mg  over 60 Minutes Intravenous Every 24 Hours 09/10/21 1100 09/19/21 1059    09/09/21 0942  meropenem (MERREM) 1 g in sodium chloride 0.9 % 100 mL IVPB-VTB     Ordering Provider: Brock Metzger MD    1 g  over 3 Hours Intravenous Every 12 Hours 09/09/21 1600 09/19/21 1559    09/09/21 0949  vancomycin 1250 mg/250 mL 0.9% NS IVPB ([facility])     Ordering Provider: Brock Metzger MD    1,250 mg  over 60 Minutes Intravenous Once 09/09/21 1100      09/09/21 0942  meropenem (MERREM) 1 g in sodium chloride 0.9 % 100 mL IVPB-VTB     Ordering Provider: Brock Metzger MD    1 g  over 30 Minutes Intravenous Once 09/09/21 1030      09/09/21 0918  Pharmacy to dose vancomycin     Ordering Provider: Jeny Lopes, APRN     Does not apply Continuous PRN 09/09/21 0917 09/19/21 0916    09/03/21 0615  remdesivir 100 mg in sodium chloride 0.9 % 270 mL IVPB (powder vial)     Ordering Provider: Mg Augustine MD    100 mg  over 60 Minutes Intravenous Every 24 Hours 09/04/21 0900 09/07/21 1055    09/03/21 0615  remdesivir 200 mg in sodium chloride 0.9 % 290 mL IVPB (powder vial)     Ordering Provider: Mg Augustine MD    200 mg  over 60 Minutes Intravenous Every 24 Hours 09/03/21 0900 09/03/21 0940        Current Diuretic Therapy:  Diuretics (From admission, onward)    Ordered     Dose/Rate Route Frequency Start Stop    09/08/21 0736  furosemide (LASIX) injection 40  mg     Ordering Provider: Manuelito Adame MD    40 mg Intravenous Once 09/08/21 0800 09/08/21 0926    09/05/21 0732  furosemide (LASIX) injection 80 mg     Ordering Provider: Nic Webster MD    80 mg Intravenous Once 09/05/21 0830 09/05/21 0842        ----------------------------------------------------------------------------------------------------------------------  Vital Signs:  Temp:  [97.4 °F (36.3 °C)-100 °F (37.8 °C)] 98 °F (36.7 °C)  Heart Rate:  [] 68  Resp:  [18-26] 18  BP: ()/(43-73) 151/69  FiO2 (%):  [50 %-75 %] 50 %  SpO2:  [86 %-100 %] 96 %  on   ;   Device (Oxygen Therapy): ventilator  Body mass index is 24.16 kg/m².    Wt Readings from Last 3 Encounters:   09/09/21 67.9 kg (149 lb 9.6 oz)   06/14/17 73.9 kg (163 lb)   03/23/17 76.7 kg (169 lb)     Intake & Output (last 3 days)       09/06 0701 - 09/07 0700 09/07 0701 - 09/08 0700 09/08 0701 - 09/09 0700 09/09 0701 - 09/10 0700    P.O.        I.V. (mL/kg) 1618.4 (23.8) 904.4 (13.2) 1256.3 (18.5)     Other 202 244 501     NG/ 91 220     Total Intake(mL/kg) 1922.4 (28.3) 1239.4 (18.1) 1977.3 (29.1)     Urine (mL/kg/hr) 1175 (0.7) 950 (0.6) 1260 (0.8)     Emesis/NG output        Stool 0 0 0     Total Output 0922 513 3163     Net +747.4 +289.4 +717.3                 NPO Diet  ----------------------------------------------------------------------------------------------------------------------  Physical exam:  GENERAL:  Patient intubated and sedated. Prone position.   SKIN:  Warm, dry without rashes, purpura or petechiae.   NECK:  Supple with good range of motion; no thyromegaly or masses  LYMPHATICS:  No cervical, supraclavicular, axillary adenopathy.  RESP:  Lungs coarse breath sounds bilaterally.   CARDIAC:  Regular rate and rhythm without murmurs, rubs or gallops. Normal S1,S2. No edema  GI:  Soft, nontender, no hepatosplenomegaly, normal bowel sounds  MSK:  No clubbing or cyanosis, No joint swelling noted in  hands  ----------------------------------------------------------------------------------------------------------------------  Tele:    ----------------------------------------------------------------------------------------------------------------------  Results from last 7 days   Lab Units 09/09/21  0055 09/08/21  0234 09/07/21  0320 09/06/21  0433 09/06/21  0433 09/05/21  0227 09/05/21  0227 09/04/21  0043 09/02/21  2328   CRP mg/dL 13.17*  --  2.54*  --  2.93*   < > 3.13*   < > 7.80*   LACTATE mmol/L 1.6  --  1.3  --   --   --  1.4  --  1.2   WBC 10*3/mm3 21.69* 15.56* 15.63*   < > 14.27*   < > 5.24   < > 4.02   HEMOGLOBIN g/dL 13.6 13.1 13.8   < > 13.8   < > 12.4*   < > 12.0*   HEMATOCRIT % 44.6 42.9 44.1   < > 43.6   < > 38.2   < > 38.0   MCV fL 92.3 92.7 91.1   < > 88.6   < > 86.6   < > 89.4   MCHC g/dL 30.5* 30.5* 31.3*   < > 31.7   < > 32.5   < > 31.6   PLATELETS 10*3/mm3 216 165 232   < > 239   < > 135*   < > 124*   INR  1.29*  --  1.23*  --   --   --  1.13*  --  0.96    < > = values in this interval not displayed.     Results from last 7 days   Lab Units 09/09/21  0442   PH, ARTERIAL pH units 7.316*   PO2 ART mm Hg 116.0*   PCO2, ARTERIAL mm Hg 62.7*   HCO3 ART mmol/L 32.0*     Results from last 7 days   Lab Units 09/09/21  0055 09/08/21  0233 09/07/21  0320   SODIUM mmol/L 149* 148* 149*   POTASSIUM mmol/L 5.1 5.1 5.1   CHLORIDE mmol/L 110* 112* 112*   CO2 mmol/L 29.5* 28.6 26.7   BUN mg/dL 69* 56* 41*   CREATININE mg/dL 1.31* 1.19 1.00   EGFR IF NONAFRICN AM mL/min/1.73 53* 59* 73   CALCIUM mg/dL 8.8 8.7 8.8   GLUCOSE mg/dL 238* 278* 209*   ALBUMIN g/dL 2.56* 2.76* 2.94*   BILIRUBIN mg/dL 0.5 0.5 0.5   ALK PHOS U/L 56 63 54   AST (SGOT) U/L 41* 55* 63*   ALT (SGPT) U/L 42* 40 41   Estimated Creatinine Clearance: 46.1 mL/min (A) (by C-G formula based on SCr of 1.31 mg/dL (H)).  No results found for: AMMONIA  Results from last 7 days   Lab Units 09/02/21  1218   TROPONIN T ng/mL 0.016             Glucose    Date/Time Value Ref Range Status   09/09/2021 0601 238 (H) 70 - 130 mg/dL Final     Comment:     Meter: [id number] : [id number] Adam Swann   09/08/2021 2329 211 (H) 70 - 130 mg/dL Final     Comment:     Meter: [id number] : [id number] VIVIAN SWANSON   09/08/2021 1708 260 (H) 70 - 130 mg/dL Final     Comment:     Meter: [id number] : [id number] Belgica Heredia   09/08/2021 1129 254 (H) 70 - 130 mg/dL Final     Comment:     Meter: [id number] : [id number] GAY LENTZ   09/08/2021 0944 216 (H) 70 - 130 mg/dL Final     Comment:     Meter: [id number] : [id number] Belgica Neffmy Giovanna     No results found for: TSH, FREET4  No results found for: PREGTESTUR, PREGSERUM, HCG, HCGQUANT  Pain Management Panel    There is no flowsheet data to display.       Brief Urine Lab Results  (Last result in the past 365 days)      Color   Clarity   Blood   Leuk Est   Nitrite   Protein   CREAT   Urine HCG        09/04/21 1137 Yellow Cloudy Trace Negative Negative Trace             Blood Culture   Date Value Ref Range Status   09/02/2021 No growth at 5 days  Final   09/02/2021 No growth at 5 days  Final     No results found for: URINECX  No results found for: WOUNDCX  No results found for: STOOLCX  No results found for: RESPCX  No results found for: AFBCX  Results from last 7 days   Lab Units 09/09/21  0055 09/08/21  0233 09/07/21  0320 09/06/21  0433 09/05/21  0227 09/04/21  0043 09/02/21  2328 09/02/21  1301   PROCALCITONIN ng/mL  --  0.22 0.11  --   --   --  0.16  --    LACTATE mmol/L 1.6  --  1.3  --  1.4  --  1.2 0.8   CRP mg/dL 13.17*  --  2.54* 2.93* 3.13* 5.05* 7.80*  --        I have personally looked at the labs and they are summarized above.  ----------------------------------------------------------------------------------------------------------------------  Detailed radiology reports for the last 24 hours:    Imaging Results (Last 24 Hours)     ** No results found for the last 24 hours. **         Assessment & Plan    Mr. Solitario is our 76M PMH diabetes meliltus, depression, GERD, hypertension, dyslipidemia, presented to his place of work in a state of confusion, brought to ER, found to be Covid +. Hospitalization complicated by worsening hypoxia.     #Septic shock, Acute Metabolic Encephalopathy, Acute Hypoxic Respiratory Failure requiring mechanical ventilation 2/2 viral pneumonia treating for Covid 19 c/b mild transaminitis  - Patient presented w/ increased shortness of breath, confusion, tachypnea, covid + on 9/2.  - Admission labs showed WBC count 5K, CRP 7.8, lactate 2.3, d-dimer 0.95, procal 0.16, MRSA -, covid +, Bcx's NGTD  - B/l Venous Duplex negative for DVT  - VQ scan showed indeterminate study, noted abnormal perfusion, multifocal defects could be related to multifocal airspace disease but also PE.  - CT Head showed no acute intracranial abnormality  - CT Chest showed patchy b/l airspace disease c/w covid 19  - ID consulted; Following  - CTPE on 9/4 did not reveal PE but did show worsening bilateral airspace disease that is likely worsening COVID pneumonia. Given fluid in fissure on plain film could not rule out component of edema  - Continue 6mg IV Dexamethasone x 10 days  - Finished course of remdesivir.   - - Baricitinib per ID recs. Has been d/c by ID in interim due to intubation.   - Continue Level I AC for Covid 19 thromboembolism Ppx  - Continue APAP PRN for fevers  - Continue Albuterol Inhaler  - Patient intubated for worsening hypoxia on 9/5.   - Trend Covid 19 progression labs w/ CBC, CMP, CK, CRP, Ferritin daily and LDH, D-dimer, Fibrinogen q48hrs.  - Enhanced droplet/contact isolation precautions  - P/F ratio post intubation 80 likely representing severe ARDS. Patient paralyzed and prone positioned for first 2 days. Oxygenation improved and patient left supine for 24 hour but became more hypoxic yesterday evening and was placed back in prone position.   - ABG this AM 7.316/3/116  on 60% FiO2, PEEP of 10, , and RR 18 while prone positioned. Pplateau pressure around 22 on these settings. FiO2 decreased to 50%.   - Suspect pressor requirement a combination of sedation and sepsis. Currently on 3 of phenelepherine and 0.03 of vasopressin.   - Pulmonology consulted. Appreciate recs.   - ID has started vancomycin and meropenem emperically. I have ordered a sputum culture from ETT.     #New onset atrial flutter   - Levophed transitioned to Phenylphrine and vasopressin   - Rate controlled.   - Will transition to Tlov    #HTN/Dyslipidemia  #Elevated Troponin, NSTEMI Type II suspected  #Cardiomyopathy - New  #Pulm HTN - New  - Labs showed trops 0.031->0.016, proBNP 370   - EKG showed NSR, RBBB, no significant ST changes  - Echo showed LVEF 46-50%, mild-mod dilated RV, mild-mod dilated RA, mod-severe Pulm HTN, RVSP 45-55mmHg  - Consulted Cardiology; Recs pending  - Holding home ARB due to lower Bps  - Holding home statin while on Remdesivir   - Continue to monitor on tele, strict I/O's, daily weights, trend HR and BP  - Given pressor requirement will hold diuresis at this time. Will continue to consider PRN.     #Hypernatremia  - Corrected sodium stable at 151  - Increase free water from 40 to 50 cc/hr.     #Nonoliguric MURIEL - Improved   - B/l Cr 0.9-1.0, was up to 2.1 on admission; Now 1.3  - S/p mIVFs, Trend Cr and UOP, avoid nephrotoxins, NSAIDs, dehydration and contrast as able.    #NIDDM Type II, controlled, unknown complications  - Hgb A1c = 6.3%  - Holding home oral agents, continue FSBG and SSI, add long acting as indicated.    #Anxiety/Depression  - Continue home regimen     #GERD  - Continue home PPI     #BPH  - Continue home Flomax    F: TFs  A: Fentanyl   S: Propofol   T: Lovenox  H: HOB elevated   U: Famotidine   G: PRN  S: Not ready   B: PRN  I: ETT 9/5, RIJ 9/5  D: None     Code status: Full. Guarded prognosis.      Dispo: Pending workup and clinical improvement     *This patient is  considered high risk due to acute metabolic encephalopathy, acute respiratory failure, covid 19, monitoring for drug toxicities on Remdesivir, MURIEL, new cardiomyopathy and pulm HTN. Patient now critically ill requiring mechanical ventilation. 40 minutes of critical care time spent on patient, with greater than 50% of this time bedside or discussing care with hospital staff.       VTE Prophylaxis:   Mechanical Order History:     None      Pharmalogical Order History:      Ordered     Dose Route Frequency Stop    09/09/21 0813  enoxaparin (LOVENOX) syringe 70 mg      1 mg/kg SC Every 12 Hours --    09/09/21 0730  Pharmacy to Dose enoxaparin (LOVENOX)      -- XX Continuous PRN --    09/07/21 0859  enoxaparin (LOVENOX) syringe 40 mg  Status:  Discontinued      40 mg SC Every 24 Hours 09/09/21 0730    09/03/21 0739  enoxaparin (LOVENOX) syringe 80 mg  Status:  Discontinued      1 mg/kg SC Every 12 Hours 09/07/21 0859    09/03/21 0724  Pharmacy to Dose enoxaparin (LOVENOX)  Status:  Discontinued      -- XX Continuous PRN 09/03/21 0740    09/02/21 1237  enoxaparin (LOVENOX) syringe 40 mg  Status:  Discontinued      40 mg SC Every 24 Hours 09/02/21 1247    09/02/21 1247  enoxaparin (LOVENOX) syringe 40 mg  Status:  Discontinued      0.5 mg/kg SC Every 24 Hours 09/03/21 0549                  Nic Webster MD  [facility]  09/09/21  10:56 EDT

## 2021-09-09 NOTE — PROGRESS NOTES
PROGRESS NOTE         Patient Identification:  Name:  Valdemar Solitario  Age:  76 y.o.  Sex:  male  :  1945  MRN:  [id number]  Visit Number:  [id number]  Primary Care Provider:  Laurence Geiger APRN         LOS: 7 days       ----------------------------------------------------------------------------------------------------------------------  Subjective       Chief Complaints:    Exposure To Known Illness, Altered Mental Status, and Weakness - Generalized        Interval History:      Remains sedated and intubated at 60% FiO2.  Afebrile.  Lactate 1.6.  CRP elevated at 13.17.  White count has worsened from 15.56-21.69.    Review of Systems:    Deferred due to altered mental status  ----------------------------------------------------------------------------------------------------------------------      Objective       Current Hospital Meds:  artificial tears, , Both Eyes, Q4H  chlorhexidine, 15 mL, Mouth/Throat, Q12H  dexamethasone, 6 mg, Intravenous, BID  enoxaparin, 1 mg/kg, Subcutaneous, Q12H  famotidine, 20 mg, Intravenous, BID  insulin aspart, 0-9 Units, Subcutaneous, Q6H  insulin detemir, 15 Units, Subcutaneous, Nightly  oxymetazoline, 2 spray, Each Nare, BID  sertraline, 100 mg, Oral, Daily  sodium chloride, 10 mL, Intravenous, Q12H  sodium chloride, 10 mL, Intravenous, Q12H  sodium chloride, 10 mL, Intravenous, Q12H  sodium chloride, 10 mL, Intravenous, Q12H  sodium chloride, 10 mL, Intravenous, Q12H  tamsulosin, 0.4 mg, Oral, Daily      dexmedetomidine, 0.2-1.5 mcg/kg/hr, Last Rate: Stopped (21 1426)  fentaNYL Citrate, , Last Rate: Stopped (21 2072)  Pharmacy Consult - Pharmacy to dose,   Pharmacy to Dose enoxaparin (LOVENOX),   phenylephrine, 0.5-3 mcg/kg/min, Last Rate: 3 mcg/kg/min (21 0643)  propofol, 5-50 mcg/kg/min, Last Rate: 40 mcg/kg/min (21 0801)  vasopressin (PITRESSIN) 20 units in 100 mL infusion, 0.03 Units/min, Last Rate: 0.03 Units/min (21  0004)  vecuronium (NORCURON) infusion, 0.6-1.2 mcg/kg/min, Last Rate: 0.6 mcg/kg/min (09/08/21 1705)      ----------------------------------------------------------------------------------------------------------------------    Vital Signs:  Temp:  [97.4 °F (36.3 °C)-100 °F (37.8 °C)] 98 °F (36.7 °C)  Heart Rate:  [] 86  Resp:  [18-26] 18  BP: ()/(42-73) 147/63  FiO2 (%):  [60 %-75 %] 60 %  Mean Arterial Pressure (Non-Invasive) for the past 24 hrs (Last 3 readings):   Noninvasive MAP (mmHg)   09/09/21 0805 86   09/09/21 0600 81   09/09/21 0400 81     SpO2 Percentage    09/09/21 0600 09/09/21 0629 09/09/21 0805   SpO2: 97% 96% 96%     SpO2:  [86 %-100 %] 96 %  on   ;   Device (Oxygen Therapy): ventilator    Body mass index is 24.16 kg/m².  Wt Readings from Last 3 Encounters:   09/09/21 67.9 kg (149 lb 9.6 oz)   06/14/17 73.9 kg (163 lb)   03/23/17 76.7 kg (169 lb)        Intake/Output Summary (Last 24 hours) at 9/9/2021 0913  Last data filed at 9/9/2021 0643  Gross per 24 hour   Intake 1977.26 ml   Output 1050 ml   Net 927.26 ml     NPO Diet  ----------------------------------------------------------------------------------------------------------------------      Physical Exam:    Deferred due to COVID-19 isolation      ----------------------------------------------------------------------------------------------------------------------  Results from last 7 days   Lab Units 09/02/21  1218 09/02/21  0950   TROPONIN T ng/mL 0.016 0.031*     Results from last 7 days   Lab Units 09/02/21  0950   PROBNP pg/mL 370.2       Results from last 7 days   Lab Units 09/09/21  0442   PH, ARTERIAL pH units 7.316*   PO2 ART mm Hg 116.0*   PCO2, ARTERIAL mm Hg 62.7*   HCO3 ART mmol/L 32.0*     Results from last 7 days   Lab Units 09/09/21  0055 09/08/21  0234 09/07/21  0320 09/06/21  0433 09/06/21  0433 09/05/21  0227 09/05/21  0227 09/04/21  0043 09/02/21  2328 09/02/21  1301 09/02/21  0950   CRP mg/dL 13.17*  --  2.54*   --  2.93*   < > 3.13*   < > 7.80*  --   --    LACTATE mmol/L 1.6  --  1.3  --   --   --  1.4  --  1.2   < > 2.3*   WBC 10*3/mm3 21.69* 15.56* 15.63*   < > 14.27*   < > 5.24   < > 4.02   < > 5.51   HEMOGLOBIN g/dL 13.6 13.1 13.8   < > 13.8   < > 12.4*   < > 12.0*   < > 11.8*   HEMATOCRIT % 44.6 42.9 44.1   < > 43.6   < > 38.2   < > 38.0   < > 37.2*   MCV fL 92.3 92.7 91.1   < > 88.6   < > 86.6   < > 89.4   < > 89.0   MCHC g/dL 30.5* 30.5* 31.3*   < > 31.7   < > 32.5   < > 31.6   < > 31.7   PLATELETS 10*3/mm3 216 165 232   < > 239   < > 135*   < > 124*   < > 126*   INR  1.29*  --  1.23*  --   --   --  1.13*  --  0.96  --  0.95    < > = values in this interval not displayed.     Results from last 7 days   Lab Units 09/09/21  0055 09/08/21  0233 09/07/21  0320   SODIUM mmol/L 149* 148* 149*   POTASSIUM mmol/L 5.1 5.1 5.1   CHLORIDE mmol/L 110* 112* 112*   CO2 mmol/L 29.5* 28.6 26.7   BUN mg/dL 69* 56* 41*   CREATININE mg/dL 1.31* 1.19 1.00   EGFR IF NONAFRICN AM mL/min/1.73 53* 59* 73   CALCIUM mg/dL 8.8 8.7 8.8   GLUCOSE mg/dL 238* 278* 209*   ALBUMIN g/dL 2.56* 2.76* 2.94*   BILIRUBIN mg/dL 0.5 0.5 0.5   ALK PHOS U/L 56 63 54   AST (SGOT) U/L 41* 55* 63*   ALT (SGPT) U/L 42* 40 41   Estimated Creatinine Clearance: 46.1 mL/min (A) (by C-G formula based on SCr of 1.31 mg/dL (H)).  No results found for: AMMONIA    Glucose   Date/Time Value Ref Range Status   09/09/2021 0601 238 (H) 70 - 130 mg/dL Final     Comment:     Meter: [id number] : [id number] Elisasai Swann   09/08/2021 2329 211 (H) 70 - 130 mg/dL Final     Comment:     Meter: [id number] : [id number] VIVIAN KIKI   09/08/2021 1708 260 (H) 70 - 130 mg/dL Final     Comment:     Meter: [id number] : [id number] Belgica Heredia   09/08/2021 1129 254 (H) 70 - 130 mg/dL Final     Comment:     Meter: [id number] : [id number] GAY LENTZ   09/08/2021 0944 216 (H) 70 - 130 mg/dL Final     Comment:     Meter: [id number] : [id number] Belgica Dang  Giovanna     Lab Results   Component Value Date    HGBA1C 6.30 (H) 09/02/2021     No results found for: TSH, FREET4    Blood Culture   Date Value Ref Range Status   09/02/2021 No growth at 24 hours  Preliminary   09/02/2021 No growth at 24 hours  Preliminary   09/02/2021 No growth at 24 hours  Preliminary     No results found for: URINECX  No results found for: WOUNDCX  No results found for: STOOLCX  No results found for: RESPCX  Pain Management Panel    There is no flowsheet data to display.           ----------------------------------------------------------------------------------------------------------------------  Imaging Results (Last 24 Hours)       ** No results found for the last 24 hours. **            ----------------------------------------------------------------------------------------------------------------------    Assessment/Plan       Assessment/Plan     ASSESSMENT:    1.  Severe sepsis with lactic acid greater than 2 on admission  2.  COVID-19 pneumonia    PLAN:    Remains sedated and intubated at 60% FiO2.  Afebrile.  Lactate 1.6.  CRP elevated at 13.17.  White count has worsened from 15.56-21.69.    Chest x-ray from 9/6/2021 reports continued diffuse bilateral alveolar infiltrates with developing left lower lobe air bronchograms compatible with diffuse pneumonia.  Chest x-ray from 9/7/2021 pending.    Procalcitonin normal.  CT of the head unremarkable.  Chest x-ray from 9/2/2021 reports scattered bilateral patchy opacities that may represent COVID-19 pneumonia.  VQ scan indeterminate for exclusion of PE.  CT of the chest from 9/3/2021 reports patchy bilateral airspace disease concerning for COVID-19 pneumonia.  CT of the abdomen and pelvis from 9/3/2013 reports bibasilar airspace disease suggestive of COVID-19 pneumonia, large right inguinal hernia containing nondilated bowel and fat.     Agree with initiation of remdesivir and Decadron.    Baricitinib has been discontinued in the setting of  emergent intubation.      We are concerned about possible development of bacterial pneumonia in the setting of prior treatment with baricitinib.     In the setting of significantly worsened CRP and leukocytosis, we will empirically initiate vancomycin and meropenem, pharmacy to dose x10 days.  We will continue to monitor closely and make changes to antibiotic coverage as appropriate.    Code Status:   Code Status and Medical Interventions:   Ordered at: 09/02/21 1769     Level Of Support Discussed With:    Patient     Code Status:    CPR     Medical Interventions (Level of Support Prior to Arrest):    Full       NELSON Torres  09/09/21  09:13 EDT

## 2021-09-09 NOTE — PROGRESS NOTES
[facility] Physician - Brief Progress Note  PERMANENT  09/08/2021 21:22    [facility] [place], [place] ([facility])    CALDERON LECHUGA    Date of Service 09/08/2021 21:22    HPI/Events of Note [email or web address] Intervention:    Bedside RN reports: Aflut.  Was in 150, recv'd Loperessor from hospitalist, now back in sinus 90s.  On Levo, proned 18/450/70+10.  118/67 90 sinus, 93%    Will transition Levo to Marco.  Have d/w RN          _x___   Video Assessment performed  __x___   Most recent labs reviewed  __x___   Vital Signs reviewed  _x___      Spoke with bedside RN  __x__       Orders written  Contact [email or web address] for any needs if bedside physician is not present.      Interventions Major-Arrhythmia - evaluation and management        Electronically Signed by: Jose Land) on 09/08/2021 21:23

## 2021-09-10 NOTE — PROGRESS NOTES
PROGRESS NOTE         Patient Identification:  Name:  Valdemar Solitario  Age:  76 y.o.  Sex:  male  :  1945  MRN:  [id number]  Visit Number:  [id number]  Primary Care Provider:  Laurence Geiger APRN         LOS: 8 days       ----------------------------------------------------------------------------------------------------------------------  Subjective       Chief Complaints:    Exposure To Known Illness, Altered Mental Status, and Weakness - Generalized        Interval History:      Remains sedated and intubated at 80% FiO2.  Afebrile.  CRP worsened 21.68.  White count improved at 16.06.  Patient continues with manual pronation therapy.  Chest x-ray from 2021 reports bilateral airspace disease.    Review of Systems:    Deferred due to altered mental status  ----------------------------------------------------------------------------------------------------------------------      Objective       Current [facility] Meds:  artificial tears, , Both Eyes, Q4H  chlorhexidine, 15 mL, Mouth/Throat, Q12H  dexamethasone, 6 mg, Intravenous, BID  enoxaparin, 1 mg/kg, Subcutaneous, Q12H  famotidine, 20 mg, Intravenous, BID  insulin aspart, 0-9 Units, Subcutaneous, Q6H  insulin detemir, 20 Units, Subcutaneous, Nightly  meropenem, 1 g, Intravenous, Q12H  oxymetazoline, 2 spray, Each Nare, BID  sertraline, 100 mg, Oral, Daily  sodium chloride, 10 mL, Intravenous, Q12H  sodium chloride, 10 mL, Intravenous, Q12H  sodium chloride, 10 mL, Intravenous, Q12H  sodium chloride, 10 mL, Intravenous, Q12H  sodium chloride, 10 mL, Intravenous, Q12H  tamsulosin, 0.4 mg, Oral, Daily  vancomycin, 1,000 mg, Intravenous, Q24H      dexmedetomidine, 0.2-1.5 mcg/kg/hr, Last Rate: Stopped (21 1414)  fentaNYL Citrate, , Last Rate: Stopped (21 890)  norepinephrine, 0.02-0.3 mcg/kg/min, Last Rate: Stopped (09/10/21 5012)  Pharmacy Consult - Pharmacy to dose,   Pharmacy Consult - Pharmacy to dose,   Pharmacy to Dose  enoxaparin (LOVENOX),   Pharmacy to dose vancomycin,   phenylephrine, 0.5-3 mcg/kg/min, Last Rate: 2.7 mcg/kg/min (09/10/21 0648)  propofol, 5-50 mcg/kg/min, Last Rate: 35 mcg/kg/min (09/10/21 0644)  vasopressin (PITRESSIN) 20 units in 100 mL infusion, 0.03 Units/min, Last Rate: 0.03 Units/min (09/10/21 0837)  vecuronium (NORCURON) infusion, 0.6-1.2 mcg/kg/min, Last Rate: 0.6 mcg/kg/min (09/09/21 2345)      ----------------------------------------------------------------------------------------------------------------------    Vital Signs:  Temp:  [98.1 °F (36.7 °C)-99.6 °F (37.6 °C)] 99.6 °F (37.6 °C)  Heart Rate:  [] 69  Resp:  [22] 22  BP: ()/(33-89) 130/60  FiO2 (%):  [70 %-90 %] 80 %  Mean Arterial Pressure (Non-Invasive) for the past 24 hrs (Last 3 readings):   Noninvasive MAP (mmHg)   09/10/21 0840 87   09/10/21 0835 51   09/10/21 0825 46     SpO2 Percentage    09/10/21 0825 09/10/21 0835 09/10/21 0840   SpO2: 98% 96% 98%     SpO2:  [79 %-100 %] 98 %  on   ;   Device (Oxygen Therapy): ventilator    Body mass index is 24.39 kg/m².  Wt Readings from Last 3 Encounters:   09/10/21 68.5 kg (151 lb 0.2 oz)   06/14/17 73.9 kg (163 lb)   03/23/17 76.7 kg (169 lb)        Intake/Output Summary (Last 24 hours) at 9/10/2021 1002  Last data filed at 9/10/2021 0547  Gross per 24 hour   Intake 2786.9 ml   Output 1900 ml   Net 886.9 ml     NPO Diet  ----------------------------------------------------------------------------------------------------------------------      Physical Exam:    Deferred due to COVID-19 isolation      ----------------------------------------------------------------------------------------------------------------------            Results from last 7 days   Lab Units 09/10/21  0418   PH, ARTERIAL pH units 7.269*   PO2 ART mm Hg 180.0*   PCO2, ARTERIAL mm Hg 63.0*   HCO3 ART mmol/L 28.9*     Results from last 7 days   Lab Units 09/10/21  0429 09/09/21  0055 09/08/21  0234 09/07/21  0321  09/07/21  0320 09/06/21  0433 09/05/21  0227   CRP mg/dL 21.68* 13.17*  --   --  2.54*   < > 3.13*   LACTATE mmol/L  --  1.6  --   --  1.3  --  1.4   WBC 10*3/mm3 16.06* 21.69* 15.56*   < > 15.63*   < > 5.24   HEMOGLOBIN g/dL 12.0* 13.6 13.1   < > 13.8   < > 12.4*   HEMATOCRIT % 40.3 44.6 42.9   < > 44.1   < > 38.2   MCV fL 94.8 92.3 92.7   < > 91.1   < > 86.6   MCHC g/dL 29.8* 30.5* 30.5*   < > 31.3*   < > 32.5   PLATELETS 10*3/mm3 176 216 165   < > 232   < > 135*   INR   --  1.29*  --   --  1.23*  --  1.13*    < > = values in this interval not displayed.     Results from last 7 days   Lab Units 09/10/21  0429 09/09/21  0055 09/08/21  0233   SODIUM mmol/L 150* 149* 148*   POTASSIUM mmol/L 5.5* 5.1 5.1   CHLORIDE mmol/L 115* 110* 112*   CO2 mmol/L 27.8 29.5* 28.6   BUN mg/dL 69* 69* 56*   CREATININE mg/dL 1.19 1.31* 1.19   EGFR IF NONAFRICN AM mL/min/1.73 59* 53* 59*   CALCIUM mg/dL 8.8 8.8 8.7   GLUCOSE mg/dL 274* 238* 278*   ALBUMIN g/dL 2.12* 2.56* 2.76*   BILIRUBIN mg/dL 0.5 0.5 0.5   ALK PHOS U/L 66 56 63   AST (SGOT) U/L 44* 41* 55*   ALT (SGPT) U/L 33 42* 40   Estimated Creatinine Clearance: 51.2 mL/min (by C-G formula based on SCr of 1.19 mg/dL).  No results found for: AMMONIA    Glucose   Date/Time Value Ref Range Status   09/10/2021 0544 235 (H) 70 - 130 mg/dL Final     Comment:     Meter: [id number] : [id number] Katherine Cortland   09/10/2021 0028 195 (H) 70 - 130 mg/dL Final     Comment:     Meter: [id number] : [id number] Katherine Cortland   09/09/2021 2111 232 (H) 70 - 130 mg/dL Final     Comment:     Meter: [id number] : [id number] Katherine Cortland   09/09/2021 1712 201 (H) 70 - 130 mg/dL Final     Comment:     Meter: [id number] : [id number] tavo raman   09/09/2021 1107 212 (H) 70 - 130 mg/dL Final     Comment:     Meter: [id number] : [id number] Teresita Floyd   09/09/2021 0601 238 (H) 70 - 130 mg/dL Final     Comment:     Meter: [id number] : [id number] Adam Swann   09/08/2021 2329  211 (H) 70 - 130 mg/dL Final     Comment:     Meter: [id number] : [id number] VIVIAN SWANSON   09/08/2021 1708 260 (H) 70 - 130 mg/dL Final     Comment:     Meter: [id number] : [id number] Belgica Heredia     Lab Results   Component Value Date    HGBA1C 6.30 (H) 09/02/2021     No results found for: TSH, FREET4    Blood Culture   Date Value Ref Range Status   09/02/2021 No growth at 24 hours  Preliminary   09/02/2021 No growth at 24 hours  Preliminary   09/02/2021 No growth at 24 hours  Preliminary     No results found for: URINECX  No results found for: WOUNDCX  No results found for: STOOLCX  No results found for: RESPCX  Pain Management Panel    There is no flowsheet data to display.           ----------------------------------------------------------------------------------------------------------------------  Imaging Results (Last 24 Hours)       Procedure Component Value Units Date/Time    XR Chest 1 View [[id number]] Resulted: 09/10/21 0726     Updated: 09/10/21 0811    XR Chest 1 View [[id number]] Collected: 09/09/21 1442     Updated: 09/09/21 1517    Narrative:      XR CHEST 1 VW-     CLINICAL INDICATION: to confirm placement of ET Tube -Manual Prone  Protocol; U07.1-COVID-19; J12.82-Pneumonia due to Coronavirus disease  2019; R09.02-Hypoxemia; J96.01-Acute respiratory failure with hypoxia        COMPARISON: 09/08/2021      TECHNIQUE: Single frontal view of the chest.     FINDINGS:     Bilateral airspace disease  No interval line change  There is no evidence of an acute osseous abnormality.   There are no suspicious-appearing parenchymal soft tissue nodules.          Impression:      Bilateral airspace disease     This report was finalized on 9/9/2021 2:43 PM by Dr. Michael Feng MD.               ----------------------------------------------------------------------------------------------------------------------    Assessment/Plan       Assessment/Plan     ASSESSMENT:    1.  Severe sepsis with  lactic acid greater than 2 on admission  2.  COVID-19 pneumonia    PLAN:    Remains sedated and intubated at 80% FiO2.  Afebrile.  CRP worsened 21.68.  White count improved at 16.06.  Patient continues with manual pronation therapy.  Chest x-ray from 9/9/2021 reports bilateral airspace disease.    Chest x-ray from 9/6/2021 reports continued diffuse bilateral alveolar infiltrates with developing left lower lobe air bronchograms compatible with diffuse pneumonia.  Chest x-ray from 9/7/2021 pending.    Procalcitonin normal.  CT of the head unremarkable.  Chest x-ray from 9/2/2021 reports scattered bilateral patchy opacities that may represent COVID-19 pneumonia.  VQ scan indeterminate for exclusion of PE.  CT of the chest from 9/3/2021 reports patchy bilateral airspace disease concerning for COVID-19 pneumonia.  CT of the abdomen and pelvis from 9/3/2013 reports bibasilar airspace disease suggestive of COVID-19 pneumonia, large right inguinal hernia containing nondilated bowel and fat.     Agree with initiation of remdesivir and Decadron.    Baricitinib has been discontinued in the setting of emergent intubation.      We are concerned about possible development of bacterial pneumonia in the setting of prior treatment with baricitinib.     Recommend to continue vancomycin and meropenem courses for now.  We will continue to monitor closely and make changes to antibiotic coverage as appropriate.    Code Status:   Code Status and Medical Interventions:   Ordered at: 09/02/21 2226     Level Of Support Discussed With:    Patient     Code Status:    CPR     Medical Interventions (Level of Support Prior to Arrest):    Full     Scribed for Brock Metzger MD by NELSON Torres. 9/10/2021  10:04 EDT       NELSON Torres  09/10/21  10:02 EDT    Physician Attestation:    The documentation recorded by the scribe accurately reflects the service I personally performed and the decisions made by me.    Brock Metzger  MD  09/12/21  09:43 EDT

## 2021-09-10 NOTE — PLAN OF CARE
Problem: Adult Inpatient Plan of Care  Goal: Plan of Care Review  Outcome: Ongoing, Progressing  Flowsheets  Taken 9/10/2021 0153 by Katherine Pandya, RN  Outcome Summary: Patient remains on Marco, Vaso, and Levo for BP support. Patient is currently proned. He remains on Vec, Fentanyl, and Propofol for sedation. WCTM.  Taken 9/3/2021 1810 by Isra Hopkins, RN  Plan of Care Reviewed With: patient   Goal Outcome Evaluation:              Outcome Summary: Patient remains on Marco, Vaso, and Levo for BP support. Patient is currently proned. He remains on Vec, Fentanyl, and Propofol for sedation. WCTM.

## 2021-09-10 NOTE — PROGRESS NOTES
Nutrition Services    Patient Name:  Valdemar Solitario  YOB: 1945  MRN: [id number]  Admit Date:  9/2/2021    Nutrition Note:  Tf peptamen Intense VHP @ 20 ml/hr with 75 ml/hr water flush.  Labs and meds reviewed.  Will increase TF to a goal rate of 45 ml/hr to meet nutrition needs.    Electronically signed by:  Malaika Rowley RD  09/10/21 14:39 EDT

## 2021-09-10 NOTE — CONSULTS
Palliative Care Initial Consult     Attending Physician: Nic Webster MD  Referring Provider: Nic Webster MD.    Palliative care reason for consult: GOC/ACP support for family  Code Status:   Code Status and Medical Interventions:   Ordered at: 09/10/21 7677     Limited Support to NOT Include:    Intubation    Cardioversion/Defibrillation     Code Status:    No CPR     Medical Interventions (Level of Support Prior to Arrest):    Limited     Comments:    adult children Tiffany, Cathleen and John all agree he would not want to be resuciated or reintubated in the event he became extubated.      Advanced Directives: Advance Directive Status: Patient does not have advance directive   Healthcare surrogate: Four adult children Tiffany, Cathleen, Malaika, and Bennie  Goals of Care: Adult children agree that their father would not want to be resuscitated, however would like us to continue to treat him.    HPI:  Valdemar Solitario is a 76 y.o. male admitted on 9/2/2021 due to confusion, dyspnea, and weakness, he has a past medical history significant for diabetes meliltus, depression, GERD, hypertension, dyslipidemia.  He arrived to the ED via EMS after presenting to his place of work in a state of confusion.  He reported he had been feeling bad for a few days which worsened on 9/2/2021.  He reported while at work he was unable to remember his locker combination and was experiencing shortness of breath and generalized weakness.     Upon arrival to the ED, vital signs were T 98.8F, pulse 85, RR 14, and BP 96/52 with room air oxygen saturation of 82%.  Initial arterial blood gas revealed pH of 7.324 with PCO2 of 42.4 and PO2 of 47.9 on room air.  Troponin T was found to be 0.031.  Creatinine was found to be elevated at 2.10.  Lactate was found to be elevated at 2.3 with D-dimer of 0.95.  Hemoglobin was found to be 11.8.     Mr. Solitario is evaluated in the ED currently on BiPAP.  He reports feeling unwell for a few days but  "has difficulty talking due to BiPAP. He reports he has been making urine, though it is unclear at present if he has produced a specimen while in the ED.  He reports cough, weakness, and dyspnea.  He is alert to self.  He follows commands but is wearing BiPAP during examination. He denies chest pain and known dysuria.  He reports he has had some pain in his low back.     High risk secondary to acute hypoxemic respiratory failure 2/2 COVID-19 pneumonia                    ROS: Negative except as above in HPI.     Past Medical History:   Diagnosis Date   • Depression    • Diabetes mellitus (CMS/[facility])     states border diabetic, is not on meds for it   • Dyslipidemia    • GERD (gastroesophageal reflux disease)    • Hypertension    • Thoracic spine pain    • Vitamin B12 deficiency (dietary) anemia    • Vitamin D deficiency      Past Surgical History:   Procedure Laterality Date   • APPENDECTOMY     • CATARACT EXTRACTION     • HERNIA REPAIR       Social History     Socioeconomic History   • Marital status:      Spouse name: Not on file   • Number of children: Not on file   • Years of education: Not on file   • Highest education level: Not on file   Tobacco Use   • Smoking status: Former Smoker   Substance and Sexual Activity   • Alcohol use: No   • Drug use: No   • Sexual activity: Defer     Family History   Problem Relation Age of Onset   • Hypertension Mother        Allergies   Allergen Reactions   • Codeine Other (See Comments)     Makes him feel \"froggy\"         Current Facility-Administered Medications   Medication Dose Route Frequency Provider Last Rate Last Admin   • acetaminophen (TYLENOL) tablet 650 mg  650 mg Oral Q6H PRN Rebecca Dill MD   650 mg at 09/08/21 2128   • artificial tears ophthalmic ointment   Both Eyes PRN Nic Webster MD       • artificial tears ophthalmic ointment   Both Eyes Q4H Nic Webster MD   Given at 09/10/21 1037   • benzonatate (TESSALON) capsule 200 mg  200 mg Oral Q4H PRN " Anuradha Angeles MD   200 mg at 09/04/21 2114   • chlorhexidine (PERIDEX) 0.12 % solution 15 mL  15 mL Mouth/Throat Q12H Nic Webster MD   15 mL at 09/10/21 0836   • dexamethasone (DECADRON) injection 6 mg  6 mg Intravenous BID Mnauelito Adame MD   6 mg at 09/10/21 0837   • DEXMEDETOMIDINE 1000 MCG/250ML INFUSION infusion  0.2-1.5 mcg/kg/hr Intravenous Titrated Anuradha Angeles MD   Held at 09/09/21 1414   • dextrose (D50W) 25 g/ 50mL Intravenous Solution 25 g  25 g Intravenous Q15 Min PRN Manuelito Adame MD       • dextrose (D50W) 25 g/ 50mL Intravenous Solution 25 g  25 g Intravenous Q15 Min PRN Nic Webster MD       • dextrose (GLUTOSE) oral gel 15 g  15 g Oral Q15 Min PRN Nic Webster MD       • enoxaparin (LOVENOX) syringe 70 mg  1 mg/kg Subcutaneous Q12H Nic Webster MD   70 mg at 09/10/21 1038   • famotidine (PEPCID) injection 20 mg  20 mg Intravenous BID Nic Webster MD   20 mg at 09/10/21 0837   • fentaNYL (SUBLIMAZE) PCA 1500 mcg/30 mL syringe   Intravenous Titrated Jose Land MD   Stopped at 09/08/21 2132   • glucagon (human recombinant) (GLUCAGEN DIAGNOSTIC) injection 1 mg  1 mg Subcutaneous Q15 Min PRN Nic Webster MD       • insulin aspart (novoLOG) injection 0-14 Units  0-14 Units Subcutaneous TID AC Nic Webster MD   5 Units at 09/10/21 1041   • insulin detemir (LEVEMIR) injection 20 Units  20 Units Subcutaneous Nightly Manuelito Adame MD       • meropenem (MERREM) 1 g in sodium chloride 0.9 % 100 mL IVPB-VTB  1 g Intravenous Q8H Nic Webster MD   1 g at 09/10/21 1300   • norepinephrine (LEVOPHED) 8 mg in 250 mL NS infusion (premix)  0.02-0.3 mcg/kg/min Intravenous Titrated Nic Webster MD   Stopped at 09/10/21 0547   • oxymetazoline (AFRIN) nasal spray 2 spray  2 spray Each Nare BID Jose Land MD   2 spray at 09/10/21 0836   • Pharmacy Consult - Pharmacy to dose   Does not apply Continuous PRN Jose De Jesus Mccord MD       • Pharmacy Consult - Pharmacy to dose    Does not apply Continuous PRN Jeny Lopes, APRN       • Pharmacy to Dose enoxaparin (LOVENOX)   Does not apply Continuous PRN Nic Webster MD       • Pharmacy to dose vancomycin   Does not apply Continuous PRN Jeny Lopes APRN       • phenylephrine (SIMONE-SYNEPHRINE) 50 mg in 250 mL NS infusion  0.5-3 mcg/kg/min Intravenous Titrated Jose Land MD 61.7 mL/hr at 09/10/21 1409 3 mcg/kg/min at 09/10/21 1409   • propofol (DIPRIVAN) infusion 10 mg/mL 100 mL  5-50 mcg/kg/min Intravenous Titrated Nic Webster MD 16 mL/hr at 09/10/21 1211 35 mcg/kg/min at 09/10/21 1211   • sertraline (ZOLOFT) tablet 100 mg  100 mg Oral Daily Mg Augustine MD   100 mg at 09/10/21 0837   • sodium chloride 0.9 % flush 10 mL  10 mL Intravenous PRN Mg Augustine MD       • sodium chloride 0.9 % flush 10 mL  10 mL Intravenous Q12H Mg Augustine MD   10 mL at 09/10/21 0838   • sodium chloride 0.9 % flush 10 mL  10 mL Intravenous PRN Mg Augustine MD       • sodium chloride 0.9 % flush 10 mL  10 mL Intravenous Q12H Jose De Jesus Mccord MD   10 mL at 09/10/21 0838   • sodium chloride 0.9 % flush 10 mL  10 mL Intravenous PRN Jose De Jesus Mccord MD   10 mL at 09/08/21 2011   • sodium chloride 0.9 % flush 10 mL  10 mL Intravenous Q12H Nic Webster MD   10 mL at 09/10/21 0838   • sodium chloride 0.9 % flush 10 mL  10 mL Intravenous Q12H Nic Webster MD   10 mL at 09/10/21 0838   • sodium chloride 0.9 % flush 10 mL  10 mL Intravenous Q12H Nic Webster MD   10 mL at 09/10/21 0837   • sodium chloride 0.9 % flush 10 mL  10 mL Intravenous PRN Nic Webster MD   10 mL at 09/08/21 2010   • sodium chloride 0.9 % flush 20 mL  20 mL Intravenous PRN Nic Webster MD       • tamsulosin (FLOMAX) 24 hr capsule 0.4 mg  0.4 mg Oral Daily Mg Augustine MD   0.4 mg at 09/10/21 0837   • vancomycin 1 g/250 mL 0.9% NS (vial-mate)  1,000 mg Intravenous Q24H Brock Metzger MD   1,000 mg at 09/10/21  "1037   • Vasopressin (PITRESSIN) 20 Units in sodium chloride 0.9 % 100 mL infusion  0.03 Units/min Intravenous Continuous Jose Land MD 9 mL/hr at 09/10/21 0837 0.03 Units/min at 09/10/21 0837   • vecuronium (NORCURON) 100 mg in sodium chloride 0.9 % 100 mL (1 mg/mL) infusion  0.6-1.2 mcg/kg/min Intravenous Titrated Nic Webster MD 2.74 mL/hr at 09/09/21 2345 0.6 mcg/kg/min at 09/09/21 2345   • vecuronium (NORCURON) bolus from bag 1 mg/mL 3.81 mg  50 mcg/kg Intravenous Once PRN Nic Webster MD         dexmedetomidine, 0.2-1.5 mcg/kg/hr, Last Rate: Stopped (09/09/21 1414)  fentaNYL Citrate, , Last Rate: Stopped (09/08/21 2132)  norepinephrine, 0.02-0.3 mcg/kg/min, Last Rate: Stopped (09/10/21 0547)  Pharmacy Consult - Pharmacy to dose,   Pharmacy Consult - Pharmacy to dose,   Pharmacy to Dose enoxaparin (LOVENOX),   Pharmacy to dose vancomycin,   phenylephrine, 0.5-3 mcg/kg/min, Last Rate: 3 mcg/kg/min (09/10/21 1409)  propofol, 5-50 mcg/kg/min, Last Rate: 35 mcg/kg/min (09/10/21 1211)  vasopressin (PITRESSIN) 20 units in 100 mL infusion, 0.03 Units/min, Last Rate: 0.03 Units/min (09/10/21 0837)  vecuronium (NORCURON) infusion, 0.6-1.2 mcg/kg/min, Last Rate: 0.6 mcg/kg/min (09/09/21 2345)      •  acetaminophen  •  artificial tears  •  benzonatate  •  dextrose  •  dextrose  •  dextrose  •  glucagon (human recombinant)  •  Pharmacy Consult - Pharmacy to dose  •  Pharmacy Consult - Pharmacy to dose  •  Pharmacy to Dose enoxaparin (LOVENOX)  •  Pharmacy to dose vancomycin  •  sodium chloride  •  sodium chloride  •  sodium chloride  •  sodium chloride  •  sodium chloride  •  vecuronium    Current medication reviewed for route, type, dose and frequency and are current per MAR.    Palliative Performance Scale Score:     /64   Pulse 54   Temp 99.6 °F (37.6 °C)   Resp 28   Ht 167.6 cm (65.98\")   Wt 68.5 kg (151 lb 0.2 oz)   SpO2 99%   BMI 24.39 kg/m²     Intake/Output Summary (Last 24 hours) at " 9/10/2021 1448  Last data filed at 9/10/2021 1300  Gross per 24 hour   Intake 2786.9 ml   Output 1900 ml   Net 886.9 ml       PE:  COVID Restrictions      Labs:   Results from last 7 days   Lab Units 09/10/21  0429   WBC 10*3/mm3 16.06*   HEMOGLOBIN g/dL 12.0*   HEMATOCRIT % 40.3   PLATELETS 10*3/mm3 176     Results from last 7 days   Lab Units 09/10/21  0429   SODIUM mmol/L 150*   POTASSIUM mmol/L 5.5*   CHLORIDE mmol/L 115*   CO2 mmol/L 27.8   BUN mg/dL 69*   CREATININE mg/dL 1.19   GLUCOSE mg/dL 274*   CALCIUM mg/dL 8.8     Results from last 7 days   Lab Units 09/10/21  0429   SODIUM mmol/L 150*   POTASSIUM mmol/L 5.5*   CHLORIDE mmol/L 115*   CO2 mmol/L 27.8   BUN mg/dL 69*   CREATININE mg/dL 1.19   CALCIUM mg/dL 8.8   BILIRUBIN mg/dL 0.5   ALK PHOS U/L 66   ALT (SGPT) U/L 33   AST (SGOT) U/L 44*   GLUCOSE mg/dL 274*     Imaging Results (Last 72 Hours)     Procedure Component Value Units Date/Time    XR Chest 1 View [[id number]] Resulted: 09/10/21 0726     Updated: 09/10/21 1253    XR Chest 1 View [[id number]] Collected: 09/09/21 1442     Updated: 09/09/21 1517    Narrative:      XR CHEST 1 VW-     CLINICAL INDICATION: to confirm placement of ET Tube -Manual Prone  Protocol; U07.1-COVID-19; J12.82-Pneumonia due to Coronavirus disease  2019; R09.02-Hypoxemia; J96.01-Acute respiratory failure with hypoxia        COMPARISON: 09/08/2021      TECHNIQUE: Single frontal view of the chest.     FINDINGS:     Bilateral airspace disease  No interval line change  There is no evidence of an acute osseous abnormality.   There are no suspicious-appearing parenchymal soft tissue nodules.          Impression:      Bilateral airspace disease     This report was finalized on 9/9/2021 2:43 PM by Dr. Michael Feng MD.       XR Chest 1 View [[id number]] Collected: 09/08/21 1146     Updated: 09/08/21 1149    Narrative:      XR CHEST 1 VW-     CLINICAL INDICATION: to confirm placement of ET Tube -Manual Prone  Protocol; U07.1-COVID-19;  J12.82-Pneumonia due to Coronavirus disease  2019; R09.02-Hypoxemia; J96.01-Acute respiratory failure with hypoxia        COMPARISON: 09/06/2021      TECHNIQUE: Single frontal view of the chest.     FINDINGS:     Trace left effusion and bilateral consolidation  Right central line tip cavoatrial junction  Endotracheal tube and nasogastric tube appear to be in appropriate  position  There is no evidence of an acute osseous abnormality.   There are no suspicious-appearing parenchymal soft tissue nodules.          Impression:      Line placement as above     This report was finalized on 9/8/2021 11:47 AM by Dr. Michael Feng MD.             Diagnostics: Reviewed    A: Valdemar Solitario is a 76 y.o. male with due to confusion, dyspnea, and weakness, he has a past medical history significant for diabetes meliltus, depression, GERD, hypertension, dyslipidemia.  He arrived to the ED via EMS after presenting to his place of work in a state of confusion.  He reported he had been feeling bad for a few days which worsened on 9/2/2021.  He reported while at work he was unable to remember his locker combination and was experiencing shortness of breath and generalized weakness.     Upon arrival to the ED, vital signs were T 98.8F, pulse 85, RR 14, and BP 96/52 with room air oxygen saturation of 82%.  Initial arterial blood gas revealed pH of 7.324 with PCO2 of 42.4 and PO2 of 47.9 on room air.  Troponin T was found to be 0.031.  Creatinine was found to be elevated at 2.10.  Lactate was found to be elevated at 2.3 with D-dimer of 0.95.  Hemoglobin was found to be 11.8.     Mr. Solitario is evaluated in the ED currently on BiPAP.  He reports feeling unwell for a few days but has difficulty talking due to BiPAP. He reports he has been making urine, though it is unclear at present if he has produced a specimen while in the ED.  He reports cough, weakness, and dyspnea.  He is alert to self.  He follows commands but is wearing BiPAP during  examination. He denies chest pain and known dysuria.  He reports he has had some pain in his low back.     High risk secondary to acute hypoxemic respiratory failure 2/2 COVID-19 pneumonia    9/5 patient was intubated and put on the ventilator.                  P:   Palliative care was consulted to discuss goals of care and advanced care planning. I was able to discuss GOC with Patients son Tiffany, daughters Cathleen and Malaika regarding what they feel their father would want, all three stated that they want us to continue to treat their father however if something should happen that his heart would stop they all believe that he would not want to be resuscitated, nor do they feel that if he should become extubated that he would not want to be re-intubated. There is one other son Bennie that they state do no have much communication, I let them know that if Bennie would like to speak with me that I would more than happy top speak with him. I let Dr. Webster and RN Teresita know of the conversation and the code status change.    We appreciate the consult and the opportunity to participate in Valdemar Solitario's care. We will continue to follow along. Please do not hesitate to contact us regarding further symptom management or goals of care needs, including after hours or on weekends via our on call provider at [phone].     Time: 45 minutes spent reviewing medical and medication records, assessing and examining patient, discussing with family, answering questions, providing some guidance about a plan and documentation of care, and coordinating care with other healthcare members, with > 50% time spent face to face.     Socorro Zhou, APRN    9/10/2021

## 2021-09-10 NOTE — CASE MANAGEMENT/SOCIAL WORK
Discharge Planning Assessment  AMIE Knox     Patient Name: Valdemar Solitario  MRN: [id number]  Today's Date: 9/10/2021    Admit Date: 9/2/2021    Discharge Plan     Row Name 09/10/21 1100       Plan    Plan Pt admitted 9/2/21. Pt remains intubated and sedated. Pt lives alone and does not utilize home health services or DME at this time. Discharge plan pending clinical improvement. SS following.        SHALONDA Lewis

## 2021-09-10 NOTE — PROGRESS NOTES
[facility] HOSPITALIST PROGRESS NOTE     Patient Identification:  Name:  Valdemar Solitario  Age:  76 y.o.  Sex:  male  :  1945  MRN:  [id number]  Visit Number:  [id number]  ROOM: [address]     Primary Care Provider:  Laurence Geiger APRN    Length of stay in inpatient status:  8    Subjective     Chief Compliant:    Chief Complaint   Patient presents with   • Exposure To Known Illness   • Altered Mental Status   • Weakness - Generalized       History of Presenting Illness:    Patient became hypoxic and hypotensive after being flipped supine yesterday. Vecuronium had to be continues. Patient was again flipped prone with improvement.     Discussed goals of care with patient's son. He reported if his father would not be able to go back working like he was before this illness he does not think he would want to live in a debilitated state. He reported he did not think his father would still like to be a full code but will have to discuss with his three siblings.     ROS:  Otherwise 10 point ROS negative other than documented above in HPI.     Objective     Current Hospital Meds:artificial tears, , Both Eyes, Q4H  chlorhexidine, 15 mL, Mouth/Throat, Q12H  dexamethasone, 6 mg, Intravenous, BID  enoxaparin, 1 mg/kg, Subcutaneous, Q12H  famotidine, 20 mg, Intravenous, BID  insulin aspart, 0-14 Units, Subcutaneous, TID AC  insulin detemir, 20 Units, Subcutaneous, Nightly  meropenem, 1 g, Intravenous, Q8H  oxymetazoline, 2 spray, Each Nare, BID  sertraline, 100 mg, Oral, Daily  sodium chloride, 10 mL, Intravenous, Q12H  sodium chloride, 10 mL, Intravenous, Q12H  sodium chloride, 10 mL, Intravenous, Q12H  sodium chloride, 10 mL, Intravenous, Q12H  sodium chloride, 10 mL, Intravenous, Q12H  tamsulosin, 0.4 mg, Oral, Daily  vancomycin, 1,000 mg, Intravenous, Q24H    dexmedetomidine, 0.2-1.5 mcg/kg/hr, Last Rate: Stopped (21 141)  fentaNYL Citrate, , Last Rate: Stopped (21)  norepinephrine,  0.02-0.3 mcg/kg/min, Last Rate: Stopped (09/10/21 0547)  Pharmacy Consult - Pharmacy to dose,   Pharmacy Consult - Pharmacy to dose,   Pharmacy to Dose enoxaparin (LOVENOX),   Pharmacy to dose vancomycin,   phenylephrine, 0.5-3 mcg/kg/min, Last Rate: 2.7 mcg/kg/min (09/10/21 0648)  propofol, 5-50 mcg/kg/min, Last Rate: 35 mcg/kg/min (09/10/21 0644)  vasopressin (PITRESSIN) 20 units in 100 mL infusion, 0.03 Units/min, Last Rate: 0.03 Units/min (09/10/21 0837)  vecuronium (NORCURON) infusion, 0.6-1.2 mcg/kg/min, Last Rate: 0.6 mcg/kg/min (09/09/21 2345)        Current Antimicrobial Therapy:  Anti-Infectives (From admission, onward)    Ordered     Dose/Rate Route Frequency Start Stop    09/10/21 1042  meropenem (MERREM) 1 g in sodium chloride 0.9 % 100 mL IVPB-VTB     Ordering Provider: Nic Webster MD    1 g  over 3 Hours Intravenous Every 8 Hours 09/10/21 1300 09/19/21 1259    09/09/21 0949  vancomycin 1 g/250 mL 0.9% NS (vial-mate)     Ordering Provider: Brock Metzger MD    1,000 mg  over 60 Minutes Intravenous Every 24 Hours 09/10/21 1100 09/19/21 1059    09/09/21 0949  vancomycin 1250 mg/250 mL 0.9% NS IVPB ([facility])     Ordering Provider: Brock Metzger MD    1,250 mg  over 60 Minutes Intravenous Once 09/09/21 1100 09/09/21 1201    09/09/21 0942  meropenem (MERREM) 1 g in sodium chloride 0.9 % 100 mL IVPB-VTB     Ordering Provider: Brock Metzger MD    1 g  over 30 Minutes Intravenous Once 09/09/21 1030 09/09/21 1131    09/09/21 0918  Pharmacy to dose vancomycin     Ordering Provider: Jeny Lopes, APRN     Does not apply Continuous PRN 09/09/21 0917 09/19/21 0916    09/03/21 0615  remdesivir 100 mg in sodium chloride 0.9 % 270 mL IVPB (powder vial)     Ordering Provider: Mg Augustine MD    100 mg  over 60 Minutes Intravenous Every 24 Hours 09/04/21 0900 09/07/21 1055    09/03/21 0615  remdesivir 200 mg in sodium chloride 0.9 % 290 mL IVPB (powder vial)     Ordering Provider:  Mg Augustine MD    200 mg  over 60 Minutes Intravenous Every 24 Hours 09/03/21 0900 09/03/21 0940        Current Diuretic Therapy:  Diuretics (From admission, onward)    Ordered     Dose/Rate Route Frequency Start Stop    09/10/21 0822  furosemide (LASIX) injection 40 mg     Ordering Provider: Manuelito Adame MD    40 mg Intravenous Once 09/10/21 0915 09/10/21 0839    09/08/21 0736  furosemide (LASIX) injection 40 mg     Ordering Provider: Manuelito Admae MD    40 mg Intravenous Once 09/08/21 0800 09/08/21 0926 09/05/21 0732  furosemide (LASIX) injection 80 mg     Ordering Provider: Nic Webster MD    80 mg Intravenous Once 09/05/21 0830 09/05/21 0842        ----------------------------------------------------------------------------------------------------------------------  Vital Signs:  Temp:  [98.1 °F (36.7 °C)-99.6 °F (37.6 °C)] 99.6 °F (37.6 °C)  Heart Rate:  [] 60  Resp:  [22-28] 28  BP: ()/(33-89) 159/71  FiO2 (%):  [60 %-90 %] 60 %  SpO2:  [79 %-100 %] 99 %  on   ;   Device (Oxygen Therapy): ventilator  Body mass index is 24.39 kg/m².    Wt Readings from Last 3 Encounters:   09/10/21 68.5 kg (151 lb 0.2 oz)   06/14/17 73.9 kg (163 lb)   03/23/17 76.7 kg (169 lb)     Intake & Output (last 3 days)       09/07 0701 - 09/08 0700 09/08 0701 - 09/09 0700 09/09 0701 - 09/10 0700 09/10 0701 - 09/11 0700    I.V. (mL/kg) 904.4 (13.2) 1256.3 (18.5) 1994.9 (29.1)     Other 244 501 161     NG/GT 91 220 81     IV Piggyback   550 250    Total Intake(mL/kg) 1239.4 (18.1) 1977.3 (29.1) 2786.9 (40.7) 250 (3.6)    Urine (mL/kg/hr) 950 (0.6) 1260 (0.8) 1900 (1.2)     Stool 0 0      Total Output 950 1260 1900     Net +289.4 +717.3 +886.9 +250                NPO Diet  ----------------------------------------------------------------------------------------------------------------------  Physical exam:  GENERAL:  Patient intubated and sedated. Prone position.   SKIN:  Warm, dry without rashes, purpura  or petechiae.   NECK:  Supple with good range of motion; no thyromegaly or masses  LYMPHATICS:  No cervical, supraclavicular, axillary adenopathy.  RESP:  Lungs coarse breath sounds bilaterally.   CARDIAC:  Regular rate and rhythm without murmurs, rubs or gallops. Normal S1,S2. No edema  GI:  Soft, nontender, no hepatosplenomegaly, normal bowel sounds  MSK:  No clubbing or cyanosis, No joint swelling noted in hands  ----------------------------------------------------------------------------------------------------------------------  Tele:    ----------------------------------------------------------------------------------------------------------------------  Results from last 7 days   Lab Units 09/10/21  0429 09/09/21  0055 09/08/21  0234 09/07/21  0320 09/07/21  0320 09/06/21  0433 09/05/21  0227   CRP mg/dL 21.68* 13.17*  --   --  2.54*   < > 3.13*   LACTATE mmol/L  --  1.6  --   --  1.3  --  1.4   WBC 10*3/mm3 16.06* 21.69* 15.56*   < > 15.63*   < > 5.24   HEMOGLOBIN g/dL 12.0* 13.6 13.1   < > 13.8   < > 12.4*   HEMATOCRIT % 40.3 44.6 42.9   < > 44.1   < > 38.2   MCV fL 94.8 92.3 92.7   < > 91.1   < > 86.6   MCHC g/dL 29.8* 30.5* 30.5*   < > 31.3*   < > 32.5   PLATELETS 10*3/mm3 176 216 165   < > 232   < > 135*   INR   --  1.29*  --   --  1.23*  --  1.13*    < > = values in this interval not displayed.     Results from last 7 days   Lab Units 09/10/21  0418   PH, ARTERIAL pH units 7.269*   PO2 ART mm Hg 180.0*   PCO2, ARTERIAL mm Hg 63.0*   HCO3 ART mmol/L 28.9*     Results from last 7 days   Lab Units 09/10/21  0429 09/09/21  0055 09/08/21  0233   SODIUM mmol/L 150* 149* 148*   POTASSIUM mmol/L 5.5* 5.1 5.1   CHLORIDE mmol/L 115* 110* 112*   CO2 mmol/L 27.8 29.5* 28.6   BUN mg/dL 69* 69* 56*   CREATININE mg/dL 1.19 1.31* 1.19   EGFR IF NONAFRICN AM mL/min/1.73 59* 53* 59*   CALCIUM mg/dL 8.8 8.8 8.7   GLUCOSE mg/dL 274* 238* 278*   ALBUMIN g/dL 2.12* 2.56* 2.76*   BILIRUBIN mg/dL 0.5 0.5 0.5   ALK PHOS U/L 66  56 63   AST (SGOT) U/L 44* 41* 55*   ALT (SGPT) U/L 33 42* 40   Estimated Creatinine Clearance: 51.2 mL/min (by C-G formula based on SCr of 1.19 mg/dL).  No results found for: AMMONIA              Glucose   Date/Time Value Ref Range Status   09/10/2021 1039 248 (H) 70 - 130 mg/dL Final     Comment:     Meter: [id number] : [id number] Teresita Everett   09/10/2021 0544 235 (H) 70 - 130 mg/dL Final     Comment:     Meter: [id number] : [id number] Katherine Couch   09/10/2021 0028 195 (H) 70 - 130 mg/dL Final     Comment:     Meter: [id number] : [id number] Katherine Couch   09/09/2021 2111 232 (H) 70 - 130 mg/dL Final     Comment:     Meter: [id number] : [id number] Katherine Couch   09/09/2021 1712 201 (H) 70 - 130 mg/dL Final     Comment:     Meter: [id number] : [id number] tavo danisha   09/09/2021 1107 212 (H) 70 - 130 mg/dL Final     Comment:     Meter: [id number] : [id number] Teresita Everett   09/09/2021 0601 238 (H) 70 - 130 mg/dL Final     Comment:     Meter: [id number] : [id number] Chujenni Hue   09/08/2021 2329 211 (H) 70 - 130 mg/dL Final     Comment:     Meter: [id number] : [id number] VIVIAN KIKI     No results found for: TSH, FREET4  No results found for: PREGTESTUR, PREGSERUM, HCG, HCGQUANT  Pain Management Panel    There is no flowsheet data to display.       Brief Urine Lab Results  (Last result in the past 365 days)      Color   Clarity   Blood   Leuk Est   Nitrite   Protein   CREAT   Urine HCG        09/04/21 1137 Yellow Cloudy Trace Negative Negative Trace             No results found for: BLOODCX  No results found for: URINECX  No results found for: WOUNDCX  No results found for: STOOLCX  No results found for: RESPCX  No results found for: AFBCX  Results from last 7 days   Lab Units 09/10/21  0429 09/09/21  0914 09/09/21  0055 09/08/21  0233 09/07/21  0320 09/06/21  0433 09/05/21  0227 09/04/21  0043   PROCALCITONIN ng/mL  --  0.45*  --  0.22 0.11  --   --   --    LACTATE  mmol/L  --   --  1.6  --  1.3  --  1.4  --    CRP mg/dL 21.68*  --  13.17*  --  2.54* 2.93* 3.13* 5.05*       I have personally looked at the labs and they are summarized above.  ----------------------------------------------------------------------------------------------------------------------  Detailed radiology reports for the last 24 hours:    Imaging Results (Last 24 Hours)     Procedure Component Value Units Date/Time    XR Chest 1 View [[id number]] Resulted: 09/10/21 0726     Updated: 09/10/21 0811    XR Chest 1 View [[id number]] Collected: 09/09/21 1442     Updated: 09/09/21 1517    Narrative:      XR CHEST 1 VW-     CLINICAL INDICATION: to confirm placement of ET Tube -Manual Prone  Protocol; U07.1-COVID-19; J12.82-Pneumonia due to Coronavirus disease  2019; R09.02-Hypoxemia; J96.01-Acute respiratory failure with hypoxia        COMPARISON: 09/08/2021      TECHNIQUE: Single frontal view of the chest.     FINDINGS:     Bilateral airspace disease  No interval line change  There is no evidence of an acute osseous abnormality.   There are no suspicious-appearing parenchymal soft tissue nodules.          Impression:      Bilateral airspace disease     This report was finalized on 9/9/2021 2:43 PM by Dr. Michael Feng MD.           Assessment & Plan    Mr. Solitario is our 76M PMH diabetes meliltus, depression, GERD, hypertension, dyslipidemia, presented to his place of work in a state of confusion, brought to ER, found to be Covid +. Hospitalization complicated by worsening hypoxia.      #Septic shock, Acute Metabolic Encephalopathy, Acute Hypoxic Respiratory Failure requiring mechanical ventilation 2/2 viral pneumonia treating for Covid 19 c/b mild transaminitis  - Patient presented w/ increased shortness of breath, confusion, tachypnea, covid + on 9/2.  - Admission labs showed WBC count 5K, CRP 7.8, lactate 2.3, d-dimer 0.95, procal 0.16, MRSA -, covid +, Bcx's NGTD  - B/l Venous Duplex negative for DVT  - VQ  scan showed indeterminate study, noted abnormal perfusion, multifocal defects could be related to multifocal airspace disease but also PE.  - CT Head showed no acute intracranial abnormality  - CT Chest showed patchy b/l airspace disease c/w covid 19  - ID consulted; Following  - CTPE on 9/4 did not reveal PE but did show worsening bilateral airspace disease that is likely worsening COVID pneumonia. Given fluid in fissure on plain film could not rule out component of edema  - Continue 6mg IV Dexamethasone x 10 days  - Finished course of remdesivir.   - - Baricitinib per ID recs. Has been d/c by ID in interim due to intubation.   - Continue Level I AC for Covid 19 thromboembolism Ppx  - Continue APAP PRN for fevers  - Continue Albuterol Inhaler  - Patient intubated for worsening hypoxia on 9/5.   - Trend Covid 19 progression labs w/ CBC, CMP, CK, CRP, Ferritin daily and LDH, D-dimer, Fibrinogen q48hrs.  - Enhanced droplet/contact isolation precautions  - P/F ratio post intubation 80 likely representing severe ARDS. Patient paralyzed and prone positioned for first 2 days. Oxygenation improved and patient left supine for 24 hours but last two days has become more hypoxic while supine requiring prone positioning.   - ABG this AM 7.269/63/180 on 90% FiO2, PEEP of 12, , and RR 22 while prone positioned. Pplateau pressure around 26 on these settings. FiO2 decreased to 60%. And I decreased PEEP to 10. Repeat ABG ordered for when patient has been flipped supine.    - Suspect pressor requirement a combination of sedation and sepsis. Currently on 2.6 of phenelepherine and 0.03 of vasopressin.   - Pulmonology consulted. Appreciate recs. 40 IV lasix given this AM. Will monitor response.   - ID has started vancomycin and meropenem emperically. I have ordered a sputum culture from ETT.      #New onset atrial flutter   - Levophed transitioned to Phenylphrine and vasopressin   - Rate controlled.   - Will transition to Tlov      #HTN/Dyslipidemia  #Elevated Troponin, NSTEMI Type II suspected  #Cardiomyopathy - New  #Pulm HTN - New  - Labs showed trops 0.031->0.016, proBNP 370   - EKG showed NSR, RBBB, no significant ST changes  - Echo showed LVEF 46-50%, mild-mod dilated RV, mild-mod dilated RA, mod-severe Pulm HTN, RVSP 45-55mmHg  - Consulted Cardiology; Recs pending  - Holding home ARB due to shock  - Holding home statin while on Remdesivir   - Continue to monitor on tele, strict I/O's, daily weights, trend HR and BP  - Given pressor requirement will hold diuresis at this time. Will continue to consider PRN.      #Hypernatremia  - Corrected sodium worse at 153  - Increase free water from 50 to 75 cc/hr.      #Nonoliguric MURIEL - Improved   - B/l Cr 0.9-1.0, was up to 2.1 on admission; Now 1.3  - S/p mIVFs, Trend Cr and UOP, avoid nephrotoxins, NSAIDs, dehydration and contrast as able.     #NIDDM Type II, controlled, unknown complications  - Hgb A1c = 6.3%  - Holding home oral agents, continue FSBG and SSI  - Uncontrolled. Pulmonary has increased basal, will also increase SSI.      #Anxiety/Depression  - Continue home regimen     #GERD  - Continue home PPI      #BPH  - Continue home Flomax     F: TFs  A: Fentanyl   S: Propofol   T: Lovenox  H: HOB elevated   U: Famotidine   G: PRN  S: Not ready   B: PRN  I: ETT 9/5, RIJ 9/5  D: None      Code status: Full. Guarded prognosis. Discussed goals of care      Dispo: Pending workup and clinical improvement     *This patient is considered high risk due to acute metabolic encephalopathy, acute respiratory failure, covid 19, monitoring for drug toxicities on Remdesivir, MURIEL, new cardiomyopathy and pulm HTN. Patient now critically ill requiring mechanical ventilation. 40 minutes of critical care time spent on patient, with greater than 50% of this time bedside or discussing care with hospital staff.      VTE Prophylaxis:   Mechanical Order History:     None      Pharmalogical Order History:       Ordered     Dose Route Frequency Stop    09/09/21 0813  enoxaparin (LOVENOX) syringe 70 mg      1 mg/kg SC Every 12 Hours --    09/09/21 0730  Pharmacy to Dose enoxaparin (LOVENOX)      -- XX Continuous PRN --    09/07/21 0859  enoxaparin (LOVENOX) syringe 40 mg  Status:  Discontinued      40 mg SC Every 24 Hours 09/09/21 0730    09/03/21 0739  enoxaparin (LOVENOX) syringe 80 mg  Status:  Discontinued      1 mg/kg SC Every 12 Hours 09/07/21 0859    09/03/21 0724  Pharmacy to Dose enoxaparin (LOVENOX)  Status:  Discontinued      -- XX Continuous PRN 09/03/21 0740    09/02/21 1237  enoxaparin (LOVENOX) syringe 40 mg  Status:  Discontinued      40 mg SC Every 24 Hours 09/02/21 1247    09/02/21 1247  enoxaparin (LOVENOX) syringe 40 mg  Status:  Discontinued      0.5 mg/kg SC Every 24 Hours 09/03/21 0549              Nic Webster MD  [facility]  09/10/21  11:05 EDT

## 2021-09-10 NOTE — PROGRESS NOTES
[facility]      Mar/chart reviewed  [facility]  Sedated on vent unable to provide subjective  Continues proning protocol    Vital Sign Min/Max for last 24 hours  Temp  Min: 98.1 °F (36.7 °C)  Max: 98.9 °F (37.2 °C)   BP  Min: 62/33  Max: 183/89   Pulse  Min: 58  Max: 125   Resp  Min: 22  Max: 22   SpO2  Min: 79 %  Max: 100 %   No data recorded   Weight  Min: 68.5 kg (151 lb 0.2 oz)  Max: 68.5 kg (151 lb 0.2 oz)   2786/1900    Appears ill, sedated on vent  perrl, eomi, normal sclera  mmm, no jvd, trachea midline, neck supple,  chest decreased ae bilaterally, no crackles, no wheezes,   rrr,   soft, nt, nd +bs,  no c/c/ e  Skin warm, dry no rashes    Labs: 9/10: reviewed:   glucose 274  Bun 69  Cr 1.19  Na 150  k 5.5  Bicarb 27  Albumin 2.2  Wbc 16  hgb 12  plts 176  7.269/63/180    A/P:  1. Acute hypoxemic and hypercapnic respiratory failure -- continue vent support, wean oxygen as able.  Would wean fi02 at this point to 40% then try and wean peep. fi02 much better today, would still continue proning for now.  Can increase RR as well  2. COVID19 pneumonia --, d dimer is low -- would keep the current measures for this in place, monitor for secondary bacterial infection.  procal low 9/9  D dimer is low, neg ct angio and neg venous doppler, continue prophylactic lovenox  3. DM -- accuechecks a little high,  ssi increased levemir 15->20 on 9/10   4. Pulmonary hypertension --dry and keep on dry side  5. Hypernatremia -- will increase free water flushes  6. Hyperkalemia -- will give dose of lasix    CC 35 mins.

## 2021-09-11 NOTE — PROGRESS NOTES
PROGRESS NOTE         Patient Identification:  Name:  Valdemar Solitario  Age:  76 y.o.  Sex:  male  :  1945  MRN:  [id number]  Visit Number:  [id number]  Primary Care Provider:  Laurence Geiger APRN         LOS: 9 days       ----------------------------------------------------------------------------------------------------------------------  Subjective       Chief Complaints:    Exposure To Known Illness, Altered Mental Status, and Weakness - Generalized        Interval History:      The patient remains intubated and sedated at 65% FiO2, which is slightly improved.  Afebrile.  CRP is improved significantly at 14.61.  WBC is improved at 13.27.  Chest x-ray on 2021 shows little overall change.    ----------------------------------------------------------------------------------------------------------------------      Objective       Current Hospital Meds:  artificial tears, , Both Eyes, Q4H  chlorhexidine, 15 mL, Mouth/Throat, Q12H  dexamethasone, 6 mg, Intravenous, BID  enoxaparin, 1 mg/kg, Subcutaneous, Q12H  famotidine, 20 mg, Intravenous, BID  insulin aspart, 0-14 Units, Subcutaneous, TID AC  insulin detemir, 25 Units, Subcutaneous, Nightly  meropenem, 1 g, Intravenous, Q8H  sertraline, 100 mg, Oral, Daily  sodium chloride, 10 mL, Intravenous, Q12H  sodium chloride, 10 mL, Intravenous, Q12H  sodium chloride, 10 mL, Intravenous, Q12H  sodium chloride, 10 mL, Intravenous, Q12H  sodium chloride, 10 mL, Intravenous, Q12H  tamsulosin, 0.4 mg, Oral, Daily  vancomycin, 1,000 mg, Intravenous, Q24H      dexmedetomidine, 0.2-1.5 mcg/kg/hr, Last Rate: Stopped (21 1414)  fentaNYL Citrate, , Last Rate: Stopped (21 9721)  norepinephrine, 0.02-0.3 mcg/kg/min, Last Rate: Stopped (09/10/21 3462)  Pharmacy Consult - Pharmacy to dose,   Pharmacy Consult - Pharmacy to dose,   Pharmacy to Dose enoxaparin (LOVENOX),   Pharmacy to dose vancomycin,   phenylephrine, 0.5-3 mcg/kg/min, Last Rate: 0.9  mcg/kg/min (09/11/21 0302)  propofol, 5-50 mcg/kg/min, Last Rate: 45 mcg/kg/min (09/11/21 0520)  vasopressin (PITRESSIN) 20 units in 100 mL infusion, 0.03 Units/min, Last Rate: Stopped (09/11/21 0321)  vecuronium (NORCURON) infusion, 0.6-1.2 mcg/kg/min, Last Rate: Stopped (09/11/21 0321)      ----------------------------------------------------------------------------------------------------------------------    Vital Signs:  Temp:  [98.3 °F (36.8 °C)-99.5 °F (37.5 °C)] 98.3 °F (36.8 °C)  Heart Rate:  [46-86] 67  Resp:  [28-32] 28  BP: ()/(45-85) 110/52  FiO2 (%):  [60 %-75 %] 65 %  Mean Arterial Pressure (Non-Invasive) for the past 24 hrs (Last 3 readings):   Noninvasive MAP (mmHg)   09/11/21 0805 72   09/11/21 0705 83   09/11/21 0650 72     SpO2 Percentage    09/11/21 0705 09/11/21 0805 09/11/21 0823   SpO2: 95% 95% 95%     SpO2:  [85 %-99 %] 95 %  on   ;   Device (Oxygen Therapy): ventilator    Body mass index is 24.39 kg/m².  Wt Readings from Last 3 Encounters:   09/10/21 68.5 kg (151 lb 0.2 oz)   06/14/17 73.9 kg (163 lb)   03/23/17 76.7 kg (169 lb)        Intake/Output Summary (Last 24 hours) at 9/11/2021 0926  Last data filed at 9/11/2021 0520  Gross per 24 hour   Intake 3571.49 ml   Output 3200 ml   Net 371.49 ml     NPO Diet  ----------------------------------------------------------------------------------------------------------------------      Physical Exam:    Deferred due to COVID-19 isolation.  ----------------------------------------------------------------------------------------------------------------------            Results from last 7 days   Lab Units 09/11/21 0443   PH, ARTERIAL pH units 7.448   PO2 ART mm Hg 75.0*   PCO2, ARTERIAL mm Hg 43.3   HCO3 ART mmol/L 30.0*     Results from last 7 days   Lab Units 09/11/21  0417 09/11/21  0302 09/10/21  0429 09/09/21  0055 09/08/21  0234 09/07/21  0320 09/06/21  0433 09/05/21  0227   CRP mg/dL  --  14.61* 21.68* 13.17*  --  2.54*   < > 3.13*    LACTATE mmol/L  --  1.9  --  1.6  --  1.3  --  1.4   WBC 10*3/mm3  --  13.27* 16.06* 21.69*   < > 15.63*   < > 5.24   HEMOGLOBIN g/dL  --  11.0* 12.0* 13.6   < > 13.8   < > 12.4*   HEMATOCRIT %  --  36.7* 40.3 44.6   < > 44.1   < > 38.2   MCV fL  --  91.8 94.8 92.3   < > 91.1   < > 86.6   MCHC g/dL  --  30.0* 29.8* 30.5*   < > 31.3*   < > 32.5   PLATELETS 10*3/mm3  --  154 176 216   < > 232   < > 135*   INR  1.36*  --   --  1.29*  --  1.23*  --  1.13*    < > = values in this interval not displayed.     Results from last 7 days   Lab Units 09/11/21  0302 09/10/21  0429 09/09/21  0055   SODIUM mmol/L 148* 150* 149*   POTASSIUM mmol/L 4.5 5.5* 5.1   CHLORIDE mmol/L 113* 115* 110*   CO2 mmol/L 26.0 27.8 29.5*   BUN mg/dL 70* 69* 69*   CREATININE mg/dL 0.98 1.19 1.31*   EGFR IF NONAFRICN AM mL/min/1.73 74 59* 53*   CALCIUM mg/dL 8.9 8.8 8.8   GLUCOSE mg/dL 307* 274* 238*   ALBUMIN g/dL 1.98* 2.12* 2.56*   BILIRUBIN mg/dL 0.5 0.5 0.5   ALK PHOS U/L 61 66 56   AST (SGOT) U/L 32 44* 41*   ALT (SGPT) U/L 28 33 42*   Estimated Creatinine Clearance: 62.1 mL/min (by C-G formula based on SCr of 0.98 mg/dL).  No results found for: AMMONIA    Glucose   Date/Time Value Ref Range Status   09/11/2021 0604 296 (H) 70 - 130 mg/dL Final     Comment:     Meter: [id number] : [id number] Katherine Couch   09/10/2021 2045 270 (H) 70 - 130 mg/dL Final     Comment:     Meter: [id number] : [id number] Katherine Couch   09/10/2021 1711 254 (H) 70 - 130 mg/dL Final     Comment:     Meter: [id number] : [id number] Teresita Everett   09/10/2021 1039 248 (H) 70 - 130 mg/dL Final     Comment:     Meter: [id number] : [id number] Teresita Everett   09/10/2021 0544 235 (H) 70 - 130 mg/dL Final     Comment:     Meter: [id number] : [id number] Katherine Couch   09/10/2021 0028 195 (H) 70 - 130 mg/dL Final     Comment:     Meter: [id number] : [id number] Katherine Couch   09/09/2021 2111 232 (H) 70 - 130 mg/dL Final     Comment:     Meter:  [id number] : [id number] Katherine Pandya   09/09/2021 1712 201 (H) 70 - 130 mg/dL Final     Comment:     Meter: [id number] : [id number] tavo raman     Lab Results   Component Value Date    HGBA1C 6.30 (H) 09/02/2021     No results found for: TSH, FREET4    Blood Culture   Date Value Ref Range Status   09/02/2021 No growth at 24 hours  Preliminary   09/02/2021 No growth at 24 hours  Preliminary   09/02/2021 No growth at 24 hours  Preliminary     No results found for: URINECX  No results found for: WOUNDCX  No results found for: STOOLCX  No results found for: RESPCX  Pain Management Panel    There is no flowsheet data to display.           ----------------------------------------------------------------------------------------------------------------------  Imaging Results (Last 24 Hours)       Procedure Component Value Units Date/Time    XR Chest 1 View [[id number]] Resulted: 09/11/21 0243     Updated: 09/11/21 0321    XR Chest 1 View [[id number]] Collected: 09/10/21 1711     Updated: 09/10/21 1714    Narrative:      XR CHEST 1 VW-     CLINICAL INDICATION: to confirm placement of ET Tube -Manual Prone  Protocol; U07.1-COVID-19; J12.82-Pneumonia due to Coronavirus disease  2019; R09.02-Hypoxemia; J96.01-Acute respiratory failure with hypoxia        COMPARISON: 09/09/2021      TECHNIQUE: Single frontal view of the chest.     FINDINGS:     Right internal jugular central line. Tip in the superior vena cava. No  pneumothorax  Bilateral airspace disease  Endotracheal tube stable. Nasogastric tube stable  There are no suspicious-appearing parenchymal soft tissue nodules.          Impression:      Line placement as above     This report was finalized on 9/10/2021 5:12 PM by Dr. Michael Feng MD.               ----------------------------------------------------------------------------------------------------------------------    Assessment/Plan       Assessment/Plan     ASSESSMENT:    1.  Severe sepsis with lactic acid  greater than 2 on admission  2.  COVID-19 pneumonia    PLAN:    The patient remains intubated and sedated at 65% FiO2, which is slightly improved.  Afebrile.  CRP is improved significantly at 14.61.  WBC is improved at 13.27. Chest x-ray on 9/11/2021 shows little overall change.    VQ scan on 9/2/2021 indeterminate for exclusion of PE.  CT of the chest on 9/3/2021 reports patchy bilateral airspace disease concerning for COVID-19 pneumonia.  CT of the abdomen and pelvis on 9/3/2021 reports bibasilar airspace disease suggestive of COVID-19 pneumonia, large right inguinal hernia containing nondilated bowel and fat.     Patient remains on Decadron but has completed course of remdesivir.    We are concerned about possible development of bacterial pneumonia in the setting of prior treatment with baricitinib.     Recommend to continue vancomycin and meropenem courses for now.  We will continue to follow closely and adjust antibiotic therapy as appropriate.    Code Status:   Code Status and Medical Interventions:   Ordered at: 09/10/21 1447     Limited Support to NOT Include:    Intubation    Cardioversion/Defibrillation     Code Status:    No CPR     Medical Interventions (Level of Support Prior to Arrest):    Limited     Comments:    adult children Tiffany, Cathleen and John all agree he would not want to be resuciated or reintubated in the event he became extubated.     JUAN Rose  09/11/21  09:26 EDT

## 2021-09-11 NOTE — PROGRESS NOTES
[facility] HOSPITALIST PROGRESS NOTE     Patient Identification:  Name:  Valdemar Solitario  Age:  76 y.o.  Sex:  male  :  1945  MRN:  [id number]  Visit Number:  [id number]  ROOM: [address]     Primary Care Provider:  Laurence Geiger APRN    Length of stay in inpatient status:  9    Subjective     Chief Compliant:    Chief Complaint   Patient presents with   • Exposure To Known Illness   • Altered Mental Status   • Weakness - Generalized       History of Presenting Illness:    Patient has remained supine since yesterday morning. Patient's pressor requirement has substantially improved, patient down to 0.9 of phenylephrine and off vasopressor.     I called and updated patient's son Tiffany on plan of care     ROS:  Otherwise 10 point ROS negative other than documented above in HPI.     Objective     Current Hospital Meds:artificial tears, , Both Eyes, Q4H  chlorhexidine, 15 mL, Mouth/Throat, Q12H  dexamethasone, 6 mg, Intravenous, BID  enoxaparin, 1 mg/kg, Subcutaneous, Q12H  famotidine, 20 mg, Intravenous, BID  insulin aspart, 0-14 Units, Subcutaneous, TID AC  insulin detemir, 25 Units, Subcutaneous, Nightly  meropenem, 1 g, Intravenous, Q8H  sertraline, 100 mg, Oral, Daily  sodium chloride, 10 mL, Intravenous, Q12H  sodium chloride, 10 mL, Intravenous, Q12H  sodium chloride, 10 mL, Intravenous, Q12H  sodium chloride, 10 mL, Intravenous, Q12H  sodium chloride, 10 mL, Intravenous, Q12H  tamsulosin, 0.4 mg, Oral, Daily  vancomycin, 1,000 mg, Intravenous, Q24H    dexmedetomidine, 0.2-1.5 mcg/kg/hr, Last Rate: Stopped (21)  fentaNYL Citrate, , Last Rate: Stopped (21)  fentaNYL Citrate,   fentaNYL Citrate,   norepinephrine, 0.02-0.3 mcg/kg/min, Last Rate: Stopped (09/10/21 4749)  Pharmacy Consult - Pharmacy to dose,   Pharmacy Consult - Pharmacy to dose,   Pharmacy to Dose enoxaparin (LOVENOX),   Pharmacy to dose vancomycin,   phenylephrine, 0.5-3 mcg/kg/min, Last Rate: 0.9  mcg/kg/min (09/11/21 0302)  propofol, 5-50 mcg/kg/min, Last Rate: 45 mcg/kg/min (09/11/21 0520)  vasopressin (PITRESSIN) 20 units in 100 mL infusion, 0.03 Units/min, Last Rate: Stopped (09/11/21 0321)  vecuronium (NORCURON) infusion, 0.6-1.2 mcg/kg/min, Last Rate: Stopped (09/11/21 0321)        Current Antimicrobial Therapy:  Anti-Infectives (From admission, onward)    Ordered     Dose/Rate Route Frequency Start Stop    09/10/21 1042  meropenem (MERREM) 1 g in sodium chloride 0.9 % 100 mL IVPB-VTB     Ordering Provider: Nic Webster MD    1 g  over 3 Hours Intravenous Every 8 Hours 09/10/21 1300 09/19/21 1259    09/09/21 0949  vancomycin 1 g/250 mL 0.9% NS (vial-mate)     Ordering Provider: Brock Metzger MD    1,000 mg  over 60 Minutes Intravenous Every 24 Hours 09/10/21 1100 09/19/21 1059    09/09/21 0949  vancomycin 1250 mg/250 mL 0.9% NS IVPB ([facility])     Ordering Provider: Brock Metzger MD    1,250 mg  over 60 Minutes Intravenous Once 09/09/21 1100 09/09/21 1201    09/09/21 0942  meropenem (MERREM) 1 g in sodium chloride 0.9 % 100 mL IVPB-VTB     Ordering Provider: Brock Metzger MD    1 g  over 30 Minutes Intravenous Once 09/09/21 1030 09/09/21 1131    09/09/21 0918  Pharmacy to dose vancomycin     Ordering Provider: Jeny Lopes, APRN     Does not apply Continuous PRN 09/09/21 0917 09/19/21 0916    09/03/21 0615  remdesivir 100 mg in sodium chloride 0.9 % 270 mL IVPB (powder vial)     Ordering Provider: Mg Augustine MD    100 mg  over 60 Minutes Intravenous Every 24 Hours 09/04/21 0900 09/07/21 1055    09/03/21 0615  remdesivir 200 mg in sodium chloride 0.9 % 290 mL IVPB (powder vial)     Ordering Provider: Mg Augustine MD    200 mg  over 60 Minutes Intravenous Every 24 Hours 09/03/21 0900 09/03/21 0940        Current Diuretic Therapy:  Diuretics (From admission, onward)    Ordered     Dose/Rate Route Frequency Start Stop    09/10/21 0822  furosemide (LASIX)  injection 40 mg     Ordering Provider: Manuelito Adame MD    40 mg Intravenous Once 09/10/21 0915 09/10/21 0839    09/08/21 0736  furosemide (LASIX) injection 40 mg     Ordering Provider: Manuelito Adame MD    40 mg Intravenous Once 09/08/21 0800 09/08/21 0926    09/05/21 0732  furosemide (LASIX) injection 80 mg     Ordering Provider: Nic Webster MD    80 mg Intravenous Once 09/05/21 0830 09/05/21 0842        ----------------------------------------------------------------------------------------------------------------------  Vital Signs:  Temp:  [98.3 °F (36.8 °C)-99.5 °F (37.5 °C)] 98.3 °F (36.8 °C)  Heart Rate:  [46-86] 67  Resp:  [28-32] 28  BP: ()/(45-85) 110/52  FiO2 (%):  [60 %-75 %] 65 %  SpO2:  [85 %-99 %] 95 %  on   ;   Device (Oxygen Therapy): ventilator  Body mass index is 24.39 kg/m².    Wt Readings from Last 3 Encounters:   09/10/21 68.5 kg (151 lb 0.2 oz)   06/14/17 73.9 kg (163 lb)   03/23/17 76.7 kg (169 lb)     Intake & Output (last 3 days)       09/08 0701 - 09/09 0700 09/09 0701 - 09/10 0700 09/10 0701 - 09/11 0700 09/11 0701 - 09/12 0700    I.V. (mL/kg) 1256.3 (18.5) 1994.9 (29.1) 1710.5 (25)     Other      NG/ 81 356     IV Piggyback  550 350     Total Intake(mL/kg) 1977.3 (29.1) 2786.9 (40.7) 3571.5 (52.1)     Urine (mL/kg/hr) 1260 (0.8) 1900 (1.2) 3200 (1.9)     Stool 0       Total Output 1260 1900 3200     Net +717.3 +886.9 +371.5                 NPO Diet  ----------------------------------------------------------------------------------------------------------------------  Physical exam:  GENERAL:  Patient intubated and sedated.   SKIN:  Warm, dry without rashes, purpura or petechiae.  EYES:  Pupils equal, round and reactive to light.  EOMs intact.  Conjunctivae normal.  HEAD:  Normocephalic. Atraumatic.   EARS/NOSE/MOUTH/THROAT:  Hearing intact. Septum midline.  No excoriations or nasal discharge. No stomatitis or ulcers.  Lips normal. Oropharynx without  lesions or exudates.  NECK:  Supple with good range of motion; no thyromegaly or masses  LYMPHATICS:  No cervical, supraclavicular, axillary adenopathy.  RESP:  Lungs clear to auscultation. Good airflow. Intubated receiving mechanical ventilation.   CARDIAC:  Regular rate and rhythm without murmurs, rubs or gallops. Normal S1,S2. No edema  GI:  Soft, nontender, no hepatosplenomegaly, normal bowel sounds  MSK:  No clubbing or cyanosis, No joint swelling noted in hands  NEUROLOGICAL:  No focal deficit. Pupils equal and reactive.   ----------------------------------------------------------------------------------------------------------------------  Tele:    ----------------------------------------------------------------------------------------------------------------------  Results from last 7 days   Lab Units 09/11/21  0417 09/11/21  0302 09/10/21  0429 09/09/21  0055 09/08/21  0234 09/07/21  0320 09/06/21  0433 09/05/21  0227   CRP mg/dL  --  14.61* 21.68* 13.17*  --  2.54*   < > 3.13*   LACTATE mmol/L  --  1.9  --  1.6  --  1.3  --  1.4   WBC 10*3/mm3  --  13.27* 16.06* 21.69*   < > 15.63*   < > 5.24   HEMOGLOBIN g/dL  --  11.0* 12.0* 13.6   < > 13.8   < > 12.4*   HEMATOCRIT %  --  36.7* 40.3 44.6   < > 44.1   < > 38.2   MCV fL  --  91.8 94.8 92.3   < > 91.1   < > 86.6   MCHC g/dL  --  30.0* 29.8* 30.5*   < > 31.3*   < > 32.5   PLATELETS 10*3/mm3  --  154 176 216   < > 232   < > 135*   INR  1.36*  --   --  1.29*  --  1.23*  --  1.13*    < > = values in this interval not displayed.     Results from last 7 days   Lab Units 09/11/21  0443   PH, ARTERIAL pH units 7.448   PO2 ART mm Hg 75.0*   PCO2, ARTERIAL mm Hg 43.3   HCO3 ART mmol/L 30.0*     Results from last 7 days   Lab Units 09/11/21  0302 09/10/21  0429 09/09/21  0055   SODIUM mmol/L 148* 150* 149*   POTASSIUM mmol/L 4.5 5.5* 5.1   CHLORIDE mmol/L 113* 115* 110*   CO2 mmol/L 26.0 27.8 29.5*   BUN mg/dL 70* 69* 69*   CREATININE mg/dL 0.98 1.19 1.31*   EGFR IF  NONAFRICN AM mL/min/1.73 74 59* 53*   CALCIUM mg/dL 8.9 8.8 8.8   GLUCOSE mg/dL 307* 274* 238*   ALBUMIN g/dL 1.98* 2.12* 2.56*   BILIRUBIN mg/dL 0.5 0.5 0.5   ALK PHOS U/L 61 66 56   AST (SGOT) U/L 32 44* 41*   ALT (SGPT) U/L 28 33 42*   Estimated Creatinine Clearance: 62.1 mL/min (by C-G formula based on SCr of 0.98 mg/dL).  No results found for: AMMONIA              Glucose   Date/Time Value Ref Range Status   09/11/2021 0604 296 (H) 70 - 130 mg/dL Final     Comment:     Meter: [id number] : [id number] Katherine Couch   09/10/2021 2045 270 (H) 70 - 130 mg/dL Final     Comment:     Meter: [id number] : [id number] Katherine Couch   09/10/2021 1711 254 (H) 70 - 130 mg/dL Final     Comment:     Meter: [id number] : [id number] Teresita Everett   09/10/2021 1039 248 (H) 70 - 130 mg/dL Final     Comment:     Meter: [id number] : [id number] Teresita Everett   09/10/2021 0544 235 (H) 70 - 130 mg/dL Final     Comment:     Meter: [id number] : [id number] Katherine Couch   09/10/2021 0028 195 (H) 70 - 130 mg/dL Final     Comment:     Meter: [id number] : [id number] Katherine Couch   09/09/2021 2111 232 (H) 70 - 130 mg/dL Final     Comment:     Meter: [id number] : [id number] Katherine Couch   09/09/2021 1712 201 (H) 70 - 130 mg/dL Final     Comment:     Meter: [id number] : [id number] tavo raman     No results found for: TSH, FREET4  No results found for: PREGTESTUR, PREGSERUM, HCG, HCGQUANT  Pain Management Panel    There is no flowsheet data to display.       Brief Urine Lab Results  (Last result in the past 365 days)      Color   Clarity   Blood   Leuk Est   Nitrite   Protein   CREAT   Urine HCG        09/04/21 1137 Yellow Cloudy Trace Negative Negative Trace             No results found for: BLOODCX  No results found for: URINECX  No results found for: WOUNDCX  No results found for: STOOLCX  No results found for: RESPCX  No results found for: AFBCX  Results from last 7 days   Lab Units 09/11/21  0302  09/10/21  0429 09/09/21  0914 09/09/21  0055 09/08/21  0233 09/07/21  0320 09/06/21  0433 09/05/21  0227   PROCALCITONIN ng/mL  --   --  0.45*  --  0.22 0.11  --   --    LACTATE mmol/L 1.9  --   --  1.6  --  1.3  --  1.4   CRP mg/dL 14.61* 21.68*  --  13.17*  --  2.54* 2.93* 3.13*       I have personally looked at the labs and they are summarized above.  ----------------------------------------------------------------------------------------------------------------------  Detailed radiology reports for the last 24 hours:    Imaging Results (Last 24 Hours)     Procedure Component Value Units Date/Time    XR Chest 1 View [[id number]] Resulted: 09/11/21 0243     Updated: 09/11/21 0321    XR Chest 1 View [[id number]] Collected: 09/10/21 1711     Updated: 09/10/21 1714    Narrative:      XR CHEST 1 VW-     CLINICAL INDICATION: to confirm placement of ET Tube -Manual Prone  Protocol; U07.1-COVID-19; J12.82-Pneumonia due to Coronavirus disease  2019; R09.02-Hypoxemia; J96.01-Acute respiratory failure with hypoxia        COMPARISON: 09/09/2021      TECHNIQUE: Single frontal view of the chest.     FINDINGS:     Right internal jugular central line. Tip in the superior vena cava. No  pneumothorax  Bilateral airspace disease  Endotracheal tube stable. Nasogastric tube stable  There are no suspicious-appearing parenchymal soft tissue nodules.          Impression:      Line placement as above     This report was finalized on 9/10/2021 5:12 PM by Dr. Michael Feng MD.           Assessment & Plan    Mr. Solitario is our 76M PMH diabetes meliltus, depression, GERD, hypertension, dyslipidemia, presented to his place of work in a state of confusion, brought to ER, found to be Covid +. Hospitalization complicated by worsening hypoxia.      #Septic shock, Acute Metabolic Encephalopathy, Acute Hypoxic Respiratory Failure requiring mechanical ventilation 2/2 viral pneumonia treating for Covid 19 c/b mild transaminitis  - Patient presented w/  increased shortness of breath, confusion, tachypnea, covid + on 9/2.  - Admission labs showed WBC count 5K, CRP 7.8, lactate 2.3, d-dimer 0.95, procal 0.16, MRSA -, covid +, Bcx's NGTD  - B/l Venous Duplex negative for DVT  - VQ scan showed indeterminate study, noted abnormal perfusion, multifocal defects could be related to multifocal airspace disease but also PE.  - CT Head showed no acute intracranial abnormality  - CT Chest showed patchy b/l airspace disease c/w covid 19  - ID consulted; Following  - CTPE on 9/4 did not reveal PE but did show worsening bilateral airspace disease that is likely worsening COVID pneumonia. Given fluid in fissure on plain film could not rule out component of edema  - Continue 6mg IV Dexamethasone x 10 days  - Finished course of remdesivir.   - - Baricitinib per ID recs. Has been d/c by ID in interim due to intubation.   - Continue Level I AC for Covid 19 thromboembolism Ppx  - Continue APAP PRN for fevers  - Continue Albuterol Inhaler  - Patient intubated for worsening hypoxia on 9/5.   - Trend Covid 19 progression labs w/ CBC, CMP, CK, CRP, Ferritin daily and LDH, D-dimer, Fibrinogen q48hrs.  - Enhanced droplet/contact isolation precautions  - P/F ratio post intubation 80 likely representing severe ARDS. Patient paralyzed and prone positioned for first 2 days. Oxygenation improved and patient left supine for 24 hours but last two days has become more hypoxic while supine requiring prone positioning.   - ABG this AM 7.448/43/75 on 70% FiO2, PEEP of 12, , and RR 22. Pplateau pressure around 23 on these settings. FiO2 decreased to 65%. Repeat ABG ordered for this afternoon. Patient has been supine since yesterday morning, will continue to consider prone positioning.     - Suspect pressor requirement a combination of sedation and sepsis. Currently much improved only on 0.9 of phenylephrine.   - Pulmonology consulted. Appreciate recs.   - ID has started vancomycin and meropenem  emperically. I have ordered a sputum culture from ETT.   - Will transduce CVP to help guide diuretics. Patient net even yesterday with lasix.      #New onset atrial flutter   - Levophed transitioned to Phenylphrine   - Rate controlled.   - Have transitioned to Tlov     #HTN/Dyslipidemia  #Elevated Troponin, NSTEMI Type II suspected  #Cardiomyopathy - New  #Pulm HTN - New  - Labs showed trops 0.031->0.016, proBNP 370   - EKG showed NSR, RBBB, no significant ST changes  - Echo showed LVEF 46-50%, mild-mod dilated RV, mild-mod dilated RA, mod-severe Pulm HTN, RVSP 45-55mmHg  - Consulted Cardiology; Recs pending  - Holding home ARB due to shock  - Holding home statin while on Remdesivir   - Continue to monitor on tele, strict I/O's, daily weights, trend HR and BP  - Given pressor requirement will hold diuresis at this time. Will continue to consider PRN.      #Hypernatremia  - Corrected sodium improved at 150  - Continue free water at 75 cc/hr.      #Nonoliguric MURIEL - Improved   - B/l Cr 0.9-1.0, was up to 2.1 on admission; Now 0.98  - S/p mIVFs, Trend Cr and UOP, avoid nephrotoxins, NSAIDs, dehydration and contrast as able.     #NIDDM Type II, controlled, unknown complications  - Hgb A1c = 6.3%  - Holding home oral agents, continue FSBG and SSI  - Uncontrolled. Pulmonary has increased basal, will also increase SSI.      #Anxiety/Depression  - Continue home regimen     #GERD  - Continue home PPI      #BPH  - Continue home Flomax     F: TFs  A: Fentanyl   S: Propofol   T: Lovenox  H: HOB elevated   U: Famotidine   G: PRN  S: Not ready   B: PRN  I: ETT 9/5, RIJ 9/5  D: None      Code status: DNR/DNI.      Dispo: Pending workup and clinical improvement     *This patient is considered high risk due to acute metabolic encephalopathy, acute respiratory failure, covid 19, monitoring for drug toxicities on Remdesivir, MURIEL, new cardiomyopathy and pulm HTN. Patient now critically ill requiring mechanical ventilation. 40 minutes  of critical care time spent on patient, with greater than 50% of this time bedside or discussing care with hospital staff.          VTE Prophylaxis:   Mechanical Order History:     None      Pharmalogical Order History:      Ordered     Dose Route Frequency Stop    09/09/21 0813  enoxaparin (LOVENOX) syringe 70 mg      1 mg/kg SC Every 12 Hours --    09/09/21 0730  Pharmacy to Dose enoxaparin (LOVENOX)      -- XX Continuous PRN --    09/07/21 0859  enoxaparin (LOVENOX) syringe 40 mg  Status:  Discontinued      40 mg SC Every 24 Hours 09/09/21 0730    09/03/21 0739  enoxaparin (LOVENOX) syringe 80 mg  Status:  Discontinued      1 mg/kg SC Every 12 Hours 09/07/21 0859    09/03/21 0724  Pharmacy to Dose enoxaparin (LOVENOX)  Status:  Discontinued      -- XX Continuous PRN 09/03/21 0740    09/02/21 1237  enoxaparin (LOVENOX) syringe 40 mg  Status:  Discontinued      40 mg SC Every 24 Hours 09/02/21 1247    09/02/21 1247  enoxaparin (LOVENOX) syringe 40 mg  Status:  Discontinued      0.5 mg/kg SC Every 24 Hours 09/03/21 0549                  Nic Webster MD  [facility]  09/11/21  10:06 EDT

## 2021-09-11 NOTE — PROGRESS NOTES
Pulmonary       Mar/chart reviewed  Patient seen and examined through telemedicine.  All the labs medications and the notes vitals reviewed.  Case discussed with patient's nurse.  Patient remains on high ventilator settings.  With borderline PO2.    Review of systems cannot be obtained as patient is sedated and ventilated  Overall prognosis remains poor.      Vital Sign Min/Max for last 24 hours  Temp  Min: 98.3 °F (36.8 °C)  Max: 99.5 °F (37.5 °C)   BP  Min: 84/46  Max: 173/75   Pulse  Min: 46  Max: 86   Resp  Min: 26  Max: 32   SpO2  Min: 85 %  Max: 99 %   No data recorded   No data recorded   Patient was seen and examined via telemedicine.     Patient is intubated and sedated.     Head is normocephalic     Respiratory-no acute respiratory distress noted.  Intubated and on mechanical ventilator.     Cardiology-sinus rhythm noted on telemetry     Neuro-sedated    Adult Transthoracic Echo Complete W/ Cont if Necessary Per Protocol    Result Date: 9/3/2021  · Estimated left ventricular EF = 50% Left ventricular ejection fraction appears to be 46 - 50%. Left ventricular systolic function is normal. · Left ventricular diastolic function was normal. · The right ventricular cavity is mild to moderately dilated. · The right atrial cavity is mild to moderately dilated. · Estimated right ventricular systolic pressure from tricuspid regurgitation is moderately elevated (45-55 mmHg). · Moderate to severe pulmonary hypertension is present. · No pericardial effusion · No prev echo      CT Abdomen Pelvis Without Contrast    Result Date: 9/3/2021  EXAM:   CT Abdomen and Pelvis Without Intravenous Contrast  EXAM DATE:   9/3/2021 11:20 AM  CLINICAL HISTORY:   Urinary retention; U07.1-COVID-19; J12.82-Pneumonia due to Coronavirus disease 2019; R09.02-Hypoxemia  TECHNIQUE:   Axial computed tomography images of the abdomen and pelvis without intravenous contrast.  Sagittal and coronal reformatted images were created and reviewed.   This CT exam was performed using one or more of the following dose reduction techniques:  automated exposure control, adjustment of the mA and/or kV according to patient size, and/or use of iterative reconstruction technique.  COMPARISON:   08/04/2015  FINDINGS:   LUNG BASES:  Bibasilar airspace disease suggestive of COVID-19 pneumonia.   ABDOMEN:   LIVER:  Unremarkable.   GALLBLADDER AND BILE DUCTS:  Unremarkable.  No calcified stones.  No ductal dilation.   PANCREAS:  Unremarkable.  No ductal dilation.   SPLEEN:  Unremarkable.  No splenomegaly.   ADRENALS:  Unremarkable.  No mass.   KIDNEYS AND URETERS:  Unremarkable.  No obstructing stones.  No hydronephrosis.   STOMACH AND BOWEL:  Large right inguinal hernia containing nondilated bowel and fat.  Sigmoid colonic diverticulosis.  No mucosal thickening.   PELVIS:   APPENDIX:  No findings to suggest acute appendicitis.   BLADDER:  Unremarkable.  No stones.   REPRODUCTIVE:  Unremarkable as visualized.   ABDOMEN and PELVIS:   INTRAPERITONEAL SPACE:  Unremarkable.  No free air.  No significant fluid collection.   BONES/JOINTS:  No acute fracture.  No dislocation.   SOFT TISSUES:  See above.   VASCULATURE:  Unremarkable.  No abdominal aortic aneurysm.   LYMPH NODES:  Unremarkable.  No enlarged lymph nodes.      1.  Bibasilar airspace disease suggestive of COVID-19 pneumonia. 2.  Large right inguinal hernia containing nondilated bowel and fat.  This report was finalized on 9/3/2021 11:39 AM by Dr. Leonel Angel MD.      XR Chest 2 View    Result Date: 9/2/2021  EXAM:   XR Chest, 2 Views  EXAM DATE:   9/2/2021 10:15 AM  CLINICAL HISTORY:   CHF/COPD Protocol  TECHNIQUE:   Frontal and lateral views of the chest.  COMPARISON:   No relevant prior studies available.  FINDINGS:   LUNGS:  Scattered bilateral patchy opacities may represent COVID-19 pneumonia.   PLEURAL SPACE:  Unremarkable.  No pneumothorax.   HEART:  Unremarkable.  No cardiomegaly.   MEDIASTINUM:  Unremarkable.    BONES/JOINTS:  Unremarkable.        Scattered bilateral patchy opacities may represent COVID-19 pneumonia.  This report was finalized on 9/2/2021 10:18 AM by Dr. Leonel Angel MD.      CT Head Without Contrast    Result Date: 9/2/2021  EXAM:   CT Head Without Intravenous Contrast  EXAM DATE:   9/2/2021 9:54 AM  CLINICAL HISTORY:   altered mental status  TECHNIQUE:   Axial computed tomography images of the head/brain without intravenous contrast.  Sagittal and coronal reformatted images were created and reviewed.  This CT exam was performed using one or more of the following dose reduction techniques:  automated exposure control, adjustment of the mA and/or kV according to patient size, and/or use of iterative reconstruction technique.  COMPARISON:   No relevant prior studies available.  FINDINGS:   BRAIN:  Unremarkable.  No hemorrhage.  No significant white matter disease.  No edema.   VENTRICLES:  Unremarkable.  No ventriculomegaly.   BONES/JOINTS:  Unremarkable.  No acute fracture.   SOFT TISSUES:  Unremarkable.   SINUSES:  Unremarkable as visualized.  No acute sinusitis.   MASTOID AIR CELLS:  Unremarkable as visualized.  No mastoid effusion.        Unremarkable exam demonstrating no CT evidence of acute intracranial findings.  This report was finalized on 9/2/2021 10:07 AM by Dr. Leonel Angel MD.      CT Chest Without Contrast Diagnostic    Result Date: 9/3/2021  EXAM:   CT Chest Without Intravenous Contrast  EXAM DATE:   9/3/2021 11:20 AM  CLINICAL HISTORY:   Pneumonia, effusion or abscess suspected, xray done  TECHNIQUE:   Axial computed tomography images of the chest without intravenous contrast.  Sagittal and coronal reformatted images were created and reviewed.  This CT exam was performed using one or more of the following dose reduction techniques:  automated exposure control, adjustment of the mA and/or kV according to patient size, and/or use of iterative reconstruction technique.  COMPARISON:   No  relevant prior studies available.  FINDINGS:   LUNGS:  Patchy bilateral airspace disease concerning for COVID-19 pneumonia.   PLEURAL SPACE:  Unremarkable.  No pneumothorax.  No significant effusion.   HEART:  Coronary artery calcifications.  No significant pericardial effusion.   BONES/JOINTS:  Unremarkable.  No acute fracture.  No dislocation.   SOFT TISSUES:  Unremarkable.   VASCULATURE:  Unremarkable.  No thoracic aortic aneurysm.   LYMPH NODES:  Unremarkable.  No enlarged lymph nodes.        Patchy bilateral airspace disease concerning for COVID-19 pneumonia.  This report was finalized on 9/3/2021 12:25 PM by Dr. Leonel Angel MD.      NM Lung Scan Perfusion Particulate    Result Date: 9/2/2021  RADIONUCLIDE PERFUSION-ONLY LUNG SCAN, 9/2/2021 HISTORY:  76-year-old male in the ED with multifocal pulmonary infiltrates and positive COVID-19 testing. Short of air. Hypoxemia. Assess for pulmonary embolism.  DOSE: 4.0 mCi mCi technetium labeled MAA intravenously. COMPARISON: Chest x-ray tonight. FINDINGS: NOTE: Ventilation imaging is currently restricted during the COVID pandemic as an aerosol-generating procedure. A perfusion-only examination was performed per current protocol. Multifocal regions of decreased perfusion scattered throughout both lungs. These may correspond to multifocal regions of pneumonitis visible on chest x-ray. Pulmonary embolism could have a similar appearance. The findings are therefore indeterminate for the presence of pulmonary embolism.     1.  Indeterminant examination for the exclusion of pulmonary embolism. 2.  Abnormal perfusion-only lung imaging. Multifocal perfusion defects may be attributable to the patient's multifocal air space pneumonia but pulmonary embolism is also possible. Signer Name: Kael Hamilton MD  Signed: 9/2/2021 8:24 PM  Workstation Name: [email or web address]  [facility]    XR Chest 1 View    Result Date: 9/11/2021  XR CHEST 1 VW-  CLINICAL  INDICATION: to confirm placement of ET Tube -Manual Prone Protocol; U07.1-COVID-19; J12.82-Pneumonia due to Coronavirus disease 2019; R09.02-Hypoxemia; J96.01-Acute respiratory failure with hypoxia   COMPARISON: 09/10/2021  TECHNIQUE: Single frontal view of the chest.  FINDINGS:  Bibasilar airspace disease No interval line change There is no evidence of an acute osseous abnormality. There are no suspicious-appearing parenchymal soft tissue nodules.       Little overall change  This report was finalized on 9/11/2021 10:53 AM by Dr. Michael Feng MD.      XR Chest 1 View    Result Date: 9/10/2021  XR CHEST 1 VW-  CLINICAL INDICATION: to confirm placement of ET Tube -Manual Prone Protocol; U07.1-COVID-19; J12.82-Pneumonia due to Coronavirus disease 2019; R09.02-Hypoxemia; J96.01-Acute respiratory failure with hypoxia   COMPARISON: 09/09/2021  TECHNIQUE: Single frontal view of the chest.  FINDINGS:  Right internal jugular central line. Tip in the superior vena cava. No pneumothorax Bilateral airspace disease Endotracheal tube stable. Nasogastric tube stable There are no suspicious-appearing parenchymal soft tissue nodules.       Line placement as above  This report was finalized on 9/10/2021 5:12 PM by Dr. Michael Feng MD.      XR Chest 1 View    Result Date: 9/9/2021  XR CHEST 1 VW-  CLINICAL INDICATION: to confirm placement of ET Tube -Manual Prone Protocol; U07.1-COVID-19; J12.82-Pneumonia due to Coronavirus disease 2019; R09.02-Hypoxemia; J96.01-Acute respiratory failure with hypoxia   COMPARISON: 09/08/2021  TECHNIQUE: Single frontal view of the chest.  FINDINGS:  Bilateral airspace disease No interval line change There is no evidence of an acute osseous abnormality. There are no suspicious-appearing parenchymal soft tissue nodules.       Bilateral airspace disease  This report was finalized on 9/9/2021 2:43 PM by Dr. Michael Feng MD.      XR Chest 1 View    Result Date: 9/8/2021  XR CHEST 1 VW-  CLINICAL  INDICATION: to confirm placement of ET Tube -Manual Prone Protocol; U07.1-COVID-19; J12.82-Pneumonia due to Coronavirus disease 2019; R09.02-Hypoxemia; J96.01-Acute respiratory failure with hypoxia   COMPARISON: 09/06/2021  TECHNIQUE: Single frontal view of the chest.  FINDINGS:  Trace left effusion and bilateral consolidation Right central line tip cavoatrial junction Endotracheal tube and nasogastric tube appear to be in appropriate position There is no evidence of an acute osseous abnormality. There are no suspicious-appearing parenchymal soft tissue nodules.       Line placement as above  This report was finalized on 9/8/2021 11:47 AM by Dr. Michael Feng MD.      XR Chest 1 View    Result Date: 9/6/2021  CR Chest 1 Vw INDICATION: Confirm endotracheal tube placement. COMPARISON:  9/5/2021 FINDINGS: Portable AP view(s) of the chest. Endotracheal tube 1.3 cm above the beltran. Pull back about 1.5 to 2 cm may allow for more optimal positioning. Nasogastric tube distal tip within the stomach and proximal port below the GE junction. Right neck approach central line unchanged. Lower lung volumes with cardiomegaly. Diffuse haziness over both lungs may represent diffuse infiltrates. No dense consolidation or sizable effusions. No pneumothorax.     Endotracheal tube just above the beltran. Slight pull back 1.5 to 2 cm may allow for more optimal positioning. NG tube and right neck approach central line in satisfactory position. Continued diffuse bilateral alveolar infiltrates with developing left lower lobe air bronchograms compatible with diffuse pneumonia. NOTIFICATION: Critical Value/emergent results were called by telephone at the time of interpretation on 9/6/2021 3:30 PM to Gerard the patient's ICU nurse who verbally acknowledged these results. Signer Name: LISE Lopes MD  Signed: 9/6/2021 3:31 PM  Workstation Name: [facility]  [facility]    XR Chest 1 View    Result Date:  9/5/2021  EXAM:   XR Chest, 1 View  EXAM DATE:   9/5/2021 2:35 PM  CLINICAL HISTORY:   central line; U07.1-COVID-19; J12.82-Pneumonia due to Coronavirus disease 2019; R09.02-Hypoxemia; J96.01-Acute respiratory failure with hypoxia  TECHNIQUE:   Frontal view of the chest.  COMPARISON:   09/05/2021  FINDINGS:   LUNGS:  Patchy bilateral airspace disease again noted.   PLEURAL SPACE:  Unremarkable.  No pneumothorax.   HEART:  Unremarkable.  No cardiomegaly.   MEDIASTINUM:  Unremarkable.   BONES/JOINTS:  Unremarkable.   TUBES, LINES AND DEVICES:  ET tube tip below level clavicles. Right central line with tip in SVC.        Patchy bilateral airspace disease again noted.  This report was finalized on 9/5/2021 3:24 PM by Dr. Leonel Angel MD.      XR Chest 1 View    Result Date: 9/5/2021  EXAM:   XR Chest, 1 View  EXAM DATE:   9/5/2021 1:50 PM  CLINICAL HISTORY:   ETT AND OG TUBE PLACEMENT; U07.1-COVID-19; J12.82-Pneumonia due to Coronavirus disease 2019; R09.02-Hypoxemia  TECHNIQUE:   Frontal view of the chest.  COMPARISON:   09/04/2021  FINDINGS:   LUNGS:  Coarsened interstitial markings throughout the lungs with some improved aeration in comparison with prior study.   PLEURAL SPACE:  Unremarkable.  No pneumothorax.   HEART:  Unremarkable.  No cardiomegaly.   MEDIASTINUM:  Unremarkable.   BONES/JOINTS:  Unremarkable.   TUBES, LINES AND DEVICES:  ET tube tip below level clavicles.        Coarsened interstitial markings throughout the lungs with some improved aeration in comparison with prior study.  This report was finalized on 9/5/2021 1:58 PM by Dr. Leonel Angel MD.      XR Chest 1 View    Result Date: 9/4/2021  CR Chest 1 Vw INDICATION: Hypoxia, COVID COMPARISON:  Chest x-ray from 9/2/21 FINDINGS: Single portable AP view(s) of the chest. There appears to be diffusely slightly increased bilateral pulmonary opacities, there may be some fluid in the minor fissure. The opacities from known bilateral pneumonia are not well  identified. There is no definite evidence of pneumothorax. The heart is again noted to be diffusely enlarged.     There appears to be slight interval increase in bilateral pulmonary opacities concerning for superimposed edema or ARDS on known severe pneumonia. Favor edema given the fluid in the minor fissure. Consider correlation for potential fluid overload. Signer Name: Rylee Rocha MD  Signed: 9/4/2021 6:45 PM  Workstation Name: [id number]  [facility]    CT Chest Pulmonary Embolism    Result Date: 9/4/2021  EXAM:   CT Angiography Chest With Intravenous Contrast  EXAM DATE:   9/4/2021 2:20 PM  CLINICAL HISTORY:   PE suspected, low/intermediate prob, positive D-dimer; U07.1-COVID-19; J12.82-Pneumonia due to Coronavirus disease 2019; R09.02-Hypoxemia  TECHNIQUE:   Axial computed tomographic angiography images of the chest with intravenous contrast.  This CT exam was performed using one or more of the following dose reduction techniques:  automated exposure control, adjustment of the mA and/or kV according to patient size, and/or use of iterative reconstruction technique.   MIP reconstructed images were created and reviewed.  COMPARISON:   09/03/2021  FINDINGS:   Pulmonary arteries:  Unremarkable.  No pulmonary embolism.   Aorta:  No acute findings.  No thoracic aortic aneurysm.   Lungs:  Worsened bilateral airspace disease.  No mass.   Pleural space:  Unremarkable.  No significant effusion.  No pneumothorax.   Heart:  Unremarkable.  No cardiomegaly.  No significant pericardial effusion.  No evidence of RV dysfunction.   Bones/joints:  No acute fracture.  No dislocation.   Soft tissues:  Unremarkable.   Lymph nodes:  Unremarkable.  No enlarged lymph nodes.        Worsened bilateral airspace disease.  This report was finalized on 9/4/2021 2:35 PM by Dr. Leonel Angel MD.      US Venous Doppler Lower Extremity Bilateral (duplex)    Result Date: 9/3/2021  EXAM:   US Duplex Bilateral Lower  Extremities Veins  CLINICAL HISTORY:   positive d-dimer, rule out DVT; U07.1-COVID-19; J12.82-Pneumonia due to Coronavirus disease 2019; R09.02-Hypoxemia  TECHNIQUE:   Real-time duplex ultrasound scan of the bilateral lower extremity veins integrating B-mode two-dimensional vascular structure, Doppler spectral analysis, color flow Doppler imaging and compression.  COMPARISON:   No relevant prior studies available.  FINDINGS:   RIGHT DEEP VEINS:  Unremarkable.  No DVT in the right common femoral, femoral, proximal deep femoral or popliteal veins.  The veins demonstrate normal color flow, are normally compressible, with normal phasic flow and/or augmentation response.    LEFT DEEP VEINS:  Unremarkable.  No DVT in the left common femoral, femoral, proximal deep femoral or popliteal veins.  The veins demonstrate normal color flow, are normally compressible, with normal phasic flow and/or augmentation response.    SOFT TISSUES:  No acute findings.  No popliteal cyst.        Normal bilateral lower extremity duplex venous ultrasound.  This report was finalized on 9/3/2021 5:18 PM by Dr. Leonel Angel MD.        Scheduled Meds:artificial tears, , Both Eyes, Q4H  chlorhexidine, 15 mL, Mouth/Throat, Q12H  dexamethasone, 6 mg, Intravenous, BID  enoxaparin, 1 mg/kg, Subcutaneous, Q12H  famotidine, 20 mg, Intravenous, BID  insulin aspart, 0-14 Units, Subcutaneous, TID AC  insulin detemir, 25 Units, Subcutaneous, Nightly  meropenem, 1 g, Intravenous, Q8H  sertraline, 100 mg, Oral, Daily  sodium chloride, 10 mL, Intravenous, Q12H  sodium chloride, 10 mL, Intravenous, Q12H  sodium chloride, 10 mL, Intravenous, Q12H  sodium chloride, 10 mL, Intravenous, Q12H  sodium chloride, 10 mL, Intravenous, Q12H  tamsulosin, 0.4 mg, Oral, Daily  vancomycin, 1,500 mg, Intravenous, Q24H      Continuous Infusions:dexmedetomidine, 0.2-1.5 mcg/kg/hr, Last Rate: Stopped (09/09/21 1414)  fentaNYL Citrate,   fentaNYL Citrate,   fentaNYL Citrate,    norepinephrine, 0.02-0.3 mcg/kg/min, Last Rate: Stopped (09/10/21 0547)  Pharmacy Consult - Pharmacy to dose,   Pharmacy Consult - Pharmacy to dose,   Pharmacy to Dose enoxaparin (LOVENOX),   Pharmacy to dose vancomycin,   phenylephrine, 0.5-3 mcg/kg/min, Last Rate: 0.9 mcg/kg/min (09/11/21 1247)  propofol, 5-50 mcg/kg/min, Last Rate: 45 mcg/kg/min (09/12/21 0156)  vasopressin (PITRESSIN) 20 units in 100 mL infusion, 0.03 Units/min, Last Rate: Stopped (09/11/21 0321)  vecuronium (NORCURON) infusion, 0.6-1.2 mcg/kg/min, Last Rate: Stopped (09/11/21 0321)      PRN Meds:.•  acetaminophen  •  artificial tears  •  benzonatate  •  dextrose  •  dextrose  •  dextrose  •  glucagon (human recombinant)  •  Pharmacy Consult - Pharmacy to dose  •  Pharmacy Consult - Pharmacy to dose  •  Pharmacy to Dose enoxaparin (LOVENOX)  •  Pharmacy to dose vancomycin  •  sodium chloride  •  sodium chloride  •  sodium chloride  •  sodium chloride  •  sodium chloride  •  vecuronium    Results for orders placed during the hospital encounter of 09/02/21    Adult Transthoracic Echo Complete W/ Cont if Necessary Per Protocol    Interpretation Summary  · Estimated left ventricular EF = 50% Left ventricular ejection fraction appears to be 46 - 50%. Left ventricular systolic function is normal.  · Left ventricular diastolic function was normal.  · The right ventricular cavity is mild to moderately dilated.  · The right atrial cavity is mild to moderately dilated.  · Estimated right ventricular systolic pressure from tricuspid regurgitation is moderately elevated (45-55 mmHg).  · Moderate to severe pulmonary hypertension is present.  · No pericardial effusion  · No prev echo        Patient Active Problem List   Diagnosis   • Pneumonia due to COVID-19 virus   • Acute respiratory failure with hypoxia (CMS/HCC)       A/P:  1. Acute hypoxemic and hypercapnic respiratory failure --ventilator settings graphics reviewed.  ABG from today morning was pH  7.44 PCO2 of 43 PO2 of 75 and bicarb of 30.  Ventilator settings were adjusted rate was changed to 26 and FiO2 to 65%.  PEEP was kept at 12 so that patient does not have valvular disease recruitment.      2. COVID19 pneumonia --continue isolation.  Continue anticoagulation.    3. DM --blood sugar target 140-180.    4. Pulmonary hypertension --likely due to ongoing hypoxia and heart failure patient has ejection fraction of 50%.  Right ventricular cavity is moderately dilated.  Latest echo reviewed.    5. Hypernatremia --continue free water flushes.  We will continue to monitor.  6. Hyperkalemia -- w received Lasix.  We will continue to monitor.  If does not get better we will change the tube feeds to Nepro.    Case d/w nurse and team   This service is provided via tele medicine   I am in [place] [facility] and patient is in [facility]  Cc time 50 mins which doesnot overlap with any other physician

## 2021-09-12 NOTE — PLAN OF CARE
Problem: Adult Inpatient Plan of Care  Goal: Absence of Hospital-Acquired Illness or Injury  Intervention: Identify and Manage Fall Risk  Recent Flowsheet Documentation  Taken 9/11/2021 2000 by Nayla Everett RN  Safety Promotion/Fall Prevention: safety round/check completed  Intervention: Prevent Skin Injury  Recent Flowsheet Documentation  Taken 9/11/2021 2000 by Nayla Everett RN  Body Position: side-lying, right  Skin Protection:   adhesive use limited   incontinence pads utilized   tubing/devices free from skin contact   skin-to-device areas padded  Goal: Optimal Comfort and Wellbeing  Intervention: Provide Person-Centered Care  Recent Flowsheet Documentation  Taken 9/11/2021 2000 by Nayla Everett RN  Trust Relationship/Rapport: care explained     Problem: Fall Injury Risk  Goal: Absence of Fall and Fall-Related Injury  Intervention: Identify and Manage Contributors to Fall Injury Risk  Recent Flowsheet Documentation  Taken 9/11/2021 2000 by Nayla Everett RN  Medication Review/Management: medications reviewed  Intervention: Promote Injury-Free Environment  Recent Flowsheet Documentation  Taken 9/11/2021 2000 by Nayla Everett RN  Safety Promotion/Fall Prevention: safety round/check completed     Problem: Skin Injury Risk Increased  Goal: Skin Health and Integrity  Intervention: Optimize Skin Protection  Recent Flowsheet Documentation  Taken 9/11/2021 2000 by Nayla Everett RN  Pressure Reduction Techniques: weight shift assistance provided  Head of Bed (HOB): HOB at 30-45 degrees  Pressure Reduction Devices: pressure-redistributing mattress utilized  Skin Protection:   adhesive use limited   incontinence pads utilized   tubing/devices free from skin contact   skin-to-device areas padded   Goal Outcome Evaluation:

## 2021-09-12 NOTE — NURSING NOTE
Wound consult for DTI to prepuce of the Penis. Area presents with a a deep linear purple discoloration from the conrad catheter. PT is Covid positive, has a Hang score of 9, and albumin of 2.14. PT is at high risk for additional skin break down. New order for Venelex Q shift and PRN

## 2021-09-12 NOTE — PROGRESS NOTES
Pulmonary and critical care      Mar/chart reviewed  Patient seen and examined through telemedicine.    All the labs medications and the notes and vitals reviewed.  Review of system cannot be obtained as patient is intubated and sedated.  Overall prognosis poor.  Still requiring high PEEP and high FiO2.  Chest x-ray from today morning reviewed.  ET tube is 7 cm above the beltran.  Will advance ET tube.  Discussed with nurse and orders entered        Vital Sign Min/Max for last 24 hours  Temp  Min: 98.3 °F (36.8 °C)  Max: 99 °F (37.2 °C)   BP  Min: 90/49  Max: 146/61   Pulse  Min: 53  Max: 74   Resp  Min: 26  Max: 28   SpO2  Min: 86 %  Max: 96 %   No data recorded   No data recorded   Patient was seen and examined via telemedicine.     Patient is intubated and sedated.     Head is normocephalic     Respiratory-no acute respiratory distress noted.  Intubated and on mechanical ventilator.     Cardiology-sinus rhythm noted on telemetry     Neuro-sedated        CT Chest Pulmonary Embolism        Result Date: 9/3/2021  EXAM:   US Duplex Bilateral Lower Extremities Veins  CLINICAL HISTORY:   positive d-dimer, rule out DVT; U07.1-COVID-19; J12.82-Pneumonia due to Coronavirus disease 2019; R09.02-Hypoxemia  TECHNIQUE:   Real-time duplex ultrasound scan of the bilateral lower extremity veins integrating B-mode two-dimensional vascular structure, Doppler spectral analysis, color flow Doppler imaging and compression.  COMPARISON:   No relevant prior studies available.  FINDINGS:   RIGHT DEEP VEINS:  Unremarkable.  No DVT in the right common femoral, femoral, proximal deep femoral or popliteal veins.  The veins demonstrate normal color flow, are normally compressible, with normal phasic flow and/or augmentation response.    LEFT DEEP VEINS:  Unremarkable.  No DVT in the left common femoral, femoral, proximal deep femoral or popliteal veins.  The veins demonstrate normal color flow, are normally compressible, with normal phasic  flow and/or augmentation response.    SOFT TISSUES:  No acute findings.  No popliteal cyst.        Normal bilateral lower extremity duplex venous ultrasound.  This report was finalized on 9/3/2021 5:18 PM by Dr. Leonel Angel MD.        Scheduled Meds:artificial tears, , Both Eyes, Q4H  chlorhexidine, 15 mL, Mouth/Throat, Q12H  dexamethasone, 6 mg, Intravenous, BID  enoxaparin, 1 mg/kg, Subcutaneous, Q12H  famotidine, 20 mg, Intravenous, BID  insulin aspart, 0-14 Units, Subcutaneous, TID AC  insulin detemir, 25 Units, Subcutaneous, Nightly  meropenem, 1 g, Intravenous, Q8H  sertraline, 100 mg, Oral, Daily  sodium chloride, 10 mL, Intravenous, Q12H  sodium chloride, 10 mL, Intravenous, Q12H  sodium chloride, 10 mL, Intravenous, Q12H  sodium chloride, 10 mL, Intravenous, Q12H  sodium chloride, 10 mL, Intravenous, Q12H  tamsulosin, 0.4 mg, Oral, Daily  vancomycin, 1,500 mg, Intravenous, Q24H      Continuous Infusions:dexmedetomidine, 0.2-1.5 mcg/kg/hr, Last Rate: Stopped (09/09/21 1414)  fentaNYL Citrate,   fentaNYL Citrate,   fentaNYL Citrate,   norepinephrine, 0.02-0.3 mcg/kg/min, Last Rate: Stopped (09/10/21 0547)  Pharmacy Consult - Pharmacy to dose,   Pharmacy Consult - Pharmacy to dose,   Pharmacy to Dose enoxaparin (LOVENOX),   Pharmacy to dose vancomycin,   phenylephrine, 0.5-3 mcg/kg/min, Last Rate: 0.9 mcg/kg/min (09/11/21 1247)  propofol, 5-50 mcg/kg/min, Last Rate: 45 mcg/kg/min (09/12/21 0156)  vasopressin (PITRESSIN) 20 units in 100 mL infusion, 0.03 Units/min, Last Rate: Stopped (09/11/21 0321)  vecuronium (NORCURON) infusion, 0.6-1.2 mcg/kg/min, Last Rate: Stopped (09/11/21 0321)      PRN Meds:.•  acetaminophen  •  artificial tears  •  benzonatate  •  dextrose  •  dextrose  •  dextrose  •  glucagon (human recombinant)  •  Pharmacy Consult - Pharmacy to dose  •  Pharmacy Consult - Pharmacy to dose  •  Pharmacy to Dose enoxaparin (LOVENOX)  •  Pharmacy to dose vancomycin  •  sodium chloride  •  sodium  chloride  •  sodium chloride  •  sodium chloride  •  sodium chloride  •  vecuronium    Results for orders placed during the hospital encounter of 09/02/21    Adult Transthoracic Echo Complete W/ Cont if Necessary Per Protocol    Interpretation Summary  · Estimated left ventricular EF = 50% Left ventricular ejection fraction appears to be 46 - 50%. Left ventricular systolic function is normal.  · Left ventricular diastolic function was normal.  · The right ventricular cavity is mild to moderately dilated.  · The right atrial cavity is mild to moderately dilated.  · Estimated right ventricular systolic pressure from tricuspid regurgitation is moderately elevated (45-55 mmHg).  · Moderate to severe pulmonary hypertension is present.  · No pericardial effusion  · No prev echo    WBC   Date Value Ref Range Status   09/11/2021 13.27 (H) 3.40 - 10.80 10*3/mm3 Final     RBC   Date Value Ref Range Status   09/11/2021 4.00 (L) 4.14 - 5.80 10*6/mm3 Final     Hemoglobin   Date Value Ref Range Status   09/11/2021 11.0 (L) 13.0 - 17.7 g/dL Final     Hematocrit   Date Value Ref Range Status   09/11/2021 36.7 (L) 37.5 - 51.0 % Final     MCV   Date Value Ref Range Status   09/11/2021 91.8 79.0 - 97.0 fL Final     MCH   Date Value Ref Range Status   09/11/2021 27.5 26.6 - 33.0 pg Final     MCHC   Date Value Ref Range Status   09/11/2021 30.0 (L) 31.5 - 35.7 g/dL Final     RDW   Date Value Ref Range Status   09/11/2021 13.4 12.3 - 15.4 % Final     RDW-SD   Date Value Ref Range Status   09/11/2021 45.8 37.0 - 54.0 fl Final     MPV   Date Value Ref Range Status   09/11/2021 12.4 (H) 6.0 - 12.0 fL Final     Platelets   Date Value Ref Range Status   09/11/2021 154 140 - 450 10*3/mm3 Final     Neutrophils Absolute   Date Value Ref Range Status   09/11/2021 12.08 (H) 1.70 - 7.00 10*3/mm3 Final     Eosinophils Absolute   Date Value Ref Range Status   09/10/2021 0.16 0.00 - 0.40 10*3/mm3 Final     Basic Metabolic Panel    Sodium Sodium   Date  Value Ref Range Status   09/11/2021 148 (H) 136 - 145 mmol/L Final   09/10/2021 150 (H) 136 - 145 mmol/L Final      Potassium Potassium   Date Value Ref Range Status   09/11/2021 4.5 3.5 - 5.2 mmol/L Final   09/10/2021 5.5 (H) 3.5 - 5.2 mmol/L Final     Comment:     Slight hemolysis detected by analyzer. Results may be affected.      Chloride Chloride   Date Value Ref Range Status   09/11/2021 113 (H) 98 - 107 mmol/L Final   09/10/2021 115 (H) 98 - 107 mmol/L Final      Bicarbonate No results found for: PLASMABICARB   BUN BUN   Date Value Ref Range Status   09/11/2021 70 (H) 8 - 23 mg/dL Final   09/10/2021 69 (H) 8 - 23 mg/dL Final      Creatinine Creatinine   Date Value Ref Range Status   09/12/2021 1.09 0.76 - 1.27 mg/dL Final   09/11/2021 0.98 0.76 - 1.27 mg/dL Final   09/10/2021 1.19 0.76 - 1.27 mg/dL Final      Calcium Calcium   Date Value Ref Range Status   09/11/2021 8.9 8.6 - 10.5 mg/dL Final   09/10/2021 8.8 8.6 - 10.5 mg/dL Final      Glucose      No components found for: GLUCOSE.*     Site   Date Value Ref Range Status   09/12/2021 Right Radial  Final     Aj's Test   Date Value Ref Range Status   09/12/2021 N/A  Final     pH, Arterial   Date Value Ref Range Status   09/12/2021 7.455 (H) 7.350 - 7.450 pH units Final     Comment:     83 Value above reference range     pCO2, Arterial   Date Value Ref Range Status   09/12/2021 45.7 (H) 35.0 - 45.0 mm Hg Final     pO2, Arterial   Date Value Ref Range Status   09/12/2021 52.4 (C) 83.0 - 108.0 mm Hg Final     Comment:     85 Value below critical limit     HCO3, Arterial   Date Value Ref Range Status   09/12/2021 32.1 (H) 20.0 - 26.0 mmol/L Final     Comment:     83 Value above reference range     Base Excess, Arterial   Date Value Ref Range Status   09/12/2021 7.3 (H) 0.0 - 2.0 mmol/L Final     O2 Saturation, Arterial   Date Value Ref Range Status   09/12/2021 88.1 (L) 94.0 - 99.0 % Final     Comment:     84 Value below reference range     Hemoglobin, Blood  Gas   Date Value Ref Range Status   09/12/2021 11.4 (L) 14 - 18 g/dL Final     Comment:     84 Value below reference range     Hematocrit, Blood Gas   Date Value Ref Range Status   09/12/2021 34.9 (L) 38.0 - 51.0 % Final     Comment:     84 Value below reference range     Oxyhemoglobin   Date Value Ref Range Status   09/12/2021 87.0 (L) 94 - 99 % Final     Comment:     84 Value below reference range     Methemoglobin   Date Value Ref Range Status   09/12/2021 0.10 0.00 - 3.00 % Final     Carboxyhemoglobin   Date Value Ref Range Status   09/12/2021 1.2 0 - 5 % Final     CO2 Content   Date Value Ref Range Status   09/12/2021 33.5 (H) 22 - 33 mmol/L Final     Barometric Pressure for Blood Gas   Date Value Ref Range Status   09/12/2021 732 mmHg Final     Modality   Date Value Ref Range Status   09/12/2021 Room Air  Final     FIO2   Date Value Ref Range Status   09/12/2021 65 % Final         Patient Active Problem List   Diagnosis   • Pneumonia due to COVID-19 virus   • Acute respiratory failure with hypoxia (CMS/HCC)     Patient Active Problem List   Diagnosis   • Pneumonia due to COVID-19 virus   • Acute respiratory failure with hypoxia (CMS/HCC)       A/P:  1. Acute hypoxemic and hypercapnic respiratory failure --ABG showed pH of 7.45 with PCO2 45 PO2 was just 52.  PEEP 12.  Ventilator settings were adjusted.  Tidal volume changed to 450.  ET tube was 7 cm from beltran.  ET tube advanced.  Will repeat chest x-ray.      2. COVID19 pneumonia --continue isolation.  Continue steroids    3. DM --blood sugar target 140-180.    4. Pulmonary hypertension --echo reviewed.  Likely due to underlying heart disease ejection fraction of 50%, lung disease and ongoing hypoxia patient has pulmonary hypertension.  Continue current treatment.        5. Hypernatremia --continue free water flushes.  We will continue to monitor.  Sodium levels better.    6. Hyperkalemia -- w received Lasix.  We will continue to monitor.  Potassium levels  better.  Creatinine borderline will not give any Lasix today.    Continue anticoagulation and GI prophylaxis as per primary.  Target blood sugar 1 40-1 80    If creatinine improves and sodium levels get better recommend diuresis to improve lung compliance and diffusion capacity..    Overall prognosis poor.  Recommend DNR. Case d/w nurse and team   This service is provided via tele medicine   I am in [place] and patient is in [facility]  Cc time 50 mins which doesnot overlap with any other physician

## 2021-09-12 NOTE — PROGRESS NOTES
[facility] HOSPITALIST PROGRESS NOTE     Patient Identification:  Name:  Valdemar Solitario  Age:  76 y.o.  Sex:  male  :  1945  MRN:  [id number]  Visit Number:  [id number]  ROOM: [address]     Primary Care Provider:  Laurence Geiger APRN    Length of stay in inpatient status:  10    Subjective     Chief Compliant:    Chief Complaint   Patient presents with   • Exposure To Known Illness   • Altered Mental Status   • Weakness - Generalized       History of Presenting Illness:    Patient with no acute events overnight. Patient remains sedated and the ventilator. Patient remains on 0.09 phenylephrine but was on hypertensive side on my exam. Patient was around net even with the 80 IV lasix yesterday.     ROS:  Otherwise 10 point ROS negative other than documented above in HPI.     Objective     Current Hospital Meds:artificial tears, , Both Eyes, Q4H  bumetanide, 2 mg, Intravenous, Once  chlorhexidine, 15 mL, Mouth/Throat, Q12H  dexamethasone, 6 mg, Intravenous, BID  enoxaparin, 1 mg/kg, Subcutaneous, Q12H  famotidine, 20 mg, Intravenous, BID  insulin aspart, 0-14 Units, Subcutaneous, TID AC  insulin detemir, 25 Units, Subcutaneous, Nightly  meropenem, 1 g, Intravenous, Q8H  sertraline, 100 mg, Oral, Daily  sodium chloride, 10 mL, Intravenous, Q12H  sodium chloride, 10 mL, Intravenous, Q12H  sodium chloride, 10 mL, Intravenous, Q12H  sodium chloride, 10 mL, Intravenous, Q12H  sodium chloride, 10 mL, Intravenous, Q12H  tamsulosin, 0.4 mg, Oral, Daily  vancomycin, 1,500 mg, Intravenous, Q24H    dexmedetomidine, 0.2-1.5 mcg/kg/hr, Last Rate: Stopped (21 1414)  fentaNYL Citrate,   fentaNYL Citrate,   fentaNYL Citrate,   norepinephrine, 0.02-0.3 mcg/kg/min, Last Rate: Stopped (09/10/21 9555)  Pharmacy Consult - Pharmacy to dose,   Pharmacy Consult - Pharmacy to dose,   Pharmacy to Dose enoxaparin (LOVENOX),   Pharmacy to dose vancomycin,   phenylephrine, 0.5-3 mcg/kg/min, Last Rate: 0.899  mcg/kg/min (09/12/21 0545)  propofol, 5-50 mcg/kg/min, Last Rate: 45 mcg/kg/min (09/12/21 0742)  vasopressin (PITRESSIN) 20 units in 100 mL infusion, 0.03 Units/min, Last Rate: Stopped (09/11/21 0321)  vecuronium (NORCURON) infusion, 0.6-1.2 mcg/kg/min, Last Rate: Stopped (09/11/21 0321)        Current Antimicrobial Therapy:  Anti-Infectives (From admission, onward)    Ordered     Dose/Rate Route Frequency Start Stop    09/11/21 1231  vancomycin 1500 mg/500 mL 0.9% NS IVPB ([facility])     Ordering Provider: Nic Webster MD    1,500 mg  over 60 Minutes Intravenous Every 24 Hours 09/11/21 1400 09/18/21 1359    09/10/21 1042  meropenem (MERREM) 1 g in sodium chloride 0.9 % 100 mL IVPB-VTB     Ordering Provider: Nic Webster MD    1 g  over 3 Hours Intravenous Every 8 Hours 09/10/21 1300 09/19/21 1259    09/09/21 0949  vancomycin 1250 mg/250 mL 0.9% NS IVPB ([facility])     Ordering Provider: Brock Metzger MD    1,250 mg  over 60 Minutes Intravenous Once 09/09/21 1100 09/09/21 1201    09/09/21 0942  meropenem (MERREM) 1 g in sodium chloride 0.9 % 100 mL IVPB-VTB     Ordering Provider: Brock Metzger MD    1 g  over 30 Minutes Intravenous Once 09/09/21 1030 09/09/21 1131    09/09/21 0918  Pharmacy to dose vancomycin     Ordering Provider: Jeny Lopes, NELSON     Does not apply Continuous PRN 09/09/21 0917 09/19/21 0916    09/03/21 0615  remdesivir 100 mg in sodium chloride 0.9 % 270 mL IVPB (powder vial)     Ordering Provider: Mg Augustine MD    100 mg  over 60 Minutes Intravenous Every 24 Hours 09/04/21 0900 09/07/21 1055    09/03/21 0615  remdesivir 200 mg in sodium chloride 0.9 % 290 mL IVPB (powder vial)     Ordering Provider: Mg Augustine MD    200 mg  over 60 Minutes Intravenous Every 24 Hours 09/03/21 0900 09/03/21 0940        Current Diuretic Therapy:  Diuretics (From admission, onward)    Ordered     Dose/Rate Route Frequency Start Stop    09/12/21 0857  bumetanide (BUMEX) injection  2 mg     Ordering Provider: Nic Webster MD    2 mg Intravenous Once 09/12/21 0945      09/11/21 1026  furosemide (LASIX) 80 mg in sodium chloride 0.9 % 50 mL IVPB     Ordering Provider: Nic Webster MD    80 mg  over 4 Hours Intravenous Once 09/11/21 1200 09/11/21 1646    09/10/21 0822  furosemide (LASIX) injection 40 mg     Ordering Provider: Manuelito Adame MD    40 mg Intravenous Once 09/10/21 0915 09/10/21 0839    09/08/21 0736  furosemide (LASIX) injection 40 mg     Ordering Provider: Manuelito Adame MD    40 mg Intravenous Once 09/08/21 0800 09/08/21 0926    09/05/21 0732  furosemide (LASIX) injection 80 mg     Ordering Provider: Nic Webster MD    80 mg Intravenous Once 09/05/21 0830 09/05/21 0842        ----------------------------------------------------------------------------------------------------------------------  Vital Signs:  Temp:  [98.5 °F (36.9 °C)-99 °F (37.2 °C)] 98.5 °F (36.9 °C)  Heart Rate:  [49-72] 52  Resp:  [26] 26  BP: ()/(45-68) 141/68  FiO2 (%):  [65 %-75 %] 75 %  SpO2:  [86 %-96 %] 94 %  on   ;   Device (Oxygen Therapy): ventilator  Body mass index is 24.4 kg/m².    Wt Readings from Last 3 Encounters:   09/12/21 68.5 kg (151 lb 2 oz)   06/14/17 73.9 kg (163 lb)   03/23/17 76.7 kg (169 lb)     Intake & Output (last 3 days)       09/09 0701 - 09/10 0700 09/10 0701 - 09/11 0700 09/11 0701 - 09/12 0700 09/12 0701 - 09/13 0700    I.V. (mL/kg) 1994.9 (29.1) 1710.5 (25) 950.3 (13.9)     Other 161 1155 1500     NG/GT 81 356 620     IV Piggyback 550 350 850     Total Intake(mL/kg) 2786.9 (40.7) 3571.5 (52.1) 3920.3 (57.2)     Urine (mL/kg/hr) 1900 (1.2) 3200 (1.9) 3250 (2)     Stool        Total Output 1900 3200 3250     Net +886.9 +371.5 +670.3                 NPO Diet  ----------------------------------------------------------------------------------------------------------------------  Physical exam:  GENERAL:  Patient intubated and sedated.   SKIN:  Warm, dry without  rashes, purpura or petechiae.  EYES:  Pupils equal, round and reactive to light.  EOMs intact.  Conjunctivae normal.  HEAD:  Normocephalic. Atraumatic.   EARS/NOSE/MOUTH/THROAT:  Hearing intact. Septum midline.  No excoriations or nasal discharge. No stomatitis or ulcers.  Lips normal. Oropharynx without lesions or exudates.  NECK:  Supple with good range of motion; no thyromegaly or masses  LYMPHATICS:  No cervical, supraclavicular, axillary adenopathy.  RESP:  Lungs clear to auscultation. Good airflow. Intubated receiving mechanical ventilation.   CARDIAC:  Regular rate and rhythm without murmurs, rubs or gallops. Normal S1,S2. No edema  GI:  Soft, nontender, no hepatosplenomegaly, normal bowel sounds  MSK:  No clubbing or cyanosis, No joint swelling noted in hands  NEUROLOGICAL:  No focal deficit. Pupils equal and reactive.   ----------------------------------------------------------------------------------------------------------------------  Tele:    ----------------------------------------------------------------------------------------------------------------------  Results from last 7 days   Lab Units 09/12/21  0308 09/11/21  0417 09/11/21  0302 09/10/21  0429 09/09/21  0055 09/09/21  0055 09/08/21  0234 09/07/21  0320   CRP mg/dL 7.14*  --  14.61* 21.68*   < > 13.17*  --  2.54*   LACTATE mmol/L  --   --  1.9  --   --  1.6  --  1.3   WBC 10*3/mm3  --   --  13.27* 16.06*  --  21.69*   < > 15.63*   HEMOGLOBIN g/dL  --   --  11.0* 12.0*  --  13.6   < > 13.8   HEMATOCRIT %  --   --  36.7* 40.3  --  44.6   < > 44.1   MCV fL  --   --  91.8 94.8  --  92.3   < > 91.1   MCHC g/dL  --   --  30.0* 29.8*  --  30.5*   < > 31.3*   PLATELETS 10*3/mm3  --   --  154 176  --  216   < > 232   INR   --  1.36*  --   --   --  1.29*  --  1.23*    < > = values in this interval not displayed.     Results from last 7 days   Lab Units 09/12/21  0426   PH, ARTERIAL pH units 7.455*   PO2 ART mm Hg 52.4*   PCO2, ARTERIAL mm Hg 45.7*    HCO3 ART mmol/L 32.1*     Results from last 7 days   Lab Units 09/12/21  0308 09/11/21  0302 09/10/21  0429 09/09/21  0055 09/09/21  0055   SODIUM mmol/L  --  148* 150*  --  149*   POTASSIUM mmol/L  --  4.5 5.5*  --  5.1   CHLORIDE mmol/L  --  113* 115*  --  110*   CO2 mmol/L  --  26.0 27.8  --  29.5*   BUN mg/dL  --  70* 69*  --  69*   CREATININE mg/dL 1.09 0.98 1.19   < > 1.31*   EGFR IF NONAFRICN AM mL/min/1.73 66 74 59*   < > 53*   CALCIUM mg/dL  --  8.9 8.8  --  8.8   GLUCOSE mg/dL  --  307* 274*  --  238*   ALBUMIN g/dL 2.14* 1.98* 2.12*   < > 2.56*   BILIRUBIN mg/dL 0.4 0.5 0.5   < > 0.5   ALK PHOS U/L 59 61 66   < > 56   AST (SGOT) U/L 50* 32 44*   < > 41*   ALT (SGPT) U/L 36 28 33   < > 42*    < > = values in this interval not displayed.   Estimated Creatinine Clearance: 55.9 mL/min (by C-G formula based on SCr of 1.09 mg/dL).  No results found for: AMMONIA              Glucose   Date/Time Value Ref Range Status   09/12/2021 0753 285 (H) 70 - 130 mg/dL Final     Comment:     Meter: [id number] : [id number] Belgica Heredia   09/12/2021 0640 292 (H) 70 - 130 mg/dL Final     Comment:     Meter: [id number] : [id number] TOBY DE LA CRUZ   09/11/2021 2220 233 (H) 70 - 130 mg/dL Final     Comment:     Meter: [id number] : [id number] Nayla Karlos   09/11/2021 1740 292 (H) 70 - 130 mg/dL Final     Comment:     Meter: [id number] : [id number] Teresita Floyd   09/11/2021 1011 269 (H) 70 - 130 mg/dL Final     Comment:     Meter: [id number] : [id number] Teresita Everett   09/11/2021 0604 296 (H) 70 - 130 mg/dL Final     Comment:     Meter: [id number] : [id number] Katherine Pandya   09/10/2021 2045 270 (H) 70 - 130 mg/dL Final     Comment:     Meter: [id number] : [id number] Katherine Cougibran   09/10/2021 1711 254 (H) 70 - 130 mg/dL Final     Comment:     Meter: [id number] : [id number] Teresita Everett     No results found for: TSH, FREET4  No results found for: PREGTESTUR, PREGSERUM, HCG,  HCGQUANT  Pain Management Panel    There is no flowsheet data to display.       Brief Urine Lab Results  (Last result in the past 365 days)      Color   Clarity   Blood   Leuk Est   Nitrite   Protein   CREAT   Urine HCG        09/04/21 1137 Yellow Cloudy Trace Negative Negative Trace             No results found for: BLOODCX  No results found for: URINECX  No results found for: WOUNDCX  No results found for: STOOLCX  No results found for: RESPCX  No results found for: AFBCX  Results from last 7 days   Lab Units 09/12/21  0308 09/11/21  0302 09/10/21  0429 09/09/21  0914 09/09/21  0055 09/08/21  0233 09/07/21  0320 09/06/21  0433   PROCALCITONIN ng/mL  --   --   --  0.45*  --  0.22 0.11  --    LACTATE mmol/L  --  1.9  --   --  1.6  --  1.3  --    CRP mg/dL 7.14* 14.61* 21.68*  --  13.17*  --  2.54* 2.93*       I have personally looked at the labs and they are summarized above.  ----------------------------------------------------------------------------------------------------------------------  Detailed radiology reports for the last 24 hours:    Imaging Results (Last 24 Hours)     Procedure Component Value Units Date/Time    XR Chest 1 View [[id number]] Resulted: 09/12/21 0203     Updated: 09/12/21 0203        Assessment & Plan    Mr. Solitario is our 76M PMH diabetes meliltus, depression, GERD, hypertension, dyslipidemia, presented to his place of work in a state of confusion, brought to ER, found to be Covid +. Hospitalization complicated by worsening hypoxia.      #Septic shock, Acute Metabolic Encephalopathy, Acute Hypoxic Respiratory Failure requiring mechanical ventilation 2/2 viral pneumonia treating for Covid 19 c/b mild transaminitis  - Patient presented w/ increased shortness of breath, confusion, tachypnea, covid + on 9/2.  - Admission labs showed WBC count 5K, CRP 7.8, lactate 2.3, d-dimer 0.95, procal 0.16, MRSA -, covid +, Bcx's NGTD  - B/l Venous Duplex negative for DVT  - VQ scan showed indeterminate  study, noted abnormal perfusion, multifocal defects could be related to multifocal airspace disease but also PE.  - CT Head showed no acute intracranial abnormality  - CT Chest showed patchy b/l airspace disease c/w covid 19  - ID consulted; Following  - CTPE on 9/4 did not reveal PE but did show worsening bilateral airspace disease that is likely worsening COVID pneumonia. Given fluid in fissure on plain film could not rule out component of edema  - Continue 6mg IV Dexamethasone x 10 days  - Finished course of remdesivir.   - Baricitinib per ID recs. Has been d/c by ID in interim due to intubation.   - Continue Level I AC for Covid 19 thromboembolism Ppx  - Continue APAP PRN for fevers  - Continue Albuterol Inhaler  - Patient intubated for worsening hypoxia on 9/5.   - Trend Covid 19 progression labs w/ CBC, CMP, CK, CRP, Ferritin daily and LDH, D-dimer, Fibrinogen q48hrs.  - Enhanced droplet/contact isolation precautions  - P/F ratio post intubation 80 likely representing severe ARDS. Patient paralyzed and prone positioned for first 2 days. Oxygenation improved and patient left supine for 24 hours but last two days has become more hypoxic while supine requiring prone positioning.   - ABG this AM 7.455/46/52 on 65% FiO2, PEEP of 12, , and RR 22. Pplateau pressure around 24. Patient saturating well on 75% FiO2 on my evaluation.   - Suspect pressor requirement a combination of sedation and sepsis. Currently much improved only on 0.9 of phenylephrine and it appeared it could be weaned as blood pressure 140/90.   - Pulmonology consulted. Appreciate recs.   - ID has started vancomycin and meropenem emperically. I have ordered a sputum culture from ETT.   -Patient net even yesterday with lasix gtt yesterday. Giving trial of IV bumex as below.      #New onset atrial flutter   - Levophed transitioned to Phenylphrine   - Rate controlled.   - Have transitioned to Tlov     #HTN/Dyslipidemia  #Elevated Troponin, NSTEMI  Type II suspected  #Cardiomyopathy - New  #Pulm HTN - New  - Labs showed trops 0.031->0.016, proBNP 370   - EKG showed NSR, RBBB, no significant ST changes  - Echo showed LVEF 46-50%, mild-mod dilated RV, mild-mod dilated RA, mod-severe Pulm HTN, RVSP 45-55mmHg  - Consulted Cardiology; Recs pending  - Holding home ARB due to shock  - Holding home statin while on Remdesivir   - Continue to monitor on tele, strict I/O's, daily weights, trend HR and BP  - CVP was 20-30 yesterday. Did not respond to 80 IV lasix gtt yesterday, will give 2 mg IV bumex and monitor.      #Hypernatremia  - Corrected sodium improved at 150. Repeat pending.   - Continue free water at 75 cc/hr.      #Nonoliguric MURIEL - Improved   - B/l Cr 0.9-1.0, was up to 2.1 on admission; Now 0.98  - S/p mIVFs, Trend Cr and UOP, avoid nephrotoxins, NSAIDs, dehydration and contrast as able.     #NIDDM Type II, controlled, unknown complications  - Hgb A1c = 6.3%  - Holding home oral agents, continue FSBG and SSI  - Uncontrolled. Pulmonary has increased basal, will also increase SSI.      #Anxiety/Depression  - Continue home regimen     #GERD  - Continue home PPI      #BPH  - Continue home Flomax     F: TFs  A: Fentanyl   S: Propofol   T: Lovenox  H: HOB elevated   U: Famotidine   G: PRN  S: Not ready   B: PRN  I: ETT 9/5, RIJ 9/5  D: Vanc, meropenem      Code status: DNR/DNI.      Dispo: Pending workup and clinical improvement     *This patient is considered high risk due to acute metabolic encephalopathy, acute respiratory failure, covid 19, monitoring for drug toxicities on Remdesivir, MURIEL, new cardiomyopathy and pulm HTN. Patient now critically ill requiring mechanical ventilation. 40 minutes of critical care time spent on patient, with greater than 50% of this time bedside or discussing care with hospital staff.      VTE Prophylaxis:   Mechanical Order History:     None      Pharmalogical Order History:      Ordered     Dose Route Frequency Stop    09/09/21  0813  enoxaparin (LOVENOX) syringe 70 mg      1 mg/kg SC Every 12 Hours --    09/09/21 0730  Pharmacy to Dose enoxaparin (LOVENOX)      -- XX Continuous PRN --    09/07/21 0859  enoxaparin (LOVENOX) syringe 40 mg  Status:  Discontinued      40 mg SC Every 24 Hours 09/09/21 0730    09/03/21 0739  enoxaparin (LOVENOX) syringe 80 mg  Status:  Discontinued      1 mg/kg SC Every 12 Hours 09/07/21 0859    09/03/21 0724  Pharmacy to Dose enoxaparin (LOVENOX)  Status:  Discontinued      -- XX Continuous PRN 09/03/21 0740    09/02/21 1237  enoxaparin (LOVENOX) syringe 40 mg  Status:  Discontinued      40 mg SC Every 24 Hours 09/02/21 1247    09/02/21 1247  enoxaparin (LOVENOX) syringe 40 mg  Status:  Discontinued      0.5 mg/kg SC Every 24 Hours 09/03/21 0549                  Nic Webster MD  [facility]  09/12/21  09:19 EDT

## 2021-09-12 NOTE — PROGRESS NOTES
PROGRESS NOTE         Patient Identification:  Name:  Valdemar Solitario  Age:  76 y.o.  Sex:  male  :  1945  MRN:  [id number]  Visit Number:  [id number]  Primary Care Provider:  Laurence Geiger APRN         LOS: 10 days       ----------------------------------------------------------------------------------------------------------------------  Subjective       Chief Complaints:    Exposure To Known Illness, Altered Mental Status, and Weakness - Generalized        Interval History:      The patient remains intubated and sedated at 75% FiO2, which is overall fairly stable.  Afebrile.  CRP is improved significantly at 7.14.  WBC is fairly stable at 14.91.  Chest x-ray on 2021 shows little overall change with bilateral airspace disease.    ----------------------------------------------------------------------------------------------------------------------      Objective       Current [facility] Meds:  artificial tears, , Both Eyes, Q4H  chlorhexidine, 15 mL, Mouth/Throat, Q12H  dexamethasone, 6 mg, Intravenous, BID  enoxaparin, 1 mg/kg, Subcutaneous, Q12H  famotidine, 20 mg, Intravenous, BID  insulin aspart, 0-14 Units, Subcutaneous, TID AC  insulin detemir, 25 Units, Subcutaneous, Nightly  meropenem, 1 g, Intravenous, Q8H  sertraline, 100 mg, Oral, Daily  sodium chloride, 10 mL, Intravenous, Q12H  sodium chloride, 10 mL, Intravenous, Q12H  sodium chloride, 10 mL, Intravenous, Q12H  sodium chloride, 10 mL, Intravenous, Q12H  sodium chloride, 10 mL, Intravenous, Q12H  tamsulosin, 0.4 mg, Oral, Daily  vancomycin, 1,500 mg, Intravenous, Q24H      dexmedetomidine, 0.2-1.5 mcg/kg/hr, Last Rate: Stopped (21 1414)  fentaNYL Citrate,   fentaNYL Citrate,   fentaNYL Citrate,   norepinephrine, 0.02-0.3 mcg/kg/min, Last Rate: Stopped (09/10/21 9728)  Pharmacy Consult - Pharmacy to dose,   Pharmacy Consult - Pharmacy to dose,   Pharmacy to Dose enoxaparin (LOVENOX),   Pharmacy to dose vancomycin,    phenylephrine, 0.5-3 mcg/kg/min, Last Rate: 0.899 mcg/kg/min (09/12/21 0545)  propofol, 5-50 mcg/kg/min, Last Rate: 45 mcg/kg/min (09/12/21 1257)  vasopressin (PITRESSIN) 20 units in 100 mL infusion, 0.03 Units/min, Last Rate: Stopped (09/11/21 0321)  vecuronium (NORCURON) infusion, 0.6-1.2 mcg/kg/min, Last Rate: Stopped (09/11/21 0321)      ----------------------------------------------------------------------------------------------------------------------    Vital Signs:  Temp:  [98.5 °F (36.9 °C)-99 °F (37.2 °C)] 98.5 °F (36.9 °C)  Heart Rate:  [49-75] 75  Resp:  [26] 26  BP: ()/(45-68) 123/45  FiO2 (%):  [65 %-75 %] 75 %  Mean Arterial Pressure (Non-Invasive) for the past 24 hrs (Last 3 readings):   Noninvasive MAP (mmHg)   09/12/21 1250 83   09/12/21 1235 91   09/12/21 1220 102     SpO2 Percentage    09/12/21 1220 09/12/21 1235 09/12/21 1250   SpO2: 94% 94% 90%     SpO2:  [86 %-96 %] 90 %  on   ;   Device (Oxygen Therapy): ventilator    Body mass index is 24.4 kg/m².  Wt Readings from Last 3 Encounters:   09/12/21 68.5 kg (151 lb 2 oz)   06/14/17 73.9 kg (163 lb)   03/23/17 76.7 kg (169 lb)        Intake/Output Summary (Last 24 hours) at 9/12/2021 1325  Last data filed at 9/12/2021 0600  Gross per 24 hour   Intake 3770.33 ml   Output 3250 ml   Net 520.33 ml     NPO Diet  ----------------------------------------------------------------------------------------------------------------------      Physical Exam:    Deferred due to COVID-19 isolation.  ----------------------------------------------------------------------------------------------------------------------            Results from last 7 days   Lab Units 09/12/21  0426   PH, ARTERIAL pH units 7.455*   PO2 ART mm Hg 52.4*   PCO2, ARTERIAL mm Hg 45.7*   HCO3 ART mmol/L 32.1*     Results from last 7 days   Lab Units 09/12/21  0935 09/12/21  0308 09/11/21  0417 09/11/21  0302 09/10/21  0429 09/09/21  0055 09/09/21  0055 09/08/21  0234 09/07/21  0320    CRP mg/dL  --  7.14*  --  14.61* 21.68*   < > 13.17*  --  2.54*   LACTATE mmol/L  --   --   --  1.9  --   --  1.6  --  1.3   WBC 10*3/mm3 14.91*  --   --  13.27* 16.06*   < > 21.69*   < > 15.63*   HEMOGLOBIN g/dL 10.5*  --   --  11.0* 12.0*   < > 13.6   < > 13.8   HEMATOCRIT % 35.1*  --   --  36.7* 40.3   < > 44.6   < > 44.1   MCV fL 92.9  --   --  91.8 94.8   < > 92.3   < > 91.1   MCHC g/dL 29.9*  --   --  30.0* 29.8*   < > 30.5*   < > 31.3*   PLATELETS 10*3/mm3 143  --   --  154 176   < > 216   < > 232   INR   --   --  1.36*  --   --   --  1.29*  --  1.23*    < > = values in this interval not displayed.     Results from last 7 days   Lab Units 09/12/21  0934 09/12/21  0308 09/11/21  0302 09/10/21  0429 09/10/21  0429   SODIUM mmol/L 147*  --  148*  --  150*   POTASSIUM mmol/L 4.0  --  4.5  --  5.5*   CHLORIDE mmol/L 110*  --  113*  --  115*   CO2 mmol/L 28.3  --  26.0  --  27.8   BUN mg/dL 66*  --  70*  --  69*   CREATININE mg/dL 0.97 1.09 0.98   < > 1.19   EGFR IF NONAFRICN AM mL/min/1.73 75 66 74   < > 59*   CALCIUM mg/dL 8.6  --  8.9  --  8.8   GLUCOSE mg/dL 303*  --  307*  --  274*   ALBUMIN g/dL 2.14* 2.14* 1.98*   < > 2.12*   BILIRUBIN mg/dL 0.4 0.4 0.5   < > 0.5   ALK PHOS U/L 68 59 61   < > 66   AST (SGOT) U/L 53* 50* 32   < > 44*   ALT (SGPT) U/L 38 36 28   < > 33    < > = values in this interval not displayed.   Estimated Creatinine Clearance: 62.9 mL/min (by C-G formula based on SCr of 0.97 mg/dL).  No results found for: AMMONIA    Glucose   Date/Time Value Ref Range Status   09/12/2021 1107 275 (H) 70 - 130 mg/dL Final     Comment:     Meter: [id number] : [id number] David JAIMES   09/12/2021 0753 285 (H) 70 - 130 mg/dL Final     Comment:     Meter: [id number] : [id number] Belgicavaleria Heredia   09/12/2021 0640 292 (H) 70 - 130 mg/dL Final     Comment:     Meter: [id number] : [id number] TOBY DE LA CRUZ   09/11/2021 2220 233 (H) 70 - 130 mg/dL Final     Comment:     Meter: [id number]  : [id number] Nayla Everett   09/11/2021 1740 292 (H) 70 - 130 mg/dL Final     Comment:     Meter: [id number] : [id number] Teresita Everett   09/11/2021 1011 269 (H) 70 - 130 mg/dL Final     Comment:     Meter: [id number] : [id number] Teresita Everett   09/11/2021 0604 296 (H) 70 - 130 mg/dL Final     Comment:     Meter: [id number] : [id number] Katherine Couch   09/10/2021 2045 270 (H) 70 - 130 mg/dL Final     Comment:     Meter: [id number] : [id number] Katherine Couch     Lab Results   Component Value Date    HGBA1C 6.30 (H) 09/02/2021     No results found for: TSH, FREET4    Blood Culture   Date Value Ref Range Status   09/02/2021 No growth at 24 hours  Preliminary   09/02/2021 No growth at 24 hours  Preliminary   09/02/2021 No growth at 24 hours  Preliminary     No results found for: URINECX  No results found for: WOUNDCX  No results found for: STOOLCX  No results found for: RESPCX  Pain Management Panel    There is no flowsheet data to display.           ----------------------------------------------------------------------------------------------------------------------  Imaging Results (Last 24 Hours)       Procedure Component Value Units Date/Time    XR Chest 1 View [[id number]] Collected: 09/12/21 0938     Updated: 09/12/21 0941    Narrative:      XR CHEST 1 VW-     CLINICAL INDICATION: to confirm placement of ET Tube -Manual Prone  Protocol; U07.1-COVID-19; J12.82-Pneumonia due to Coronavirus disease  2019; R09.02-Hypoxemia; J96.01-Acute respiratory failure with hypoxia        COMPARISON: 09/11/2021      TECHNIQUE: Single frontal view of the chest.     FINDINGS:     Bilateral airspace disease  No interval line change  There is no evidence of an acute osseous abnormality.   There are no suspicious-appearing parenchymal soft tissue nodules.          Impression:      Little overall change. Bilateral airspace disease     This report was finalized on 9/12/2021 9:39 AM by Dr. Michael Feng MD.                ----------------------------------------------------------------------------------------------------------------------    Assessment/Plan       Assessment/Plan     ASSESSMENT:    1.  Severe sepsis with lactic acid greater than 2 on admission  2.  COVID-19 pneumonia    PLAN:    The patient remains intubated and sedated at 75% FiO2, which is overall fairly stable.  Afebrile.  CRP is improved significantly at 7.14.  WBC is fairly stable at 14.91.  Chest x-ray on 9/12/2021 shows little overall change with bilateral airspace disease.    VQ scan on 9/2/2021 indeterminate for exclusion of PE.  CT of the chest on 9/3/2021 reports patchy bilateral airspace disease concerning for COVID-19 pneumonia.  CT of the abdomen and pelvis on 9/3/2021 reports bibasilar airspace disease suggestive of COVID-19 pneumonia, large right inguinal hernia containing nondilated bowel and fat.     Patient remains on Decadron but has completed course of remdesivir.    We are concerned about possible development of bacterial pneumonia in the setting of prior treatment with baricitinib.     Recommend to continue vancomycin and meropenem courses for now.  We will continue to follow closely and adjust antibiotic therapy as appropriate.    Code Status:   Code Status and Medical Interventions:   Ordered at: 09/10/21 1447     Limited Support to NOT Include:    Intubation    Cardioversion/Defibrillation     Code Status:    No CPR     Medical Interventions (Level of Support Prior to Arrest):    Limited     Comments:    adult children Tiffany, Cathleen and John all agree he would not want to be resuciated or reintubated in the event he became extubated.     JUAN Rose  09/12/21  13:25 EDT

## 2021-09-13 NOTE — PROGRESS NOTES
PROGRESS NOTE         Patient Identification:  Name:  Valdemar Solitario  Age:  76 y.o.  Sex:  male  :  1945  MRN:  [id number]  Visit Number:  [id number]  Primary Care Provider:  Laurence Geiger APRN         LOS: 11 days       ----------------------------------------------------------------------------------------------------------------------  Subjective       Chief Complaints:    Exposure To Known Illness, Altered Mental Status, and Weakness - Generalized        Interval History:      The patient remains intubated and sedated at 100% FiO2.  Afebrile.  CRP is improved to 7.14.  White count up slightly at 14.91.  Chest x-ray from 2021 reports improved aeration of the lungs and decreased basilar airspace disease.    ----------------------------------------------------------------------------------------------------------------------      Objective       Current [facility] Meds:  artificial tears, , Both Eyes, Q4H  castor oil-balsam peru, 1 application, Topical, Q12H  chlorhexidine, 15 mL, Mouth/Throat, Q12H  [START ON 2021] dexamethasone, 6 mg, Intravenous, Daily  enoxaparin, 1 mg/kg, Subcutaneous, Q12H  famotidine, 20 mg, Intravenous, BID  insulin aspart, 0-14 Units, Subcutaneous, TID AC  insulin detemir, 25 Units, Subcutaneous, Nightly  meropenem, 1 g, Intravenous, Q8H  sertraline, 100 mg, Oral, Daily  sodium chloride, 10 mL, Intravenous, Q12H  sodium chloride, 10 mL, Intravenous, Q12H  sodium chloride, 10 mL, Intravenous, Q12H  sodium chloride, 10 mL, Intravenous, Q12H  sodium chloride, 10 mL, Intravenous, Q12H  tamsulosin, 0.4 mg, Oral, Daily  vancomycin, 1,500 mg, Intravenous, Q24H      dexmedetomidine, 0.2-1.5 mcg/kg/hr, Last Rate: Stopped (21 1414)  fentaNYL Citrate,   fentaNYL Citrate,   fentaNYL Citrate,   norepinephrine, 0.02-0.3 mcg/kg/min, Last Rate: 2.6 mcg/kg/min (21 4819)  Pharmacy Consult - Pharmacy to dose,   Pharmacy Consult - Pharmacy to dose,   Pharmacy to  Dose enoxaparin (LOVENOX),   Pharmacy to dose vancomycin,   phenylephrine, 0.5-3 mcg/kg/min, Last Rate: 3 mcg/kg/min (09/13/21 0749)  propofol, 5-50 mcg/kg/min, Last Rate: 40 mcg/kg/min (09/13/21 1209)  vasopressin (PITRESSIN) 20 units in 100 mL infusion, 0.03 Units/min, Last Rate: 0.03 Units/min (09/13/21 1208)  vecuronium (NORCURON) infusion, 0.6-1.2 mcg/kg/min, Last Rate: Stopped (09/11/21 0321)      ----------------------------------------------------------------------------------------------------------------------    Vital Signs:  Temp:  [98.3 °F (36.8 °C)-100 °F (37.8 °C)] 100 °F (37.8 °C)  Heart Rate:  [48-79] 61  Resp:  [26] 26  BP: ()/(28-62) 121/54  FiO2 (%):  [75 %-100 %] 100 %  Mean Arterial Pressure (Non-Invasive) for the past 24 hrs (Last 3 readings):   Noninvasive MAP (mmHg)   09/13/21 1215 76   09/13/21 1202 35   09/13/21 1148 76     SpO2 Percentage    09/13/21 1148 09/13/21 1202 09/13/21 1215   SpO2: 95% (!) 87% 96%     SpO2:  [86 %-98 %] 96 %  on   ;   Device (Oxygen Therapy): ventilator    Body mass index is 24.27 kg/m².  Wt Readings from Last 3 Encounters:   09/13/21 68.2 kg (150 lb 4.8 oz)   06/14/17 73.9 kg (163 lb)   03/23/17 76.7 kg (169 lb)        Intake/Output Summary (Last 24 hours) at 9/13/2021 1218  Last data filed at 9/13/2021 0636  Gross per 24 hour   Intake 4383.18 ml   Output 1425 ml   Net 2958.18 ml     NPO Diet  ----------------------------------------------------------------------------------------------------------------------      Physical Exam:    Deferred due to COVID-19 isolation.  ----------------------------------------------------------------------------------------------------------------------            Results from last 7 days   Lab Units 09/13/21  1134   PH, ARTERIAL pH units 7.434   PO2 ART mm Hg 72.6*   PCO2, ARTERIAL mm Hg 48.4*   HCO3 ART mmol/L 32.4*     Results from last 7 days   Lab Units 09/13/21  0732 09/12/21  0935 09/12/21  0308 09/11/21  0415  09/11/21  0302 09/10/21  0429 09/09/21  0055 09/08/21  0234 09/07/21  0320   CRP mg/dL 4.68*  --  7.14*  --  14.61*   < > 13.17*  --  2.54*   LACTATE mmol/L 1.9  --   --   --  1.9  --  1.6  --  1.3   WBC 10*3/mm3 11.51* 14.91*  --   --  13.27*   < > 21.69*   < > 15.63*   HEMOGLOBIN g/dL 10.6* 10.5*  --   --  11.0*   < > 13.6   < > 13.8   HEMATOCRIT % 35.5* 35.1*  --   --  36.7*   < > 44.6   < > 44.1   MCV fL 94.9 92.9  --   --  91.8   < > 92.3   < > 91.1   MCHC g/dL 29.9* 29.9*  --   --  30.0*   < > 30.5*   < > 31.3*   PLATELETS 10*3/mm3 150 143  --   --  154   < > 216   < > 232   INR  1.19*  --   --  1.36*  --   --  1.29*  --  1.23*    < > = values in this interval not displayed.     Results from last 7 days   Lab Units 09/13/21  0732 09/12/21  0934 09/12/21 0308 09/11/21 0302 09/11/21  0302   SODIUM mmol/L 151* 147*  --   --  148*   POTASSIUM mmol/L 3.0* 4.0  --   --  4.5   CHLORIDE mmol/L 116* 110*  --   --  113*   CO2 mmol/L 26.6 28.3  --   --  26.0   BUN mg/dL 58* 66*  --   --  70*   CREATININE mg/dL 0.88 0.97 1.09   < > 0.98   EGFR IF NONAFRICN AM mL/min/1.73 84 75 66   < > 74   CALCIUM mg/dL 7.1* 8.6  --   --  8.9   GLUCOSE mg/dL 130* 303*  --   --  307*   ALBUMIN g/dL 1.74* 2.14* 2.14*   < > 1.98*   BILIRUBIN mg/dL 0.6 0.4 0.4   < > 0.5   ALK PHOS U/L 46 68 59   < > 61   AST (SGOT) U/L 40 53* 50*   < > 32   ALT (SGPT) U/L 34 38 36   < > 28    < > = values in this interval not displayed.   Estimated Creatinine Clearance: 68.9 mL/min (by C-G formula based on SCr of 0.88 mg/dL).  No results found for: AMMONIA    Glucose   Date/Time Value Ref Range Status   09/13/2021 1122 110 70 - 130 mg/dL Final     Comment:     Meter: [id number] : [id number] DEE WINCHESTER   09/13/2021 0635 146 (H) 70 - 130 mg/dL Final     Comment:     Meter: [id number] : [id number] Nayla Everett   09/12/2021 2130 221 (H) 70 - 130 mg/dL Final     Comment:     Meter: [id number] : [id number] Nayla Everett   09/12/2021 1743 254  (H) 70 - 130 mg/dL Final     Comment:     Meter: [id number] : [id number] David JAIMES   09/12/2021 1107 275 (H) 70 - 130 mg/dL Final     Comment:     Meter: [id number] : [id number] David JAIMES   09/12/2021 0753 285 (H) 70 - 130 mg/dL Final     Comment:     Meter: [id number] : [id number] Belgica Heredia   09/12/2021 0640 292 (H) 70 - 130 mg/dL Final     Comment:     Meter: [id number] : [id number] TOBY DE LA CRUZ   09/11/2021 2220 233 (H) 70 - 130 mg/dL Final     Comment:     Meter: [id number] : [id number] Nayla Everett     Lab Results   Component Value Date    HGBA1C 6.30 (H) 09/02/2021     No results found for: TSH, FREET4    Blood Culture   Date Value Ref Range Status   09/02/2021 No growth at 24 hours  Preliminary   09/02/2021 No growth at 24 hours  Preliminary   09/02/2021 No growth at 24 hours  Preliminary     No results found for: URINECX  No results found for: WOUNDCX  No results found for: STOOLCX  No results found for: RESPCX  Pain Management Panel    There is no flowsheet data to display.           ----------------------------------------------------------------------------------------------------------------------  Imaging Results (Last 24 Hours)       Procedure Component Value Units Date/Time    XR Chest 1 View [[id number]] Collected: 09/13/21 0852     Updated: 09/13/21 0855    Narrative:      EXAM:    XR Chest, 1 View     EXAM DATE:    9/13/2021 7:00 AM     CLINICAL HISTORY:    to confirm placement of ET Tube -Manual Prone Protocol;  U07.1-COVID-19; J12.82-Pneumonia due to Coronavirus disease 2019;  R09.02-Hypoxemia; J96.01-Acute respiratory failure with hypoxia     TECHNIQUE:    Frontal view of the chest.     COMPARISON:    09/12/2021     FINDINGS:    Lungs:  Improvement in bilateral airspace disease.  Improved lung  volumes.    Pleural space:  Unremarkable.  No pneumothorax.    Heart:  Unremarkable.  No cardiomegaly.    Mediastinum:  Unremarkable.    Bones/joints:  Old  left rib fractures.    Tubes, lines and devices:  Right IJ deep line positioning is stable.   NG tube extends into the stomach.  ET tube with tip at level of  clavicles.       Impression:      1.  Improved aeration of the lungs and decreased basilar airspace  disease.  2.  Support device positioning detailed above.     This report was finalized on 9/13/2021 8:53 AM by Dr. Houston Larios MD.               ----------------------------------------------------------------------------------------------------------------------    Assessment/Plan       Assessment/Plan     ASSESSMENT:    1.  Severe sepsis with lactic acid greater than 2 on admission  2.  COVID-19 pneumonia    PLAN:    The patient remains intubated and sedated at 100% FiO2.  Afebrile.  CRP is improved to 7.14.  White count up slightly at 14.91.  Chest x-ray from 9/13/2021 reports improved aeration of the lungs and decreased basilar airspace disease.    VQ scan on 9/2/2021 indeterminate for exclusion of PE.  CT of the chest on 9/3/2021 reports patchy bilateral airspace disease concerning for COVID-19 pneumonia.  CT of the abdomen and pelvis on 9/3/2021 reports bibasilar airspace disease suggestive of COVID-19 pneumonia, large right inguinal hernia containing nondilated bowel and fat.     Patient remains on Decadron but has completed course of remdesivir.    We are concerned about possible development of bacterial pneumonia in the setting of prior treatment with baricitinib.     Recommend to continue vancomycin and meropenem courses for now.  We will continue to follow closely and adjust antibiotic therapy as appropriate.    Code Status:   Code Status and Medical Interventions:   Ordered at: 09/10/21 6272     Limited Support to NOT Include:    Intubation    Cardioversion/Defibrillation     Code Status:    No CPR     Medical Interventions (Level of Support Prior to Arrest):    Limited     Comments:    adult children Cathleen Allen and John all agree he  would not want to be resuciated or reintubated in the event he became extubated.     Scribed for Brock Metzger MD by NELSON Torres. 9/13/2021  12:21 EDT       NELSON Torres  09/13/21  12:18 EDT      Physician Attestation:    The documentation recorded by the scribe accurately reflects the service I personally performed and the decisions made by me.    Brock Metzger MD  09/13/21  12:37 EDT

## 2021-09-13 NOTE — PROGRESS NOTES
[facility] HOSPITALIST PROGRESS NOTE     Patient Identification:  Name:  Valdemar Solitario  Age:  76 y.o.  Sex:  male  :  1945  MRN:  [id number]  Visit Number:  [id number]  ROOM: [address]     Primary Care Provider:  Laurence Geiger APRN    Length of stay in inpatient status:  11    Subjective     Chief Compliant:    Chief Complaint   Patient presents with   • Exposure To Known Illness   • Altered Mental Status   • Weakness - Generalized     History of Presenting Illness:    Patient remains critically ill, intubated for airway protection, on IV pressors for hypotension, remains sedated, remains on high Fi02 requirement, ID/Pulm following, since I last saw last week has developed ARDS, CXR this AM reviewed and somewhat improved aeration, AM ABG w/ low P02, PEEP increased per night team, repeat ABG ordered and pending, patient remains afebrile, WBC count improved to 11K, CRP at 4.68 today, on Vanc and Merrem, Cr has normalized, Na has trended up to 151 and slightly increased FW w/ TFs today, discussed case w/ palliative care and they plan to reach out to family for further C conversations.   Objective     Current Hospital Meds:artificial tears, , Both Eyes, Q4H  castor oil-balsam peru, 1 application, Topical, Q12H  chlorhexidine, 15 mL, Mouth/Throat, Q12H  [START ON 2021] dexamethasone, 6 mg, Intravenous, Daily  enoxaparin, 1 mg/kg, Subcutaneous, Q12H  famotidine, 20 mg, Intravenous, BID  insulin aspart, 0-14 Units, Subcutaneous, TID AC  insulin detemir, 25 Units, Subcutaneous, Nightly  meropenem, 1 g, Intravenous, Q8H  sertraline, 100 mg, Oral, Daily  sodium chloride, 10 mL, Intravenous, Q12H  sodium chloride, 10 mL, Intravenous, Q12H  sodium chloride, 10 mL, Intravenous, Q12H  sodium chloride, 10 mL, Intravenous, Q12H  sodium chloride, 10 mL, Intravenous, Q12H  tamsulosin, 0.4 mg, Oral, Daily  vancomycin, 1,500 mg, Intravenous, Q24H    dexmedetomidine, 0.2-1.5 mcg/kg/hr, Last Rate:  Stopped (09/09/21 1414)  fentaNYL Citrate,   fentaNYL Citrate,   fentaNYL Citrate,   norepinephrine, 0.02-0.3 mcg/kg/min, Last Rate: 2.6 mcg/kg/min (09/13/21 0719)  Pharmacy Consult - Pharmacy to dose,   Pharmacy Consult - Pharmacy to dose,   Pharmacy to Dose enoxaparin (LOVENOX),   Pharmacy to dose vancomycin,   phenylephrine, 0.5-3 mcg/kg/min, Last Rate: 3 mcg/kg/min (09/13/21 0749)  propofol, 5-50 mcg/kg/min, Last Rate: 40 mcg/kg/min (09/13/21 0724)  vasopressin (PITRESSIN) 20 units in 100 mL infusion, 0.03 Units/min, Last Rate: Stopped (09/11/21 0321)  vecuronium (NORCURON) infusion, 0.6-1.2 mcg/kg/min, Last Rate: Stopped (09/11/21 0321)        Current Antimicrobial Therapy:  Anti-Infectives (From admission, onward)    Ordered     Dose/Rate Route Frequency Start Stop    09/11/21 1231  vancomycin 1500 mg/500 mL 0.9% NS IVPB ([facility])     Ordering Provider: Nic Webster MD    1,500 mg  over 60 Minutes Intravenous Every 24 Hours 09/11/21 1400 09/18/21 1359    09/10/21 1042  meropenem (MERREM) 1 g in sodium chloride 0.9 % 100 mL IVPB-VTB     Ordering Provider: Nic Webster MD    1 g  over 3 Hours Intravenous Every 8 Hours 09/10/21 1300 09/19/21 1259    09/09/21 0949  vancomycin 1250 mg/250 mL 0.9% NS IVPB ([facility])     Ordering Provider: Brock eMtzger MD    1,250 mg  over 60 Minutes Intravenous Once 09/09/21 1100 09/09/21 1201    09/09/21 0942  meropenem (MERREM) 1 g in sodium chloride 0.9 % 100 mL IVPB-VTB     Ordering Provider: Brock Metzger MD    1 g  over 30 Minutes Intravenous Once 09/09/21 1030 09/09/21 1131    09/09/21 0918  Pharmacy to dose vancomycin     Ordering Provider: Jeny Lopes, APRN     Does not apply Continuous PRN 09/09/21 0917 09/19/21 0916    09/03/21 0615  remdesivir 100 mg in sodium chloride 0.9 % 270 mL IVPB (powder vial)     Ordering Provider: Mg Augustine MD    100 mg  over 60 Minutes Intravenous Every 24 Hours 09/04/21 0900 09/07/21 1055    09/03/21 0615   remdesivir 200 mg in sodium chloride 0.9 % 290 mL IVPB (powder vial)     Ordering Provider: Mg Augustine MD    200 mg  over 60 Minutes Intravenous Every 24 Hours 09/03/21 0900 09/03/21 0940        Current Diuretic Therapy:  Diuretics (From admission, onward)    Ordered     Dose/Rate Route Frequency Start Stop    09/12/21 0857  bumetanide (BUMEX) injection 2 mg     Ordering Provider: Nic Webster MD    2 mg Intravenous Once 09/12/21 0945 09/12/21 0948    09/11/21 1026  furosemide (LASIX) 80 mg in sodium chloride 0.9 % 50 mL IVPB     Ordering Provider: Nic Webster MD    80 mg  over 4 Hours Intravenous Once 09/11/21 1200 09/11/21 1646    09/10/21 0822  furosemide (LASIX) injection 40 mg     Ordering Provider: Manuelito Adame MD    40 mg Intravenous Once 09/10/21 0915 09/10/21 0839    09/08/21 0736  furosemide (LASIX) injection 40 mg     Ordering Provider: Manuelito Adame MD    40 mg Intravenous Once 09/08/21 0800 09/08/21 0926    09/05/21 0732  furosemide (LASIX) injection 80 mg     Ordering Provider: Nic Webster MD    80 mg Intravenous Once 09/05/21 0830 09/05/21 0842        ----------------------------------------------------------------------------------------------------------------------  Vital Signs:  Temp:  [98.3 °F (36.8 °C)-99 °F (37.2 °C)] 99 °F (37.2 °C)  Heart Rate:  [48-79] 72  Resp:  [26] 26  BP: ()/(41-64) 95/47  FiO2 (%):  [75 %-100 %] 100 %  SpO2:  [86 %-98 %] 98 %  on   ;   Device (Oxygen Therapy): ventilator  Body mass index is 24.27 kg/m².    Wt Readings from Last 3 Encounters:   09/13/21 68.2 kg (150 lb 4.8 oz)   06/14/17 73.9 kg (163 lb)   03/23/17 76.7 kg (169 lb)     Intake & Output (last 3 days)       09/10 0701 - 09/11 0700 09/11 0701 - 09/12 0700 09/12 0701 - 09/13 0700 09/13 0701 - 09/14 0700    I.V. (mL/kg) 1710.5 (25) 950.3 (13.9) 1012.7 (14.8)     Other 1155 1500 2245     NG/ 620 994     IV Piggyback 350 850 131.5     Total Intake(mL/kg) 3571.5 (52.1)  3920.3 (57.2) 4383.2 (64.3)     Urine (mL/kg/hr) 3200 (1.9) 3250 (2) 3525 (2.2)     Total Output 3200 3250 3525     Net +371.5 +670.3 +858.2                 NPO Diet  ----------------------------------------------------------------------------------------------------------------------  Physical exam:  General: intubated/sedated  Head: Normocephalic, atraumatic  Eyes: Conjunctivae and sclerae normal.  Ears: Ears appear intact with no abnormalities noted.   Neck: Trachea midline. No obvious JVD.  Heart: Tele reveals regular rate and rhtyhm  Lungs: Intubated/venilated, b/l equal rise of chest, course breath sounds, on 100% Fi02  Abdomen: No obvious abdominal distension.  MS: Muscle tone appears normal. No gross deformities.  Extremities: No clubbing, cyanosis or edema noted.  Skin: No visible bleeding, bruising, or rash.  Neurologic: unable to assess due to sedation  ----------------------------------------------------------------------------------------------------------------------  Results from last 7 days   Lab Units 09/13/21  0732 09/12/21  0935 09/12/21  0308 09/11/21  0417 09/11/21  0302 09/10/21  0429 09/09/21  0055 09/08/21  0234 09/07/21  0320   CRP mg/dL 4.68*  --  7.14*  --  14.61*   < > 13.17*  --  2.54*   LACTATE mmol/L 1.9  --   --   --  1.9  --  1.6  --  1.3   WBC 10*3/mm3 11.51* 14.91*  --   --  13.27*   < > 21.69*   < > 15.63*   HEMOGLOBIN g/dL 10.6* 10.5*  --   --  11.0*   < > 13.6   < > 13.8   HEMATOCRIT % 35.5* 35.1*  --   --  36.7*   < > 44.6   < > 44.1   MCV fL 94.9 92.9  --   --  91.8   < > 92.3   < > 91.1   MCHC g/dL 29.9* 29.9*  --   --  30.0*   < > 30.5*   < > 31.3*   PLATELETS 10*3/mm3 150 143  --   --  154   < > 216   < > 232   INR  1.19*  --   --  1.36*  --   --  1.29*  --  1.23*    < > = values in this interval not displayed.     Results from last 7 days   Lab Units 09/13/21  0328   PH, ARTERIAL pH units 7.470*   PO2 ART mm Hg 54.0*   PCO2, ARTERIAL mm Hg 45.7*   HCO3 ART mmol/L 33.2*      Results from last 7 days   Lab Units 09/13/21  0732 09/12/21  0934 09/12/21  0308 09/11/21  0302 09/11/21  0302   SODIUM mmol/L 151* 147*  --   --  148*   POTASSIUM mmol/L 3.0* 4.0  --   --  4.5   CHLORIDE mmol/L 116* 110*  --   --  113*   CO2 mmol/L 26.6 28.3  --   --  26.0   BUN mg/dL 58* 66*  --   --  70*   CREATININE mg/dL 0.88 0.97 1.09   < > 0.98   EGFR IF NONAFRICN AM mL/min/1.73 84 75 66   < > 74   CALCIUM mg/dL 7.1* 8.6  --   --  8.9   GLUCOSE mg/dL 130* 303*  --   --  307*   ALBUMIN g/dL 1.74* 2.14* 2.14*   < > 1.98*   BILIRUBIN mg/dL 0.6 0.4 0.4   < > 0.5   ALK PHOS U/L 46 68 59   < > 61   AST (SGOT) U/L 40 53* 50*   < > 32   ALT (SGPT) U/L 34 38 36   < > 28    < > = values in this interval not displayed.   Estimated Creatinine Clearance: 68.9 mL/min (by C-G formula based on SCr of 0.88 mg/dL).  No results found for: AMMONIA              Glucose   Date/Time Value Ref Range Status   09/13/2021 0635 146 (H) 70 - 130 mg/dL Final     Comment:     Meter: [id number] : [id number] Nayla Everett   09/12/2021 2130 221 (H) 70 - 130 mg/dL Final     Comment:     Meter: [id number] : [id number] Nayla Everett   09/12/2021 1743 254 (H) 70 - 130 mg/dL Final     Comment:     Meter: [id number] : [id number] David JAMIES   09/12/2021 1107 275 (H) 70 - 130 mg/dL Final     Comment:     Meter: [id number] : [id number] David JAIMSE   09/12/2021 0753 285 (H) 70 - 130 mg/dL Final     Comment:     Meter: [id number] : [id number] Belgica Heredia   09/12/2021 0640 292 (H) 70 - 130 mg/dL Final     Comment:     Meter: [id number] : [id number] TOBY DE LA CRUZ   09/11/2021 2220 233 (H) 70 - 130 mg/dL Final     Comment:     Meter: [id number] : [id number] Nayla Karlos   09/11/2021 1740 292 (H) 70 - 130 mg/dL Final     Comment:     Meter: [id number] : [id number] Teresita Floyd     No results found for: TSH, FREET4  No results found for: PREGTESTUR, PREGSERUM, HCG, HCGQUANT  Pain Management Panel     There is no flowsheet data to display.       Brief Urine Lab Results  (Last result in the past 365 days)      Color   Clarity   Blood   Leuk Est   Nitrite   Protein   CREAT   Urine HCG        09/04/21 1137 Yellow Cloudy Trace Negative Negative Trace             No results found for: BLOODCX  No results found for: URINECX  No results found for: WOUNDCX  No results found for: STOOLCX  No results found for: RESPCX  No results found for: AFBCX  Results from last 7 days   Lab Units 09/13/21  0732 09/12/21  0308 09/11/21  0302 09/10/21  0429 09/09/21  0914 09/09/21  0055 09/08/21  0233 09/07/21  0320   PROCALCITONIN ng/mL  --   --   --   --  0.45*  --  0.22 0.11   LACTATE mmol/L 1.9  --  1.9  --   --  1.6  --  1.3   CRP mg/dL 4.68* 7.14* 14.61* 21.68*  --  13.17*  --  2.54*     I have personally looked at the labs and they are summarized above.  ----------------------------------------------------------------------------------------------------------------------  Detailed radiology reports for the last 24 hours:  Imaging Results (Last 24 Hours)     Procedure Component Value Units Date/Time    XR Chest 1 View [[id number]] Collected: 09/13/21 0852     Updated: 09/13/21 0855    Narrative:      EXAM:    XR Chest, 1 View     EXAM DATE:    9/13/2021 7:00 AM     CLINICAL HISTORY:    to confirm placement of ET Tube -Manual Prone Protocol;  U07.1-COVID-19; J12.82-Pneumonia due to Coronavirus disease 2019;  R09.02-Hypoxemia; J96.01-Acute respiratory failure with hypoxia     TECHNIQUE:    Frontal view of the chest.     COMPARISON:    09/12/2021     FINDINGS:    Lungs:  Improvement in bilateral airspace disease.  Improved lung  volumes.    Pleural space:  Unremarkable.  No pneumothorax.    Heart:  Unremarkable.  No cardiomegaly.    Mediastinum:  Unremarkable.    Bones/joints:  Old left rib fractures.    Tubes, lines and devices:  Right IJ deep line positioning is stable.   NG tube extends into the stomach.  ET tube with tip at level  of  clavicles.       Impression:      1.  Improved aeration of the lungs and decreased basilar airspace  disease.  2.  Support device positioning detailed above.     This report was finalized on 9/13/2021 8:53 AM by Dr. Houston Larios MD.           Assessment & Plan    Mr. Solitario is our 76M PMH diabetes meliltus, depression, GERD, hypertension, dyslipidemia, presented to his place of work in a state of confusion, brought to ER, found to be Covid +. Hospitalization complicated by worsening hypoxia.      #Septic shock, Acute Metabolic Encephalopathy, Acute Hypoxic Respiratory Failure requiring intubation and mechanical ventilation 2/2 viral pneumonia treating for Covid 19 c/b ARDS and mild transaminitis  - Patient presented w/ increased shortness of breath, confusion, tachypnea, covid + on 9/2, intubated on 9/5 for worsening hypoxia on bipap.  - Admission labs showed WBC count 5K, CRP 7.8, lactate 2.3, d-dimer 0.95, procal 0.16, MRSA -, covid +, Bcx's NGTD  - B/l Venous Duplex negative for DVT  - VQ scan showed indeterminate study, noted abnormal perfusion, multifocal defects could be related to multifocal airspace disease but also PE.  - CT Head showed no acute intracranial abnormality  - CT Chest showed patchy b/l airspace disease c/w covid 19  - ID consulted; Following  - CTPE on 9/4 did not reveal PE but did show worsening bilateral airspace disease that is likely worsening COVID pneumonia. Given fluid in fissure on plain film could not rule out component of edema  - Pulm consulted; Not here over the weekend but awaiting updated recs  - ID consulted; Following, s/p Remdesivir, initially initiated on baricitinib but d/c'd due to intubation/mechanical ventilation.   Continue 6mg IV Dexamethasone for extended time period due to continued significant respiratory failure, have changed to qd dosing from BID  - Continue Vanc and Merrem  - Continue Level III AC for Covid 19 thromboembolism Ppx  - Continue APAP PRN for  fevers  - Continue Albuterol Inhaler  - Continue IV pressors to maintain MAPs > 65mmHg or SBP > 90, currently still requiring  - Trend Covid 19 progression labs w/ CBC, CMP, CK, CRP, Ferritin daily and LDH, D-dimer, Fibrinogen q48hrs.  - Continue to monitor in CCU, continue enhanced droplet/contact isolation precautions, wean 02 as able.     #HTN/Dyslipidemia  #Elevated Troponin, NSTEMI Type II suspected  #Cardiomyopathy   #Pulm HTN   #Afib w/ RVR  - Labs showed trops 0.031->0.016, proBNP 370   - EKG showed NSR, RBBB, no significant ST changes  - Echo showed LVEF 46-50%, mild-mod dilated RV, mild-mod dilated RA, mod-severe Pulm HTN, RVSP 45-55mmHg  - Consulted Cardiology; Followed, rec'd outpatient echo for f/u 6 months  - Holding home ARB due to shock  - Holding home statin while on Remdesivir, consider resuming soon  - Continue Tlov, switch to DOAC when appropraite  - Continue to monitor on tele, strict I/O's, daily weights, trend HR and BP, IV diuresis PRN     #Electrolyte Abnormalities  - Acute Mild Hypokalemia - K 3.0 today, Replacing, on protocol  - Acute Mild Hypernatremia - Na up to 151 today, slightly increased FW flushes, titrate as indicated, trend Na     #NIDDM Type II, controlled, unknown complications  - Hgb A1c = 6.3%  - Holding home oral agents, continue FSBG and SSI  - Uncontrolled. Pulmonary has increased basal, will also increase SSI.      #Anxiety/Depression  - Continue home regimen     #GERD  - Continue home PPI      #BPH  - Continue home Flomax     #Nonoliguric MURIEL - Resolved  - B/l Cr 0.9-1.0, was up to 2.1 on admission, now back to baseline, S/p mIVFs; Trend Cr and UOP, avoid nephrotoxins, NSAIDs, dehydration and contrast as able.    F: NPO  E: Monitor & Replace PRN  N: TFs  PPx: TLov  Code: DNR/DNI     Dispo: Pending clinical improvement, palliative consulted for GOC conversations    I have spent 30 minutes of critical care time (10:00-10:30AM) with > 50% of time spent in direct patient  care, evaluating the patient at bedside, reviewing all labs and images, reviewing ABG and vent settings, reviewing pain and sedation gtt's, reviewing pressor requirement, communicating plan of care w/ nursing staff and consultants, utilizing high complexity medical decision making to assess and treat vital organ system failure in an individual who has impairment of one or more vital organ systems such that there is a high probability of imminent or life threatening deterioration in the patient’s condition. Failure to initiate the above interventions on an urgent basis would likely result in sudden, clinically significant or life threatening deterioration in the patient's condition.    VTE Prophylaxis:   Mechanical Order History:     None      Pharmalogical Order History:      Ordered     Dose Route Frequency Stop    09/09/21 0813  enoxaparin (LOVENOX) syringe 70 mg      1 mg/kg SC Every 12 Hours --    09/09/21 0730  Pharmacy to Dose enoxaparin (LOVENOX)      -- XX Continuous PRN --    09/07/21 0859  enoxaparin (LOVENOX) syringe 40 mg  Status:  Discontinued      40 mg SC Every 24 Hours 09/09/21 0730    09/03/21 0739  enoxaparin (LOVENOX) syringe 80 mg  Status:  Discontinued      1 mg/kg SC Every 12 Hours 09/07/21 0859    09/03/21 0724  Pharmacy to Dose enoxaparin (LOVENOX)  Status:  Discontinued      -- XX Continuous PRN 09/03/21 0740    09/02/21 1237  enoxaparin (LOVENOX) syringe 40 mg  Status:  Discontinued      40 mg SC Every 24 Hours 09/02/21 1247    09/02/21 1247  enoxaparin (LOVENOX) syringe 40 mg  Status:  Discontinued      0.5 mg/kg SC Every 24 Hours 09/03/21 0549              Jose De Jesus Mccord MD  [facility]  09/13/21  10:35 EDT

## 2021-09-13 NOTE — PROGRESS NOTES
Kinetics :  Vancomycin  Day 5    The pre-dose vancomycin level was a little low for this therapy.  We will empirically adjust the regimen to 1750mg q 24 hrs to maximize the auc/ trough level projections and monitor with you.

## 2021-09-13 NOTE — PROGRESS NOTES
Daryl Aquino MD                          [phone]      Patient ID:    Name:  Valdemar Solitario    MRN:  [id number]    1945   76 y.o.  male            Patient Care Team:  Laurence Geiger APRN as PCP - General (Family Medicine)  Corinne Dimas APRN as PCP - Family Medicine    CC/ Reason for visit: Acute respiratory failure, acute coronary pneumonia, shock, pulmonary pretension    Subjective: Pt seen and examined this AM.  Overnight events noted.  Patient remains on the mechanical ventilator.  Deeply sedated and on multiple pressors.  Reportedly having difficulty with ventilator synchrony.  Chest imaging reviewed and ABG reviewed.  Low-grade fevers overnight    ROS: Unable to obtain due to critical illness    Objective     Vital Signs past 24hrs    BP range: BP: ()/(28-71) 159/71  Pulse range: Heart Rate:  [48-79] 58  Resp rate range: Resp:  [25-26] 26  Temp range: Temp (24hrs), Av.9 °F (37.2 °C), Min:98.3 °F (36.8 °C), Max:100 °F (37.8 °C)      Ventilator/Non-Invasive Ventilation Settings (From admission, onward)     Start     Ordered    21 1636  Ventilator - AC/VC; (22); 50; 90%; 12; 450  Continuous     Comments: May titrate up to 70% FIO2 please notify Dr Webster if higher 02 amount is required.   Question Answer Comment   Vent Mode AC/VC    Breath rate  22   FiO2 50    FiO2 titrate for Sp02% =/> 90%    PEEP 12    Tidal Volume 450        21 1637    21 0810  Ventilator - AC/VC; (22); 50; 90%; 10; 450  Continuous,   Status:  Canceled     Question Answer Comment   Vent Mode AC/VC    Breath rate  22   FiO2 50    FiO2 titrate for Sp02% =/> 90%    PEEP 10    Tidal Volume 450        21 0809    21 1306  Ventilator - AC/VC; (18); 100; 90%; Other (see comments); 16; 450  Continuous,   Status:  Canceled     Question Answer Comment   Vent Mode AC/VC    Breath rate  18   FiO2 100    FiO2 titrate for Sp02% =/> 90%    PEEP Other (see comments)     PEEP: 16    Tidal Volume 450        09/05/21 1308    09/02/21 2228  NIPPV (CPAP or BIPAP)  Until Discontinued,   Status:  Canceled     Question Answer Comment   Indication: Acute Respiratory Failure    Type: BIPAP    NIPPV Mask Interface: Per Patient Preference    IPAP 15    EPAP 5    Oxygen FIO2    FIO2 % 32    Breath Rate 20    Titrate for SPO2 90%        09/02/21 2227    09/02/21 2223  NIPPV (CPAP or BIPAP)  Until Discontinued,   Status:  Canceled     Question Answer Comment   Type: BIPAP    NIPPV Mask Interface: Per Patient Preference        09/02/21 2223                Device (Oxygen Therapy): ventilator FiO2 (%): 100 %     68.2 kg (150 lb 4.8 oz); Body mass index is 24.27 kg/m².      Intake/Output Summary (Last 24 hours) at 9/13/2021 1446  Last data filed at 9/13/2021 1313  Gross per 24 hour   Intake 4483.18 ml   Output 1425 ml   Net 3058.18 ml       PHYSICAL EXAM   Constitutional:  Elderly white male deeply sedated on the mechanical ventilator  Head: - NCAT  Eyes: No pallor.  Anicteric sclerae, EOMI.  ENMT:   Endotracheal tube in place, no oral thrush. Moist MM.   NECK: Trachea midline, No thyromegaly, no palpable cervical lymphadenopathy  Heart: RRR, no murmur. No pedal edema   Lungs: GOGO +, No wheezes/ crackles heard    Abdomen: Soft. No tenderness, guarding or rigidity. No palpable masses  Extremities: Extremities warm and well perfused. No cyanosis/ clubbing  Neuro: Sedated  Psych: Mood and affect - UTO     PPE recommended per [facility] infectious disease Isolation protocol for the current clinical scenario(as mentioned below) was followed.     Scheduled meds:  artificial tears, , Both Eyes, Q4H  castor oil-balsam peru, 1 application, Topical, Q12H  chlorhexidine, 15 mL, Mouth/Throat, Q12H  [START ON 9/14/2021] dexamethasone, 6 mg, Intravenous, Daily  enoxaparin, 1 mg/kg, Subcutaneous, Q12H  famotidine, 20 mg, Intravenous, BID  insulin aspart, 0-14 Units, Subcutaneous, TID AC  insulin detemir,  25 Units, Subcutaneous, Nightly  meropenem, 1 g, Intravenous, Q8H  sertraline, 100 mg, Oral, Daily  sodium chloride, 10 mL, Intravenous, Q12H  sodium chloride, 10 mL, Intravenous, Q12H  sodium chloride, 10 mL, Intravenous, Q12H  sodium chloride, 10 mL, Intravenous, Q12H  sodium chloride, 10 mL, Intravenous, Q12H  tamsulosin, 0.4 mg, Oral, Daily  vancomycin, 1,750 mg, Intravenous, Q24H        IV meds:                      dexmedetomidine, 0.2-1.5 mcg/kg/hr, Last Rate: Stopped (09/09/21 1414)  fentaNYL Citrate,   fentaNYL Citrate,   fentaNYL Citrate,   norepinephrine, 0.02-0.3 mcg/kg/min, Last Rate: 2.6 mcg/kg/min (09/13/21 0719)  Pharmacy Consult - Pharmacy to dose,   Pharmacy Consult - Pharmacy to dose,   Pharmacy to Dose enoxaparin (LOVENOX),   Pharmacy to dose vancomycin,   phenylephrine, 0.5-3 mcg/kg/min, Last Rate: 2.1 mcg/kg/min (09/13/21 1428)  propofol, 5-50 mcg/kg/min, Last Rate: 40 mcg/kg/min (09/13/21 1209)  vasopressin (PITRESSIN) 20 units in 100 mL infusion, 0.03 Units/min, Last Rate: 0.03 Units/min (09/13/21 1208)  vecuronium (NORCURON) infusion, 0.6-1.2 mcg/kg/min, Last Rate: Stopped (09/11/21 0321)        Data Review:      Results from last 7 days   Lab Units 09/13/21  0732 09/12/21  0935 09/12/21  0934 09/12/21  0308 09/11/21  0417 09/11/21  0302 09/11/21  0302 09/10/21  0429 09/09/21  0914 09/09/21  0055 09/08/21  0234 09/08/21  0233 09/07/21  0320 09/07/21  0320   SODIUM mmol/L 151*  --  147*  --   --   --  148*   < >  --  149*   < > 148*   < > 149*   POTASSIUM mmol/L 3.0*  --  4.0  --   --   --  4.5   < >  --  5.1   < > 5.1   < > 5.1   CHLORIDE mmol/L 116*  --  110*  --   --   --  113*   < >  --  110*   < > 112*   < > 112*   CO2 mmol/L 26.6  --  28.3  --   --   --  26.0   < >  --  29.5*   < > 28.6   < > 26.7   BUN mg/dL 58*  --  66*  --   --   --  70*   < >  --  69*   < > 56*   < > 41*   CREATININE mg/dL 0.88  --  0.97 1.09  --    < > 0.98   < >  --  1.31*   < > 1.19   < > 1.00   CALCIUM mg/dL  7.1*  --  8.6  --   --   --  8.9   < >  --  8.8   < > 8.7   < > 8.8   BILIRUBIN mg/dL 0.6  --  0.4 0.4  --    < > 0.5   < >  --  0.5   < > 0.5   < > 0.5   ALK PHOS U/L 46  --  68 59  --    < > 61   < >  --  56   < > 63   < > 54   ALT (SGPT) U/L 34  --  38 36  --    < > 28   < >  --  42*   < > 40   < > 41   AST (SGOT) U/L 40  --  53* 50*  --    < > 32   < >  --  41*   < > 55*   < > 63*   GLUCOSE mg/dL 130*  --  303*  --   --   --  307*   < >  --  238*   < > 278*   < > 209*   WBC 10*3/mm3 11.51* 14.91*  --   --   --   --  13.27*   < >  --  21.69*   < >  --   --  15.63*   HEMOGLOBIN g/dL 10.6* 10.5*  --   --   --   --  11.0*   < >  --  13.6   < >  --   --  13.8   PLATELETS 10*3/mm3 150 143  --   --   --   --  154   < >  --  216   < >  --   --  232   INR  1.19*  --   --   --  1.36*  --   --   --   --  1.29*  --   --    < > 1.23*   PROCALCITONIN ng/mL  --   --   --   --   --   --   --   --  0.45*  --   --  0.22  --  0.11    < > = values in this interval not displayed.       Lab Results   Component Value Date    CALCIUM 7.1 (L) 09/13/2021             Results from last 7 days   Lab Units 09/13/21  1134 09/13/21  0328 09/12/21  0426 09/11/21  1519 09/11/21  0443 09/10/21  1905 09/10/21  1435   PH, ARTERIAL pH units 7.434 7.470* 7.455* 7.408 7.448 7.446 7.415   PO2 ART mm Hg 72.6* 54.0* 52.4* 80.4* 75.0* 88.2 156.0*   PCO2, ARTERIAL mm Hg 48.4* 45.7* 45.7* 47.2* 43.3 43.4 44.9   HCO3 ART mmol/L 32.4* 33.2* 32.1* 29.7* 30.0* 29.9* 28.7*      COVID LABS:  Results From Last 14 Days   Lab Units 09/13/21  0732 09/12/21  0308 09/11/21  0417 09/11/21  0302 09/10/21  0429 09/09/21  0914 09/09/21  0055 09/08/21  0233 09/07/21  0320 09/07/21  0320 09/06/21  0433 09/05/21  0227 09/05/21  0227 09/02/21  1301 09/02/21  1218 09/02/21  0950   PROBNP pg/mL  --   --   --   --   --   --   --   --   --   --   --   --   --   --   --  370.2   CRP mg/dL 4.68* 7.14*  --  14.61*   < >  --  13.17*  --    < > 2.54* 2.93*   < > 3.13*   < >  --   --     D DIMER QUANT MCGFEU/mL 0.83*  --   --  0.41  --   --  0.52*  --    < > 0.36  --    < > 0.36   < >  --  0.95*   FERRITIN ng/mL  --   --   --   --   --   --   --   --   --  1,685.00* 2,397.00*  --  2,904.00*   < >  --   --    LACTATE mmol/L 1.9  --   --  1.9  --   --  1.6  --    < > 1.3  --    < > 1.4   < >  --  2.3*   LDH U/L 390*  --   --  336*  --   --  404*  --    < > 608*  --    < > 608*   < >  --   --    PROCALCITONIN ng/mL  --   --   --   --   --  0.45*  --  0.22  --  0.11  --   --   --    < >  --   --    PROTIME Seconds 15.5*  --  17.2*  --   --   --  16.6*  --    < > 15.9*  --    < > 14.9*   < >  --  13.1   INR  1.19*  --  1.36*  --   --   --  1.29*  --    < > 1.23*  --    < > 1.13*   < >  --  0.95   TROPONIN T ng/mL  --   --   --   --   --   --   --   --   --   --   --   --   --   --  0.016 0.031*    < > = values in this interval not displayed.         Results Review:    I have reviewed the relevant laboratory results and independently reviewed the chest imaging from this hospitalization including the available echocardiogram reports personally and summarized it if/ when appropriate below    Assessment    Acute hypoxic respiratory failure  Acute COVID-19 pneumonia  ARDS  Shock - Septic versus cardiogenic  Atrial fibrillation on anticoagulation  Moderate to severe pulmonary hypertension  RV dilation  Hypernatremia  Hypocalcemia  Anemia    PLAN:  Patient with acute respiratory failure in setting of acute COVID-19 pneumonia.  Settings adjusted as appropriate on the ventilator.  Agree with concern of ETT position and repeat chest x-ray noted  Continue with current treatment plan for COVID-19 pneumonia  Minimally elevated procalcitonin.  ID on board and giving patient several antibiotics and would get respiratory cultures and wean down as tolerated.  Renal function is better.  Continue to keep him as negative as possible  On multiple pressors for unclear etiology.  Echocardiogram does show evidence of moderate  to severe pulmonary hypertension with no current concern or diagnosis of pulmonary embolism. D-dimer is not significantly elevated and would not repeat CT angiogram with confirmation of no PE on CTA of 9/4.  Patient already on anticoagulation for A. Fib. We will wean down sedative medications to see if it will help with pressor support. Add midodrine.   Will defer electrolyte correction to primary service  Very guarded prognosis  DNR    DVT/GI prophylaxis    Agree with palliative care evaluation and would strongly recommend transition to comfort measures only given advanced age and severe COVID-19 pneumonia with prolonged respiratory failure, pulmonary hypertension and high risk for further decline given multiple complications    D/w RN     CC - 32 mins    Daryl Aquino MD  9/13/2021

## 2021-09-13 NOTE — PROGRESS NOTES
"Palliative Care Daily Progress Note     S: Medical record reviewed, followed up with Primary RN Valeria and Dr. Mccord  regarding patient's condition.  Pt is sedated on the ventilator      O:   Palliative Performance Scale Score: 30%   /53   Pulse 59   Temp 100 °F (37.8 °C) (Oral)   Resp 26   Ht 167.6 cm (65.98\")   Wt 68.2 kg (150 lb 4.8 oz)   SpO2 94%   BMI 24.27 kg/m²     Intake/Output Summary (Last 24 hours) at 9/13/2021 1645  Last data filed at 9/13/2021 1521  Gross per 24 hour   Intake 4983.18 ml   Output 1425 ml   Net 3558.18 ml       PE:  COVID RESTRICTIONS      Meds: Reviewed and changes noted.    Labs:   Results from last 7 days   Lab Units 09/13/21  0732   WBC 10*3/mm3 11.51*   HEMOGLOBIN g/dL 10.6*   HEMATOCRIT % 35.5*   PLATELETS 10*3/mm3 150     Results from last 7 days   Lab Units 09/13/21  0732   SODIUM mmol/L 151*   POTASSIUM mmol/L 3.0*   CHLORIDE mmol/L 116*   CO2 mmol/L 26.6   BUN mg/dL 58*   CREATININE mg/dL 0.88   GLUCOSE mg/dL 130*   CALCIUM mg/dL 7.1*     Results from last 7 days   Lab Units 09/13/21  0732   SODIUM mmol/L 151*   POTASSIUM mmol/L 3.0*   CHLORIDE mmol/L 116*   CO2 mmol/L 26.6   BUN mg/dL 58*   CREATININE mg/dL 0.88   CALCIUM mg/dL 7.1*   BILIRUBIN mg/dL 0.6   ALK PHOS U/L 46   ALT (SGPT) U/L 34   AST (SGOT) U/L 40   GLUCOSE mg/dL 130*     Imaging Results (Last 72 Hours)     Procedure Component Value Units Date/Time    XR Chest 1 View [[id number]] Collected: 09/13/21 0852     Updated: 09/13/21 0855    Narrative:      EXAM:    XR Chest, 1 View     EXAM DATE:    9/13/2021 7:00 AM     CLINICAL HISTORY:    to confirm placement of ET Tube -Manual Prone Protocol;  U07.1-COVID-19; J12.82-Pneumonia due to Coronavirus disease 2019;  R09.02-Hypoxemia; J96.01-Acute respiratory failure with hypoxia     TECHNIQUE:    Frontal view of the chest.     COMPARISON:    09/12/2021     FINDINGS:    Lungs:  Improvement in bilateral airspace disease.  Improved lung  volumes.    Pleural space:  " Unremarkable.  No pneumothorax.    Heart:  Unremarkable.  No cardiomegaly.    Mediastinum:  Unremarkable.    Bones/joints:  Old left rib fractures.    Tubes, lines and devices:  Right IJ deep line positioning is stable.   NG tube extends into the stomach.  ET tube with tip at level of  clavicles.       Impression:      1.  Improved aeration of the lungs and decreased basilar airspace  disease.  2.  Support device positioning detailed above.     This report was finalized on 9/13/2021 8:53 AM by Dr. Houston Larios MD.       XR Chest 1 View [[id number]] Collected: 09/12/21 0938     Updated: 09/12/21 0941    Narrative:      XR CHEST 1 VW-     CLINICAL INDICATION: to confirm placement of ET Tube -Manual Prone  Protocol; U07.1-COVID-19; J12.82-Pneumonia due to Coronavirus disease  2019; R09.02-Hypoxemia; J96.01-Acute respiratory failure with hypoxia        COMPARISON: 09/11/2021      TECHNIQUE: Single frontal view of the chest.     FINDINGS:     Bilateral airspace disease  No interval line change  There is no evidence of an acute osseous abnormality.   There are no suspicious-appearing parenchymal soft tissue nodules.          Impression:      Little overall change. Bilateral airspace disease     This report was finalized on 9/12/2021 9:39 AM by Dr. Michael Feng MD.       XR Chest 1 View [[id number]] Collected: 09/11/21 1052     Updated: 09/11/21 1055    Narrative:      XR CHEST 1 VW-     CLINICAL INDICATION: to confirm placement of ET Tube -Manual Prone  Protocol; U07.1-COVID-19; J12.82-Pneumonia due to Coronavirus disease  2019; R09.02-Hypoxemia; J96.01-Acute respiratory failure with hypoxia        COMPARISON: 09/10/2021      TECHNIQUE: Single frontal view of the chest.     FINDINGS:     Bibasilar airspace disease  No interval line change  There is no evidence of an acute osseous abnormality.   There are no suspicious-appearing parenchymal soft tissue nodules.          Impression:      Little overall change     This  report was finalized on 9/11/2021 10:53 AM by Dr. Michael Feng MD.               Diagnostics: Reviewed    A: Valdemar Solitario is a 76 y.o. yo male with confusion, dyspnea, and weakness, he has a past medical history significant for diabetes meliltus, depression, GERD, hypertension, dyslipidemia.  He arrived to the ED via EMS after presenting to his place of work in a state of confusion.  He reported he had been feeling bad for a few days which worsened on 9/2/2021.  He reported while at work he was unable to remember his locker combination and was experiencing shortness of breath and generalized weakness.     Upon arrival to the ED, vital signs were T 98.8F, pulse 85, RR 14, and BP 96/52 with room air oxygen saturation of 82%.  Initial arterial blood gas revealed pH of 7.324 with PCO2 of 42.4 and PO2 of 47.9 on room air.  Troponin T was found to be 0.031.  Creatinine was found to be elevated at 2.10.  Lactate was found to be elevated at 2.3 with D-dimer of 0.95.  Hemoglobin was found to be 11.8.     Mr. Solitario is evaluated in the ED currently on BiPAP.  He reports feeling unwell for a few days but has difficulty talking due to BiPAP. He reports he has been making urine, though it is unclear at present if he has produced a specimen while in the ED.  He reports cough, weakness, and dyspnea.  He is alert to self.  He follows commands but is wearing BiPAP during examination. He denies chest pain and known dysuria.  He reports he has had some pain in his low back.      High risk secondary to acute hypoxemic respiratory failure 2/2 COVID-19 pneumonia      P:  I was able to speak with patients son Janessa today regarding their fathers condition and letting him know that keyanna is currently on ventilator at 100% FiO2 with PEEP of 12. Janessa stated that he was going to talk with his sisters and discuss what they wanted to do, and how long their father would wan to remain on a ventilator. I let him know that I would speak with   in the am to get an update and would call him as well to update and see what conversation was with his sisters.    We will continue to follow along. Please do not hesitate to contact us regarding further sx mgmt or GOC needs, including after hours or on weekends via our on call provider at [phone].     Socorro Zhou, APRN    9/13/2021

## 2021-09-13 NOTE — NURSING NOTE
Repositioned\gurgling from et tube.  respiratory notified.  Added air in cuff, anesthesia notified to check cuff.  Anesthesia at bedside visualize w glide scope. No need to change at this time, Intact wctm.

## 2021-09-13 NOTE — CASE MANAGEMENT/SOCIAL WORK
Discharge Planning Assessment  AMIE Knox     Patient Name: Valdemar Solitario  MRN: [id number]  Today's Date: 9/13/2021    Admit Date: 9/2/2021    Discharge Plan     Row Name 09/13/21 1044       Plan    Plan Pt admitted 9/2/21. Pt remains intubated and sedated. Palliative following for GOC. Pt lives alone and does not utilize home health services or DME at this time. Discharge plan pending clinical improvement. SS following.        SHALONDA Lewis

## 2021-09-14 NOTE — PROGRESS NOTES
"Palliative Care Daily Progress Note     S: Medical record reviewed, followed up with Primary RN Valeria and Dr. Mccord regarding patient's condition. Pt is sedated on ventilator      O:   Palliative Performance Scale Score: 30%   /52   Pulse 65   Temp 99.3 °F (37.4 °C) (Oral)   Resp 22   Ht 167.6 cm (65.98\")   Wt 68.6 kg (151 lb 4.8 oz)   SpO2 97%   BMI 24.43 kg/m²     Intake/Output Summary (Last 24 hours) at 9/14/2021 1638  Last data filed at 9/14/2021 1550  Gross per 24 hour   Intake 3398.52 ml   Output 1700 ml   Net 1698.52 ml       PE:  COVID RESTRICTIONS      Meds: Reviewed and changes noted.    Labs:   Results from last 7 days   Lab Units 09/14/21  1124   WBC 10*3/mm3 10.94*   HEMOGLOBIN g/dL 10.8*   HEMATOCRIT % 37.1*   PLATELETS 10*3/mm3 136*     Results from last 7 days   Lab Units 09/14/21  1124   SODIUM mmol/L 147*   POTASSIUM mmol/L 5.2   CHLORIDE mmol/L 113*   CO2 mmol/L 22.5   BUN mg/dL 56*   CREATININE mg/dL 0.88   GLUCOSE mg/dL 158*   CALCIUM mg/dL 8.2*     Results from last 7 days   Lab Units 09/14/21  1124   SODIUM mmol/L 147*   POTASSIUM mmol/L 5.2   CHLORIDE mmol/L 113*   CO2 mmol/L 22.5   BUN mg/dL 56*   CREATININE mg/dL 0.88   CALCIUM mg/dL 8.2*   BILIRUBIN mg/dL 0.6   ALK PHOS U/L 61   ALT (SGPT) U/L 41   AST (SGOT) U/L 72*   GLUCOSE mg/dL 158*     Imaging Results (Last 72 Hours)     Procedure Component Value Units Date/Time    XR Chest 1 View [[id number]] Collected: 09/14/21 1029     Updated: 09/14/21 1042    Narrative:      EXAM:    XR Chest, 1 View     EXAM DATE:    9/14/2021 8:03 AM     CLINICAL HISTORY:    to confirm placement of ET Tube -Manual Prone Protocol;  U07.1-COVID-19; J12.82-Pneumonia due to Coronavirus disease 2019;  R09.02-Hypoxemia; J96.01-Acute respiratory failure with hypoxia     TECHNIQUE:    Frontal view of the chest.     COMPARISON:    09/13/2021     FINDINGS:    Lungs:  Bilateral airspace disease from previous.    Pleural space:  No pneumothorax identified.    " Heart:  Cardiomegaly is stable.    Mediastinum:  Unremarkable.    Bones/joints:  Unremarkable.    Tubes, lines and devices:  ET tube with tip just below clavicles.  NG  tube extends into the mid stomach.  Right IJ deep line noted with tip at  the cavoatrial junction.       Impression:      1.  Positioning of support devices detailed above.  2.  Stable bilateral airspace disease.     This report was finalized on 9/14/2021 10:30 AM by Dr. Houston Larios MD.       XR Chest 1 View [[id number]] Collected: 09/13/21 0852     Updated: 09/13/21 0855    Narrative:      EXAM:    XR Chest, 1 View     EXAM DATE:    9/13/2021 7:00 AM     CLINICAL HISTORY:    to confirm placement of ET Tube -Manual Prone Protocol;  U07.1-COVID-19; J12.82-Pneumonia due to Coronavirus disease 2019;  R09.02-Hypoxemia; J96.01-Acute respiratory failure with hypoxia     TECHNIQUE:    Frontal view of the chest.     COMPARISON:    09/12/2021     FINDINGS:    Lungs:  Improvement in bilateral airspace disease.  Improved lung  volumes.    Pleural space:  Unremarkable.  No pneumothorax.    Heart:  Unremarkable.  No cardiomegaly.    Mediastinum:  Unremarkable.    Bones/joints:  Old left rib fractures.    Tubes, lines and devices:  Right IJ deep line positioning is stable.   NG tube extends into the stomach.  ET tube with tip at level of  clavicles.       Impression:      1.  Improved aeration of the lungs and decreased basilar airspace  disease.  2.  Support device positioning detailed above.     This report was finalized on 9/13/2021 8:53 AM by Dr. Houston Larios MD.       XR Chest 1 View [[id number]] Collected: 09/12/21 0938     Updated: 09/12/21 0941    Narrative:      XR CHEST 1 VW-     CLINICAL INDICATION: to confirm placement of ET Tube -Manual Prone  Protocol; U07.1-COVID-19; J12.82-Pneumonia due to Coronavirus disease  2019; R09.02-Hypoxemia; J96.01-Acute respiratory failure with hypoxia        COMPARISON: 09/11/2021      TECHNIQUE: Single frontal view  of the chest.     FINDINGS:     Bilateral airspace disease  No interval line change  There is no evidence of an acute osseous abnormality.   There are no suspicious-appearing parenchymal soft tissue nodules.          Impression:      Little overall change. Bilateral airspace disease     This report was finalized on 9/12/2021 9:39 AM by Dr. Michael Feng MD.               Diagnostics: Reviewed    A: Valdemar Solitario is a 76 y.o. yo male with confusion, dyspnea, and weakness, he has a past medical history significant for diabetes meliltus, depression, GERD, hypertension, dyslipidemia.  He arrived to the ED via EMS after presenting to his place of work in a state of confusion.  He reported he had been feeling bad for a few days which worsened on 9/2/2021.  He reported while at work he was unable to remember his locker combination and was experiencing shortness of breath and generalized weakness.     Upon arrival to the ED, vital signs were T 98.8F, pulse 85, RR 14, and BP 96/52 with room air oxygen saturation of 82%.  Initial arterial blood gas revealed pH of 7.324 with PCO2 of 42.4 and PO2 of 47.9 on room air.  Troponin T was found to be 0.031.  Creatinine was found to be elevated at 2.10.  Lactate was found to be elevated at 2.3 with D-dimer of 0.95.  Hemoglobin was found to be 11.8.     Mr. Solitario is evaluated in the ED currently on BiPAP.  He reports feeling unwell for a few days but has difficulty talking due to BiPAP. He reports he has been making urine, though it is unclear at present if he has produced a specimen while in the ED.  He reports cough, weakness, and dyspnea.  He is alert to self.  He follows commands but is wearing BiPAP during examination. He denies chest pain and known dysuria.  He reports he has had some pain in his low back.      High risk secondary to acute hypoxemic respiratory failure 2/2 COVID-19 pneumonia         P:  I was able to speak with patients alcides Riddle again today and update him  on his fathers condition. I asked him if he had a chance to speak with his sisters regarding our conversation yesterday. He stated that he had and that while they did not want him resuscitated that they wanted to continue treatment and that if he was able to receive a trach and peg that they would like for this to be an option. I let Janessa that his father was not near the parameters upon which a surgeon would take on the risk of placing a trach, and that while we can continue to treat his father that he understands how critical he is and that he is at high risk for complications. I let him know that I would continue to follow his father case.      We will continue to follow along. Please do not hesitate to contact us regarding further sx mgmt or GOC needs, including after hours or on weekends via our on call provider at [phone].     Socorro hZou, APRN    9/14/2021

## 2021-09-14 NOTE — PAYOR COMM NOTE
"CONTACT:  SEBASTIEN RICHARDS MSN, APRN  UTILIZATION MANAGEMENT DEPT.  [facility]  [address] [facility] [address]  PHONE:  [phone]  FAX: [phone]    CLINICAL UPDATE. WE STILL HAVE NOT RECEIVED AN AUTHORIZATION DETERMINATION, PATIENT REMAINS IN HOUSE.    REFERENCE # [id number]    Valdemar Bear (76 y.o. Male)     Date of Birth Social Security Number Address Home Phone MRN    1945  [address]   [facility] [address] [phone] [id number]    [place]ondoism Marital Status          Mosque        Admission Date Admission Type Admitting Provider Attending Provider Department, Room/Bed    9/2/21 Emergency Jose De Jesus Mccord MD Parks, Andrew, MD [facility] CRITICAL CARE, 03/    Discharge Date Discharge Disposition Discharge Destination                       Attending Provider: Jose De Jesus Mccord MD    Allergies: Codeine    Isolation: Enh Drop/Con   Infection: COVID (confirmed) (09/02/21)   Code Status: No CPR    Ht: 167.6 cm (65.98\")   Wt: 68.6 kg (151 lb 4.8 oz)    Admission Cmt: None   Principal Problem: None                Active Insurance as of 9/2/2021     Primary Coverage     Payor Plan Insurance Group Employer/Plan Group    [facility] [id number]     Payor Plan Address Payor Plan Phone Number Payor Plan Fax Number Effective Dates    [address] [address] [phone]  1/1/2020 - None Entered    [facility] [id number]       Subscriber Name Subscriber Birth Date Member ID       VALDEMAR BEAR 1945 [id number]                 Emergency Contacts      (Rel.) Home Phone Work Phone Mobile Phone    Josef Bear (Brother) [phone] -- --    Janessa bear (Son) -- -- [phone]    Kathleen Winn (Sister) -- -- [phone]    LISA BEAR (Daughter) -- -- [phone]    MOLINALYNN BECK (Daughter) -- -- [phone]            Vital Signs (last day)     Date/Time   Temp   Temp src   Pulse   Resp   BP   Patient Position   SpO2    09/14/21 0618   --   --   59   26   " 114/60   --   94    09/14/21 0503   --   --   57   26   139/66   --   98    09/14/21 0403   100 (37.8)   --   58   26   131/62   --   97    09/14/21 0303   --   --   60   26   127/54   --   97    09/14/21 0241   --   --   62   26   --   --   96    09/14/21 0203   --   --   59   26   133/52   --   95    09/14/21 0103   --   --   67   26   99/51   --   95    09/14/21 0003   99 (37.2)   Oral   60   26   132/54   --   95    09/13/21 2321   --   --   59   26   --   --   97    09/13/21 2303   --   --   61   26   119/71   --   96    09/13/21 2203   --   --   60   26   127/55   --   94    09/13/21 2103   --   --   58   26   145/62   --   97    09/13/21 2003   99.8 (37.7)   Oral   58   26   157/68   --   96    09/13/21 1833   --   --   61   26   101/44   --   95    09/13/21 1804   --   --   61   26   96/45   --   96    09/13/21 1745   --   --   65   26   98/49   --   96    09/13/21 1733   --   --   61   --   108/46   --   96    09/13/21 1704   --   --   59   26   114/50   --   97    09/13/21 1617   --   --   59   --   114/53   --   94    09/13/21 1604   --   --   71   26   (!) 65/31   --   (!) 87    09/13/21 1547   --   --   63   --   (!) 70/38   --   92    09/13/21 1533   --   --   65   --   100/48   --   (!) 88    09/13/21 1531   100 (37.8)   Oral   58   --   --   --   90    09/13/21 1518   --   --   53   --   160/78   --   97    09/13/21 1503   --   --   57   26   155/70   --   93    09/13/21 1440   --   --   55   26   --   --   97    09/13/21 1302   --   --   58   26   159/71   --   97    09/13/21 1215   --   --   61   26   121/54   --   96    09/13/21 1202   100 (37.8)   Oral   72   26   (!) 48/28   --   (!) 87    09/13/21 1148   --   --   62   --   120/49   --   95    09/13/21 1134   --   --   63   --   117/54   --   95    09/13/21 1102   --   --   60   25   119/46   --   94    09/13/21 1047   --   --   66   --   96/46   --   91    09/13/21 1033   --   --   64   26   --   --   97    09/13/21 1030   --   --   61   --    106/48   --   97    09/13/21 1018   --   --   62   --   104/49   --   97    09/13/21 1002   --   --   65   26   101/48   --   96    09/13/21 0815   --   --   68   --   (!) 87/44   --   98    09/13/21 0802   --   --   72   26   95/47   --   98    09/13/21 0749   --   --   74   --   (!) 75/46   --   --    09/13/21 0748   99 (37.2)   Oral   72   --   (!) 75/46   --   97    09/13/21 0730   --   --   78   --   (!) 84/47   --   97    09/13/21 0722   --   --   79   26   --   --   96    09/13/21 0703   --   --   74   26   (!) 85/46   --   96    09/13/21 0635   --   --   74   --   91/54   --   96    09/13/21 0603   --   --   73   --   (!) 73/43   --   96    09/13/21 0503   --   --   70   --   (!) 72/44   --   98    09/13/21 0403   98.6 (37)   Oral   61   --   91/50   --   96    09/13/21 0303   --   --   67   26   111/54   --   93    09/13/21 0241   --   --   52   26   --   --   92    09/13/21 0203   --   --   55   26   107/53   --   91    09/13/21 0103   --   --   59   26   103/51   --   93    09/13/21 0003   98.3 (36.8)   Oral   54   --   118/57   --   94              Intake & Output (last day)       09/13 0701 - 09/14 0700 09/14 0701 - 09/15 0700    I.V. (mL/kg) 1676.5 (24.4)     Other 681     NG/     IV Piggyback 763     Total Intake(mL/kg) 3398.5 (49.5)     Urine (mL/kg/hr) 1700 (1)     Stool 0     Total Output 1700     Net +1698.5                 Medication Administration Report for Valdemar Solitario as of 09/14/21 0707   Medications 09/13/21 09/14/21    castor oil-balsam peru (VENELEX) ointment 1 application  Dose: 1 application  Freq: Every 12 Hours Scheduled Route: TOP  Start: 09/12/21 2000    Admin Instructions:   Apply to DTI to prepuce of the penis     0935 2059 0900     2100              chlorhexidine (PERIDEX) 0.12 % solution 15 mL  Dose: 15 mL  Freq: Every 12 Hours Scheduled Route: MT  Start: 09/05/21 1400    Admin Instructions:    Do not swallow.     0935 2059 0900      2100              dexamethasone (DECADRON) injection 6 mg  Dose: 6 mg  Freq: Daily Route: IV  Start: 09/14/21 0900    Admin Instructions:   For IV administration. May be pushed over a minimum of 1 minute.      0900               enoxaparin (LOVENOX) syringe 70 mg  Dose: 1 mg/kg  Weight Dosing Info: 67.9 kg  Freq: Every 12 Hours Route: SC  Indications of Use: ATRIAL FIBRILLATION  Start: 09/09/21 1000    Admin Instructions:   Give subcutaneous in abdomen only. Do not massage site after injection.     0936 2058 1000 2200              famotidine (PEPCID) injection 20 mg  Dose: 20 mg  Freq: 2 Times Daily Route: IV  Start: 09/05/21 2100    Admin Instructions:   Dilute to 10 mL total volume and give IV push over 2 minutes.     0937 2058 0900 2100              insulin aspart (novoLOG) injection 0-14 Units  Dose: 0-14 Units  Freq: 3 Times Daily Before Meals Route: SC  Start: 09/10/21 1130    Admin Instructions:   Correction - Moderate-High Dose.  60-80 units/day total insulin dose or uses insulin at home    Blood glucose 150-199 mg/dL - 3 units  Blood glucose 200-249 mg/dL - 5 units  Blood glucose 250-299 mg/dL - 8 units  Blood glucose 300-349 mg/dL - 10 units  Blood glucose 350-400 mg/dL - 12 units  Blood glucose greater than 400 mg/dL - 14 units and call provider       0928)     (1457) (9990) 1540 0808     1732             insulin detemir (LEVEMIR) injection 25 Units  Dose: 25 Units  Freq: Nightly Route: SC  Start: 09/11/21 2100    Admin Instructions:        (2058) [C]            2100               meropenem (MERREM) 1 g in sodium chloride 0.9 % 100 mL IVPB-VTB  Dose: 1 g  Freq: Every 8 Hours Route: IV  Indications of Use: PNEUMONIA  Start: 09/10/21 1300   End: 09/19/21 1259    4457     1313     2100          0415     1300     2100             midodrine (PROAMATINE) tablet 10 mg  Dose: 10 mg  Freq: 3 Times Daily Before Meals Route: PO  Start: 09/13/21 1730     1745            0730     1130     1730             sodium chloride 0.9 % flush 10 mL  Dose: 10 mL  Freq: Every 12 Hours Scheduled Route: IV  Start: 09/06/21 0015    Admin Instructions:   Lumen #3     0935     2100 0900 2100              sodium chloride 0.9 % flush 10 mL  Dose: 10 mL  Freq: Every 12 Hours Scheduled Route: IV  Start: 09/06/21 0015    Admin Instructions:   Lumen #2     0936     2100 0900 2100              sodium chloride 0.9 % flush 10 mL  Dose: 10 mL  Freq: Every 12 Hours Scheduled Route: IV  Start: 09/06/21 0015    Admin Instructions:   Lumen #1     0936     2101 0900 2100              sodium chloride 0.9 % flush 10 mL  Dose: 10 mL  Freq: Every 12 Hours Scheduled Route: IV  Start: 09/03/21 2100 0936 2101 0900 2100              sodium chloride 0.9 % flush 10 mL  Dose: 10 mL  Freq: Every 12 Hours Scheduled Route: IV  Start: 09/03/21 0900 0937 2101 0900 2100              vancomycin 1750 mg/500 mL 0.9% NS IVPB ([facility])  Dose: 1,750 mg  Freq: Every 24 Hours Route: IV  Indications of Use: PNEUMONIA  Start: 09/13/21 1600   End: 09/17/21 1559    1521            1600              Completed Medications  Medications 09/13/21 09/14/21       potassium chloride (KLOR-CON) packet 40 mEq  Dose: 40 mEq  Freq: Once Route: PO  Start: 09/13/21 2000   End: 09/13/21 2057    Admin Instructions:   For use with a feeding tube. Mix in at least 4 oz. of liquid.     2057                potassium chloride 10 mEq in 100 mL IVPB  Dose: 10 mEq  Freq: Every 1 Hour Route: IV  Start: 09/13/21 2000   End: 09/13/21 2200    Admin Instructions:   OUTPATIENT/NON-MONITORED UNITS: Potassium Chloride standard bolus infusion rate is a maximum of 10 mEq/hr on unmonitored patients    MONITORED UNITS: Potassium Chloride standard bolus infusion rate is a maximum of 20 mEq/hr on ECG monitored patients ONLY       2058 2100               vecuronium (NORCURON)  injection 5 mg  Dose: 5 mg  Freq: Once Route: IV  Start: 09/13/21 0415   End: 09/13/21 0325    Admin Instructions:   Dilute to 10mL total volume with 0.9% NaCl. CAUTION: Paralytic     0325               Discontinued Medications  Medications 09/13/21 09/14/21       artificial tears ophthalmic ointment  Freq: Every 4 Hours Route: Both Eyes  Start: 09/05/21 1530   End: 09/13/21 1456    Admin Instructions:   Apply to each eye while patient on neuromuscular blockade.     0339     0935     1236              dexamethasone (DECADRON) injection 6 mg  Dose: 6 mg  Freq: 2 Times Daily Route: IV  Start: 09/09/21 0900   End: 09/13/21 1023    Admin Instructions:   For IV administration. May be pushed over a minimum of 1 minute.     0935                sertraline (ZOLOFT) tablet 100 mg  Dose: 100 mg  Freq: Daily Route: PO  Start: 09/03/21 0900   End: 09/13/21 1456    0936                tamsulosin (FLOMAX) 24 hr capsule 0.4 mg  Dose: 0.4 mg  Freq: Daily Route: PO  Start: 09/03/21 0900   End: 09/13/21 1456    Admin Instructions:   Swallow whole. Do not crush or chew.     0936                vancomycin 1500 mg/500 mL 0.9% NS IVPB ([facility])  Dose: 1,500 mg  Freq: Every 24 Hours Route: IV  Indications of Use: PNEUMONIA  Start: 09/11/21 1400   End: 09/13/21 1407    (1403) [C]                      and   Medication Administration Report for Valdemar Solitario as of 09/14/21 0707   Medications 09/13/21 09/14/21    Admin Instructions:   Begin infusion at 50 mcg/hr. Titrate up or down by 50 mcg/hr every 5 minutes for NRS 7-10 or CPOT 5-8. Max. dose of 300 mcg/hr.      Caution: Look alike/sound alike drug alert    If given for pain, use the following pain scale:  Mild Pain = Pain Score of 1-3, CPOT 1-2  Moderate Pain = Pain Score of 4-6, CPOT 3-4  Severe Pain = Pain Score of 7-10, CPOT 5-8     Order specific questions:   RASS Goal: -1 TO -2      031     2058 8851               norepinephrine (LEVOPHED) 8 mg in 250 mL NS infusion  (premix)  Rate: 2.55-38.19 mL/hr Dose: 0.02-0.3 mcg/kg/min  Weight Dosing Info: 67.9 kg  Freq: Titrated Route: IV  Start: 09/09/21 1745    Admin Instructions:   Initiate infusion at 0.02 mcg/kg/min and titrate up or down by 0.02 mcg/kg/min every 5 minutes to use the lowest dose possible to maintain a MAP greater than or equal To 65 mm Hg. Maximum Dose = 0.3 mcg/kg/min. Contact provider if unable to maintain MAP target at the maximum dose or if MAP is greater than 95 mm Hg. Once MAP target achieved, obtain vitals a minimum of every 30 minutes.  With provider order may titrate to maximum dose of 0.5 mcg/kg/min.  Concentration 8 mg/250 mL Central line preferred, if unavailable use large bore IV access with frequent nurse monitoring of IV site.     0719                phenylephrine (SIMONE-SYNEPHRINE) 50 mg in 250 mL NS infusion  Rate: 10.29-61.74 mL/hr Dose: 0.5-3 mcg/kg/min  Weight Dosing Info: 68.6 kg  Freq: Titrated Route: IV  Start: 09/08/21 2215    Admin Instructions:   Initiate infusion at 0.5 mcg/kg/min and titrate up or down by 0.3 mcg/kg/min every 5 minutes to use the lowest dose possible to maintain MAP greater than or equal to 65 mm Hg. Maximum dose = 3 mcg/kg/min. Contact provider if unable to maintain MAP greater than or equal to 65 mm Hg on maximum dose or if MAP is greater than 95 mm Hg. Once MAP target achieved obtain vitals a minimum of every 30 minutes.  Protect from light. Central line preferred, if unavailable use large bore IV access with frequent nurse monitoring of IV site.     0559     0636     0749     1237     1314       1428     1917           0105               propofol (DIPRIVAN) infusion 10 mg/mL 100 mL  Rate: 2.29-22.86 mL/hr Dose: 5-50 mcg/kg/min  Weight Dosing Info: 76.2 kg  Freq: Titrated Route: IV  Start: 09/05/21 1400    Admin Instructions:   Begin infusion at 5 mcg/kg/min and titrate up or down by 5 mcg/kg/min every 5 minutes to maintain RASS goal.  Maximum dose = 50 mcg/kg/min.   Notify MD if not at goal and requiring more than 50 mcg/kg/min. Infuse into dedicated line, change vial and tube every 12 hours. Patient must be intubated.    RASS Level:   0 (Alert & Calm) - Alert & Calm Spontaneously pays attention to caregiver.  -1 (Drowsy) - Not fully alert, but has sustained awakening to voice (eye opening & contact >10 sec)  -2 (Light Sedation) - Briefly awakens to voice (eyes open & contact <10 sec)  -3 (Moderate Sedation) - Movement or eye opening to voice (no eye contact)  -4 (Deep Sedation) - No response to voice, but movement or eye opening to physical stimulation  -5 (Unarousable) - No response to voice or physical stimulation     Order specific questions:   RASS Goal: -1 TO -2      3463 0886     1209     1527     1800        0105          0649             Vasopressin (PITRESSIN) 20 Units in sodium chloride 0.9 % 100 mL infusion  Rate: 9 mL/hr Dose: 0.03 Units/min  Freq: Continuous Route: IV  Start: 09/09/21 0045    Admin Instructions:   Infuse at 0.03 units/minute. Do not titrate.     1208     1521           0243               fentaNYL (SUBLIMAZE) PCA 1500 mcg/30 mL syringe  Nurse Loading Dose: 0 mcg  Patient Bolus Dose: 0 mcg  Lockout Interval: 10 Minutes  Basal Rate: 0 mcg/hr  One Hour Dose Limit: 300 mcg  Freq: Titrated Route: IV  Start: 09/08/21 2215   End: 09/13/21 1456    Admin Instructions:   Begin infusion at 50 mcg/hr. Titrate up or down by 50 mcg/hr every 5 minutes for NRS 7-10 or CPOT 5-8. Max. dose of 300 mcg/hr.      Caution: Look alike/sound alike drug alert    If given for pain, use the following pain scale:  Mild Pain = Pain Score of 1-3, CPOT 1-2  Moderate Pain = Pain Score of 4-6, CPOT 3-4  Severe Pain = Pain Score of 7-10, CPOT 5-8     Order specific questions:   RASS Goal: -1 TO -2      4938     1319               fentaNYL (SUBLIMAZE) PCA 1500 mcg/30 mL syringe  Nurse Loading Dose: 0 mcg  Patient Bolus Dose: 0 mcg  Lockout Interval: 10 Minutes  Basal Rate: 0  mcg/hr  One Hour Dose Limit: 300 mcg  Freq: Titrated Route: IV  Start: 09/05/21 1400   End: 09/08/21 1304    Admin Instructions:   Begin infusion at 50 mcg/hr. Titrate up or down by 50 mcg/hr every 5 minutes for NRS 7-10 or CPOT 5-8. Max. dose of 300 mcg/hr.      Caution: Look alike/sound alike drug alert    If given for pain, use the following pain scale:  Mild Pain = Pain Score of 1-3, CPOT 1-2  Moderate Pain = Pain Score of 4-6, CPOT 3-4  Severe Pain = Pain Score of 7-10, CPOT 5-8     Order specific questions:   RASS Goal: -1 TO -2                  Lab Results (last 24 hours)     Procedure Component Value Units Date/Time    Respiratory Culture - Sputum, Bronchus [[id number]] Collected: 09/13/21 1526    Specimen: Sputum from Bronchus Updated: 09/14/21 0646     Gram Stain Rare (1+) WBCs seen      Rare (1+) Epithelial cells seen      No organisms seen    POC Glucose Once [[id number]]  (Abnormal) Collected: 09/14/21 0611    Specimen: Blood Updated: 09/14/21 0617     Glucose 133 mg/dL      Comment: Meter: [id number] : [id number] Nayla Everett       Creatinine, Serum [[id number]]  (Normal) Collected: 09/14/21 0432    Specimen: Blood Updated: 09/14/21 0505     Creatinine 0.92 mg/dL      eGFR Non African Amer 80 mL/min/1.73     Narrative:      GFR Normal >60  Chronic Kidney Disease <60  Kidney Failure <15      Hepatic Function Panel [[id number]]  (Abnormal) Collected: 09/14/21 0432    Specimen: Blood Updated: 09/14/21 0505     Total Protein 5.3 g/dL      Albumin 2.05 g/dL      ALT (SGPT) 43 U/L      AST (SGOT) 71 U/L      Alkaline Phosphatase 57 U/L      Total Bilirubin 0.6 mg/dL      Bilirubin, Direct 0.4 mg/dL      Bilirubin, Indirect 0.2 mg/dL     C-reactive Protein [[id number]]  (Abnormal) Collected: 09/14/21 0432    Specimen: Blood Updated: 09/14/21 0505     C-Reactive Protein 21.82 mg/dL     Blood Gas, Arterial With Co-Ox [[id number]]  (Abnormal) Collected: 09/14/21 0447    Specimen: Arterial Blood Updated: 09/14/21  0450     Site Right Radial     Aj's Test N/A     pH, Arterial 7.418 pH units      pCO2, Arterial 46.1 mm Hg      pO2, Arterial 115.0 mm Hg      Comment: 83 Value above reference range        HCO3, Arterial 29.7 mmol/L      Comment: 83 Value above reference range        Base Excess, Arterial 4.3 mmol/L      O2 Saturation, Arterial 98.3 %      Hemoglobin, Blood Gas 15.2 g/dL      Hematocrit, Blood Gas 46.6 %      Oxyhemoglobin 97.3 %      Methemoglobin 0.10 %      Carboxyhemoglobin 0.9 %      A-a Gradiant 521.4 mmHg      CO2 Content 31.1 mmol/L      Temperature 0.0 C      Barometric Pressure for Blood Gas 729 mmHg      Modality Ventilator     FIO2 100 %      Ventilator Mode VC/AC     Set Tidal Volume 450.00     Set Mech Resp Rate 26.0     PEEP 12.0     Note --     Collected by [id number]     Comment: Meter: [id number]     :  [id number]        pH, Temp Corrected --     pCO2, Temperature Corrected --     pO2, Temperature Corrected --    POC Glucose Once [[id number]]  (Normal) Collected: 09/13/21 2110    Specimen: Blood Updated: 09/13/21 2122     Glucose 74 mg/dL      Comment: Meter: [id number] : [id number] Nayla Everett       POC Glucose Once [[id number]]  (Normal) Collected: 09/13/21 1628    Specimen: Blood Updated: 09/13/21 1656     Glucose 102 mg/dL      Comment: Meter: [id number] : [id number] Kendy MERRILL       Vancomycin, Trough [[id number]]  (Normal) Collected: 09/13/21 1319    Specimen: Blood Updated: 09/13/21 1354     Vancomycin Trough 9.60 mcg/mL     Narrative:      Therapeutic Ranges for Vancomycin    Vancomycin Random   5.0-40.0 mcg/mL  Vancomycin Trough   5.0-20.0 mcg/mL  Vancomycin Peak     20.0-40.0 mcg/mL    CBC & Differential [[id number]]  (Abnormal) Collected: 09/13/21 0732    Specimen: Blood Updated: 09/13/21 1156    Narrative:      The following orders were created for panel order CBC & Differential.  Procedure                               Abnormality         Status                      ---------                               -----------         ------                     CBC Auto Differential[[id number]]        Abnormal            Final result               Scan Slide[[id number]]                   Normal              Final result                 Please view results for these tests on the individual orders.    Scan Slide [[id number]]  (Normal) Collected: 09/13/21 0732    Specimen: Blood Updated: 09/13/21 1156     RBC Morphology Normal     Platelet Morphology Normal    CBC Auto Differential [[id number]]  (Abnormal) Collected: 09/13/21 0732    Specimen: Blood Updated: 09/13/21 1155     WBC 11.51 10*3/mm3      RBC 3.74 10*6/mm3      Hemoglobin 10.6 g/dL      Hematocrit 35.5 %      MCV 94.9 fL      MCH 28.3 pg      MCHC 29.9 g/dL      RDW 13.4 %      RDW-SD 47.4 fl      MPV 12.8 fL      Platelets 150 10*3/mm3      Neutrophil % 93.4 %      Lymphocyte % 4.0 %      Monocyte % 1.1 %      Eosinophil % 0.2 %      Basophil % 0.3 %      Immature Grans % 1.0 %      Neutrophils, Absolute 10.75 10*3/mm3      Lymphocytes, Absolute 0.46 10*3/mm3      Monocytes, Absolute 0.13 10*3/mm3      Eosinophils, Absolute 0.02 10*3/mm3      Basophils, Absolute 0.03 10*3/mm3      Immature Grans, Absolute 0.12 10*3/mm3      nRBC 0.0 /100 WBC     Narrative:      Appended report. These results have been appended to a previously verified report.    Blood Gas, Arterial With Co-Ox [[id number]]  (Abnormal) Collected: 09/13/21 1134    Specimen: Arterial Blood Updated: 09/13/21 1141     Site Right Radial     Aj's Test N/A     pH, Arterial 7.434 pH units      pCO2, Arterial 48.4 mm Hg      pO2, Arterial 72.6 mm Hg      Comment: 84 Value below reference range        HCO3, Arterial 32.4 mmol/L      Comment: 83 Value above reference range        Base Excess, Arterial 7.0 mmol/L      O2 Saturation, Arterial 94.9 %      Hemoglobin, Blood Gas 12.5 g/dL      Comment: 84 Value below reference range        Hematocrit, Blood Gas 38.4 %       "Oxyhemoglobin 93.9 %      Comment: 84 Value below reference range        Methemoglobin 0.20 %      Carboxyhemoglobin 0.9 %      A-a Gradiant 565.0 mmHg      CO2 Content 33.9 mmol/L      Temperature 0.0 C      Barometric Pressure for Blood Gas 733 mmHg      Modality Ventilator     FIO2 100 %      Ventilator Mode VC/AC     Set Tidal Volume 450.00     Set Mech Resp Rate 26.0     PEEP 12.0     Note --     Collected by [id number]     Comment: Meter: [id number]     :  [id number]        pH, Temp Corrected --     pCO2, Temperature Corrected --     pO2, Temperature Corrected --    POC Glucose Once [[id number]]  (Normal) Collected: 09/13/21 1122    Specimen: Blood Updated: 09/13/21 1140     Glucose 110 mg/dL      Comment: Meter: [id number] : [id number] PRICE ANNABELLA                Operative/Procedure Notes (all)      Nic Webster MD at 09/05/21 1323  Version 1 of 1     Procedure Orders    1. Intubation [[id number]] ordered by Nic Webster MD           Post-procedure Diagnoses    1. Pneumonia due to COVID-19 virus [U07.1, J12.82]             Intubation    Date/Time: 9/5/2021 1:23 PM  Performed by: Nic Webster MD  Authorized by: Nic Webster MD   Consent: The procedure was performed in an emergent situation. Verbal consent obtained.  Consent given by: patient  Patient understanding: patient states understanding of the procedure being performed  Patient consent: the patient's understanding of the procedure matches consent given  Procedure consent: procedure consent matches procedure scheduled  Relevant documents: relevant documents present and verified  Patient identity confirmed: verbally with patient, arm band and provided demographic data  Time out: Immediately prior to procedure a \"time out\" was called to verify the correct patient, procedure, equipment, support staff and site/side marked as required.  Indications: respiratory failure and  hypoxemia  Intubation method: fiberoptic oral  Patient " status: paralyzed (RSI)  Preoxygenation: BiPAP.  Sedatives: etomidate  Paralytic: succinylcholine  Laryngoscope size: Mac 3  Tube size: 8.0 mm  Tube type: cuffed  Number of attempts: 1  Cords visualized: yes  Post-procedure assessment: chest rise and CO2 detector  Breath sounds: equal  Cuff inflated: yes  ETT to lip: 25 cm  Tube secured with: ETT wolf  Chest x-ray findings: endotracheal tube in appropriate position  Patient tolerance: patient tolerated the procedure well with no immediate complications          Electronically signed by Nic Webster MD at 09/05/21 1325     Fred Roberto MD at 09/05/21 1435  Version 1 of 1     Procedure Orders    1. Insert Central Line At Bedside [[id number]] ordered by Fred Roberto MD           Post-procedure Diagnoses    1. Pneumonia due to COVID-19 virus [U07.1, J12.82]    2. Hypoxia [R09.02]    3. Acute respiratory failure with hypoxia (CMS/HCC) [J96.01]             Insert Central Line At Bedside    Date/Time: 9/5/2021 2:35 PM  Performed by: Fred Roberto MD  Authorized by: Fred Roberto MD   Consent: Verbal consent obtained. Written consent obtained.  Risks and benefits: risks, benefits and alternatives were discussed  Patient identity confirmed: arm band, provided demographic data and hospital-assigned identification number  Indications: vascular access  Anesthesia: local infiltration    Anesthesia:  Local Anesthetic: lidocaine 1% with epinephrine    Sedation:  Patient sedated: no    Preparation: skin prepped with ChloraPrep  Skin prep agent dried: skin prep agent completely dried prior to procedure  Sterile barriers: all five maximum sterile barriers used - cap, mask, sterile gown, sterile gloves, and large sterile sheet  Hand hygiene: hand hygiene performed prior to central venous catheter insertion  Location details: right internal jugular  Patient position: flat  Catheter type: triple lumen  Catheter size: 7 Fr  Pre-procedure: landmarks  identified  Ultrasound guidance: yes  Sterile ultrasound techniques: sterile gel and sterile probe covers were used  Number of attempts: 1  Successful placement: yes  Post-procedure: line sutured and dressing applied  Assessment: blood return through all ports,  free fluid flow and placement verified by x-ray  Patient tolerance: patient tolerated the procedure well with no immediate complications          Electronically signed by Fred Roberto MD at 09/05/21 4756     Fred Roberto MD at 09/06/21 9732  Version 1 of 1         Procedure Note    Valdemar Miracle      Pre-op Diagnosis:   Need for feeding access and inability to pass nasogastric or orogastric tube    Post-op Diagnosis: same            EGD with orogastric feeding tube placement    Anesthesia: none    Staff:   Skyler Pak    Findings: successful placement of orogastric feeding tube    Operative Procedure:  Patient placed supine in ICU bed.  After timeout performed the endoscope was implanted into the room.  Femoral-popliteal elevated.  This could be advanced in the distal esophagus there was no formal mucosal abnormality but the tissues did appear tight and required some insufflation and gentle pressure to traverse the distal esophagus.  There was mild antral gastritis.  Retroflexion showed no hiatal hernia.  At this time an orogastric tube was advanced under endoscopic visualization into the gastric lumen.  The endoscope was then removed with the orogastric tube remaining in place.  Patient tolerated procedure well.    Estimated Blood Loss: minimal    Specimens:   none           Drains: orogastric tube    Grafts/Implants:  none    Complications: none      Fred Roberto MD     Date: 9/6/2021  Time: 17:54 EDT      Electronically signed by Fred Roberto MD at 09/06/21 2546          Physician Progress Notes (last 48 hours) (Notes from 09/12/21 0707 through 09/14/21 0707)      Daryl Aquino MD at 09/13/21 8091                                       Daryl Aquino MD                          [phone]      Patient ID:    Name:  Valdemar Solitario    MRN:  [id number]    1945   76 y.o.  male            Patient Care Team:  Laurence Geiger APRN as PCP - General (Family Medicine)  Corinne Dimas APRN as PCP - Family Medicine    CC/ Reason for visit: Acute respiratory failure, acute coronary pneumonia, shock, pulmonary pretension    Subjective: Pt seen and examined this AM.  Overnight events noted.  Patient remains on the mechanical ventilator.  Deeply sedated and on multiple pressors.  Reportedly having difficulty with ventilator synchrony.  Chest imaging reviewed and ABG reviewed.  Low-grade fevers overnight    ROS: Unable to obtain due to critical illness    Objective     Vital Signs past 24hrs    BP range: BP: ()/(28-71) 159/71  Pulse range: Heart Rate:  [48-79] 58  Resp rate range: Resp:  [25-26] 26  Temp range: Temp (24hrs), Av.9 °F (37.2 °C), Min:98.3 °F (36.8 °C), Max:100 °F (37.8 °C)      Ventilator/Non-Invasive Ventilation Settings (From admission, onward)     Start     Ordered    21 1636  Ventilator - AC/VC; (22); 50; 90%; 12; 450  Continuous     Comments: May titrate up to 70% FIO2 please notify Dr Webster if higher 02 amount is required.   Question Answer Comment   Vent Mode AC/VC    Breath rate  22   FiO2 50    FiO2 titrate for Sp02% =/> 90%    PEEP 12    Tidal Volume 450        21 1637    21 0810  Ventilator - AC/VC; (22); 50; 90%; 10; 450  Continuous,   Status:  Canceled     Question Answer Comment   Vent Mode AC/VC    Breath rate  22   FiO2 50    FiO2 titrate for Sp02% =/> 90%    PEEP 10    Tidal Volume 450        21 0809    21 1306  Ventilator - AC/VC; (18); 100; 90%; Other (see comments); 16; 450  Continuous,   Status:  Canceled     Question Answer Comment   Vent Mode AC/VC    Breath rate  18   FiO2 100    FiO2 titrate for Sp02% =/> 90%    PEEP Other (see  comments)    PEEP: 16    Tidal Volume 450        09/05/21 1308    09/02/21 2228  NIPPV (CPAP or BIPAP)  Until Discontinued,   Status:  Canceled     Question Answer Comment   Indication: Acute Respiratory Failure    Type: BIPAP    NIPPV Mask Interface: Per Patient Preference    IPAP 15    EPAP 5    Oxygen FIO2    FIO2 % 32    Breath Rate 20    Titrate for SPO2 90%        09/02/21 2227    09/02/21 2223  NIPPV (CPAP or BIPAP)  Until Discontinued,   Status:  Canceled     Question Answer Comment   Type: BIPAP    NIPPV Mask Interface: Per Patient Preference        09/02/21 2223                 Device (Oxygen Therapy): ventilator FiO2 (%): 100 %     68.2 kg (150 lb 4.8 oz); Body mass index is 24.27 kg/m².      Intake/Output Summary (Last 24 hours) at 9/13/2021 1446  Last data filed at 9/13/2021 1313  Gross per 24 hour   Intake 4483.18 ml   Output 1425 ml   Net 3058.18 ml       PHYSICAL EXAM   Constitutional:  Elderly white male deeply sedated on the mechanical ventilator  Head: - NCAT  Eyes: No pallor.  Anicteric sclerae, EOMI.  ENMT:   Endotracheal tube in place, no oral thrush. Moist MM.   NECK: Trachea midline, No thyromegaly, no palpable cervical lymphadenopathy  Heart: RRR, no murmur. No pedal edema   Lungs: GOGO +, No wheezes/ crackles heard    Abdomen: Soft. No tenderness, guarding or rigidity. No palpable masses  Extremities: Extremities warm and well perfused. No cyanosis/ clubbing  Neuro: Sedated  Psych: Mood and affect - UTO     PPE recommended per [facility] infectious disease Isolation protocol for the current clinical scenario(as mentioned below) was followed.       Results Review:    I have reviewed the relevant laboratory results and independently reviewed the chest imaging from this hospitalization including the available echocardiogram reports personally and summarized it if/ when appropriate below    Assessment    Acute hypoxic respiratory failure  Acute COVID-19 pneumonia  ARDS  Shock - Septic  versus cardiogenic  Atrial fibrillation on anticoagulation  Moderate to severe pulmonary hypertension  RV dilation  Hypernatremia  Hypocalcemia  Anemia    PLAN:  Patient with acute respiratory failure in setting of acute COVID-19 pneumonia.  Settings adjusted as appropriate on the ventilator.  Agree with concern of ETT position and repeat chest x-ray noted  Continue with current treatment plan for COVID-19 pneumonia  Minimally elevated procalcitonin.  ID on board and giving patient several antibiotics and would get respiratory cultures and wean down as tolerated.  Renal function is better.  Continue to keep him as negative as possible  On multiple pressors for unclear etiology.  Echocardiogram does show evidence of moderate to severe pulmonary hypertension with no current concern or diagnosis of pulmonary embolism. D-dimer is not significantly elevated and would not repeat CT angiogram with confirmation of no PE on CTA of .  Patient already on anticoagulation for A. Fib. We will wean down sedative medications to see if it will help with pressor support. Add midodrine.   Will defer electrolyte correction to primary service  Very guarded prognosis  DNR    DVT/GI prophylaxis    Agree with palliative care evaluation and would strongly recommend transition to comfort measures only given advanced age and severe COVID-19 pneumonia with prolonged respiratory failure, pulmonary hypertension and high risk for further decline given multiple complications    D/w RN     CC - 32 mins    Daryl Aquino MD  2021    Electronically signed by Daryl Aquino MD at 21 1531     Brock Metzger MD at 21 1218                     PROGRESS NOTE         Patient Identification:  Name:  Valdemar Solitario  Age:  76 y.o.  Sex:  male  :  1945  MRN:  [id number]  Visit Number:  [id number]  Primary Care Provider:  Laurence Geiger APRN         LOS: 11 days        ----------------------------------------------------------------------------------------------------------------------  Subjective       Chief Complaints:    Exposure To Known Illness, Altered Mental Status, and Weakness - Generalized        Interval History:      The patient remains intubated and sedated at 100% FiO2.  Afebrile.  CRP is improved to 7.14.  White count up slightly at 14.91.  Chest x-ray from 9/13/2021 reports improved aeration of the lungs and decreased basilar airspace disease.    ----------------------------------------------------------------------------------------------------------------------      Objective       NPO Diet  ----------------------------------------------------------------------------------------------------------------------      Physical Exam:    Deferred due to COVID-19 isolation.  ----------------------------------------------------------------------------------------------------------------------              ----------------------------------------------------------------------------------------------------------------------    Assessment/Plan       Assessment/Plan     ASSESSMENT:    1.  Severe sepsis with lactic acid greater than 2 on admission  2.  COVID-19 pneumonia    PLAN:    The patient remains intubated and sedated at 100% FiO2.  Afebrile.  CRP is improved to 7.14.  White count up slightly at 14.91.  Chest x-ray from 9/13/2021 reports improved aeration of the lungs and decreased basilar airspace disease.    VQ scan on 9/2/2021 indeterminate for exclusion of PE.  CT of the chest on 9/3/2021 reports patchy bilateral airspace disease concerning for COVID-19 pneumonia.  CT of the abdomen and pelvis on 9/3/2021 reports bibasilar airspace disease suggestive of COVID-19 pneumonia, large right inguinal hernia containing nondilated bowel and fat.     Patient remains on Decadron but has completed course of remdesivir.    We are concerned about possible development of  bacterial pneumonia in the setting of prior treatment with baricitinib.     Recommend to continue vancomycin and meropenem courses for now.  We will continue to follow closely and adjust antibiotic therapy as appropriate.    Code Status:   Code Status and Medical Interventions:   Ordered at: 09/10/21 1447     Limited Support to NOT Include:    Intubation    Cardioversion/Defibrillation     Code Status:    No CPR     Medical Interventions (Level of Support Prior to Arrest):    Limited     Comments:    adult children Tiffany, Cathleen and John all agree he would not want to be resuciated or reintubated in the event he became extubated.     Scribed for Brock Metzger MD by NELSON Torres. 2021  12:21 EDT       NELSON Torres  21  12:18 EDT      Physician Attestation:    The documentation recorded by the scribe accurately reflects the service I personally performed and the decisions made by me.    Brock Metzger MD  21  12:37 EDT      Electronically signed by Brock Metzger MD at 21 1237     Jose De Jesus Mccord MD at 21 1034              [facility]     Patient Identification:  Name:  Valdemar Solitario  Age:  76 y.o.  Sex:  male  :  1945  MRN:  [id number]  Visit Number:  [id number]  ROOM: [address]     Primary Care Provider:  Laurence Geiger APRN    Length of stay in inpatient status:  11    Subjective     Chief Compliant:    Chief Complaint   Patient presents with   • Exposure To Known Illness   • Altered Mental Status   • Weakness - Generalized     History of Presenting Illness:    Patient remains critically ill, intubated for airway protection, on IV pressors for hypotension, remains sedated, remains on high Fi02 requirement, ID/Pulm following, since I last saw last week has developed ARDS, CXR this AM reviewed and somewhat improved aeration, AM ABG w/ low P02, PEEP increased per night team, repeat ABG ordered and pending, patient  remains afebrile, WBC count improved to 11K, CRP at 4.68 today, on Vanc and Merrem, Cr has normalized, Na has trended up to 151 and slightly increased FW w/ TFs today, discussed case w/ palliative care and they plan to reach out to family for further GOC conversations.   Objective       NPO Diet  ----------------------------------------------------------------------------------------------------------------------  Physical exam:  General: intubated/sedated  Head: Normocephalic, atraumatic  Eyes: Conjunctivae and sclerae normal.  Ears: Ears appear intact with no abnormalities noted.   Neck: Trachea midline. No obvious JVD.  Heart: Tele reveals regular rate and rhtyhm  Lungs: Intubated/venilated, b/l equal rise of chest, course breath sounds, on 100% Fi02  Abdomen: No obvious abdominal distension.  MS: Muscle tone appears normal. No gross deformities.  Extremities: No clubbing, cyanosis or edema noted.  Skin: No visible bleeding, bruising, or rash.  Neurologic: unable to assess due to sedation    Assessment & Plan    Mr. Solitario is our 76M PMH diabetes meliltus, depression, GERD, hypertension, dyslipidemia, presented to his place of work in a state of confusion, brought to ER, found to be Covid +. Hospitalization complicated by worsening hypoxia.      #Septic shock, Acute Metabolic Encephalopathy, Acute Hypoxic Respiratory Failure requiring intubation and mechanical ventilation 2/2 viral pneumonia treating for Covid 19 c/b ARDS and mild transaminitis  - Patient presented w/ increased shortness of breath, confusion, tachypnea, covid + on 9/2, intubated on 9/5 for worsening hypoxia on bipap.  - Admission labs showed WBC count 5K, CRP 7.8, lactate 2.3, d-dimer 0.95, procal 0.16, MRSA -, covid +, Bcx's NGTD  - B/l Venous Duplex negative for DVT  - VQ scan showed indeterminate study, noted abnormal perfusion, multifocal defects could be related to multifocal airspace disease but also PE.  - CT Head showed no acute  intracranial abnormality  - CT Chest showed patchy b/l airspace disease c/w covid 19  - ID consulted; Following  - CTPE on 9/4 did not reveal PE but did show worsening bilateral airspace disease that is likely worsening COVID pneumonia. Given fluid in fissure on plain film could not rule out component of edema  - Pulm consulted; Not here over the weekend but awaiting updated recs  - ID consulted; Following, s/p Remdesivir, initially initiated on baricitinib but d/c'd due to intubation/mechanical ventilation.   Continue 6mg IV Dexamethasone for extended time period due to continued significant respiratory failure, have changed to qd dosing from BID  - Continue Vanc and Merrem  - Continue Level III AC for Covid 19 thromboembolism Ppx  - Continue APAP PRN for fevers  - Continue Albuterol Inhaler  - Continue IV pressors to maintain MAPs > 65mmHg or SBP > 90, currently still requiring  - Trend Covid 19 progression labs w/ CBC, CMP, CK, CRP, Ferritin daily and LDH, D-dimer, Fibrinogen q48hrs.  - Continue to monitor in CCU, continue enhanced droplet/contact isolation precautions, wean 02 as able.     #HTN/Dyslipidemia  #Elevated Troponin, NSTEMI Type II suspected  #Cardiomyopathy   #Pulm HTN   #Afib w/ RVR  - Labs showed trops 0.031->0.016, proBNP 370   - EKG showed NSR, RBBB, no significant ST changes  - Echo showed LVEF 46-50%, mild-mod dilated RV, mild-mod dilated RA, mod-severe Pulm HTN, RVSP 45-55mmHg  - Consulted Cardiology; Followed, rec'd outpatient echo for f/u 6 months  - Holding home ARB due to shock  - Holding home statin while on Remdesivir, consider resuming soon  - Continue Tlov, switch to DOAC when appropraite  - Continue to monitor on tele, strict I/O's, daily weights, trend HR and BP, IV diuresis PRN     #Electrolyte Abnormalities  - Acute Mild Hypokalemia - K 3.0 today, Replacing, on protocol  - Acute Mild Hypernatremia - Na up to 151 today, slightly increased FW flushes, titrate as indicated, trend Na      #NIDDM Type II, controlled, unknown complications  - Hgb A1c = 6.3%  - Holding home oral agents, continue FSBG and SSI  - Uncontrolled. Pulmonary has increased basal, will also increase SSI.      #Anxiety/Depression  - Continue home regimen     #GERD  - Continue home PPI      #BPH  - Continue home Flomax     #Nonoliguric MURIEL - Resolved  - B/l Cr 0.9-1.0, was up to 2.1 on admission, now back to baseline, S/p mIVFs; Trend Cr and UOP, avoid nephrotoxins, NSAIDs, dehydration and contrast as able.    F: NPO  E: Monitor & Replace PRN  N: TFs  PPx: TLov  Code: DNR/DNI     Dispo: Pending clinical improvement, palliative consulted for GOC conversations    I have spent 30 minutes of critical care time (10:00-10:30AM) with > 50% of time spent in direct patient care, evaluating the patient at bedside, reviewing all labs and images, reviewing ABG and vent settings, reviewing pain and sedation gtt's, reviewing pressor requirement, communicating plan of care w/ nursing staff and consultants, utilizing high complexity medical decision making to assess and treat vital organ system failure in an individual who has impairment of one or more vital organ systems such that there is a high probability of imminent or life threatening deterioration in the patient’s condition. Failure to initiate the above interventions on an urgent basis would likely result in sudden, clinically significant or life threatening deterioration in the patient's condition.      Jose De Jesus Mccord MD  [facility] Center[facility]  09/13/21  10:35 EDT    Electronically signed by Jose De Jesus Mccord MD at 09/13/21 1040     Socorro Zhou APRN at 09/13/21 1025          Palliative Care Daily Progress Note     S: Medical record reviewed, followed up with Primary RN Valeria and Dr. Mccord  regarding patient's condition.  Pt is sedated on the ventilator      O:   Palliative Performance Scale Score: 30%   /53   Pulse 59   Temp 100 °F (37.8 °C) (Oral)   Resp 26    "Ht 167.6 cm (65.98\")   Wt 68.2 kg (150 lb 4.8 oz)   SpO2 94%   BMI 24.27 kg/m²     Intake/Output Summary (Last 24 hours) at 9/13/2021 1645  Last data filed at 9/13/2021 1521  Gross per 24 hour   Intake 4983.18 ml   Output 1425 ml   Net 3558.18 ml       PE:  COVID RESTRICTIONS        Diagnostics: Reviewed    A: Valdemar Solitario is a 76 y.o. yo male with confusion, dyspnea, and weakness, he has a past medical history significant for diabetes meliltus, depression, GERD, hypertension, dyslipidemia.  He arrived to the ED via EMS after presenting to his place of work in a state of confusion.  He reported he had been feeling bad for a few days which worsened on 9/2/2021.  He reported while at work he was unable to remember his locker combination and was experiencing shortness of breath and generalized weakness.     Upon arrival to the ED, vital signs were T 98.8F, pulse 85, RR 14, and BP 96/52 with room air oxygen saturation of 82%.  Initial arterial blood gas revealed pH of 7.324 with PCO2 of 42.4 and PO2 of 47.9 on room air.  Troponin T was found to be 0.031.  Creatinine was found to be elevated at 2.10.  Lactate was found to be elevated at 2.3 with D-dimer of 0.95.  Hemoglobin was found to be 11.8.     Mr. Solitario is evaluated in the ED currently on BiPAP.  He reports feeling unwell for a few days but has difficulty talking due to BiPAP. He reports he has been making urine, though it is unclear at present if he has produced a specimen while in the ED.  He reports cough, weakness, and dyspnea.  He is alert to self.  He follows commands but is wearing BiPAP during examination. He denies chest pain and known dysuria.  He reports he has had some pain in his low back.      High risk secondary to acute hypoxemic respiratory failure 2/2 COVID-19 pneumonia      P:  I was able to speak with patients alcides Riddle today regarding their fathers condition and letting him know that keyanna is currently on ventilator at 100% FiO2 with " PEEP of 12. Janessa stated that he was going to talk with his sisters and discuss what they wanted to do, and how long their father would wan to remain on a ventilator. I let him know that I would speak with  in the am to get an update and would call him as well to update and see what conversation was with his sisters.    We will continue to follow along. Please do not hesitate to contact us regarding further sx mgmt or GOC needs, including after hours or on weekends via our on call provider at [phone].     Socorro Zhou, NELSON    2021    Electronically signed by Socorro Zhou APRN at 21 2660     Brock Metzger MD at 21 1328                     PROGRESS NOTE         Patient Identification:  Name:  Valdemar Solitario  Age:  76 y.o.  Sex:  male  :  1945  MRN:  [id number]  Visit Number:  [id number]  Primary Care Provider:  Laurence Geiger, APRN         LOS: 10 days       ----------------------------------------------------------------------------------------------------------------------  Subjective       Chief Complaints:    Exposure To Known Illness, Altered Mental Status, and Weakness - Generalized        Interval History:      The patient remains intubated and sedated at 75% FiO2, which is overall fairly stable.  Afebrile.  CRP is improved significantly at 7.14.  WBC is fairly stable at 14.91.  Chest x-ray on 2021 shows little overall change with bilateral airspace disease.    ----------------------------------------------------------------------------------------------------------------------      Objective         ----------------------------------------------------------------------------------------------------------------------      Physical Exam:    Deferred due to COVID-19 isolation.  ----------------------------------------------------------------------------------------------------------------------         ----------------------------------------------------------------------------------------------------------------------    Assessment/Plan       Assessment/Plan     ASSESSMENT:    1.  Severe sepsis with lactic acid greater than 2 on admission  2.  COVID-19 pneumonia    PLAN:    The patient remains intubated and sedated at 75% FiO2, which is overall fairly stable.  Afebrile.  CRP is improved significantly at 7.14.  WBC is fairly stable at 14.91.  Chest x-ray on 9/12/2021 shows little overall change with bilateral airspace disease.    VQ scan on 9/2/2021 indeterminate for exclusion of PE.  CT of the chest on 9/3/2021 reports patchy bilateral airspace disease concerning for COVID-19 pneumonia.  CT of the abdomen and pelvis on 9/3/2021 reports bibasilar airspace disease suggestive of COVID-19 pneumonia, large right inguinal hernia containing nondilated bowel and fat.     Patient remains on Decadron but has completed course of remdesivir.    We are concerned about possible development of bacterial pneumonia in the setting of prior treatment with baricitinib.     Recommend to continue vancomycin and meropenem courses for now.  We will continue to follow closely and adjust antibiotic therapy as appropriate.    Code Status:   Code Status and Medical Interventions:   Ordered at: 09/10/21 4928     Limited Support to NOT Include:    Intubation    Cardioversion/Defibrillation     Code Status:    No CPR     Medical Interventions (Level of Support Prior to Arrest):    Limited     Comments:    adult children Tiffany, Cathleen and John all agree he would not want to be resuciated or reintubated in the event he became extubated.     JUAN Rose  09/12/21  13:25 EDT          Electronically signed by Brock Metzger MD at 09/12/21 8523     Nic Webster MD at 09/12/21 0940              [place] [facility]     Patient Identification:  Name:  Valdemar Solitario  Age:  76 y.o.  Sex:   male  :  1945  MRN:  [id number]  Visit Number:  [id number]  ROOM: [address]     Primary Care Provider:  Laurence Geiger APRN    Length of stay in inpatient status:  10    Subjective     Chief Compliant:    Chief Complaint   Patient presents with   • Exposure To Known Illness   • Altered Mental Status   • Weakness - Generalized       History of Presenting Illness:    Patient with no acute events overnight. Patient remains sedated and the ventilator. Patient remains on 0.09 phenylephrine but was on hypertensive side on my exam. Patient was around net even with the 80 IV lasix yesterday.     ROS:  Otherwise 10 point ROS negative other than documented above in HPI.     Objective       NPO Diet  ----------------------------------------------------------------------------------------------------------------------  Physical exam:  GENERAL:  Patient intubated and sedated.   SKIN:  Warm, dry without rashes, purpura or petechiae.  EYES:  Pupils equal, round and reactive to light.  EOMs intact.  Conjunctivae normal.  HEAD:  Normocephalic. Atraumatic.   EARS/NOSE/MOUTH/THROAT:  Hearing intact. Septum midline.  No excoriations or nasal discharge. No stomatitis or ulcers.  Lips normal. Oropharynx without lesions or exudates.  NECK:  Supple with good range of motion; no thyromegaly or masses  LYMPHATICS:  No cervical, supraclavicular, axillary adenopathy.  RESP:  Lungs clear to auscultation. Good airflow. Intubated receiving mechanical ventilation.   CARDIAC:  Regular rate and rhythm without murmurs, rubs or gallops. Normal S1,S2. No edema  GI:  Soft, nontender, no hepatosplenomegaly, normal bowel sounds  MSK:  No clubbing or cyanosis, No joint swelling noted in hands  NEUROLOGICAL:  No focal deficit. Pupils equal and reactive.   ----------------------------------------------------------------------------------------------------------------------  Tele:     ----------------------------------------------------------------------------------------------------------------------    Assessment & Plan    Mr. Solitario is our 76M PMH diabetes meliltus, depression, GERD, hypertension, dyslipidemia, presented to his place of work in a state of confusion, brought to ER, found to be Covid +. Hospitalization complicated by worsening hypoxia.      #Septic shock, Acute Metabolic Encephalopathy, Acute Hypoxic Respiratory Failure requiring mechanical ventilation 2/2 viral pneumonia treating for Covid 19 c/b mild transaminitis  - Patient presented w/ increased shortness of breath, confusion, tachypnea, covid + on 9/2.  - Admission labs showed WBC count 5K, CRP 7.8, lactate 2.3, d-dimer 0.95, procal 0.16, MRSA -, covid +, Bcx's NGTD  - B/l Venous Duplex negative for DVT  - VQ scan showed indeterminate study, noted abnormal perfusion, multifocal defects could be related to multifocal airspace disease but also PE.  - CT Head showed no acute intracranial abnormality  - CT Chest showed patchy b/l airspace disease c/w covid 19  - ID consulted; Following  - CTPE on 9/4 did not reveal PE but did show worsening bilateral airspace disease that is likely worsening COVID pneumonia. Given fluid in fissure on plain film could not rule out component of edema  - Continue 6mg IV Dexamethasone x 10 days  - Finished course of remdesivir.   - Baricitinib per ID recs. Has been d/c by ID in interim due to intubation.   - Continue Level I AC for Covid 19 thromboembolism Ppx  - Continue APAP PRN for fevers  - Continue Albuterol Inhaler  - Patient intubated for worsening hypoxia on 9/5.   - Trend Covid 19 progression labs w/ CBC, CMP, CK, CRP, Ferritin daily and LDH, D-dimer, Fibrinogen q48hrs.  - Enhanced droplet/contact isolation precautions  - P/F ratio post intubation 80 likely representing severe ARDS. Patient paralyzed and prone positioned for first 2 days. Oxygenation improved and patient left supine for  24 hours but last two days has become more hypoxic while supine requiring prone positioning.   - ABG this AM 7.455/46/52 on 65% FiO2, PEEP of 12, , and RR 22. Pplateau pressure around 24. Patient saturating well on 75% FiO2 on my evaluation.   - Suspect pressor requirement a combination of sedation and sepsis. Currently much improved only on 0.9 of phenylephrine and it appeared it could be weaned as blood pressure 140/90.   - Pulmonology consulted. Appreciate recs.   - ID has started vancomycin and meropenem emperically. I have ordered a sputum culture from ETT.   -Patient net even yesterday with lasix gtt yesterday. Giving trial of IV bumex as below.      #New onset atrial flutter   - Levophed transitioned to Phenylphrine   - Rate controlled.   - Have transitioned to Tlov     #HTN/Dyslipidemia  #Elevated Troponin, NSTEMI Type II suspected  #Cardiomyopathy - New  #Pulm HTN - New  - Labs showed trops 0.031->0.016, proBNP 370   - EKG showed NSR, RBBB, no significant ST changes  - Echo showed LVEF 46-50%, mild-mod dilated RV, mild-mod dilated RA, mod-severe Pulm HTN, RVSP 45-55mmHg  - Consulted Cardiology; Recs pending  - Holding home ARB due to shock  - Holding home statin while on Remdesivir   - Continue to monitor on tele, strict I/O's, daily weights, trend HR and BP  - CVP was 20-30 yesterday. Did not respond to 80 IV lasix gtt yesterday, will give 2 mg IV bumex and monitor.      #Hypernatremia  - Corrected sodium improved at 150. Repeat pending.   - Continue free water at 75 cc/hr.      #Nonoliguric MURIEL - Improved   - B/l Cr 0.9-1.0, was up to 2.1 on admission; Now 0.98  - S/p mIVFs, Trend Cr and UOP, avoid nephrotoxins, NSAIDs, dehydration and contrast as able.     #NIDDM Type II, controlled, unknown complications  - Hgb A1c = 6.3%  - Holding home oral agents, continue FSBG and SSI  - Uncontrolled. Pulmonary has increased basal, will also increase SSI.      #Anxiety/Depression  - Continue home regimen      #GERD  - Continue home PPI      #BPH  - Continue home Flomax     F: TFs  A: Fentanyl   S: Propofol   T: Lovenox  H: HOB elevated   U: Famotidine   G: PRN  S: Not ready   B: PRN  I: ETT 9/5, RIJ 9/5  D: Vanc, meropenem      Code status: DNR/DNI.      Dispo: Pending workup and clinical improvement     *This patient is considered high risk due to acute metabolic encephalopathy, acute respiratory failure, covid 19, monitoring for drug toxicities on Remdesivir, MURIEL, new cardiomyopathy and pulm HTN. Patient now critically ill requiring mechanical ventilation. 40 minutes of critical care time spent on patient, with greater than 50% of this time bedside or discussing care with hospital staff.          Nic Webster MD  [facility]  09/12/21  09:19 EDT    Electronically signed by Nic Webster MD at 09/12/21 2091

## 2021-09-14 NOTE — PROGRESS NOTES
[facility] HOSPITALIST PROGRESS NOTE     Patient Identification:  Name:  Valdemar Solitario  Age:  76 y.o.  Sex:  male  :  1945  MRN:  [id number]  Visit Number:  [id number]  ROOM: [address]     Primary Care Provider:  Laurence Geiger APRN    Length of stay in inpatient status:  12    Subjective     Chief Compliant:    Chief Complaint   Patient presents with   • Exposure To Known Illness   • Altered Mental Status   • Weakness - Generalized     History of Presenting Illness:    Patient remains critically ill, he remains intubated on high Fi02 requirement and has had more recently progressing IV pressor requirements and lower blood pressures, pulmonary consulted and following, agreed w/ palliative care consult, palliative care following and discussing GOC w/ family, today CRP significantly bumped at 21, on Vanc and Merrem per ID already, awaiting updated recs, have ordered CBC as has not had today, awaiting updated Na level as well, he remains afebrile, prognosis remains very guarded.  Called patient's sister, Kathleen, to update her on patient's care, she asked me to call patient's brother, Josef, which I did but was unable to reach him at this time.     Objective     Current Hospital Meds:castor oil-balsam peru, 1 application, Topical, Q12H  chlorhexidine, 15 mL, Mouth/Throat, Q12H  dexamethasone, 6 mg, Intravenous, Daily  enoxaparin, 1 mg/kg, Subcutaneous, Q12H  famotidine, 20 mg, Intravenous, BID  insulin aspart, 0-14 Units, Subcutaneous, TID AC  insulin detemir, 25 Units, Subcutaneous, Nightly  meropenem, 1 g, Intravenous, Q8H  midodrine, 10 mg, Oral, TID AC  sodium chloride, 10 mL, Intravenous, Q12H  sodium chloride, 10 mL, Intravenous, Q12H  sodium chloride, 10 mL, Intravenous, Q12H  sodium chloride, 10 mL, Intravenous, Q12H  sodium chloride, 10 mL, Intravenous, Q12H  vancomycin, 1,750 mg, Intravenous, Q24H    dexmedetomidine, 0.2-1.5 mcg/kg/hr, Last Rate: Stopped (21  1414)  norepinephrine, 0.02-0.3 mcg/kg/min, Last Rate: 2.6 mcg/kg/min (09/13/21 0719)  Pharmacy Consult - Pharmacy to dose,   Pharmacy Consult - Pharmacy to dose,   Pharmacy to Dose enoxaparin (LOVENOX),   Pharmacy to dose vancomycin,   phenylephrine, 0.5-3 mcg/kg/min, Last Rate: 1.8 mcg/kg/min (09/14/21 1003)  propofol, 5-50 mcg/kg/min, Last Rate: 30 mcg/kg/min (09/14/21 0750)  vasopressin (PITRESSIN) 20 units in 100 mL infusion, 0.03 Units/min, Last Rate: 0.03 Units/min (09/14/21 0243)      Current Antimicrobial Therapy:  Anti-Infectives (From admission, onward)    Ordered     Dose/Rate Route Frequency Start Stop    09/13/21 1407  vancomycin 1750 mg/500 mL 0.9% NS IVPB ([facility])     Ordering Provider: Jose De Jesus Mccord MD    1,750 mg  over 120 Minutes Intravenous Every 24 Hours 09/13/21 1600 09/17/21 1559    09/10/21 1042  meropenem (MERREM) 1 g in sodium chloride 0.9 % 100 mL IVPB-VTB     Ordering Provider: Nic Webster MD    1 g  over 3 Hours Intravenous Every 8 Hours 09/10/21 1300 09/19/21 1259    09/09/21 0949  vancomycin 1250 mg/250 mL 0.9% NS IVPB ([facility])     Ordering Provider: Brock Metzger MD    1,250 mg  over 60 Minutes Intravenous Once 09/09/21 1100 09/09/21 1201    09/09/21 0942  meropenem (MERREM) 1 g in sodium chloride 0.9 % 100 mL IVPB-VTB     Ordering Provider: Brock Metzger MD    1 g  over 30 Minutes Intravenous Once 09/09/21 1030 09/09/21 1131    09/09/21 0918  Pharmacy to dose vancomycin     Ordering Provider: Jeny Lopes N, APRN     Does not apply Continuous PRN 09/09/21 0917 09/19/21 0916    09/03/21 0615  remdesivir 100 mg in sodium chloride 0.9 % 270 mL IVPB (powder vial)     Ordering Provider: Mg Augustine MD    100 mg  over 60 Minutes Intravenous Every 24 Hours 09/04/21 0900 09/07/21 1055    09/03/21 0615  remdesivir 200 mg in sodium chloride 0.9 % 290 mL IVPB (powder vial)     Ordering Provider: Mg Augustine MD    200 mg  over 60 Minutes Intravenous Every 24  Hours 09/03/21 0900 09/03/21 0940        Current Diuretic Therapy:  Diuretics (From admission, onward)    Ordered     Dose/Rate Route Frequency Start Stop    09/12/21 0857  bumetanide (BUMEX) injection 2 mg     Ordering Provider: Nic Webster MD    2 mg Intravenous Once 09/12/21 0945 09/12/21 0948    09/11/21 1026  furosemide (LASIX) 80 mg in sodium chloride 0.9 % 50 mL IVPB     Ordering Provider: Nic Webster MD    80 mg  over 4 Hours Intravenous Once 09/11/21 1200 09/11/21 1646    09/10/21 0822  furosemide (LASIX) injection 40 mg     Ordering Provider: Manuelito Adame MD    40 mg Intravenous Once 09/10/21 0915 09/10/21 0839    09/08/21 0736  furosemide (LASIX) injection 40 mg     Ordering Provider: Manuelito Adame MD    40 mg Intravenous Once 09/08/21 0800 09/08/21 0926    09/05/21 0732  furosemide (LASIX) injection 80 mg     Ordering Provider: Nic Webster MD    80 mg Intravenous Once 09/05/21 0830 09/05/21 0842        ----------------------------------------------------------------------------------------------------------------------  Vital Signs:  Temp:  [99 °F (37.2 °C)-100 °F (37.8 °C)] 100 °F (37.8 °C)  Heart Rate:  [53-72] 69  Resp:  [26] 26  BP: ()/(28-78) 64/34  FiO2 (%):  [100 %] 100 %  SpO2:  [87 %-99 %] 95 %  on  Flow (L/min):  [15] 15;   Device (Oxygen Therapy): ventilator  Body mass index is 24.43 kg/m².    Wt Readings from Last 3 Encounters:   09/14/21 68.6 kg (151 lb 4.8 oz)   06/14/17 73.9 kg (163 lb)   03/23/17 76.7 kg (169 lb)     Intake & Output (last 3 days)       09/11 0701 - 09/12 0700 09/12 0701 - 09/13 0700 09/13 0701 - 09/14 0700 09/14 0701 - 09/15 0700    I.V. (mL/kg) 950.3 (13.9) 1012.7 (14.8) 1676.5 (24.4)     Other 1500 2245 681     NG/ 994 278     IV Piggyback 850 131.5 763     Total Intake(mL/kg) 3920.3 (57.2) 4383.2 (64.3) 3398.5 (49.5)     Urine (mL/kg/hr) 3250 (2) 3525 (2.2) 1700 (1)     Stool   0     Total Output 3250 3525 1700     Net +670.3 +858.2  +1698.5                 NPO Diet  ----------------------------------------------------------------------------------------------------------------------  Physical exam:  General: intubated/sedated  Head: Normocephalic, atraumatic  Eyes: Conjunctivae and sclerae normal.  Ears: Ears appear intact with no abnormalities noted.   Neck: Trachea midline. No obvious JVD.  Heart: Tele reveals regular rate and rhtyhm  Lungs: Intubated/venilated, b/l equal rise of chest, course breath sounds, on 100% Fi02 still  Abdomen: No obvious abdominal distension.  MS: Muscle tone appears normal. No gross deformities.  Extremities: No clubbing, cyanosis or edema noted.  Skin: No visible bleeding, bruising, or rash.  Neurologic: unable to assess due to sedation    *Exam not significantly changed today 9/14  ----------------------------------------------------------------------------------------------------------------------  Results from last 7 days   Lab Units 09/14/21  0432 09/13/21  0732 09/12/21  0935 09/12/21  0308 09/11/21  0417 09/11/21  0302 09/11/21  0302 09/10/21  0429 09/09/21  0055   CRP mg/dL 21.82* 4.68*  --  7.14*  --    < > 14.61*   < > 13.17*   LACTATE mmol/L  --  1.9  --   --   --   --  1.9  --  1.6   WBC 10*3/mm3  --  11.51* 14.91*  --   --   --  13.27*   < > 21.69*   HEMOGLOBIN g/dL  --  10.6* 10.5*  --   --   --  11.0*   < > 13.6   HEMATOCRIT %  --  35.5* 35.1*  --   --   --  36.7*   < > 44.6   MCV fL  --  94.9 92.9  --   --   --  91.8   < > 92.3   MCHC g/dL  --  29.9* 29.9*  --   --   --  30.0*   < > 30.5*   PLATELETS 10*3/mm3  --  150 143  --   --   --  154   < > 216   INR   --  1.19*  --   --  1.36*  --   --   --  1.29*    < > = values in this interval not displayed.     Results from last 7 days   Lab Units 09/14/21  0447   PH, ARTERIAL pH units 7.418   PO2 ART mm Hg 115.0*   PCO2, ARTERIAL mm Hg 46.1*   HCO3 ART mmol/L 29.7*     Results from last 7 days   Lab Units 09/14/21  0432 09/13/21  0732 09/12/21  0934  09/12/21  0308 09/11/21  0302   SODIUM mmol/L  --  151* 147*  --  148*   POTASSIUM mmol/L 5.1 3.0* 4.0  --  4.5   CHLORIDE mmol/L  --  116* 110*  --  113*   CO2 mmol/L  --  26.6 28.3  --  26.0   BUN mg/dL  --  58* 66*  --  70*   CREATININE mg/dL 0.92 0.88 0.97   < > 0.98   EGFR IF NONAFRICN AM mL/min/1.73 80 84 75   < > 74   CALCIUM mg/dL  --  7.1* 8.6  --  8.9   GLUCOSE mg/dL  --  130* 303*  --  307*   ALBUMIN g/dL 2.05* 1.74* 2.14*   < > 1.98*   BILIRUBIN mg/dL 0.6 0.6 0.4   < > 0.5   ALK PHOS U/L 57 46 68   < > 61   AST (SGOT) U/L 71* 40 53*   < > 32   ALT (SGPT) U/L 43* 34 38   < > 28    < > = values in this interval not displayed.   Estimated Creatinine Clearance: 66.3 mL/min (by C-G formula based on SCr of 0.92 mg/dL).  No results found for: AMMONIA              Glucose   Date/Time Value Ref Range Status   09/14/2021 0611 133 (H) 70 - 130 mg/dL Final     Comment:     Meter: [id number] : [id number] Nayla Everett   09/13/2021 2110 74 70 - 130 mg/dL Final     Comment:     Meter: [id number] : [id number] Nayla Everett   09/13/2021 1628 102 70 - 130 mg/dL Final     Comment:     Meter: [id number] : [id number] Kendy Pastor GARFIELD   09/13/2021 1122 110 70 - 130 mg/dL Final     Comment:     Meter: [id number] : [id number] PRICE ANNABELLA   09/13/2021 0635 146 (H) 70 - 130 mg/dL Final     Comment:     Meter: [id number] : [id number] Nayla Everett   09/12/2021 2130 221 (H) 70 - 130 mg/dL Final     Comment:     Meter: [id number] : [id number] Nayla Everett   09/12/2021 1743 254 (H) 70 - 130 mg/dL Final     Comment:     Meter: [id number] : [id number] David JAIMES   09/12/2021 1107 275 (H) 70 - 130 mg/dL Final     Comment:     Meter: [id number] : [id number] David JAIMES     No results found for: TSH, FREET4  No results found for: PREGTESTUR, PREGSERUM, HCG, HCGQUANT  Pain Management Panel    There is no flowsheet data to display.       Brief Urine Lab Results  (Last result in the past 365  days)      Color   Clarity   Blood   Leuk Est   Nitrite   Protein   CREAT   Urine HCG        09/04/21 1137 Yellow Cloudy Trace Negative Negative Trace             No results found for: BLOODCX  No results found for: URINECX  No results found for: WOUNDCX  No results found for: STOOLCX  No results found for: RESPCX  No results found for: AFBCX  Results from last 7 days   Lab Units 09/14/21  0432 09/13/21  0732 09/12/21  0308 09/11/21  0302 09/10/21  0429 09/09/21  0914 09/09/21  0055 09/08/21  0233   PROCALCITONIN ng/mL  --   --   --   --   --  0.45*  --  0.22   LACTATE mmol/L  --  1.9  --  1.9  --   --  1.6  --    CRP mg/dL 21.82* 4.68* 7.14* 14.61* 21.68*  --  13.17*  --      I have personally looked at the labs and they are summarized above.  ----------------------------------------------------------------------------------------------------------------------  Detailed radiology reports for the last 24 hours:  Imaging Results (Last 24 Hours)     Procedure Component Value Units Date/Time    XR Chest 1 View [[id number]] Collected: 09/14/21 1029     Updated: 09/14/21 1042    Narrative:      EXAM:    XR Chest, 1 View     EXAM DATE:    9/14/2021 8:03 AM     CLINICAL HISTORY:    to confirm placement of ET Tube -Manual Prone Protocol;  U07.1-COVID-19; J12.82-Pneumonia due to Coronavirus disease 2019;  R09.02-Hypoxemia; J96.01-Acute respiratory failure with hypoxia     TECHNIQUE:    Frontal view of the chest.     COMPARISON:    09/13/2021     FINDINGS:    Lungs:  Bilateral airspace disease from previous.    Pleural space:  No pneumothorax identified.    Heart:  Cardiomegaly is stable.    Mediastinum:  Unremarkable.    Bones/joints:  Unremarkable.    Tubes, lines and devices:  ET tube with tip just below clavicles.  NG  tube extends into the mid stomach.  Right IJ deep line noted with tip at  the cavoatrial junction.       Impression:      1.  Positioning of support devices detailed above.  2.  Stable bilateral airspace  disease.     This report was finalized on 9/14/2021 10:30 AM by Dr. Houston Larios MD.           Assessment & Plan    Mr. Solitario is our 76M PMH diabetes meliltus, depression, GERD, hypertension, dyslipidemia, presented to his place of work in a state of confusion, brought to ER, found to be Covid +. Hospitalization complicated by worsening hypoxia.      #Septic shock, Acute Metabolic Encephalopathy, Acute Hypoxic Respiratory Failure requiring intubation and mechanical ventilation 2/2 viral pneumonia treating for Covid 19 c/b ARDS and mild transaminitis  - Patient presented w/ increased shortness of breath, confusion, tachypnea, covid + on 9/2, intubated on 9/5 for worsening hypoxia on bipap.  - Admission labs showed WBC count 5K, CRP 7.8, lactate 2.3, d-dimer 0.95, procal 0.16, MRSA -, covid +, Bcx's NGTD  - B/l Venous Duplex negative for DVT  - VQ scan showed indeterminate study, noted abnormal perfusion, multifocal defects could be related to multifocal airspace disease but also PE.  - CT Head showed no acute intracranial abnormality  - CT Chest showed patchy b/l airspace disease c/w covid 19  - ID consulted; Following  - CTPE on 9/4 did not reveal PE but did show worsening bilateral airspace disease that is likely worsening COVID pneumonia. Given fluid in fissure on plain film could not rule out component of edema  - Pulm consulted; Following  - ID consulted; Following, s/p Remdesivir, initially initiated on baricitinib but d/c'd due to intubation/mechanical ventilation.   Continue 6mg IV Dexamethasone for extended time period due to continued significant respiratory failure  - Continue Vanc and Merrem, CRP worsening today, f/u updated recs  - Continue Level III AC for Covid 19 thromboembolism Ppx  - Continue APAP PRN for fevers  - Continue Albuterol Inhaler  - Continue IV pressors to maintain MAPs > 65mmHg or SBP > 90, currently still requiring and have had to increase  - Trend Covid 19 progression labs w/  CBC, CMP, CK, CRP, Ferritin daily and LDH, D-dimer, Fibrinogen q48hrs.  - Continue to monitor in CCU, continue enhanced droplet/contact isolation precautions, wean 02 as able.     #HTN/Dyslipidemia  #Elevated Troponin, NSTEMI Type II suspected  #Cardiomyopathy   #Pulm HTN   #Afib w/ RVR  - Labs showed trops 0.031->0.016, proBNP 370   - EKG showed NSR, RBBB, no significant ST changes  - Echo showed LVEF 46-50%, mild-mod dilated RV, mild-mod dilated RA, mod-severe Pulm HTN, RVSP 45-55mmHg  - Consulted Cardiology; Followed, rec'd outpatient echo for f/u 6 months  - Holding home ARB due to shock  - Holding home statin while on Remdesivir, consider resuming soon  - Continue Tlov, switch to DOAC when appropraite  - Continue to monitor on tele, strict I/O's, daily weights, trend HR and BP, IV diuresis PRN     #Electrolyte Abnormalities  - Acute Mild Hypokalemia - K 3.0 today, Replaced per protocol, Resolved  - Acute Mild Hypernatremia - Na up to 151, slightly increased FW flushes, titrate as indicated, trend Na and repeat level pending today     #NIDDM Type II, controlled, unknown complications  - Hgb A1c = 6.3%  - Holding home oral agents, continue FSBG and SSI  - Uncontrolled. Pulmonary has increased basal, will also increase SSI.      #Anxiety/Depression  - Continue home regimen     #GERD  - Continue home PPI      #BPH  - Continue home Flomax     #Nonoliguric MURIEL - Resolved  - B/l Cr 0.9-1.0, was up to 2.1 on admission, now back to baseline, S/p mIVFs; Trend Cr and UOP, avoid nephrotoxins, NSAIDs, dehydration and contrast as able.    F: NPO  E: Monitor & Replace PRN  N: TFs  PPx: TLov  Code: DNR/DNI     Dispo: Pending clinical improvement, palliative consulted for GOC conversations     I have spent 30 minutes of critical care time (8:00-8:30AM) with > 50% of time spent in direct patient care, evaluating the patient at bedside, reviewing all labs and images, reviewing ABG and vent settings, reviewing pain and sedation  gtt's, reviewing pressor requirement and increasing amount/doses, communicating plan of care w/ nursing staff and consultants, utilizing high complexity medical decision making to assess and treat vital organ system failure in an individual who has impairment of one or more vital organ systems such that there is a high probability of imminent or life threatening deterioration in the patient’s condition. Failure to initiate the above interventions on an urgent basis would likely result in sudden, clinically significant or life threatening deterioration in the patient's condition.     VTE Prophylaxis:   Mechanical Order History:     None      Pharmalogical Order History:      Ordered     Dose Route Frequency Stop    09/09/21 0813  enoxaparin (LOVENOX) syringe 70 mg      1 mg/kg SC Every 12 Hours --    09/09/21 0730  Pharmacy to Dose enoxaparin (LOVENOX)      -- XX Continuous PRN --    09/07/21 0859  enoxaparin (LOVENOX) syringe 40 mg  Status:  Discontinued      40 mg SC Every 24 Hours 09/09/21 0730    09/03/21 0739  enoxaparin (LOVENOX) syringe 80 mg  Status:  Discontinued      1 mg/kg SC Every 12 Hours 09/07/21 0859    09/03/21 0724  Pharmacy to Dose enoxaparin (LOVENOX)  Status:  Discontinued      -- XX Continuous PRN 09/03/21 0740    09/02/21 1237  enoxaparin (LOVENOX) syringe 40 mg  Status:  Discontinued      40 mg SC Every 24 Hours 09/02/21 1247    09/02/21 1247  enoxaparin (LOVENOX) syringe 40 mg  Status:  Discontinued      0.5 mg/kg SC Every 24 Hours 09/03/21 0549              Jose De Jesus Mccord MD  [facility] Hospitalist  09/14/21  11:18 EDT

## 2021-09-14 NOTE — PROGRESS NOTES
Daryl Aquino MD                          [phone]      Patient ID:    Name:  Valdemar Solitario    MRN:  [id number]    1945   76 y.o.  male            Patient Care Team:  Laurence Geiger APRN as PCP - General (Family Medicine)  Corinne Dimas APRN as PCP - Family Medicine    CC/ Reason for visit: Acute respiratory failure, acute coronary pneumonia, shock, pulmonary pretension    Subjective: Pt seen and examined this AM. Overnight events noted.  Patient remains on the mechanical ventilator.  Stable Sedated and on multiple pressors.  Low-grade fever overnight.  Chest imaging reviewed and ABG reviewed.  ET tube was thought to be displaced and was evaluated by anesthesia who have confirmed the position and did not need to change the tube.     ROS: Unable to obtain due to critical illness    Objective     Vital Signs past 24hrs    BP range: BP: ()/(31-78) 105/47  Pulse range: Heart Rate:  [53-71] 67  Resp rate range: Resp:  [22-26] 22  Temp range: Temp (24hrs), Av.9 °F (37.7 °C), Min:99 °F (37.2 °C), Max:100.6 °F (38.1 °C)      Ventilator/Non-Invasive Ventilation Settings (From admission, onward)     Start     Ordered    21 1636  Ventilator - AC/VC; (22); 50; 90%; 12; 450  Continuous     Comments: May titrate up to 70% FIO2 please notify Dr Webster if higher 02 amount is required.   Question Answer Comment   Vent Mode AC/VC    Breath rate  22   FiO2 50    FiO2 titrate for Sp02% =/> 90%    PEEP 12    Tidal Volume 450        21 1637    21 0810  Ventilator - AC/VC; (22); 50; 90%; 10; 450  Continuous,   Status:  Canceled     Question Answer Comment   Vent Mode AC/VC    Breath rate  22   FiO2 50    FiO2 titrate for Sp02% =/> 90%    PEEP 10    Tidal Volume 450        21 0809    21 1306  Ventilator - AC/VC; (18); 100; 90%; Other (see comments); 16; 450  Continuous,   Status:  Canceled     Question Answer Comment   Vent Mode AC/VC    Breath  rate  18   FiO2 100    FiO2 titrate for Sp02% =/> 90%    PEEP Other (see comments)    PEEP: 16    Tidal Volume 450        09/05/21 1308    09/02/21 2228  NIPPV (CPAP or BIPAP)  Until Discontinued,   Status:  Canceled     Question Answer Comment   Indication: Acute Respiratory Failure    Type: BIPAP    NIPPV Mask Interface: Per Patient Preference    IPAP 15    EPAP 5    Oxygen FIO2    FIO2 % 32    Breath Rate 20    Titrate for SPO2 90%        09/02/21 2227    09/02/21 2223  NIPPV (CPAP or BIPAP)  Until Discontinued,   Status:  Canceled     Question Answer Comment   Type: BIPAP    NIPPV Mask Interface: Per Patient Preference        09/02/21 2223                Device (Oxygen Therapy): ventilator FiO2 (%): 100 %     68.6 kg (151 lb 4.8 oz); Body mass index is 24.43 kg/m².      Intake/Output Summary (Last 24 hours) at 9/14/2021 1221  Last data filed at 9/14/2021 0600  Gross per 24 hour   Intake 3398.52 ml   Output 1700 ml   Net 1698.52 ml       PHYSICAL EXAM   Constitutional: Elderly white male sedated on the mechanical ventilator  Head: - NCAT  Eyes: No pallor.  Anicteric sclerae, EOMI.  ENMT:   Endotracheal tube in place, no oral thrush. Moist MM.   NECK: Trachea midline, No thyromegaly, no palpable cervical lymphadenopathy  Heart: RRR, no murmur. No pedal edema   Lungs: GOGO +, No wheezes/ crackles heard    Abdomen: Soft. No tenderness, guarding or rigidity. No palpable masses  Extremities: Extremities warm and well perfused. No cyanosis/ clubbing  Neuro: Sedated  Psych: Mood and affect - UTO     PPE recommended per [facility] infectious disease Isolation protocol for the current clinical scenario(as mentioned below) was followed.     Scheduled meds:  castor oil-balsam peru, 1 application, Topical, Q12H  chlorhexidine, 15 mL, Mouth/Throat, Q12H  dexamethasone, 6 mg, Intravenous, Daily  enoxaparin, 1 mg/kg, Subcutaneous, Q12H  famotidine, 20 mg, Intravenous, BID  insulin aspart, 0-14 Units, Subcutaneous, TID  AC  insulin detemir, 25 Units, Subcutaneous, Nightly  meropenem, 1 g, Intravenous, Q8H  midodrine, 10 mg, Oral, TID AC  sodium chloride, 10 mL, Intravenous, Q12H  sodium chloride, 10 mL, Intravenous, Q12H  sodium chloride, 10 mL, Intravenous, Q12H  sodium chloride, 10 mL, Intravenous, Q12H  sodium chloride, 10 mL, Intravenous, Q12H  vancomycin, 1,750 mg, Intravenous, Q24H        IV meds:                      dexmedetomidine, 0.2-1.5 mcg/kg/hr, Last Rate: Stopped (09/09/21 1414)  fentaNYL Citrate,   norepinephrine, 0.02-0.3 mcg/kg/min, Last Rate: 2.6 mcg/kg/min (09/13/21 0719)  Pharmacy Consult - Pharmacy to dose,   Pharmacy Consult - Pharmacy to dose,   Pharmacy to Dose enoxaparin (LOVENOX),   Pharmacy to dose vancomycin,   phenylephrine, 0.5-3 mcg/kg/min, Last Rate: 1.8 mcg/kg/min (09/14/21 1003)  propofol, 5-50 mcg/kg/min, Last Rate: 30 mcg/kg/min (09/14/21 0750)  vasopressin (PITRESSIN) 20 units in 100 mL infusion, 0.03 Units/min, Last Rate: 0.03 Units/min (09/14/21 0243)        Data Review:      Results from last 7 days   Lab Units 09/14/21  1124 09/14/21  0432 09/13/21  0732 09/12/21  0935 09/12/21  0934 09/12/21  0308 09/11/21  0417 09/11/21  0302 09/11/21  0302 09/10/21  0429 09/09/21  0914 09/09/21  0055 09/08/21  0234 09/08/21  0233   0000   SODIUM mmol/L  --   --  151*  --  147*  --   --   --  148*   < >  --  149*   < > 148*  --    POTASSIUM mmol/L  --  5.1 3.0*  --  4.0  --   --   --  4.5   < >  --  5.1   < > 5.1   < >   CHLORIDE mmol/L  --   --  116*  --  110*  --   --   --  113*   < >  --  110*   < > 112*  --    CO2 mmol/L  --   --  26.6  --  28.3  --   --   --  26.0   < >  --  29.5*   < > 28.6  --    BUN mg/dL  --   --  58*  --  66*  --   --   --  70*   < >  --  69*   < > 56*  --    CREATININE mg/dL  --  0.92 0.88  --  0.97   < >  --   --  0.98   < >  --  1.31*   < > 1.19  --    CALCIUM mg/dL  --   --  7.1*  --  8.6  --   --   --  8.9   < >  --  8.8   < > 8.7  --    BILIRUBIN mg/dL  --  0.6 0.6  --   0.4   < >  --   --  0.5   < >  --  0.5   < > 0.5  --    ALK PHOS U/L  --  57 46  --  68   < >  --   --  61   < >  --  56   < > 63  --    ALT (SGPT) U/L  --  43* 34  --  38   < >  --   --  28   < >  --  42*   < > 40  --    AST (SGOT) U/L  --  71* 40  --  53*   < >  --   --  32   < >  --  41*   < > 55*  --    GLUCOSE mg/dL  --   --  130*  --  303*  --   --   --  307*   < >  --  238*   < > 278*  --    WBC 10*3/mm3 10.94*  --  11.51* 14.91*  --   --   --    < > 13.27*   < >  --  21.69*   < >  --   --    HEMOGLOBIN g/dL 10.8*  --  10.6* 10.5*  --   --   --    < > 11.0*   < >  --  13.6   < >  --   --    PLATELETS 10*3/mm3 136*  --  150 143  --   --   --    < > 154   < >  --  216   < >  --   --    INR   --   --  1.19*  --   --   --  1.36*  --   --   --   --  1.29*  --   --   --    PROCALCITONIN ng/mL  --   --   --   --   --   --   --   --   --   --  0.45*  --   --  0.22  --     < > = values in this interval not displayed.       Lab Results   Component Value Date    CALCIUM 7.1 (L) 09/13/2021             Results from last 7 days   Lab Units 09/14/21  0447 09/13/21  1134 09/13/21  0328 09/12/21  0426 09/11/21  1519 09/11/21  0443 09/10/21  1905   PH, ARTERIAL pH units 7.418 7.434 7.470* 7.455* 7.408 7.448 7.446   PO2 ART mm Hg 115.0* 72.6* 54.0* 52.4* 80.4* 75.0* 88.2   PCO2, ARTERIAL mm Hg 46.1* 48.4* 45.7* 45.7* 47.2* 43.3 43.4   HCO3 ART mmol/L 29.7* 32.4* 33.2* 32.1* 29.7* 30.0* 29.9*      COVID LABS:  Results From Last 14 Days   Lab Units 09/14/21  0432 09/13/21  0732 09/12/21  0308 09/11/21  0417 09/11/21  0302 09/11/21  0302 09/10/21  0429 09/09/21  0914 09/09/21  0055 09/08/21  0233 09/07/21  0320 09/07/21  0320 09/06/21  0433 09/05/21  0227 09/05/21  0227 09/02/21  1301 09/02/21  1218 09/02/21  0950   PROBNP pg/mL  --   --   --   --   --   --   --   --   --   --   --   --   --   --   --   --   --  370.2   CRP mg/dL 21.82* 4.68* 7.14*  --    < > 14.61*   < >  --  13.17*  --    < > 2.54* 2.93*   < > 3.13*   < >  --    --    D DIMER QUANT MCGFEU/mL  --  0.83*  --   --   --  0.41  --   --  0.52*  --    < > 0.36  --    < > 0.36   < >  --  0.95*   FERRITIN ng/mL  --   --   --   --   --   --   --   --   --   --   --  1,685.00* 2,397.00*  --  2,904.00*   < >  --   --    LACTATE mmol/L  --  1.9  --   --   --  1.9  --   --  1.6  --    < > 1.3  --    < > 1.4   < >  --  2.3*   LDH U/L  --  390*  --   --   --  336*  --   --  404*  --    < > 608*  --    < > 608*   < >  --   --    PROCALCITONIN ng/mL  --   --   --   --   --   --   --  0.45*  --  0.22  --  0.11  --   --   --    < >  --   --    PROTIME Seconds  --  15.5*  --  17.2*  --   --   --   --  16.6*  --    < > 15.9*  --    < > 14.9*   < >  --  13.1   INR   --  1.19*  --  1.36*  --   --   --   --  1.29*  --    < > 1.23*  --    < > 1.13*   < >  --  0.95   TROPONIN T ng/mL  --   --   --   --   --   --   --   --   --   --   --   --   --   --   --   --  0.016 0.031*    < > = values in this interval not displayed.         Results Review:    I have reviewed the relevant laboratory results and independently reviewed the chest imaging from this hospitalization including the available echocardiogram reports personally and summarized it if/ when appropriate below    Assessment    Acute hypoxic respiratory failure  Acute COVID-19 pneumonia  ARDS  Shock - Septic versus cardiogenic  Atrial fibrillation on anticoagulation  Moderate to severe pulmonary hypertension  RV dilation  Hypernatremia  Hypocalcemia  Anemia    PLAN:  Patient remains on the mechanical ventilator and settings adjusted as appropriate.  ABG and chest x-ray reviewed.  Events regarding ET tube yesterday reviewed and discussed with anesthesia.   Continue with current treatment plan for Covid pneumonia.  No new fevers noted on multiple antibiotics.  We will recommend narrowing down based on respiratory cultures which are pending.  ID on board and defer to them  Renal function is better.  Continue to keep him as negative as  possible  Shock likely cardiogenic from severe pulmonary hypertension on acute respiratory issues and would continue to work on weaning down as much as possible but unfortunately patient will be very volume dependent and this is not a good prognosis overall.  No clinical concern for PE based on CT angiogram and D-dimer.  We will continue with midodrine.  Patient already on anticoagulation.   Rest of the medical management per primary    Very guarded prognosis  DNR    DVT/GI prophylaxis    D/w palliative care and would strongly recommend transition to comfort measures only given advanced age and severe COVID-19 pneumonia with prolonged respiratory failure, pulmonary hypertension and high risk for further decline given multiple complications    D/w RN     CC - 32 mins    Daryl Aquino MD  9/14/2021

## 2021-09-14 NOTE — PROGRESS NOTES
PROGRESS NOTE         Patient Identification:  Name:  Valdemar Solitario  Age:  76 y.o.  Sex:  male  :  1945  MRN:  [id number]  Visit Number:  [id number]  Primary Care Provider:  Laurence Geiger APRN         LOS: 12 days       ----------------------------------------------------------------------------------------------------------------------  Subjective       Chief Complaints:    Exposure To Known Illness, Altered Mental Status, and Weakness - Generalized        Interval History:      Patient continues to be sedated and intubated at 100% FiO2.  T-max 100 °F.  CRP has worsened from 4.6-21.82.  White count improved at 11.51.  Respiratory culture from 2021 reports no organisms seen preliminarily.  Chest x-ray from 2021 reports stable bilateral airspace disease.    ----------------------------------------------------------------------------------------------------------------------      Objective       Current Hospital Meds:  castor oil-balsam peru, 1 application, Topical, Q12H  chlorhexidine, 15 mL, Mouth/Throat, Q12H  dexamethasone, 6 mg, Intravenous, Daily  enoxaparin, 1 mg/kg, Subcutaneous, Q12H  famotidine, 20 mg, Intravenous, BID  insulin aspart, 0-14 Units, Subcutaneous, TID AC  insulin detemir, 25 Units, Subcutaneous, Nightly  meropenem, 1 g, Intravenous, Q8H  midodrine, 10 mg, Oral, TID AC  sodium chloride, 10 mL, Intravenous, Q12H  sodium chloride, 10 mL, Intravenous, Q12H  sodium chloride, 10 mL, Intravenous, Q12H  sodium chloride, 10 mL, Intravenous, Q12H  sodium chloride, 10 mL, Intravenous, Q12H  vancomycin, 1,750 mg, Intravenous, Q24H      dexmedetomidine, 0.2-1.5 mcg/kg/hr, Last Rate: Stopped (21 1414)  fentaNYL Citrate,   norepinephrine, 0.02-0.3 mcg/kg/min, Last Rate: 2.6 mcg/kg/min (21 0619)  Pharmacy Consult - Pharmacy to dose,   Pharmacy Consult - Pharmacy to dose,   Pharmacy to Dose enoxaparin (LOVENOX),   Pharmacy to dose vancomycin,   phenylephrine,  0.5-3 mcg/kg/min, Last Rate: 1.8 mcg/kg/min (09/14/21 1003)  propofol, 5-50 mcg/kg/min, Last Rate: 30 mcg/kg/min (09/14/21 0750)  vasopressin (PITRESSIN) 20 units in 100 mL infusion, 0.03 Units/min, Last Rate: 0.03 Units/min (09/14/21 0243)      ----------------------------------------------------------------------------------------------------------------------    Vital Signs:  Temp:  [99 °F (37.2 °C)-100.6 °F (38.1 °C)] 100.6 °F (38.1 °C)  Heart Rate:  [53-75] 68  Resp:  [22-26] 22  BP: ()/(31-86) 119/54  FiO2 (%):  [100 %] 100 %  Mean Arterial Pressure (Non-Invasive) for the past 24 hrs (Last 3 readings):   Noninvasive MAP (mmHg)   09/14/21 1303 85   09/14/21 1248 76   09/14/21 1230 72     SpO2 Percentage    09/14/21 1230 09/14/21 1248 09/14/21 1303   SpO2: 96% 97% 97%     SpO2:  [83 %-99 %] 97 %  on  Flow (L/min):  [15] 15;   Device (Oxygen Therapy): ventilator    Body mass index is 24.43 kg/m².  Wt Readings from Last 3 Encounters:   09/14/21 68.6 kg (151 lb 4.8 oz)   06/14/17 73.9 kg (163 lb)   03/23/17 76.7 kg (169 lb)        Intake/Output Summary (Last 24 hours) at 9/14/2021 1330  Last data filed at 9/14/2021 1231  Gross per 24 hour   Intake 3398.52 ml   Output 1700 ml   Net 1698.52 ml     NPO Diet  ----------------------------------------------------------------------------------------------------------------------      Physical Exam:    Deferred due to COVID-19 isolation.  ----------------------------------------------------------------------------------------------------------------------            Results from last 7 days   Lab Units 09/14/21  0447   PH, ARTERIAL pH units 7.418   PO2 ART mm Hg 115.0*   PCO2, ARTERIAL mm Hg 46.1*   HCO3 ART mmol/L 29.7*     Results from last 7 days   Lab Units 09/14/21  1124 09/14/21  0432 09/13/21  0732 09/12/21  0935 09/12/21  0308 09/11/21  0417 09/11/21  0302 09/11/21  0302 09/10/21  0429 09/09/21  0055   CRP mg/dL  --  21.82* 4.68*  --  7.14*  --    < > 14.61*    < > 13.17*   LACTATE mmol/L  --   --  1.9  --   --   --   --  1.9  --  1.6   WBC 10*3/mm3 10.94*  --  11.51* 14.91*  --   --    < > 13.27*   < > 21.69*   HEMOGLOBIN g/dL 10.8*  --  10.6* 10.5*  --   --    < > 11.0*   < > 13.6   HEMATOCRIT % 37.1*  --  35.5* 35.1*  --   --    < > 36.7*   < > 44.6   MCV fL 96.6  --  94.9 92.9  --   --    < > 91.8   < > 92.3   MCHC g/dL 29.1*  --  29.9* 29.9*  --   --    < > 30.0*   < > 30.5*   PLATELETS 10*3/mm3 136*  --  150 143  --   --    < > 154   < > 216   INR   --   --  1.19*  --   --  1.36*  --   --   --  1.29*    < > = values in this interval not displayed.     Results from last 7 days   Lab Units 09/14/21  1124 09/14/21  0432 09/13/21  0732 09/12/21  0934 09/12/21  0934   SODIUM mmol/L 147*  --  151*  --  147*   POTASSIUM mmol/L 5.2 5.1 3.0*   < > 4.0   CHLORIDE mmol/L 113*  --  116*  --  110*   CO2 mmol/L 22.5  --  26.6  --  28.3   BUN mg/dL 56*  --  58*  --  66*   CREATININE mg/dL 0.88 0.92 0.88   < > 0.97   EGFR IF NONAFRICN AM mL/min/1.73 84 80 84   < > 75   CALCIUM mg/dL 8.2*  --  7.1*  --  8.6   GLUCOSE mg/dL 158*  --  130*  --  303*   ALBUMIN g/dL 1.96* 2.05* 1.74*   < > 2.14*   BILIRUBIN mg/dL 0.6 0.6 0.6   < > 0.4   ALK PHOS U/L 61 57 46   < > 68   AST (SGOT) U/L 72* 71* 40   < > 53*   ALT (SGPT) U/L 41 43* 34   < > 38    < > = values in this interval not displayed.   Estimated Creatinine Clearance: 69.3 mL/min (by C-G formula based on SCr of 0.88 mg/dL).  No results found for: AMMONIA    Glucose   Date/Time Value Ref Range Status   09/14/2021 1230 167 (H) 70 - 130 mg/dL Final     Comment:     Meter: [id number] : [id number] DEE WINCHESTER   09/14/2021 0611 133 (H) 70 - 130 mg/dL Final     Comment:     Meter: [id number] : [id number] Nayla Everett   09/13/2021 2110 74 70 - 130 mg/dL Final     Comment:     Meter: [id number] : [id number] Nayla Everett   09/13/2021 1628 102 70 - 130 mg/dL Final     Comment:     Meter: [id number] : [id number] Kendy  Darby MERRILL   09/13/2021 1122 110 70 - 130 mg/dL Final     Comment:     Meter: [id number] : [id number] DEE WINCHESTER   09/13/2021 0635 146 (H) 70 - 130 mg/dL Final     Comment:     Meter: [id number] : [id number] Nayla Everett   09/12/2021 2130 221 (H) 70 - 130 mg/dL Final     Comment:     Meter: [id number] : [id number] Nayla Karlos   09/12/2021 1743 254 (H) 70 - 130 mg/dL Final     Comment:     Meter: [id number] : [id number] David JAIMES     Lab Results   Component Value Date    HGBA1C 6.30 (H) 09/02/2021     No results found for: TSH, FREET4    Blood Culture   Date Value Ref Range Status   09/02/2021 No growth at 24 hours  Preliminary   09/02/2021 No growth at 24 hours  Preliminary   09/02/2021 No growth at 24 hours  Preliminary     No results found for: URINECX  No results found for: WOUNDCX  No results found for: STOOLCX  No results found for: RESPCX  Pain Management Panel    There is no flowsheet data to display.           ----------------------------------------------------------------------------------------------------------------------  Imaging Results (Last 24 Hours)       Procedure Component Value Units Date/Time    XR Chest 1 View [[id number]] Collected: 09/14/21 1029     Updated: 09/14/21 1042    Narrative:      EXAM:    XR Chest, 1 View     EXAM DATE:    9/14/2021 8:03 AM     CLINICAL HISTORY:    to confirm placement of ET Tube -Manual Prone Protocol;  U07.1-COVID-19; J12.82-Pneumonia due to Coronavirus disease 2019;  R09.02-Hypoxemia; J96.01-Acute respiratory failure with hypoxia     TECHNIQUE:    Frontal view of the chest.     COMPARISON:    09/13/2021     FINDINGS:    Lungs:  Bilateral airspace disease from previous.    Pleural space:  No pneumothorax identified.    Heart:  Cardiomegaly is stable.    Mediastinum:  Unremarkable.    Bones/joints:  Unremarkable.    Tubes, lines and devices:  ET tube with tip just below clavicles.  NG  tube extends into the mid stomach.  Right IJ deep  line noted with tip at  the cavoatrial junction.       Impression:      1.  Positioning of support devices detailed above.  2.  Stable bilateral airspace disease.     This report was finalized on 9/14/2021 10:30 AM by Dr. Houston Larios MD.               ----------------------------------------------------------------------------------------------------------------------    Assessment/Plan       Assessment/Plan     ASSESSMENT:    1.  Severe sepsis with lactic acid greater than 2 on admission  2.  COVID-19 pneumonia    PLAN:    Patient continues to be sedated and intubated at 100% FiO2.  T-max 100 °F.  CRP has worsened from 4.6-21.82.  White count improved at 11.51.  Respiratory culture from 9/13/2021 reports no organisms seen preliminarily.  Chest x-ray from 9/14/2021 reports stable bilateral airspace disease.    VQ scan on 9/2/2021 indeterminate for exclusion of PE.  CT of the chest on 9/3/2021 reports patchy bilateral airspace disease concerning for COVID-19 pneumonia.  CT of the abdomen and pelvis on 9/3/2021 reports bibasilar airspace disease suggestive of COVID-19 pneumonia, large right inguinal hernia containing nondilated bowel and fat.     Patient remains on Decadron but has completed course of remdesivir.    We are concerned about possible development of bacterial pneumonia in the setting of prior treatment with baricitinib.     Recommend to continue vancomycin and meropenem courses for now.  Blood cultures ordered as well as UA with culture.      We will continue to follow closely and adjust antibiotic therapy as appropriate.    Code Status:   Code Status and Medical Interventions:   Ordered at: 09/10/21 5645     Limited Support to NOT Include:    Intubation    Cardioversion/Defibrillation     Code Status:    No CPR     Medical Interventions (Level of Support Prior to Arrest):    Limited     Comments:    adult children Tiffany, Cathleen and John all agree he would not want to be resuciated or  reintubated in the event he became extubated.     Scribed for Brock Metzger MD by NELSON Torres. 9/14/2021  13:30 EDT       NELSON Torres  09/14/21  13:30 EDT      Physician Attestation:    The documentation recorded by the scribe accurately reflects the service I personally performed and the decisions made by me.    Brock Metzger MD  09/14/21  13:30 EDT

## 2021-09-14 NOTE — CASE MANAGEMENT/SOCIAL WORK
Discharge Planning Assessment  AMIE Knox     Patient Name: Valdemar Solitario  MRN: [id number]  Today's Date: 9/14/2021    Admit Date: 9/2/2021    Discharge Plan     Row Name 09/14/21 1036       Plan    Plan Pt admitted 9/2/21. Pt remains intubated and sedated. Palliative following for GOC. Pt lives alone and does not utilize home health services or DME at this time. Discharge plan pending clinical improvement. SS following.        SHALONDA Lewis

## 2021-09-15 NOTE — PROGRESS NOTES
"Palliative Care Daily Progress Note     S: Medical record reviewed, followed up with Primary RN Concepción MCKEON  regarding patient's condition. Pt is sedated on ventilator @ 100% FiO2 with peep of 12 and in covid isolation.      O:   Palliative Performance Scale Score: 30%   /56   Pulse 55   Temp 98.7 °F (37.1 °C) (Oral)   Resp 22   Ht 167.6 cm (65.98\")   Wt 68.6 kg (151 lb 4.8 oz)   SpO2 95%   BMI 24.43 kg/m²     Intake/Output Summary (Last 24 hours) at 9/15/2021 1540  Last data filed at 9/15/2021 0600  Gross per 24 hour   Intake 3284.06 ml   Output 2250 ml   Net 1034.06 ml       PE:  COVID RESTRICTIONS      Meds: Reviewed and changes noted.    Labs:   Results from last 7 days   Lab Units 09/15/21  0627   WBC 10*3/mm3 15.44*   HEMOGLOBIN g/dL 9.9*   HEMATOCRIT % 34.0*   PLATELETS 10*3/mm3 198     Results from last 7 days   Lab Units 09/15/21  0626   SODIUM mmol/L 146*   POTASSIUM mmol/L 4.5   CHLORIDE mmol/L 110*   CO2 mmol/L 29.0   BUN mg/dL 54*   CREATININE mg/dL 0.71*   GLUCOSE mg/dL 145*   CALCIUM mg/dL 8.5*     Results from last 7 days   Lab Units 09/15/21  0626   SODIUM mmol/L 146*   POTASSIUM mmol/L 4.5   CHLORIDE mmol/L 110*   CO2 mmol/L 29.0   BUN mg/dL 54*   CREATININE mg/dL 0.71*   CALCIUM mg/dL 8.5*   BILIRUBIN mg/dL 0.4   ALK PHOS U/L 59   ALT (SGPT) U/L 40   AST (SGOT) U/L 60*   GLUCOSE mg/dL 145*     Imaging Results (Last 72 Hours)     Procedure Component Value Units Date/Time    XR Chest 1 View [[id number]] Collected: 09/15/21 1405     Updated: 09/15/21 1413    Narrative:      EXAM:    XR Chest, 1 View     EXAM DATE:    9/15/2021 12:53 PM     CLINICAL HISTORY:    intubated; U07.1-COVID-19; J12.82-Pneumonia due to Coronavirus disease  2019; R09.02-Hypoxemia; J96.01-Acute respiratory failure with hypoxia     TECHNIQUE:    Frontal view of the chest.     COMPARISON:    09/14/2021     FINDINGS:    Lungs:  Bilateral airspace disease stable.  Lung volumes are stable.    Pleural space:  No pneumothorax " identified.    Heart:  Mild cardiac enlargement.    Mediastinum:  Unremarkable.    Bones/joints:  Unremarkable.    Tubes, lines and devices:  ET tube with tip just below clavicles.  NG  tube extends into the proximal-mid stomach.  Right IJ deep line with tip  at the cavoatrial junction.       Impression:      1.  No significant change in bilateral airspace disease.  2.  Positioning of support devices as detailed above.     This report was finalized on 9/15/2021 2:06 PM by Dr. Houston Larios MD.       XR Chest 1 View [[id number]] Collected: 09/14/21 1029     Updated: 09/14/21 1042    Narrative:      EXAM:    XR Chest, 1 View     EXAM DATE:    9/14/2021 8:03 AM     CLINICAL HISTORY:    to confirm placement of ET Tube -Manual Prone Protocol;  U07.1-COVID-19; J12.82-Pneumonia due to Coronavirus disease 2019;  R09.02-Hypoxemia; J96.01-Acute respiratory failure with hypoxia     TECHNIQUE:    Frontal view of the chest.     COMPARISON:    09/13/2021     FINDINGS:    Lungs:  Bilateral airspace disease from previous.    Pleural space:  No pneumothorax identified.    Heart:  Cardiomegaly is stable.    Mediastinum:  Unremarkable.    Bones/joints:  Unremarkable.    Tubes, lines and devices:  ET tube with tip just below clavicles.  NG  tube extends into the mid stomach.  Right IJ deep line noted with tip at  the cavoatrial junction.       Impression:      1.  Positioning of support devices detailed above.  2.  Stable bilateral airspace disease.     This report was finalized on 9/14/2021 10:30 AM by Dr. Houston Larios MD.       XR Chest 1 View [[id number]] Collected: 09/13/21 0852     Updated: 09/13/21 0855    Narrative:      EXAM:    XR Chest, 1 View     EXAM DATE:    9/13/2021 7:00 AM     CLINICAL HISTORY:    to confirm placement of ET Tube -Manual Prone Protocol;  U07.1-COVID-19; J12.82-Pneumonia due to Coronavirus disease 2019;  R09.02-Hypoxemia; J96.01-Acute respiratory failure with hypoxia     TECHNIQUE:    Frontal view of  the chest.     COMPARISON:    09/12/2021     FINDINGS:    Lungs:  Improvement in bilateral airspace disease.  Improved lung  volumes.    Pleural space:  Unremarkable.  No pneumothorax.    Heart:  Unremarkable.  No cardiomegaly.    Mediastinum:  Unremarkable.    Bones/joints:  Old left rib fractures.    Tubes, lines and devices:  Right IJ deep line positioning is stable.   NG tube extends into the stomach.  ET tube with tip at level of  clavicles.       Impression:      1.  Improved aeration of the lungs and decreased basilar airspace  disease.  2.  Support device positioning detailed above.     This report was finalized on 9/13/2021 8:53 AM by Dr. Houston Larios MD.               Diagnostics: Reviewed    A: Valdemar Solitario is a 76 y.o. yo male with confusion, dyspnea, and weakness, he has a past medical history significant for diabetes meliltus, depression, GERD, hypertension, dyslipidemia.  He arrived to the ED via EMS after presenting to his place of work in a state of confusion.  He reported he had been feeling bad for a few days which worsened on 9/2/2021.  He reported while at work he was unable to remember his locker combination and was experiencing shortness of breath and generalized weakness.     Upon arrival to the ED, vital signs were T 98.8F, pulse 85, RR 14, and BP 96/52 with room air oxygen saturation of 82%.  Initial arterial blood gas revealed pH of 7.324 with PCO2 of 42.4 and PO2 of 47.9 on room air.  Troponin T was found to be 0.031.  Creatinine was found to be elevated at 2.10.  Lactate was found to be elevated at 2.3 with D-dimer of 0.95.  Hemoglobin was found to be 11.8.     Mr. Solitario is evaluated in the ED currently on BiPAP.  He reports feeling unwell for a few days but has difficulty talking due to BiPAP. He reports he has been making urine, though it is unclear at present if he has produced a specimen while in the ED.  He reports cough, weakness, and dyspnea.  He is alert to self.  He  follows commands but is wearing BiPAP during examination. He denies chest pain and known dysuria.  He reports he has had some pain in his low back.      High risk secondary to acute hypoxemic respiratory failure 2/2 COVID-19 pneumonia          P:  I was able to speak with patients daughter Kathleen to update her on her fathers condition, she stated that she had been speaking with nurses and getting updates I let her know that if she ever had questions to please fell free to call. Plan is to continue to monitor and treat patient, however he is a DNR.      We will continue to follow along. Please do not hesitate to contact us regarding further sx mgmt or GOC needs, including after hours or on weekends via our on call provider at [phone].     Socorro Zhou, APRN    9/15/2021

## 2021-09-15 NOTE — CASE MANAGEMENT/SOCIAL WORK
Discharge Planning Assessment  AMIE Knox     Patient Name: Valdemar Solitario  MRN: [id number]  Today's Date: 9/15/2021    Admit Date: 9/2/2021    Discharge Plan     Row Name 09/15/21 1318       Plan    Plan Pt admitted 9/2/21. Pt remains intubated and sedated. Palliative following for GOC. Pt lives alone and does not utilize home health services or DME at this time. Discharge plan pending clinical improvement. SS following.        SHALONDA Lewis

## 2021-09-15 NOTE — PROGRESS NOTES
[facility] HOSPITALIST PROGRESS NOTE     Patient Identification:  Name:  Valdemar Solitario  Age:  76 y.o.  Sex:  male  :  1945  MRN:  [id number]  Visit Number:  [id number]  ROOM: [address]     Primary Care Provider:  Laurence Geiger APRN    Length of stay in inpatient status:  13    Subjective     Chief Compliant:    Chief Complaint   Patient presents with   • Exposure To Known Illness   • Altered Mental Status   • Weakness - Generalized     History of Presenting Illness:    Patient remains critically ill, intubated for airway protection, remains on multiple pressors for septic and possibly cardiogenic component of shock, blood pressures this AM were significantly low, 50's/30's, appeared Vasopressin had ran out, increased phenylephrine dose and BP trended back up fairly quickly, re-ordered new back Vasopressin, remains on broad Abx, CRP bumped yesterday to 21, today at 19, ID following, ordered repeat Bcx's which are pending and UA showing 4+ bacteria, no culture obtained and have ordered add on, reviewed AM ABG and P02 high, have ordered wean to 90% Fi02, have ordered repeat CXR as well, Pulm consulted and following, notes guarded prognosis, discussed case w/ sister yesterday, she had asked me to call patient's brother which I have tried several occasions yesterday and again this AM without success.  Objective     Current Hospital Meds:castor oil-balsam peru, 1 application, Topical, Q12H  chlorhexidine, 15 mL, Mouth/Throat, Q12H  dexamethasone, 6 mg, Intravenous, Daily  enoxaparin, 1 mg/kg, Subcutaneous, Q12H  famotidine, 20 mg, Intravenous, BID  insulin aspart, 0-14 Units, Subcutaneous, TID AC  insulin detemir, 25 Units, Subcutaneous, Nightly  meropenem, 1 g, Intravenous, Q8H  midodrine, 10 mg, Oral, TID AC  sodium chloride, 10 mL, Intravenous, Q12H  sodium chloride, 10 mL, Intravenous, Q12H  sodium chloride, 10 mL, Intravenous, Q12H  sodium chloride, 10 mL, Intravenous, Q12H  sodium  chloride, 10 mL, Intravenous, Q12H  vancomycin, 1,750 mg, Intravenous, Q24H    dexmedetomidine, 0.2-1.5 mcg/kg/hr, Last Rate: Stopped (09/09/21 1414)  fentaNYL Citrate,   norepinephrine, 0.02-0.3 mcg/kg/min, Last Rate: 2.6 mcg/kg/min (09/13/21 0719)  Pharmacy Consult - Pharmacy to dose,   Pharmacy Consult - Pharmacy to dose,   Pharmacy to Dose enoxaparin (LOVENOX),   Pharmacy to dose vancomycin,   phenylephrine, 0.5-3 mcg/kg/min, Last Rate: 2.2 mcg/kg/min (09/15/21 0839)  propofol, 5-50 mcg/kg/min, Last Rate: 20 mcg/kg/min (09/15/21 0810)  vasopressin (PITRESSIN) 20 units in 100 mL infusion, 0.03 Units/min, Last Rate: 0.03 Units/min (09/15/21 0819)        Current Antimicrobial Therapy:  Anti-Infectives (From admission, onward)    Ordered     Dose/Rate Route Frequency Start Stop    09/13/21 1407  vancomycin 1750 mg/500 mL 0.9% NS IVPB ([facility])     Ordering Provider: Jose De Jesus Mccord MD    1,750 mg  over 120 Minutes Intravenous Every 24 Hours 09/13/21 1600 09/17/21 1559    09/10/21 1042  meropenem (MERREM) 1 g in sodium chloride 0.9 % 100 mL IVPB-VTB     Ordering Provider: Nic Webster MD    1 g  over 3 Hours Intravenous Every 8 Hours 09/10/21 1300 09/19/21 1259    09/09/21 0949  vancomycin 1250 mg/250 mL 0.9% NS IVPB ([facility])     Ordering Provider: Brock Metzger MD    1,250 mg  over 60 Minutes Intravenous Once 09/09/21 1100 09/09/21 1201    09/09/21 0942  meropenem (MERREM) 1 g in sodium chloride 0.9 % 100 mL IVPB-VTB     Ordering Provider: Brock Metzger MD    1 g  over 30 Minutes Intravenous Once 09/09/21 1030 09/09/21 1131    09/09/21 0918  Pharmacy to dose vancomycin     Ordering Provider: Jeny Lopes APRN     Does not apply Continuous PRN 09/09/21 0917 09/19/21 0916    09/03/21 0615  remdesivir 100 mg in sodium chloride 0.9 % 270 mL IVPB (powder vial)     Ordering Provider: Mg Augustine MD    100 mg  over 60 Minutes Intravenous Every 24 Hours 09/04/21 0900 09/07/21 1055    09/03/21 0615   remdesivir 200 mg in sodium chloride 0.9 % 290 mL IVPB (powder vial)     Ordering Provider: Mg Augustine MD    200 mg  over 60 Minutes Intravenous Every 24 Hours 09/03/21 0900 09/03/21 0940        Current Diuretic Therapy:  Diuretics (From admission, onward)    Ordered     Dose/Rate Route Frequency Start Stop    09/12/21 0857  bumetanide (BUMEX) injection 2 mg     Ordering Provider: Nic Webster MD    2 mg Intravenous Once 09/12/21 0945 09/12/21 0948    09/11/21 1026  furosemide (LASIX) 80 mg in sodium chloride 0.9 % 50 mL IVPB     Ordering Provider: Nic Webster MD    80 mg  over 4 Hours Intravenous Once 09/11/21 1200 09/11/21 1646    09/10/21 0822  furosemide (LASIX) injection 40 mg     Ordering Provider: Manuelito Adame MD    40 mg Intravenous Once 09/10/21 0915 09/10/21 0839    09/08/21 0736  furosemide (LASIX) injection 40 mg     Ordering Provider: Manuelito Adame MD    40 mg Intravenous Once 09/08/21 0800 09/08/21 0926    09/05/21 0732  furosemide (LASIX) injection 80 mg     Ordering Provider: Nic Webster MD    80 mg Intravenous Once 09/05/21 0830 09/05/21 0842        ----------------------------------------------------------------------------------------------------------------------  Vital Signs:  Temp:  [99.3 °F (37.4 °C)-100.6 °F (38.1 °C)] 99.9 °F (37.7 °C)  Heart Rate:  [52-76] 60  Resp:  [22-24] 22  BP: ()/(25-90) 142/56  FiO2 (%):  [100 %] 100 %  SpO2:  [88 %-99 %] 93 %  on   ;   Device (Oxygen Therapy): ventilator  Body mass index is 24.43 kg/m².    Wt Readings from Last 3 Encounters:   09/14/21 68.6 kg (151 lb 4.8 oz)   06/14/17 73.9 kg (163 lb)   03/23/17 76.7 kg (169 lb)     Intake & Output (last 3 days)       09/12 0701 - 09/13 0700 09/13 0701 - 09/14 0700 09/14 0701 - 09/15 0700 09/15 0701 - 09/16 0700    I.V. (mL/kg) 1012.7 (14.8) 1676.5 (24.4) 1381.1 (20.1)     Other 2245 681 1042     NG/ 278 211     IV Piggyback 131.5 763 750     Total Intake(mL/kg) 4383.2  (64.3) 3398.5 (49.5) 3384.1 (49.3)     Urine (mL/kg/hr) 3525 (2.2) 1700 (1) 2250 (1.4)     Stool  0 0     Total Output 3525 1700 2250     Net +858.2 +1698.5 +1134.1                 NPO Diet  ----------------------------------------------------------------------------------------------------------------------  Physical exam:  General: intubated/sedated  Head: Normocephalic, atraumatic  Eyes: Conjunctivae and sclerae normal.  Ears: Ears appear intact with no abnormalities noted.   Neck: Trachea midline. No obvious JVD.  Heart: Tele reveals regular rate and rhtyhm  Lungs: Intubated/venilated, b/l equal rise of chest, course breath sounds, on 100% Fi02 but satting well  Abdomen: No obvious abdominal distension.  MS: Muscle tone appears normal. No gross deformities.  Extremities: No clubbing, cyanosis or edema noted. Cool extremieis  Skin: No visible bleeding, bruising, or rash.  Neurologic: unable to assess due to sedation  ----------------------------------------------------------------------------------------------------------------------  Results from last 7 days   Lab Units 09/15/21  0629 09/15/21  0627 09/15/21  0626 09/14/21  1124 09/14/21  0432 09/13/21  0732 09/12/21  0308 09/11/21  0417 09/11/21  0302 09/10/21  0429 09/09/21  0055   CRP mg/dL  --   --  19.45*  --  21.82* 4.68*   < >  --  14.61*   < > 13.17*   LACTATE mmol/L 2.0  --   --   --   --  1.9  --   --  1.9  --  1.6   WBC 10*3/mm3  --  15.44*  --  10.94*  --  11.51*   < >  --  13.27*   < > 21.69*   HEMOGLOBIN g/dL  --  9.9*  --  10.8*  --  10.6*   < >  --  11.0*   < > 13.6   HEMATOCRIT %  --  34.0*  --  37.1*  --  35.5*   < >  --  36.7*   < > 44.6   MCV fL  --  95.5  --  96.6  --  94.9   < >  --  91.8   < > 92.3   MCHC g/dL  --  29.1*  --  29.1*  --  29.9*   < >  --  30.0*   < > 30.5*   PLATELETS 10*3/mm3  --  198  --  136*  --  150   < >  --  154   < > 216   INR   --  1.13*  --   --   --  1.19*  --  1.36*  --   --  1.29*    < > = values in this  interval not displayed.     Results from last 7 days   Lab Units 09/15/21  0500   PH, ARTERIAL pH units 7.356   PO2 ART mm Hg 121.0*   PCO2, ARTERIAL mm Hg 52.2*   HCO3 ART mmol/L 29.2*     Results from last 7 days   Lab Units 09/15/21  0626 09/14/21  1124 09/14/21  0432 09/13/21  0732 09/13/21  0732   SODIUM mmol/L 146* 147*  --   --  151*   POTASSIUM mmol/L 4.5 5.2 5.1   < > 3.0*   CHLORIDE mmol/L 110* 113*  --   --  116*   CO2 mmol/L 29.0 22.5  --   --  26.6   BUN mg/dL 54* 56*  --   --  58*   CREATININE mg/dL 0.71* 0.88 0.92   < > 0.88   EGFR IF NONAFRICN AM mL/min/1.73 108 84 80   < > 84   CALCIUM mg/dL 8.5* 8.2*  --   --  7.1*   GLUCOSE mg/dL 145* 158*  --   --  130*   ALBUMIN g/dL 2.04* 1.96* 2.05*   < > 1.74*   BILIRUBIN mg/dL 0.4 0.6 0.6   < > 0.6   ALK PHOS U/L 59 61 57   < > 46   AST (SGOT) U/L 60* 72* 71*   < > 40   ALT (SGPT) U/L 40 41 43*   < > 34    < > = values in this interval not displayed.   Estimated Creatinine Clearance: 76.2 mL/min (A) (by C-G formula based on SCr of 0.71 mg/dL (L)).  No results found for: AMMONIA              Glucose   Date/Time Value Ref Range Status   09/15/2021 0807 130 70 - 130 mg/dL Final     Comment:     Meter: [id number] : [id number] CHARANJIT DELGADO   09/14/2021 2248 176 (H) 70 - 130 mg/dL Final     Comment:     Meter: [id number] : [id number] PITO CASE   09/14/2021 2036 169 (H) 70 - 130 mg/dL Final     Comment:     Meter: [id number] : [id number] PITO CASE   09/14/2021 1820 176 (H) 70 - 130 mg/dL Final     Comment:     Meter: [id number] : [id number] DEE WINCHESTER   09/14/2021 1230 167 (H) 70 - 130 mg/dL Final     Comment:     Meter: [id number] : [id number] DEE WINCHESTER   09/14/2021 0611 133 (H) 70 - 130 mg/dL Final     Comment:     Meter: [id number] : [id number] Nayla Everett   09/13/2021 2110 74 70 - 130 mg/dL Final     Comment:     Meter: [id number] : [id number] Nayla Everett   09/13/2021 1628 102 70 - 130 mg/dL Final      Comment:     Meter: [id number] : [id number] Kendy MERRILL     No results found for: TSH, FREET4  No results found for: PREGTESTUR, PREGSERUM, HCG, HCGQUANT  Pain Management Panel    There is no flowsheet data to display.       Brief Urine Lab Results  (Last result in the past 365 days)      Color   Clarity   Blood   Leuk Est   Nitrite   Protein   CREAT   Urine HCG        09/14/21 1553 Yellow Cloudy Large (3+) Negative Negative 30 mg/dL (1+)             No results found for: BLOODCX  No results found for: URINECX  No results found for: WOUNDCX  No results found for: STOOLCX  Respiratory Culture   Date Value Ref Range Status   09/13/2021 No growth  Preliminary     No results found for: AFBCX  Results from last 7 days   Lab Units 09/15/21  0629 09/15/21  0626 09/14/21  0432 09/13/21  0732 09/12/21  0308 09/11/21  0302 09/10/21  0429 09/09/21  0914 09/09/21  0055   PROCALCITONIN ng/mL  --   --   --   --   --   --   --  0.45*  --    LACTATE mmol/L 2.0  --   --  1.9  --  1.9  --   --  1.6   CRP mg/dL  --  19.45* 21.82* 4.68* 7.14* 14.61* 21.68*  --  13.17*     I have personally looked at the labs and they are summarized above.  ----------------------------------------------------------------------------------------------------------------------  Detailed radiology reports for the last 24 hours:  Imaging Results (Last 24 Hours)     ** No results found for the last 24 hours. **        Assessment & Plan    Mr. Solitario is our 76M [facility] diabetes meliltus, depression, GERD, hypertension, dyslipidemia, presented to his place of work in a state of confusion, brought to ER, found to be Covid +. Hospitalization complicated by worsening hypoxia.      #Septic shock, Acute Metabolic Encephalopathy, Acute Hypoxic Respiratory Failure requiring intubation and mechanical ventilation 2/2 viral pneumonia treating for Covid 19 c/b ARDS and mild transaminitis  #Acute UTI - New  - Patient presented w/ increased shortness of breath,  confusion, tachypnea, covid + on 9/2, intubated on 9/5 for worsening hypoxia on bipap.  - Admission labs showed WBC count 5K, CRP 7.8, lactate 2.3, d-dimer 0.95, procal 0.16, MRSA -, covid +, Bcx's NGTD  - B/l Venous Duplex negative for DVT  - VQ scan showed indeterminate study, noted abnormal perfusion, multifocal defects could be related to multifocal airspace disease but also PE.  - CT Head showed no acute intracranial abnormality  - CT Chest showed patchy b/l airspace disease c/w covid 19  - ID consulted; Following  - CTPE on 9/4 did not reveal PE but did show worsening bilateral airspace disease that is likely worsening COVID pneumonia. Given fluid in fissure on plain film could not rule out component of edema  - Pulm consulted; Following  - ID consulted; Following, s/p Remdesivir, initially initiated on baricitinib but d/c'd due to intubation/mechanical ventilation.   Continue 6mg IV Dexamethasone for extended time period due to continued significant respiratory failure  - Continue Vanc and Merrem, CRP improved today slightly, UA yesterday w/ 4+ bacteria, no culture obtained and have ordered to add on, repeat Bcx's pending  - Continue Level III AC for Covid 19 thromboembolism Ppx  - Continue APAP PRN for fevers  - Continue Albuterol Inhaler  - Continue IV pressors to maintain MAPs > 65mmHg or SBP > 90, currently still requiring   - AM ABG w/ improved P02, ordered Fi02 down to 90%, repeating CXR today  - Trend Covid 19 progression labs w/ CBC, CMP, CK, CRP, Ferritin daily and LDH, D-dimer, Fibrinogen q48hrs.  - Continue to monitor in CCU, continue enhanced droplet/contact isolation precautions, wean 02 as able.     #HTN/Dyslipidemia  #Elevated Troponin, NSTEMI Type II suspected  #Cardiomyopathy now w/ suspected component of cardiogenic shock  #Pulm HTN   #Afib w/ RVR  - Labs showed trops 0.031->0.016, proBNP 370   - EKG showed NSR, RBBB, no significant ST changes  - Echo showed LVEF 46-50%, mild-mod dilated RV,  mild-mod dilated RA, mod-severe Pulm HTN, RVSP 45-55mmHg  - Consulted Cardiology; Followed, rec'd outpatient echo for f/u 6 months  - Holding home ARB due to shock  - Holding home statin while on Remdesivir, consider resuming soon  - Continue Tlov, switch to DOAC when appropraite  - Continue to monitor on tele, strict I/O's, daily weights, trend HR and BP, IV diuresis PRN     #Electrolyte Abnormalities  - Acute Mild Hypokalemia - K 3.0 today, Replaced per protocol, Resolved  - Acute Mild Hypernatremia - Na up to 151, slightly increased FW flushes, improved to 146     #NIDDM Type II, controlled, unknown complications  - Hgb A1c = 6.3%  - Holding home oral agents, continue FSBG and SSI  - Uncontrolled. Pulmonary has increased basal, will also increase SSI.      #Anxiety/Depression  - Continue home regimen     #GERD  - Continue home PPI      #BPH  - Continue home Flomax     #Nonoliguric MURIEL - Resolved  - B/l Cr 0.9-1.0, was up to 2.1 on admission, now back to baseline, S/p mIVFs; Trend Cr and UOP, avoid nephrotoxins, NSAIDs, dehydration and contrast as able.    F: NPO  E: Monitor & Replace PRN  N: TFs  PPx: TLov  Code: DNR/DNI     Dispo: Pending clinical improvement, palliative consulted for [facility] conversations     I have spent 30 minutes of critical care time (7:30-8:00AM) with > 50% of time spent in direct patient care, evaluating the patient at bedside, reviewing all labs and images, reviewing ABG and adjustingvent settings, reviewing pain and sedation gtt's, reviewing pressor requirement and adjusting doses this AM as was hypotensive w/ vasopressin running out, communicating plan of care w/ nursing staff and consultants, utilizing high complexity medical decision making to assess and treat vital organ system failure in an individual who has impairment of one or more vital organ systems such that there is a high probability of imminent or life threatening deterioration in the patient’s condition. Failure to initiate  the above interventions on an urgent basis would likely result in sudden, clinically significant or life threatening deterioration in the patient's condition.     VTE Prophylaxis:   Mechanical Order History:     None      Pharmalogical Order History:      Ordered     Dose Route Frequency Stop    09/09/21 0813  enoxaparin (LOVENOX) syringe 70 mg      1 mg/kg SC Every 12 Hours --    09/09/21 0730  Pharmacy to Dose enoxaparin (LOVENOX)      -- XX Continuous PRN --    09/07/21 0859  enoxaparin (LOVENOX) syringe 40 mg  Status:  Discontinued      40 mg SC Every 24 Hours 09/09/21 0730    09/03/21 0739  enoxaparin (LOVENOX) syringe 80 mg  Status:  Discontinued      1 mg/kg SC Every 12 Hours 09/07/21 0859    09/03/21 0724  Pharmacy to Dose enoxaparin (LOVENOX)  Status:  Discontinued      -- XX Continuous PRN 09/03/21 0740    09/02/21 1237  enoxaparin (LOVENOX) syringe 40 mg  Status:  Discontinued      40 mg SC Every 24 Hours 09/02/21 1247    09/02/21 1247  enoxaparin (LOVENOX) syringe 40 mg  Status:  Discontinued      0.5 mg/kg SC Every 24 Hours 09/03/21 0549              Jose De Jesus Mccord MD  [facility]  09/15/21  11:10 EDT

## 2021-09-15 NOTE — PROGRESS NOTES
Daryl Aquino MD                          [phone]      Patient ID:    Name:  Valdemar Solitario    MRN:  [id number]    1945   76 y.o.  male            Patient Care Team:  Laurence Geiger APRN as PCP - General (Family Medicine)  Corinne Dimas APRN as PCP - Family Medicine    CC/ Reason for visit: Acute respiratory failure, acute coronary pneumonia, shock, pulmonary pretension    Subjective: Pt seen and examined this AM. Overnight events noted.  Patient remains on the mechanical ventilator. Sedated and on multiple pressors.  Low-grade fever overnight. ABG reviewed and ventilator settings adjusted as appropriate.  Patient now DNR/DNI.  Discussed with nurse at bedside about overall poor prognosis    ROS: Unable to obtain due to critical illness    Objective     Vital Signs past 24hrs    BP range: BP: ()/(25-90) 60/30  Pulse range: Heart Rate:  [52-76] 63  Resp rate range: Resp:  [22-26] 22  Temp range: Temp (24hrs), Av.9 °F (37.7 °C), Min:99.3 °F (37.4 °C), Max:100.6 °F (38.1 °C)      Ventilator/Non-Invasive Ventilation Settings (From admission, onward)     Start     Ordered    21 1636  Ventilator - AC/VC; (22); 50; 90%; 12; 450  Continuous     Comments: May titrate up to 70% FIO2 please notify Dr Webster if higher 02 amount is required.   Question Answer Comment   Vent Mode AC/VC    Breath rate  22   FiO2 50    FiO2 titrate for Sp02% =/> 90%    PEEP 12    Tidal Volume 450        21 1637    21 0810  Ventilator - AC/VC; (22); 50; 90%; 10; 450  Continuous,   Status:  Canceled     Question Answer Comment   Vent Mode AC/VC    Breath rate  22   FiO2 50    FiO2 titrate for Sp02% =/> 90%    PEEP 10    Tidal Volume 450        21 0809    21 1306  Ventilator - AC/VC; (18); 100; 90%; Other (see comments); 16; 450  Continuous,   Status:  Canceled     Question Answer Comment   Vent Mode AC/VC    Breath rate  18   FiO2 100    FiO2 titrate for  Sp02% =/> 90%    PEEP Other (see comments)    PEEP: 16    Tidal Volume 450        09/05/21 1308    09/02/21 2228  NIPPV (CPAP or BIPAP)  Until Discontinued,   Status:  Canceled     Question Answer Comment   Indication: Acute Respiratory Failure    Type: BIPAP    NIPPV Mask Interface: Per Patient Preference    IPAP 15    EPAP 5    Oxygen FIO2    FIO2 % 32    Breath Rate 20    Titrate for SPO2 90%        09/02/21 2227 09/02/21 2223  NIPPV (CPAP or BIPAP)  Until Discontinued,   Status:  Canceled     Question Answer Comment   Type: BIPAP    NIPPV Mask Interface: Per Patient Preference        09/02/21 2223                Device (Oxygen Therapy): ventilator FiO2 (%): 100 %     68.6 kg (151 lb 4.8 oz); Body mass index is 24.43 kg/m².      Intake/Output Summary (Last 24 hours) at 9/15/2021 0912  Last data filed at 9/15/2021 0600  Gross per 24 hour   Intake 3384.06 ml   Output 2250 ml   Net 1134.06 ml       PHYSICAL EXAM   Constitutional: Elderly white male sedated on the mechanical ventilator  Head: - NCAT  Eyes: No pallor.  Anicteric sclerae, EOMI.  ENMT:   Endotracheal tube in place, no oral thrush. Moist MM.   NECK: Trachea midline, No thyromegaly, no palpable cervical lymphadenopathy  Heart: RRR, no murmur. No pedal edema   Lungs: GOGO +, No wheezes/ crackles heard    Abdomen: Soft. No tenderness, guarding or rigidity. No palpable masses  Extremities: Extremities warm and well perfused. No cyanosis/ clubbing  Neuro: Sedated  Psych: Mood and affect - UTO     PPE recommended per [facility], [place] infectious disease Isolation protocol for the current clinical scenario(as mentioned below) was followed.     Scheduled meds:  castor oil-balsam peru, 1 application, Topical, Q12H  chlorhexidine, 15 mL, Mouth/Throat, Q12H  dexamethasone, 6 mg, Intravenous, Daily  enoxaparin, 1 mg/kg, Subcutaneous, Q12H  famotidine, 20 mg, Intravenous, BID  insulin aspart, 0-14 Units, Subcutaneous, TID AC  insulin detemir, 25 Units, Subcutaneous,  Nightly  meropenem, 1 g, Intravenous, Q8H  midodrine, 10 mg, Oral, TID AC  sodium chloride, 10 mL, Intravenous, Q12H  sodium chloride, 10 mL, Intravenous, Q12H  sodium chloride, 10 mL, Intravenous, Q12H  sodium chloride, 10 mL, Intravenous, Q12H  sodium chloride, 10 mL, Intravenous, Q12H  vancomycin, 1,750 mg, Intravenous, Q24H        IV meds:                      dexmedetomidine, 0.2-1.5 mcg/kg/hr, Last Rate: Stopped (09/09/21 1414)  fentaNYL Citrate,   norepinephrine, 0.02-0.3 mcg/kg/min, Last Rate: 2.6 mcg/kg/min (09/13/21 0719)  Pharmacy Consult - Pharmacy to dose,   Pharmacy Consult - Pharmacy to dose,   Pharmacy to Dose enoxaparin (LOVENOX),   Pharmacy to dose vancomycin,   phenylephrine, 0.5-3 mcg/kg/min, Last Rate: 2.2 mcg/kg/min (09/15/21 0839)  propofol, 5-50 mcg/kg/min, Last Rate: 20 mcg/kg/min (09/15/21 0810)  vasopressin (PITRESSIN) 20 units in 100 mL infusion, 0.03 Units/min, Last Rate: 0.03 Units/min (09/15/21 0819)        Data Review:      Results from last 7 days   Lab Units 09/15/21  0627 09/15/21  0626 09/14/21  1124 09/14/21  0432 09/13/21  0732 09/13/21  0732 09/12/21  0308 09/11/21  0417 09/10/21  0429 09/09/21  0914   SODIUM mmol/L  --  146* 147*  --   --  151*   < >  --    < >  --    POTASSIUM mmol/L  --  4.5 5.2 5.1   < > 3.0*   < >  --    < >  --    CHLORIDE mmol/L  --  110* 113*  --   --  116*   < >  --    < >  --    CO2 mmol/L  --  29.0 22.5  --   --  26.6   < >  --    < >  --    BUN mg/dL  --  54* 56*  --   --  58*   < >  --    < >  --    CREATININE mg/dL  --  0.71* 0.88 0.92   < > 0.88   < >  --    < >  --    CALCIUM mg/dL  --  8.5* 8.2*  --   --  7.1*   < >  --    < >  --    BILIRUBIN mg/dL  --  0.4 0.6 0.6   < > 0.6   < >  --    < >  --    ALK PHOS U/L  --  59 61 57   < > 46   < >  --    < >  --    ALT (SGPT) U/L  --  40 41 43*   < > 34   < >  --    < >  --    AST (SGOT) U/L  --  60* 72* 71*   < > 40   < >  --    < >  --    GLUCOSE mg/dL  --  145* 158*  --   --  130*   < >  --    < >   --    WBC 10*3/mm3 15.44*  --  10.94*  --   --  11.51*   < >  --    < >  --    HEMOGLOBIN g/dL 9.9*  --  10.8*  --   --  10.6*   < >  --    < >  --    PLATELETS 10*3/mm3 198  --  136*  --   --  150   < >  --    < >  --    INR  1.13*  --   --   --   --  1.19*  --  1.36*  --   --    PROCALCITONIN ng/mL  --   --   --   --   --   --   --   --   --  0.45*    < > = values in this interval not displayed.       Lab Results   Component Value Date    CALCIUM 8.5 (L) 09/15/2021             Results from last 7 days   Lab Units 09/15/21  0500 09/14/21  0447 09/13/21  1134 09/13/21  0328 09/12/21  0426 09/11/21  1519 09/11/21  0443   PH, ARTERIAL pH units 7.356 7.418 7.434 7.470* 7.455* 7.408 7.448   PO2 ART mm Hg 121.0* 115.0* 72.6* 54.0* 52.4* 80.4* 75.0*   PCO2, ARTERIAL mm Hg 52.2* 46.1* 48.4* 45.7* 45.7* 47.2* 43.3   HCO3 ART mmol/L 29.2* 29.7* 32.4* 33.2* 32.1* 29.7* 30.0*      COVID LABS:  Results From Last 14 Days   Lab Units 09/15/21  0629 09/15/21  0627 09/15/21  0626 09/14/21  0432 09/13/21  0732 09/12/21  0308 09/11/21  0417 09/11/21  0302 09/10/21  0429 09/09/21  0914 09/09/21  0055 09/08/21  0233 09/07/21  0320 09/06/21  0433 09/05/21  0227 09/05/21  0227 09/02/21  1301 09/02/21  1218 09/02/21  0950   PROBNP pg/mL  --   --   --   --   --   --   --   --   --   --   --   --   --   --   --   --   --   --  370.2   CRP mg/dL  --   --  19.45* 21.82* 4.68*   < >  --  14.61*   < >  --    < >  --  2.54* 2.93*   < > 3.13*   < >  --   --    D DIMER QUANT MCGFEU/mL  --  1.69*  --   --  0.83*  --   --  0.41  --   --    < >  --  0.36  --    < > 0.36   < >  --  0.95*   FERRITIN ng/mL  --   --   --   --   --   --   --   --   --   --   --   --  1,685.00* 2,397.00*  --  2,904.00*   < >  --   --    LACTATE mmol/L 2.0  --   --   --  1.9  --   --  1.9  --   --    < >  --  1.3  --    < > 1.4   < >  --  2.3*   LDH U/L  --   --  496*  --  390*  --   --  336*  --   --    < >  --  608*  --    < > 608*   < >  --   --    PROCALCITONIN ng/mL   --   --   --   --   --   --   --   --   --  0.45*  --  0.22 0.11  --   --   --    < >  --   --    PROTIME Seconds  --  15.0*  --   --  15.5*  --  17.2*  --   --   --    < >  --  15.9*  --    < > 14.9*   < >  --  13.1   INR   --  1.13*  --   --  1.19*  --  1.36*  --   --   --    < >  --  1.23*  --    < > 1.13*   < >  --  0.95   TROPONIN T ng/mL  --   --   --   --   --   --   --   --   --   --   --   --   --   --   --   --   --  0.016 0.031*    < > = values in this interval not displayed.         Results Review:    I have reviewed the relevant laboratory results and independently reviewed the chest imaging from this hospitalization including the available echocardiogram reports personally and summarized it if/ when appropriate below    Assessment    Acute hypoxic respiratory failure  Acute COVID-19 pneumonia  ARDS  Shock - Septic vs cardiogenic  Atrial fibrillation on anticoagulation  Severe pulmonary hypertension  RV dilation  Anemia    PLAN:  Patient remains on the mechanical ventilator and settings adjusted as appropriate.  ABG and last chest x-ray reviewed.  Will work on weaning down but not in a position to consider extubation.  Continue with current treatment plan for Covid pneumonia.  Low-grade fever now while on Merrem and previously on antifungal.  Will defer to ID.  No positive cultures  Renal function is better.  Continue to keep him as negative as possible  Shock likely cardiogenic from severe pulmonary hypertension on acute respiratory issues and would continue to work on weaning down as much as possible but unfortunately patient will be very volume dependent and this is not a good prognosis overall.  No clinical concern for PE based on CT angiogram and D-dimer. No benefit with midodrine.  Patient already on anticoagulation.   Rest of the medical management per primary    Very guarded prognosis  DNR/DNI    DVT/GI prophylaxis    Would strongly recommend transition to comfort measures only given advanced  age and severe COVID-19 pneumonia with prolonged respiratory failure, cardiogenic shock, severe pulmonary hypertension    D/w RN     CC - 32 mins    Daryl Aquino MD  9/15/2021

## 2021-09-15 NOTE — PROGRESS NOTES
Attempted to call patient's brother again as previously requested but have been unable to reach at number listed in emergency contacts.  Will call patient's sister again tomorrow and double check number listed is correct.

## 2021-09-16 NOTE — PROGRESS NOTES
[facility] HOSPITALIST PROGRESS NOTE     Patient Identification:  Name:  Valdemar Solitario  Age:  76 y.o.  Sex:  male  :  1945  MRN:  [id number]  Visit Number:  [id number]  ROOM: [address]     Primary Care Provider:  Laurence Geiger APRN    Length of stay in inpatient status:  14    Subjective     Chief Compliant:    Chief Complaint   Patient presents with   • Exposure To Known Illness   • Altered Mental Status   • Weakness - Generalized     History of Presenting Illness:    Patient remains critically ill, intubated for airway protection, remains on pressors but weaning, was bradycardic this AM and weaning down propofol, WBC count and CRP improved, cultures remain negative, ID following, continued on Vanc and Merrem, AM ABG stable and down to 90% Fi02 though settings still to high to discuss SBT, has had prolonged intubation, may need to discuss trach/peg w/ family soon, palliative care following and discussed case w/ them, they are attempting to reach brother as well but so far have been unable too, they are continuing GOC conversations w/ son, patient remains afebrile, Cr stable, UOP adequate.    Objective     Current Hospital Meds:castor oil-balsam peru, 1 application, Topical, Q12H  chlorhexidine, 15 mL, Mouth/Throat, Q12H  dexamethasone, 6 mg, Intravenous, Daily  enoxaparin, 1 mg/kg, Subcutaneous, Q12H  famotidine, 20 mg, Intravenous, BID  insulin aspart, 0-14 Units, Subcutaneous, TID AC  insulin detemir, 25 Units, Subcutaneous, Nightly  meropenem, 1 g, Intravenous, Q8H  midodrine, 10 mg, Oral, TID AC  sodium chloride, 10 mL, Intravenous, Q12H  sodium chloride, 10 mL, Intravenous, Q12H  sodium chloride, 10 mL, Intravenous, Q12H  sodium chloride, 10 mL, Intravenous, Q12H  sodium chloride, 10 mL, Intravenous, Q12H  vancomycin, 1,750 mg, Intravenous, Q24H    dexmedetomidine, 0.2-1.5 mcg/kg/hr, Last Rate: Stopped (21 1414)  fentaNYL Citrate,   norepinephrine, 0.02-0.3 mcg/kg/min, Last  Rate: 0.08 mcg/kg/min (09/16/21 0845)  Pharmacy Consult - Pharmacy to dose,   Pharmacy Consult - Pharmacy to dose,   Pharmacy to Dose enoxaparin (LOVENOX),   Pharmacy to dose vancomycin,   phenylephrine, 0.5-3 mcg/kg/min, Last Rate: Stopped (09/15/21 2150)  propofol, 5-50 mcg/kg/min, Last Rate: 20 mcg/kg/min (09/16/21 1222)  vasopressin (PITRESSIN) 20 units in 100 mL infusion, 0.03 Units/min, Last Rate: Stopped (09/16/21 0840)      Current Antimicrobial Therapy:  Anti-Infectives (From admission, onward)    Ordered     Dose/Rate Route Frequency Start Stop    09/13/21 1407  vancomycin 1750 mg/500 mL 0.9% NS IVPB ([facility])     Ordering Provider: Jose De Jesus Mccord MD    1,750 mg  over 120 Minutes Intravenous Every 24 Hours 09/13/21 1600 09/17/21 1559    09/10/21 1042  meropenem (MERREM) 1 g in sodium chloride 0.9 % 100 mL IVPB-VTB     Ordering Provider: Nic Webster MD    1 g  over 3 Hours Intravenous Every 8 Hours 09/10/21 1300 09/19/21 1259    09/09/21 0949  vancomycin 1250 mg/250 mL 0.9% NS IVPB ([facility])     Ordering Provider: Brock Metzger MD    1,250 mg  over 60 Minutes Intravenous Once 09/09/21 1100 09/09/21 1201    09/09/21 0942  meropenem (MERREM) 1 g in sodium chloride 0.9 % 100 mL IVPB-VTB     Ordering Provider: Brock Metzger MD    1 g  over 30 Minutes Intravenous Once 09/09/21 1030 09/09/21 1131    09/09/21 0918  Pharmacy to dose vancomycin     Ordering Provider: Jeny Lopes, NELSON     Does not apply Continuous PRN 09/09/21 0917 09/19/21 0916    09/03/21 0615  remdesivir 100 mg in sodium chloride 0.9 % 270 mL IVPB (powder vial)     Ordering Provider: Mg Augustine MD    100 mg  over 60 Minutes Intravenous Every 24 Hours 09/04/21 0900 09/07/21 1055    09/03/21 0615  remdesivir 200 mg in sodium chloride 0.9 % 290 mL IVPB (powder vial)     Ordering Provider: Mg Augustine MD    200 mg  over 60 Minutes Intravenous Every 24 Hours 09/03/21 0900 09/03/21 0940        Current Diuretic  Therapy:  Diuretics (From admission, onward)    Ordered     Dose/Rate Route Frequency Start Stop    09/12/21 0857  bumetanide (BUMEX) injection 2 mg     Ordering Provider: Nic Webster MD    2 mg Intravenous Once 09/12/21 0945 09/12/21 0948    09/11/21 1026  furosemide (LASIX) 80 mg in sodium chloride 0.9 % 50 mL IVPB     Ordering Provider: Nic Webster MD    80 mg  over 4 Hours Intravenous Once 09/11/21 1200 09/11/21 1646    09/10/21 0822  furosemide (LASIX) injection 40 mg     Ordering Provider: Manuelito Adame MD    40 mg Intravenous Once 09/10/21 0915 09/10/21 0839    09/08/21 0736  furosemide (LASIX) injection 40 mg     Ordering Provider: Manuelito Adame MD    40 mg Intravenous Once 09/08/21 0800 09/08/21 0926    09/05/21 0732  furosemide (LASIX) injection 80 mg     Ordering Provider: Nic Webster MD    80 mg Intravenous Once 09/05/21 0830 09/05/21 0842        ----------------------------------------------------------------------------------------------------------------------  Vital Signs:  Temp:  [95.5 °F (35.3 °C)-98.9 °F (37.2 °C)] 96.8 °F (36 °C)  Heart Rate:  [43-91] 51  Resp:  [22-23] 22  BP: ()/(37-80) 121/47  FiO2 (%):  [90 %] 90 %  SpO2:  [78 %-98 %] 89 %  on   ;   Device (Oxygen Therapy): ventilator  Body mass index is 24.43 kg/m².    Wt Readings from Last 3 Encounters:   09/14/21 68.6 kg (151 lb 4.8 oz)   06/14/17 73.9 kg (163 lb)   03/23/17 76.7 kg (169 lb)     Intake & Output (last 3 days)       09/13 0701 - 09/14 0700 09/14 0701 - 09/15 0700 09/15 0701 - 09/16 0700 09/16 0701 - 09/17 0700    I.V. (mL/kg) 1676.5 (24.4) 1381.1 (20.1) 760.4 (11.1)     Other 681 1042 1601 316    NG/ 211 350 100    IV Piggyback 763 750 409.7     Total Intake(mL/kg) 3398.5 (49.5) 3384.1 (49.3) 3121.1 (45.5) 416 (6.1)    Urine (mL/kg/hr) 1700 (1) 2250 (1.4) 1775 (1.1)     Stool 0 0 0     Total Output 1700 2250 1775     Net +1698.5 +1134.1 +1346.1 +416                NPO  Diet  ----------------------------------------------------------------------------------------------------------------------  Physical exam:  General: intubated/sedated  Head: Normocephalic, atraumatic  Eyes: Conjunctivae and sclerae normal.  Ears: Ears appear intact with no abnormalities noted.   Neck: Trachea midline. No obvious JVD.  Heart: Tele reveals regular rate and rhtyhm  Lungs: Intubated/venilated, b/l equal rise of chest, course breath sounds, on 90% Fi02 now  Abdomen: No obvious abdominal distension.  MS: Muscle tone appears normal. No gross deformities.  Extremities: No clubbing, cyanosis or edema noted. Cool extremieis  Skin: No visible bleeding, bruising, or rash.  Neurologic: unable to assess due to sedation  : Alis in place  ----------------------------------------------------------------------------------------------------------------------  Results from last 7 days   Lab Units 09/16/21  0047 09/15/21  0629 09/15/21  0627 09/15/21  0626 09/14/21  1124 09/14/21  0432 09/13/21  0732 09/13/21  0732 09/12/21  0308 09/11/21  0417 09/11/21  0302   CRP mg/dL 12.35*  --   --  19.45*  --  21.82*   < > 4.68*   < >  --  14.61*   LACTATE mmol/L  --  2.0  --   --   --   --   --  1.9  --   --  1.9   WBC 10*3/mm3 9.55  --  15.44*  --  10.94*  --    < > 11.51*   < >  --  13.27*   HEMOGLOBIN g/dL 9.1*  --  9.9*  --  10.8*  --    < > 10.6*   < >  --  11.0*   HEMATOCRIT % 30.9*  --  34.0*  --  37.1*  --    < > 35.5*   < >  --  36.7*   MCV fL 95.7  --  95.5  --  96.6  --    < > 94.9   < >  --  91.8   MCHC g/dL 29.4*  --  29.1*  --  29.1*  --    < > 29.9*   < >  --  30.0*   PLATELETS 10*3/mm3 148  --  198  --  136*  --    < > 150   < >  --  154   INR   --   --  1.13*  --   --   --   --  1.19*  --  1.36*  --     < > = values in this interval not displayed.     Results from last 7 days   Lab Units 09/16/21  0453   PH, ARTERIAL pH units 7.451*   PO2 ART mm Hg 66.7*   PCO2, ARTERIAL mm Hg 42.3   HCO3 ART mmol/L 29.5*      Results from last 7 days   Lab Units 09/16/21  0047 09/15/21  0626 09/14/21  1124   SODIUM mmol/L 140 146* 147*   POTASSIUM mmol/L 4.5 4.5 5.2   CHLORIDE mmol/L 104 110* 113*   CO2 mmol/L 28.8 29.0 22.5   BUN mg/dL 54* 54* 56*   CREATININE mg/dL 0.61* 0.71* 0.88   EGFR IF NONAFRICN AM mL/min/1.73 129 108 84   CALCIUM mg/dL 8.3* 8.5* 8.2*   GLUCOSE mg/dL 197* 145* 158*   ALBUMIN g/dL 1.88* 2.04* 1.96*   BILIRUBIN mg/dL 0.4 0.4 0.6   ALK PHOS U/L 48 59 61   AST (SGOT) U/L 45* 60* 72*   ALT (SGPT) U/L 37 40 41   Estimated Creatinine Clearance: 76.2 mL/min (A) (by C-G formula based on SCr of 0.61 mg/dL (L)).  No results found for: AMMONIA              Glucose   Date/Time Value Ref Range Status   09/16/2021 1048 173 (H) 70 - 130 mg/dL Final     Comment:     Meter: [id number] : [id number] CHARANJIT [facility]   09/16/2021 0621 216 (H) 70 - 130 mg/dL Final     Comment:     Meter: [id number] : [id number] Marie CURRIE   09/15/2021 2001 168 (H) 70 - 130 mg/dL Final     Comment:     Meter: [id number] : [id number] tanika burroughs   09/15/2021 1725 178 (H) 70 - 130 mg/dL Final     Comment:     Meter: [id number] : [id number] CHARANJIT DANNY   09/15/2021 1111 137 (H) 70 - 130 mg/dL Final     Comment:     Meter: [id number] : [id number] CHARANJIT DANNY   09/15/2021 0807 130 70 - 130 mg/dL Final     Comment:     Meter: [id number] : [id number] CHARANJIT DANNY   09/14/2021 2248 176 (H) 70 - 130 mg/dL Final     Comment:     Meter: [id number] : [id number] PITO CASE   09/14/2021 2036 169 (H) 70 - 130 mg/dL Final     Comment:     Meter: [id number] : [id number] PITO CASE     No results found for: TSH, FREET4  No results found for: PREGTESTUR, PREGSERUM, HCG, HCGQUANT  Pain Management Panel    There is no flowsheet data to display.       Brief Urine Lab Results  (Last result in the past 365 days)      Color   Clarity   Blood   Leuk Est   Nitrite   Protein   CREAT   Urine HCG        09/15/21 1125 Yellow  Turbid Large (3+) Negative Negative 30 mg/dL (1+)             Blood Culture   Date Value Ref Range Status   09/14/2021 No growth at 24 hours  Preliminary   09/14/2021 No growth at 24 hours  Preliminary     Urine Culture   Date Value Ref Range Status   09/15/2021 No growth  Final     No results found for: WOUNDCX  No results found for: STOOLCX  Respiratory Culture   Date Value Ref Range Status   09/13/2021 No growth  Final     No results found for: AFBCX  Results from last 7 days   Lab Units 09/16/21  0047 09/15/21  0629 09/15/21  0628 09/15/21  0626 09/14/21  0432 09/13/21  0732 09/12/21  0308 09/11/21  0302 09/10/21  0429   PROCALCITONIN ng/mL  --   --  2.59*  --   --   --   --   --   --    LACTATE mmol/L  --  2.0  --   --   --  1.9  --  1.9  --    CRP mg/dL 12.35*  --   --  19.45* 21.82* 4.68* 7.14* 14.61* 21.68*     I have personally looked at the labs and they are summarized above.  ----------------------------------------------------------------------------------------------------------------------  Detailed radiology reports for the last 24 hours:  Imaging Results (Last 24 Hours)     Procedure Component Value Units Date/Time    XR Chest 1 View [[id number]] Collected: 09/16/21 1029     Updated: 09/16/21 1034    Narrative:      EXAM:    XR Chest, 1 View     EXAM DATE:    9/16/2021 8:35 AM     CLINICAL HISTORY:    intubated; U07.1-COVID-19; J12.82-Pneumonia due to Coronavirus disease  2019; R09.02-Hypoxemia; J96.01-Acute respiratory failure with hypoxia     TECHNIQUE:    Frontal view of the chest.     COMPARISON:    09/15/2021     FINDINGS:    Lungs:  Overall minimal worsening of diffuse lateral airspace disease.    Pleural space:  Unremarkable.  No pneumothorax.    Heart:  Cardiomegaly.    Mediastinum:  Unremarkable.    Bones/joints:  Unremarkable.    Vasculature:  Right IJ deep line with tip in the distal SVC.    Tubes, lines and devices:  ET tube with tip at level of clavicles.  NG  tube extends into the  stomach.       Impression:      1.  Minimal worsening of diffuse bilateral airspace disease.  2.  Support devices as above.     This report was finalized on 9/16/2021 10:31 AM by Dr. Houston Larios MD.           Assessment & Plan    Mr. Solitario is our 76M PMH diabetes meliltus, depression, GERD, hypertension, dyslipidemia, presented to his place of work in a state of confusion, brought to ER, found to be Covid +. Hospitalization complicated by worsening hypoxia.      #Septic shock, Acute Metabolic Encephalopathy, Acute Hypoxic Respiratory Failure requiring intubation and mechanical ventilation 2/2 viral pneumonia treating for Covid 19 c/b ARDS and mild transaminitis  #Acute UTI - New  - Patient presented w/ increased shortness of breath, confusion, tachypnea, covid + on 9/2, intubated on 9/5 for worsening hypoxia on bipap.  - Admission labs showed WBC count 5K, CRP 7.8, lactate 2.3, d-dimer 0.95, procal 0.16, MRSA -, covid +, Bcx's NGTD.  UA 9/14 w/ 4+ bacteria though no culture noted, repeat UA and Cx from 9/15 w/o growth, repeat Bcx's 9/14 NGTD.  - B/l Venous Duplex negative for DVT  - VQ scan showed indeterminate study, noted abnormal perfusion, multifocal defects could be related to multifocal airspace disease but also PE.  - CT Head showed no acute intracranial abnormality  - CT Chest showed patchy b/l airspace disease c/w covid 19  - ID consulted; Following  - CTPE on 9/4 did not reveal PE but did show worsening bilateral airspace disease that is likely worsening COVID pneumonia. Given fluid in fissure on plain film could not rule out component of edema  - Pulm consulted; Following  - ID consulted; Following, s/p Remdesivir, initially initiated on baricitinib but d/c'd due to intubation/mechanical ventilation.   Continue 6mg IV Dexamethasone for extended time period due to continued significant respiratory failure  - Continue Vanc and Merrem  - Continue Level III AC for Covid 19 thromboembolism Ppx  -  Continue APAP PRN for fevers  - Continue Albuterol Inhaler  - Continue IV pressors to maintain MAPs > 65mmHg or SBP > 90, currently still requiring   - Trend Covid 19 progression labs w/ CBC, CMP, CK, CRP, Ferritin daily and LDH, D-dimer, Fibrinogen q48hrs.  - Continue to monitor in CCU, continue enhanced droplet/contact isolation precautions, wean 02 as able though so far have been unable to wean significantly, palliative care following for GOC conversations, may need to start trach/peg discussions soon given prolonged intubation.     #HTN/Dyslipidemia  #Elevated Troponin, NSTEMI Type II suspected  #Cardiomyopathy now w/ suspected component of cardiogenic shock  #Pulm HTN   #Afib w/ RVR  - Labs showed trops 0.031->0.016, proBNP 370   - EKG showed NSR, RBBB, no significant ST changes  - Echo showed LVEF 46-50%, mild-mod dilated RV, mild-mod dilated RA, mod-severe Pulm HTN, RVSP 45-55mmHg  - Consulted Cardiology; Followed, rec'd outpatient echo for f/u 6 months  - Holding home ARB due to shock  - Holding home statin while on Remdesivir, consider resuming soon  - Continue Tlov, switch to DOAC when appropraite  - Continue to monitor on tele, strict I/O's, daily weights, trend HR and BP, IV diuresis PRN    #NIDDM Type II, controlled, unknown complications  - Hgb A1c = 6.3%  - Holding home oral agents, continue FSBG and SSI  - Uncontrolled. Pulmonary has increased basal, will also increase SSI.      #Anxiety/Depression  - Continue home regimen     #GERD  - Continue home PPI      #BPH  - Continue home Flomax     #Nonoliguric MURIEL - Resolved  - B/l Cr 0.9-1.0, was up to 2.1 on admission, now back to baseline, S/p mIVFs; Trend Cr and UOP, avoid nephrotoxins, NSAIDs, dehydration and contrast as able.    #Electrolyte Abnormalities  - Acute Mild Hypokalemia - K 3.0 today, Replaced per protocol, Resolved  - Acute Mild Hypernatremia - Na up to 151, slightly increased FW flushes, now NML, Resolved     F: NPO  E: Monitor & Replace  PRN  N: NPO, TFs  PPx: TLov  Code: DNR/DNI     Dispo: Pending clinical improvement, palliative consulted for GOC conversations     I have spent 30 minutes of critical care time (8:30-9:00AM) with > 50% of time spent in direct patient care, evaluating the patient at bedside, reviewing all labs and images, reviewing ABG and settings, reviewing pain and sedation gtt's, reviewing pressor requirement, communicating plan of care w/ nursing staff and consultant, utilizing high complexity medical decision making to assess and treat vital organ system failure in an individual who has impairment of one or more vital organ systems such that there is a high probability of imminent or life threatening deterioration in the patient’s condition. Failure to initiate the above interventions on an urgent basis would likely result in sudden, clinically significant or life threatening deterioration in the patient's condition.    VTE Prophylaxis:   Mechanical Order History:     None      Pharmalogical Order History:      Ordered     Dose Route Frequency Stop    09/09/21 0813  enoxaparin (LOVENOX) syringe 70 mg      1 mg/kg SC Every 12 Hours --    09/09/21 0730  Pharmacy to Dose enoxaparin (LOVENOX)      -- XX Continuous PRN --    09/07/21 0859  enoxaparin (LOVENOX) syringe 40 mg  Status:  Discontinued      40 mg SC Every 24 Hours 09/09/21 0730    09/03/21 0739  enoxaparin (LOVENOX) syringe 80 mg  Status:  Discontinued      1 mg/kg SC Every 12 Hours 09/07/21 0859    09/03/21 0724  Pharmacy to Dose enoxaparin (LOVENOX)  Status:  Discontinued      -- XX Continuous PRN 09/03/21 0740    09/02/21 1237  enoxaparin (LOVENOX) syringe 40 mg  Status:  Discontinued      40 mg SC Every 24 Hours 09/02/21 1247    09/02/21 1247  enoxaparin (LOVENOX) syringe 40 mg  Status:  Discontinued      0.5 mg/kg SC Every 24 Hours 09/03/21 0549              Jose De Jesus Mccord MD  [facility]  09/16/21  13:22 EDT

## 2021-09-16 NOTE — PROGRESS NOTES
"Palliative Care Daily Progress Note     S: Medical record reviewed, followed up with Primary RN Concepción regarding patient's condition. Pt is sedated on the ventilator       O:   Palliative Performance Scale Score: 30%   /55 (BP Location: Right arm, Patient Position: Lying)   Pulse (!) 49   Temp 96.8 °F (36 °C) (Axillary)   Resp 22   Ht 167.6 cm (65.98\")   Wt 68.6 kg (151 lb 4.8 oz)   SpO2 90%   BMI 24.43 kg/m²     Intake/Output Summary (Last 24 hours) at 9/16/2021 1303  Last data filed at 9/16/2021 0816  Gross per 24 hour   Intake 3537.07 ml   Output 1775 ml   Net 1762.07 ml       PE:  COVID RESTRICTIONS      Meds: Reviewed and changes noted.    Labs:   Results from last 7 days   Lab Units 09/16/21  0047   WBC 10*3/mm3 9.55   HEMOGLOBIN g/dL 9.1*   HEMATOCRIT % 30.9*   PLATELETS 10*3/mm3 148     Results from last 7 days   Lab Units 09/16/21  0047   SODIUM mmol/L 140   POTASSIUM mmol/L 4.5   CHLORIDE mmol/L 104   CO2 mmol/L 28.8   BUN mg/dL 54*   CREATININE mg/dL 0.61*   GLUCOSE mg/dL 197*   CALCIUM mg/dL 8.3*     Results from last 7 days   Lab Units 09/16/21  0047   SODIUM mmol/L 140   POTASSIUM mmol/L 4.5   CHLORIDE mmol/L 104   CO2 mmol/L 28.8   BUN mg/dL 54*   CREATININE mg/dL 0.61*   CALCIUM mg/dL 8.3*   BILIRUBIN mg/dL 0.4   ALK PHOS U/L 48   ALT (SGPT) U/L 37   AST (SGOT) U/L 45*   GLUCOSE mg/dL 197*     Imaging Results (Last 72 Hours)     Procedure Component Value Units Date/Time    XR Chest 1 View [[id number]] Collected: 09/16/21 1029     Updated: 09/16/21 1034    Narrative:      EXAM:    XR Chest, 1 View     EXAM DATE:    9/16/2021 8:35 AM     CLINICAL HISTORY:    intubated; U07.1-COVID-19; J12.82-Pneumonia due to Coronavirus disease  2019; R09.02-Hypoxemia; J96.01-Acute respiratory failure with hypoxia     TECHNIQUE:    Frontal view of the chest.     COMPARISON:    09/15/2021     FINDINGS:    Lungs:  Overall minimal worsening of diffuse lateral airspace disease.    Pleural space:  Unremarkable.  " No pneumothorax.    Heart:  Cardiomegaly.    Mediastinum:  Unremarkable.    Bones/joints:  Unremarkable.    Vasculature:  Right IJ deep line with tip in the distal SVC.    Tubes, lines and devices:  ET tube with tip at level of clavicles.  NG  tube extends into the stomach.       Impression:      1.  Minimal worsening of diffuse bilateral airspace disease.  2.  Support devices as above.     This report was finalized on 9/16/2021 10:31 AM by Dr. Houston Larios MD.       XR Chest 1 View [[id number]] Collected: 09/15/21 1405     Updated: 09/15/21 1413    Narrative:      EXAM:    XR Chest, 1 View     EXAM DATE:    9/15/2021 12:53 PM     CLINICAL HISTORY:    intubated; U07.1-COVID-19; J12.82-Pneumonia due to Coronavirus disease  2019; R09.02-Hypoxemia; J96.01-Acute respiratory failure with hypoxia     TECHNIQUE:    Frontal view of the chest.     COMPARISON:    09/14/2021     FINDINGS:    Lungs:  Bilateral airspace disease stable.  Lung volumes are stable.    Pleural space:  No pneumothorax identified.    Heart:  Mild cardiac enlargement.    Mediastinum:  Unremarkable.    Bones/joints:  Unremarkable.    Tubes, lines and devices:  ET tube with tip just below clavicles.  NG  tube extends into the proximal-mid stomach.  Right IJ deep line with tip  at the cavoatrial junction.       Impression:      1.  No significant change in bilateral airspace disease.  2.  Positioning of support devices as detailed above.     This report was finalized on 9/15/2021 2:06 PM by Dr. Houston Larios MD.       XR Chest 1 View [[id number]] Collected: 09/14/21 1029     Updated: 09/14/21 1042    Narrative:      EXAM:    XR Chest, 1 View     EXAM DATE:    9/14/2021 8:03 AM     CLINICAL HISTORY:    to confirm placement of ET Tube -Manual Prone Protocol;  U07.1-COVID-19; J12.82-Pneumonia due to Coronavirus disease 2019;  R09.02-Hypoxemia; J96.01-Acute respiratory failure with hypoxia     TECHNIQUE:    Frontal view of the chest.     COMPARISON:     09/13/2021     FINDINGS:    Lungs:  Bilateral airspace disease from previous.    Pleural space:  No pneumothorax identified.    Heart:  Cardiomegaly is stable.    Mediastinum:  Unremarkable.    Bones/joints:  Unremarkable.    Tubes, lines and devices:  ET tube with tip just below clavicles.  NG  tube extends into the mid stomach.  Right IJ deep line noted with tip at  the cavoatrial junction.       Impression:      1.  Positioning of support devices detailed above.  2.  Stable bilateral airspace disease.     This report was finalized on 9/14/2021 10:30 AM by Dr. Houston Larios MD.               Diagnostics: Reviewed    A: Valdemar Solitario is a 76 y.o. yo male with confusion, dyspnea, and weakness, he has a past medical history significant for diabetes meliltus, depression, GERD, hypertension, dyslipidemia.  He arrived to the ED via EMS after presenting to his place of work in a state of confusion.  He reported he had been feeling bad for a few days which worsened on 9/2/2021.  He reported while at work he was unable to remember his locker combination and was experiencing shortness of breath and generalized weakness.     Upon arrival to the ED, vital signs were T 98.8F, pulse 85, RR 14, and BP 96/52 with room air oxygen saturation of 82%.  Initial arterial blood gas revealed pH of 7.324 with PCO2 of 42.4 and PO2 of 47.9 on room air.  Troponin T was found to be 0.031.  Creatinine was found to be elevated at 2.10.  Lactate was found to be elevated at 2.3 with D-dimer of 0.95.  Hemoglobin was found to be 11.8.     Mr. Solitario is evaluated in the ED currently on BiPAP.  He reports feeling unwell for a few days but has difficulty talking due to BiPAP. He reports he has been making urine, though it is unclear at present if he has produced a specimen while in the ED.  He reports cough, weakness, and dyspnea.  He is alert to self.  He follows commands but is wearing BiPAP during examination. He denies chest pain and known  dysuria.  He reports he has had some pain in his low back.      High risk secondary to acute hypoxemic respiratory failure 2/2 COVID-19 pneumonia      P:  I was able to speak with the patients son Janessa to give him an update and provide support and answer questions. I let him know that myself and Dr. Mccord had been trying to get a hold of Josef patients brother as he had requested a call. Janessa gave me another number for Josef, however he did not answer this number as well. I will try and reach out to Josef again today.      We will continue to follow along. Please do not hesitate to contact us regarding further sx mgmt or GOC needs, including after hours or on weekends via our on call provider at [phone].     Socorro Zhou, APRN    9/16/2021

## 2021-09-16 NOTE — PROGRESS NOTES
PROGRESS NOTE         Patient Identification:  Name:  Valdemar Solitario  Age:  76 y.o.  Sex:  male  :  1945  MRN:  [id number]  Visit Number:  [id number]  Primary Care Provider:  Laurence Geiger APRN         LOS: 14 days       ----------------------------------------------------------------------------------------------------------------------  Subjective       Chief Complaints:    Exposure To Known Illness, Altered Mental Status, and Weakness - Generalized        Interval History:      Patient remains sedated and intubated at 90% FiO2, this is an improvement from 100% FiO2 yesterday.  Afebrile.  CRP improved to 12.35.  White count 9.55.  Urinalysis from 9/15/2021 reports 6-12 WBCs, trace bacteria, 1+ yeast, urine culture reports no growth.  Blood cultures from 2021 report no growth at 24 hours.  Chest x-ray from 2021 reports minimal worsening of diffuse bilateral airspace disease.    ----------------------------------------------------------------------------------------------------------------------      Objective       Current Hospital Meds:  castor oil-balsam peru, 1 application, Topical, Q12H  chlorhexidine, 15 mL, Mouth/Throat, Q12H  dexamethasone, 6 mg, Intravenous, Daily  enoxaparin, 1 mg/kg, Subcutaneous, Q12H  famotidine, 20 mg, Intravenous, BID  insulin aspart, 0-14 Units, Subcutaneous, TID AC  insulin detemir, 25 Units, Subcutaneous, Nightly  meropenem, 1 g, Intravenous, Q8H  midodrine, 10 mg, Oral, TID AC  sodium chloride, 10 mL, Intravenous, Q12H  sodium chloride, 10 mL, Intravenous, Q12H  sodium chloride, 10 mL, Intravenous, Q12H  sodium chloride, 10 mL, Intravenous, Q12H  sodium chloride, 10 mL, Intravenous, Q12H  vancomycin, 1,750 mg, Intravenous, Q24H      dexmedetomidine, 0.2-1.5 mcg/kg/hr, Last Rate: Stopped (21 1414)  fentaNYL Citrate,   norepinephrine, 0.02-0.3 mcg/kg/min, Last Rate: 0.04 mcg/kg/min (21 0831)  Pharmacy Consult - Pharmacy to dose,    Pharmacy Consult - Pharmacy to dose,   Pharmacy to Dose enoxaparin (LOVENOX),   Pharmacy to dose vancomycin,   phenylephrine, 0.5-3 mcg/kg/min, Last Rate: Stopped (09/15/21 2150)  propofol, 5-50 mcg/kg/min, Last Rate: 15 mcg/kg/min (09/16/21 0816)  vasopressin (PITRESSIN) 20 units in 100 mL infusion, 0.03 Units/min, Last Rate: Stopped (09/16/21 0840)      ----------------------------------------------------------------------------------------------------------------------    Vital Signs:  Temp:  [95.5 °F (35.3 °C)-98.9 °F (37.2 °C)] 95.5 °F (35.3 °C)  Heart Rate:  [43-91] 50  Resp:  [22-23] 22  BP: ()/(37-80) 118/44  FiO2 (%):  [90 %] 90 %  Mean Arterial Pressure (Non-Invasive) for the past 24 hrs (Last 3 readings):   Noninvasive MAP (mmHg)   09/16/21 1118 85   09/16/21 1103 85   09/16/21 1048 81     SpO2 Percentage    09/16/21 1048 09/16/21 1103 09/16/21 1118   SpO2: 92% 92% 93%     SpO2:  [78 %-98 %] 93 %  on   ;   Device (Oxygen Therapy): ventilator    Body mass index is 24.43 kg/m².  Wt Readings from Last 3 Encounters:   09/14/21 68.6 kg (151 lb 4.8 oz)   06/14/17 73.9 kg (163 lb)   03/23/17 76.7 kg (169 lb)        Intake/Output Summary (Last 24 hours) at 9/16/2021 1221  Last data filed at 9/16/2021 0816  Gross per 24 hour   Intake 3537.07 ml   Output 1775 ml   Net 1762.07 ml     NPO Diet  ----------------------------------------------------------------------------------------------------------------------      Physical Exam:    Deferred due to COVID-19 isolation.  ----------------------------------------------------------------------------------------------------------------------            Results from last 7 days   Lab Units 09/16/21  0453   PH, ARTERIAL pH units 7.451*   PO2 ART mm Hg 66.7*   PCO2, ARTERIAL mm Hg 42.3   HCO3 ART mmol/L 29.5*     Results from last 7 days   Lab Units 09/16/21  0047 09/15/21  0629 09/15/21  0627 09/15/21  0626 09/14/21  1124 09/14/21  0432 09/13/21  0732 09/13/21  0732  09/12/21  0308 09/11/21  0417 09/11/21  0302   CRP mg/dL 12.35*  --   --  19.45*  --  21.82*   < > 4.68*   < >  --  14.61*   LACTATE mmol/L  --  2.0  --   --   --   --   --  1.9  --   --  1.9   WBC 10*3/mm3 9.55  --  15.44*  --  10.94*  --    < > 11.51*   < >  --  13.27*   HEMOGLOBIN g/dL 9.1*  --  9.9*  --  10.8*  --    < > 10.6*   < >  --  11.0*   HEMATOCRIT % 30.9*  --  34.0*  --  37.1*  --    < > 35.5*   < >  --  36.7*   MCV fL 95.7  --  95.5  --  96.6  --    < > 94.9   < >  --  91.8   MCHC g/dL 29.4*  --  29.1*  --  29.1*  --    < > 29.9*   < >  --  30.0*   PLATELETS 10*3/mm3 148  --  198  --  136*  --    < > 150   < >  --  154   INR   --   --  1.13*  --   --   --   --  1.19*  --  1.36*  --     < > = values in this interval not displayed.     Results from last 7 days   Lab Units 09/16/21  0047 09/15/21  0626 09/14/21  1124   SODIUM mmol/L 140 146* 147*   POTASSIUM mmol/L 4.5 4.5 5.2   CHLORIDE mmol/L 104 110* 113*   CO2 mmol/L 28.8 29.0 22.5   BUN mg/dL 54* 54* 56*   CREATININE mg/dL 0.61* 0.71* 0.88   EGFR IF NONAFRICN AM mL/min/1.73 129 108 84   CALCIUM mg/dL 8.3* 8.5* 8.2*   GLUCOSE mg/dL 197* 145* 158*   ALBUMIN g/dL 1.88* 2.04* 1.96*   BILIRUBIN mg/dL 0.4 0.4 0.6   ALK PHOS U/L 48 59 61   AST (SGOT) U/L 45* 60* 72*   ALT (SGPT) U/L 37 40 41   Estimated Creatinine Clearance: 76.2 mL/min (A) (by C-G formula based on SCr of 0.61 mg/dL (L)).  No results found for: AMMONIA    Glucose   Date/Time Value Ref Range Status   09/16/2021 1048 173 (H) 70 - 130 mg/dL Final     Comment:     Meter: [id number] : [id number] CHARANJIT DELGADO   09/16/2021 0621 216 (H) 70 - 130 mg/dL Final     Comment:     Meter: [id number] : [id number] Marie Rodriguez ROSEY   09/15/2021 2001 168 (H) 70 - 130 mg/dL Final     Comment:     Meter: [id number] : [id number] tanika burroughs   09/15/2021 1725 178 (H) 70 - 130 mg/dL Final     Comment:     Meter: [id number] : [id number] CHARANJIT DELGADO   09/15/2021 1111 137 (H) 70 - 130 mg/dL Final      Comment:     Meter: [id number] : [id number] CHARANJIT DELGADO   09/15/2021 0807 130 70 - 130 mg/dL Final     Comment:     Meter: [id number] : [id number] CHARANJIT DELGADO   09/14/2021 2248 176 (H) 70 - 130 mg/dL Final     Comment:     Meter: [id number] : [id number] PITO CASE   09/14/2021 2036 169 (H) 70 - 130 mg/dL Final     Comment:     Meter: [id number] : [id number] PITO CASE     Lab Results   Component Value Date    HGBA1C 6.30 (H) 09/02/2021     No results found for: TSH, FREET4    Blood Culture   Date Value Ref Range Status   09/02/2021 No growth at 24 hours  Preliminary   09/02/2021 No growth at 24 hours  Preliminary   09/02/2021 No growth at 24 hours  Preliminary     No results found for: URINECX  No results found for: WOUNDCX  No results found for: STOOLCX  No results found for: RESPCX  Pain Management Panel    There is no flowsheet data to display.           ----------------------------------------------------------------------------------------------------------------------  Imaging Results (Last 24 Hours)       Procedure Component Value Units Date/Time    XR Chest 1 View [[id number]] Collected: 09/16/21 1029     Updated: 09/16/21 1034    Narrative:      EXAM:    XR Chest, 1 View     EXAM DATE:    9/16/2021 8:35 AM     CLINICAL HISTORY:    intubated; U07.1-COVID-19; J12.82-Pneumonia due to Coronavirus disease  2019; R09.02-Hypoxemia; J96.01-Acute respiratory failure with hypoxia     TECHNIQUE:    Frontal view of the chest.     COMPARISON:    09/15/2021     FINDINGS:    Lungs:  Overall minimal worsening of diffuse lateral airspace disease.    Pleural space:  Unremarkable.  No pneumothorax.    Heart:  Cardiomegaly.    Mediastinum:  Unremarkable.    Bones/joints:  Unremarkable.    Vasculature:  Right IJ deep line with tip in the distal SVC.    Tubes, lines and devices:  ET tube with tip at level of clavicles.  NG  tube extends into the stomach.       Impression:      1.  Minimal  worsening of diffuse bilateral airspace disease.  2.  Support devices as above.     This report was finalized on 9/16/2021 10:31 AM by Dr. Houston Larios MD.               ----------------------------------------------------------------------------------------------------------------------    Assessment/Plan       Assessment/Plan     ASSESSMENT:    1.  Severe sepsis with lactic acid greater than 2 on admission  2.  COVID-19 pneumonia    PLAN:    Patient remains sedated and intubated at 90% FiO2, this is an improvement from 100% FiO2 yesterday.  Afebrile.  CRP improved to 12.35.  White count 9.55.  Urinalysis from 9/15/2021 reports 6-12 WBCs, trace bacteria, 1+ yeast, urine culture reports no growth.  Blood cultures from 9/14/2021 report no growth at 24 hours.  Chest x-ray from 9/16/2021 reports minimal worsening of diffuse bilateral airspace disease.    Chest x-ray from 9/14/2021 reports stable bilateral airspace disease.    VQ scan on 9/2/2021 indeterminate for exclusion of PE.  CT of the chest on 9/3/2021 reports patchy bilateral airspace disease concerning for COVID-19 pneumonia.  CT of the abdomen and pelvis on 9/3/2021 reports bibasilar airspace disease suggestive of COVID-19 pneumonia, large right inguinal hernia containing nondilated bowel and fat.     Patient remains on Decadron but has completed course of remdesivir.    We are concerned about possible development of bacterial pneumonia in the setting of prior treatment with baricitinib.     Recommend to continue vancomycin and meropenem courses for now.      We will continue to follow closely and adjust antibiotic therapy as appropriate.    Code Status:   Code Status and Medical Interventions:   Ordered at: 09/10/21 9538     Limited Support to NOT Include:    Intubation    Cardioversion/Defibrillation     Code Status:    No CPR     Medical Interventions (Level of Support Prior to Arrest):    Limited     Comments:    adult children Cathleen Allen and  Chasitity all agree he would not want to be resuciated or reintubated in the event he became extubated.     Scribed for Brock Metzger MD by NELSON Torres. 9/16/2021  12:21 EDT       NELSON Torres  09/16/21  12:21 EDT      Physician Attestation:    The documentation recorded by the scribe accurately reflects the service I personally performed and the decisions made by me.    Brock Metzger MD  09/16/21  12:21 EDT

## 2021-09-16 NOTE — PROGRESS NOTES
Daryl Aquino MD                          [phone]      Patient ID:    Name:  Valdemar Solitario    MRN:  [id number]    1945   76 y.o.  male            Patient Care Team:  Laurence Geiger APRN as PCP - General (Family Medicine)  Corinne Dimas APRN as PCP - Family Medicine    CC/ Reason for visit: Acute respiratory failure, acute coronary pneumonia, shock, pulmonary pretension    Subjective: Pt seen and examined this AM. Patient remains on the mechanical ventilator on high settings. Deeply Sedated and on some pressors. No fever overnight. ABG reviewed and ventilator settings adjusted as appropriate. Discussed with nurse at bedside about overall poor prognosis    ROS: Unable to obtain due to critical illness    Objective     Vital Signs past 24hrs    BP range: BP: ()/(37-80) 161/64  Pulse range: Heart Rate:  [43-91] 78  Resp rate range: Resp:  [22-23] 23  Temp range: Temp (24hrs), Av.3 °F (36.8 °C), Min:97.6 °F (36.4 °C), Max:98.9 °F (37.2 °C)      Ventilator/Non-Invasive Ventilation Settings (From admission, onward)     Start     Ordered    21 1636  Ventilator - AC/VC; (22); 50; 90%; 12; 450  Continuous     Comments: May titrate up to 70% FIO2 please notify Dr Webster if higher 02 amount is required.   Question Answer Comment   Vent Mode AC/VC    Breath rate  22   FiO2 50    FiO2 titrate for Sp02% =/> 90%    PEEP 12    Tidal Volume 450        21 1637    21 0810  Ventilator - AC/VC; (22); 50; 90%; 10; 450  Continuous,   Status:  Canceled     Question Answer Comment   Vent Mode AC/VC    Breath rate  22   FiO2 50    FiO2 titrate for Sp02% =/> 90%    PEEP 10    Tidal Volume 450        21 0809    21 1306  Ventilator - AC/VC; (18); 100; 90%; Other (see comments); 16; 450  Continuous,   Status:  Canceled     Question Answer Comment   Vent Mode AC/VC    Breath rate  18   FiO2 100    FiO2 titrate for Sp02% =/> 90%    PEEP Other (see  comments)    PEEP: 16    Tidal Volume 450        09/05/21 1308    09/02/21 2228  NIPPV (CPAP or BIPAP)  Until Discontinued,   Status:  Canceled     Question Answer Comment   Indication: Acute Respiratory Failure    Type: BIPAP    NIPPV Mask Interface: Per Patient Preference    IPAP 15    EPAP 5    Oxygen FIO2    FIO2 % 32    Breath Rate 20    Titrate for SPO2 90%        09/02/21 2227 09/02/21 2223  NIPPV (CPAP or BIPAP)  Until Discontinued,   Status:  Canceled     Question Answer Comment   Type: BIPAP    NIPPV Mask Interface: Per Patient Preference        09/02/21 2223                Device (Oxygen Therapy): ventilator FiO2 (%): 90 %     68.6 kg (151 lb 4.8 oz); Body mass index is 24.43 kg/m².      Intake/Output Summary (Last 24 hours) at 9/16/2021 1027  Last data filed at 9/16/2021 0816  Gross per 24 hour   Intake 3537.07 ml   Output 1775 ml   Net 1762.07 ml       PHYSICAL EXAM   Constitutional: Elderly white male sedated on the mechanical ventilator  Head: - NCAT  Eyes: No pallor.  Anicteric sclerae, EOMI.  ENMT:   Endotracheal tube in place, no oral thrush. Moist MM.   NECK: Trachea midline, No thyromegaly, no palpable cervical lymphadenopathy  Heart: RRR, no murmur. Trace pedal edema   Lungs: GOGO +, No wheezes/ crackles heard    Abdomen: Soft. No tenderness, guarding or rigidity. No palpable masses  Extremities: Extremities warm and well perfused. No cyanosis/ clubbing  Neuro: Sedated  Psych: Mood and affect - UTO     PPE recommended per [facility] infectious disease Isolation protocol for the current clinical scenario(as mentioned below) was followed.     Scheduled meds:  castor oil-balsam peru, 1 application, Topical, Q12H  chlorhexidine, 15 mL, Mouth/Throat, Q12H  dexamethasone, 6 mg, Intravenous, Daily  enoxaparin, 1 mg/kg, Subcutaneous, Q12H  famotidine, 20 mg, Intravenous, BID  insulin aspart, 0-14 Units, Subcutaneous, TID AC  insulin detemir, 25 Units, Subcutaneous, Nightly  meropenem, 1 g,  Intravenous, Q8H  midodrine, 10 mg, Oral, TID AC  sodium chloride, 10 mL, Intravenous, Q12H  sodium chloride, 10 mL, Intravenous, Q12H  sodium chloride, 10 mL, Intravenous, Q12H  sodium chloride, 10 mL, Intravenous, Q12H  sodium chloride, 10 mL, Intravenous, Q12H  vancomycin, 1,750 mg, Intravenous, Q24H        IV meds:                      dexmedetomidine, 0.2-1.5 mcg/kg/hr, Last Rate: Stopped (09/09/21 1414)  fentaNYL Citrate,   norepinephrine, 0.02-0.3 mcg/kg/min, Last Rate: 0.04 mcg/kg/min (09/16/21 0831)  Pharmacy Consult - Pharmacy to dose,   Pharmacy Consult - Pharmacy to dose,   Pharmacy to Dose enoxaparin (LOVENOX),   Pharmacy to dose vancomycin,   phenylephrine, 0.5-3 mcg/kg/min, Last Rate: Stopped (09/15/21 2150)  propofol, 5-50 mcg/kg/min, Last Rate: 15 mcg/kg/min (09/16/21 0816)  vasopressin (PITRESSIN) 20 units in 100 mL infusion, 0.03 Units/min, Last Rate: Stopped (09/16/21 0840)        Data Review:      Results from last 7 days   Lab Units 09/16/21  0047 09/15/21  0628 09/15/21  0627 09/15/21  0626 09/14/21  1124 09/14/21  0432 09/13/21  0732 09/12/21  0308 09/11/21  0417   SODIUM mmol/L 140  --   --  146* 147*  --  151*   < >  --    POTASSIUM mmol/L 4.5  --   --  4.5 5.2   < > 3.0*   < >  --    CHLORIDE mmol/L 104  --   --  110* 113*  --  116*   < >  --    CO2 mmol/L 28.8  --   --  29.0 22.5  --  26.6   < >  --    BUN mg/dL 54*  --   --  54* 56*  --  58*   < >  --    CREATININE mg/dL 0.61*  --   --  0.71* 0.88   < > 0.88   < >  --    CALCIUM mg/dL 8.3*  --   --  8.5* 8.2*  --  7.1*   < >  --    BILIRUBIN mg/dL 0.4  --   --  0.4 0.6   < > 0.6   < >  --    ALK PHOS U/L 48  --   --  59 61   < > 46   < >  --    ALT (SGPT) U/L 37  --   --  40 41   < > 34   < >  --    AST (SGOT) U/L 45*  --   --  60* 72*   < > 40   < >  --    GLUCOSE mg/dL 197*  --   --  145* 158*  --  130*   < >  --    WBC 10*3/mm3 9.55  --  15.44*  --  10.94*  --  11.51*   < >  --    HEMOGLOBIN g/dL 9.1*  --  9.9*  --  10.8*  --  10.6*    < >  --    PLATELETS 10*3/mm3 148  --  198  --  136*  --  150   < >  --    INR   --   --  1.13*  --   --   --  1.19*  --  1.36*   PROCALCITONIN ng/mL  --  2.59*  --   --   --   --   --   --   --     < > = values in this interval not displayed.       Lab Results   Component Value Date    CALCIUM 8.3 (L) 09/16/2021       Results from last 7 days   Lab Units 09/14/21  1351 09/14/21  1350 09/13/21  1526   BLOODCX  No growth at 24 hours No growth at 24 hours  --    RESPCX   --   --  No growth       Results from last 7 days   Lab Units 09/16/21  0453 09/15/21  0500 09/14/21  0447 09/13/21  1134 09/13/21  0328 09/12/21  0426 09/11/21  1519   PH, ARTERIAL pH units 7.451* 7.356 7.418 7.434 7.470* 7.455* 7.408   PO2 ART mm Hg 66.7* 121.0* 115.0* 72.6* 54.0* 52.4* 80.4*   PCO2, ARTERIAL mm Hg 42.3 52.2* 46.1* 48.4* 45.7* 45.7* 47.2*   HCO3 ART mmol/L 29.5* 29.2* 29.7* 32.4* 33.2* 32.1* 29.7*      COVID LABS:  Results From Last 14 Days   Lab Units 09/16/21  0047 09/15/21  0629 09/15/21  0628 09/15/21  0627 09/15/21  0626 09/14/21  0432 09/13/21  0732 09/13/21  0732 09/12/21  0308 09/11/21  0417 09/11/21  0302 09/10/21  0429 09/09/21  0914 09/09/21  0055 09/08/21  0233 09/07/21  0320 09/07/21  0320 09/06/21  0433 09/05/21  0227 09/05/21  0227 09/02/21  1301 09/02/21  1218   CRP mg/dL 12.35*  --   --   --  19.45* 21.82*   < > 4.68*   < >  --  14.61*   < >  --    < >  --   --  2.54* 2.93*   < > 3.13*   < >  --    D DIMER QUANT MCGFEU/mL  --   --   --  1.69*  --   --   --  0.83*  --   --  0.41  --   --    < >  --   --  0.36  --    < > 0.36   < >  --    FERRITIN ng/mL  --   --   --   --   --   --   --   --   --   --   --   --   --   --   --   --  1,685.00* 2,397.00*  --  2,904.00*   < >  --    LACTATE mmol/L  --  2.0  --   --   --   --   --  1.9  --   --  1.9  --   --    < >  --   --  1.3  --    < > 1.4   < >  --    LDH U/L  --   --   --   --  496*  --   --  390*  --   --  336*  --   --    < >  --   --  608*  --    < > 608*   < >   --    PROCALCITONIN ng/mL  --   --  2.59*  --   --   --   --   --   --   --   --   --  0.45*  --  0.22   < > 0.11  --   --   --    < >  --    PROTIME Seconds  --   --   --  15.0*  --   --   --  15.5*  --  17.2*  --   --   --    < >  --   --  15.9*  --    < > 14.9*   < >  --    INR   --   --   --  1.13*  --   --   --  1.19*  --  1.36*  --   --   --    < >  --   --  1.23*  --    < > 1.13*   < >  --    TROPONIN T ng/mL  --   --   --   --   --   --   --   --   --   --   --   --   --   --   --   --   --   --   --   --   --  0.016    < > = values in this interval not displayed.         Results Review:    I have reviewed the relevant laboratory results and independently reviewed the chest imaging from this hospitalization including the available echocardiogram reports personally and summarized it if/ when appropriate below    Assessment    Acute hypoxic respiratory failure  Acute COVID-19 pneumonia  ARDS  Shock - Septic vs cardiogenic  Atrial fibrillation on anticoagulation  Severe pulmonary hypertension  RV dilation  Anemia    PLAN:  Patient remains on the mechanical ventilator and settings adjusted as appropriate.  ABG and last chest x-ray reviewed. Unable to wean  Continue with steroids given high CRP still.   Wean down pressors. Likely more med related. C/w midodrine.   Abx per ID. No + cx.    Continue to keep him as negative as possible  Patient already on anticoagulation.   Rest of the medical management per primary    Very guarded prognosis  DNR/DNI    DVT/GI prophylaxis    Would strongly recommend transition to comfort measures only given advanced age and severe COVID-19 pneumonia with prolonged respiratory failure, cardiogenic shock, severe pulmonary hypertension    D/w RN     CC - 32 mins    Daryl Aquino MD  9/16/2021

## 2021-09-17 NOTE — CONSULTS
Consults    Patient Identification:    Name:  Valdemar Solitario  Age:  76 y.o.  Sex:  male  :  1945  MRN:  [id number]  Visit Number:  [id number]  Primary care provider:  Laurence Geiger APRN    Chief complaint:   Rapid Atrial Fibrillation    History of presenting illness:   76 yr old WM whom I have seen regarding elevated troponins when he first got admitted with COVID pneumonia developed rapid Atrial fibrillation. His sedation was being reduced as well and he has been on levophed drip. He remains critically ill.     ---------------------------------------------------------------------------------------------------------------------  ROS: All systems reviewed and negative except noted above.  ---------------------------------------------------------------------------------------------------------------------   Past History:   Family History   Problem Relation Age of Onset   • Hypertension Mother      Past Medical History:   Diagnosis Date   • Depression    • Diabetes mellitus (CMS/[facility])     [place] diabetic, is not on meds for it   • Dyslipidemia    • GERD (gastroesophageal reflux disease)    • Hypertension    • Thoracic spine pain    • Vitamin B12 deficiency (dietary) anemia    • Vitamin D deficiency      Past Surgical History:   Procedure Laterality Date   • APPENDECTOMY     • CATARACT EXTRACTION     • HERNIA REPAIR       Social History     Socioeconomic History   • Marital status:      Spouse name: Not on file   • Number of children: Not on file   • Years of education: Not on file   • Highest education level: Not on file   Tobacco Use   • Smoking status: Former Smoker   Substance and Sexual Activity   • Alcohol use: No   • Drug use: No   • Sexual activity: Defer     ---------------------------------------------------------------------------------------------------------------------   Allergies:   Codeine  ---------------------------------------------------------------------------------------------------------------------   Prior to Admission Medications     Prescriptions Last Dose Informant Patient Reported? Taking?    olmesartan (BENICAR) 40 MG tablet Unknown Pharmacy Yes No    Take 40 mg by mouth Daily.    omeprazole (priLOSEC) 40 MG capsule Unknown Pharmacy Yes No    Take 40 mg by mouth Daily.    sertraline (ZOLOFT) 100 MG tablet Unknown Pharmacy Yes No    Take 100 mg by mouth Daily.    simvastatin (ZOCOR) 40 MG tablet Unknown Pharmacy Yes No    Take 40 mg by mouth Every Night.    tamsulosin (FLOMAX) 0.4 MG capsule 24 hr capsule Unknown Pharmacy Yes No    Take 1 capsule by mouth Daily.        [facility] Meds:  castor oil-balsam peru, 1 application, Topical, Q12H  chlorhexidine, 15 mL, Mouth/Throat, Q12H  dexamethasone, 6 mg, Intravenous, Daily  digoxin, 250 mcg, Intravenous, Once  enoxaparin, 1 mg/kg, Subcutaneous, Q12H  famotidine, 20 mg, Intravenous, BID  insulin aspart, 0-14 Units, Subcutaneous, TID AC  insulin detemir, 25 Units, Subcutaneous, Nightly  meropenem, 1 g, Intravenous, Q8H  midodrine, 10 mg, Oral, TID AC  sodium chloride, 10 mL, Intravenous, Q12H  sodium chloride, 10 mL, Intravenous, Q12H  sodium chloride, 10 mL, Intravenous, Q12H  sodium chloride, 10 mL, Intravenous, Q12H  sodium chloride, 10 mL, Intravenous, Q12H      amiodarone, 1 mg/min, Last Rate: 1 mg/min (09/17/21 7013)   Followed by  amiodarone, 0.5 mg/min, Last Rate: 0.5 mg/min (09/17/21 1305)  dexmedetomidine, 0.2-1.5 mcg/kg/hr, Last Rate: Stopped (09/09/21 1414)  fentaNYL Citrate,   norepinephrine, 0.02-0.3 mcg/kg/min, Last Rate: 0.1 mcg/kg/min (09/17/21 1205)  Pharmacy Consult - Pharmacy to dose,   Pharmacy Consult - Pharmacy to dose,   Pharmacy to Dose enoxaparin (LOVENOX),   Pharmacy to dose vancomycin,   phenylephrine, 0.5-3 mcg/kg/min, Last Rate: 0.8 mcg/kg/min (09/17/21 1213)  propofol, 5-50 mcg/kg/min, Last Rate: 30  mcg/kg/min (09/17/21 1221)  vasopressin (PITRESSIN) 20 units in 100 mL infusion, 0.03 Units/min, Last Rate: 0.03 Units/min (09/17/21 1206)      ---------------------------------------------------------------------------------------------------------------------   Vital Signs:  Temp:  [96.1 °F (35.6 °C)-99.5 °F (37.5 °C)] 98.8 °F (37.1 °C)  Heart Rate:  [] 107  Resp:  [22-25] 24  BP: ()/(34-75) 140/56  FiO2 (%):  [90 %-100 %] 100 %      09/12/21  0559 09/13/21  0636 09/14/21  0600   Weight: 68.5 kg (151 lb 2 oz) 68.2 kg (150 lb 4.8 oz) 68.6 kg (151 lb 4.8 oz)     Body mass index is 24.43 kg/m².  ---------------------------------------------------------------------------------------------------------------------   Physical exam:   Constitutional:  Well-developed and well-nourished.  No respiratory distress.      HENT:  Head: Normocephalic and atraumatic.  Mouth:  Moist mucous membranes.    Eyes:  Conjunctivae and EOM are normal.  Pupils are equal, round, and reactive to light.  No scleral icterus.  Neck:  Neck supple.  No JVD present.    Cardiovascular:  Normal rate, regular rhythm and normal heart sounds with no murmur.  Pulmonary/Chest:  No respiratory distress, no wheezes, no crackles, with normal breath sounds and good air movement.  Abdominal:  Soft.  Bowel sounds are normal.  No distension and no tenderness.   Musculoskeletal:  No edema, no tenderness, and no deformity.  No red or swollen joints anywhere.    Neurological:  Alert and oriented to person, place, and time.  No cranial nerve deficit.  No tongue deviation.  No facial droop.  No slurred speech.   Skin:  Skin is warm and dry.  No rash noted.  No pallor.   Psychiatric:  Normal mood and affect.  Behavior is normal.  Judgment and thought content normal.   Peripheral vascular:  No edema and strong pulses on all 4 extremities.    ---------------------------------------------------------------------------------------------------------------------    EKG: Atrial fibrillation with RVR and RBBB  Telemetry: Atrial Fibrillation RVR  I have personally looked at both the EKG and the telemetry strips.  Echo:  Results for orders placed during the hospital encounter of 09/02/21    Adult Transthoracic Echo Complete W/ Cont if Necessary Per Protocol    Interpretation Summary  · Estimated left ventricular EF = 50% Left ventricular ejection fraction appears to be 46 - 50%. Left ventricular systolic function is normal.  · Left ventricular diastolic function was normal.  · The right ventricular cavity is mild to moderately dilated.  · The right atrial cavity is mild to moderately dilated.  · Estimated right ventricular systolic pressure from tricuspid regurgitation is moderately elevated (45-55 mmHg).  · Moderate to severe pulmonary hypertension is present.  · No pericardial effusion  · No prev echo    ---------------------------------------------------------------------------------------------------------------------   Results from last 7 days   Lab Units 09/17/21  0112 09/17/21  0111 09/17/21  0109 09/16/21  0047 09/15/21  0629 09/15/21  0627 09/15/21  0626 09/14/21  1124 09/14/21  0432 09/13/21  0732 09/12/21  0308 09/11/21  0417   CRP mg/dL  --  5.85*  --  12.35*  --   --  19.45*  --    < > 4.68*   < >  --    LACTATE mmol/L 1.5  --   --   --  2.0  --   --   --   --  1.9  --   --    WBC 10*3/mm3  --  14.26*  --  9.55  --  15.44*  --    < >   < > 11.51*   < >  --    HEMOGLOBIN g/dL  --  9.4*  --  9.1*  --  9.9*  --    < >   < > 10.6*   < >  --    HEMATOCRIT %  --  30.9*  --  30.9*  --  34.0*  --    < >   < > 35.5*   < >  --    MCV fL  --  92.5  --  95.7  --  95.5  --    < >   < > 94.9   < >  --    MCHC g/dL  --  30.4*  --  29.4*  --  29.1*  --    < >   < > 29.9*   < >  --    PLATELETS 10*3/mm3  --  226  --  148  --  198  --    < >   < > 150   < >  --    INR   --   --  1.07  --   --  1.13*  --   --   --  1.19*  --  1.36*    < > = values in this interval not displayed.      Results from last 7 days   Lab Units 09/17/21  0449   PH, ARTERIAL pH units 7.394   PO2 ART mm Hg 64.3*   PCO2, ARTERIAL mm Hg 44.6   HCO3 ART mmol/L 27.2*     Results from last 7 days   Lab Units 09/17/21  0111 09/16/21  0047 09/15/21  0626   SODIUM mmol/L 140 140 146*   POTASSIUM mmol/L 3.7 4.5 4.5   CHLORIDE mmol/L 105 104 110*   CO2 mmol/L 29.6* 28.8 29.0   BUN mg/dL 43* 54* 54*   CREATININE mg/dL 0.54* 0.61* 0.71*   EGFR IF NONAFRICN AM mL/min/1.73 148 129 108   CALCIUM mg/dL 8.5* 8.3* 8.5*   GLUCOSE mg/dL 158* 197* 145*   ALBUMIN g/dL 1.91* 1.88* 2.04*   BILIRUBIN mg/dL 0.4 0.4 0.4   ALK PHOS U/L 56 48 59   AST (SGOT) U/L 38 45* 60*   ALT (SGPT) U/L 37 37 40   Estimated Creatinine Clearance: 76.2 mL/min (A) (by C-G formula based on SCr of 0.54 mg/dL (L)).  No results found for: AMMONIA          Lab Results   Component Value Date    HGBA1C 6.30 (H) 09/02/2021       Blood Culture   Date Value Ref Range Status   09/14/2021 No growth at 2 days  Preliminary   09/14/2021 No growth at 2 days  Preliminary     Urine Culture   Date Value Ref Range Status   09/15/2021 No growth  Final     ---------------------------------------------------------------------------------------------------------------------   Imaging Results (Last 7 Days)     Procedure Component Value Units Date/Time    XR Chest 1 View [[id number]] Collected: 09/17/21 1122     Updated: 09/17/21 1125    Narrative:      EXAM:    XR Chest, 1 View     EXAM DATE:    9/17/2021 9:03 AM     CLINICAL HISTORY:    intubated; U07.1-COVID-19; J12.82-Pneumonia due to Coronavirus disease  2019; R09.02-Hypoxemia; J96.01-Acute respiratory failure with hypoxia     TECHNIQUE:    Frontal view of the chest.     COMPARISON:    09/16/2021     FINDINGS:    Lungs:  Diffuse bilateral airspace disease stable.    Pleural space:  Unremarkable.  No pneumothorax.    Heart:  Cardiomegaly stable.    Mediastinum:  Unremarkable.    Bones/joints:  Unremarkable.    Tubes, lines and devices:   ET tube with tip just below clavicles.  NG  tube extends into the stomach.  Right IJ deep line with tip at the  cavoatrial junction.       Impression:      1.  Stable positioning of support devices.  2.  Stable appearance of the chest.     This report was finalized on 9/17/2021 11:23 AM by Dr. Houston Larios MD.       XR Chest 1 View [[id number]] Collected: 09/16/21 1029     Updated: 09/16/21 1034    Narrative:      EXAM:    XR Chest, 1 View     EXAM DATE:    9/16/2021 8:35 AM     CLINICAL HISTORY:    intubated; U07.1-COVID-19; J12.82-Pneumonia due to Coronavirus disease  2019; R09.02-Hypoxemia; J96.01-Acute respiratory failure with hypoxia     TECHNIQUE:    Frontal view of the chest.     COMPARISON:    09/15/2021     FINDINGS:    Lungs:  Overall minimal worsening of diffuse lateral airspace disease.    Pleural space:  Unremarkable.  No pneumothorax.    Heart:  Cardiomegaly.    Mediastinum:  Unremarkable.    Bones/joints:  Unremarkable.    Vasculature:  Right IJ deep line with tip in the distal SVC.    Tubes, lines and devices:  ET tube with tip at level of clavicles.  NG  tube extends into the stomach.       Impression:      1.  Minimal worsening of diffuse bilateral airspace disease.  2.  Support devices as above.     This report was finalized on 9/16/2021 10:31 AM by Dr. Houston Larios MD.       XR Chest 1 View [[id number]] Collected: 09/15/21 1405     Updated: 09/15/21 1413    Narrative:      EXAM:    XR Chest, 1 View     EXAM DATE:    9/15/2021 12:53 PM     CLINICAL HISTORY:    intubated; U07.1-COVID-19; J12.82-Pneumonia due to Coronavirus disease  2019; R09.02-Hypoxemia; J96.01-Acute respiratory failure with hypoxia     TECHNIQUE:    Frontal view of the chest.     COMPARISON:    09/14/2021     FINDINGS:    Lungs:  Bilateral airspace disease stable.  Lung volumes are stable.    Pleural space:  No pneumothorax identified.    Heart:  Mild cardiac enlargement.    Mediastinum:  Unremarkable.    Bones/joints:   Unremarkable.    Tubes, lines and devices:  ET tube with tip just below clavicles.  NG  tube extends into the proximal-mid stomach.  Right IJ deep line with tip  at the cavoatrial junction.       Impression:      1.  No significant change in bilateral airspace disease.  2.  Positioning of support devices as detailed above.     This report was finalized on 9/15/2021 2:06 PM by Dr. Houston Larios MD.       XR Chest 1 View [[id number]] Collected: 09/14/21 1029     Updated: 09/14/21 1042    Narrative:      EXAM:    XR Chest, 1 View     EXAM DATE:    9/14/2021 8:03 AM     CLINICAL HISTORY:    to confirm placement of ET Tube -Manual Prone Protocol;  U07.1-COVID-19; J12.82-Pneumonia due to Coronavirus disease 2019;  R09.02-Hypoxemia; J96.01-Acute respiratory failure with hypoxia     TECHNIQUE:    Frontal view of the chest.     COMPARISON:    09/13/2021     FINDINGS:    Lungs:  Bilateral airspace disease from previous.    Pleural space:  No pneumothorax identified.    Heart:  Cardiomegaly is stable.    Mediastinum:  Unremarkable.    Bones/joints:  Unremarkable.    Tubes, lines and devices:  ET tube with tip just below clavicles.  NG  tube extends into the mid stomach.  Right IJ deep line noted with tip at  the cavoatrial junction.       Impression:      1.  Positioning of support devices detailed above.  2.  Stable bilateral airspace disease.     This report was finalized on 9/14/2021 10:30 AM by Dr. Houston Larios MD.       XR Chest 1 View [[id number]] Collected: 09/13/21 0852     Updated: 09/13/21 0855    Narrative:      EXAM:    XR Chest, 1 View     EXAM DATE:    9/13/2021 7:00 AM     CLINICAL HISTORY:    to confirm placement of ET Tube -Manual Prone Protocol;  U07.1-COVID-19; J12.82-Pneumonia due to Coronavirus disease 2019;  R09.02-Hypoxemia; J96.01-Acute respiratory failure with hypoxia     TECHNIQUE:    Frontal view of the chest.     COMPARISON:    09/12/2021     FINDINGS:    Lungs:  Improvement in bilateral airspace  disease.  Improved lung  volumes.    Pleural space:  Unremarkable.  No pneumothorax.    Heart:  Unremarkable.  No cardiomegaly.    Mediastinum:  Unremarkable.    Bones/joints:  Old left rib fractures.    Tubes, lines and devices:  Right IJ deep line positioning is stable.   NG tube extends into the stomach.  ET tube with tip at level of  clavicles.       Impression:      1.  Improved aeration of the lungs and decreased basilar airspace  disease.  2.  Support device positioning detailed above.     This report was finalized on 9/13/2021 8:53 AM by Dr. Houston Larios MD.       XR Chest 1 View [[id number]] Collected: 09/12/21 0938     Updated: 09/12/21 0941    Narrative:      XR CHEST 1 VW-     CLINICAL INDICATION: to confirm placement of ET Tube -Manual Prone  Protocol; U07.1-COVID-19; J12.82-Pneumonia due to Coronavirus disease  2019; R09.02-Hypoxemia; J96.01-Acute respiratory failure with hypoxia        COMPARISON: 09/11/2021      TECHNIQUE: Single frontal view of the chest.     FINDINGS:     Bilateral airspace disease  No interval line change  There is no evidence of an acute osseous abnormality.   There are no suspicious-appearing parenchymal soft tissue nodules.          Impression:      Little overall change. Bilateral airspace disease     This report was finalized on 9/12/2021 9:39 AM by Dr. Michael Feng MD.       XR Chest 1 View [[id number]] Collected: 09/11/21 1052     Updated: 09/11/21 1055    Narrative:      XR CHEST 1 VW-     CLINICAL INDICATION: to confirm placement of ET Tube -Manual Prone  Protocol; U07.1-COVID-19; J12.82-Pneumonia due to Coronavirus disease  2019; R09.02-Hypoxemia; J96.01-Acute respiratory failure with hypoxia        COMPARISON: 09/10/2021      TECHNIQUE: Single frontal view of the chest.     FINDINGS:     Bibasilar airspace disease  No interval line change  There is no evidence of an acute osseous abnormality.   There are no suspicious-appearing parenchymal soft tissue nodules.           Impression:      Little overall change     This report was finalized on 9/11/2021 10:53 AM by Dr. Michael Feng MD.           ----------------------------------------------------------------------------------------------------------------------  Assessment:   Atrial Fibrillation with rapid Ventricular response  Known RBBB  COVID PNA on ventilator  Moderate Pulmonary hypertension   EF 50%      Plan:   Digoxin 0.25 mg IVP given once which reduced HR from 150 to 110  Currently patient is on Levophed so will hold off to using BB or Cardizem  Continue IV amiodarone and we can switch to Amio per GT  Repeat limited echo    Thank you for the consult.    Smooth Espino MD  09/17/21  13:34 EDT        Director, Cardiac Cath Lab

## 2021-09-17 NOTE — PROGRESS NOTES
Daryl Aquino MD                          [phone]      Patient ID:    Name:  Valdemar Solitario    MRN:  [id number]    1945   76 y.o.  male            Patient Care Team:  Laurence Geiger APRN as PCP - General (Family Medicine)  Corinne Dimas APRN as PCP - Family Medicine    CC/ Reason for visit: Acute respiratory failure, acute coronary pneumonia, shock, pulmonary pretension    Subjective: Pt seen and examined this AM. Patient remains on the mechanical ventilator on high settings. Deeply Sedated and on some pressors. No fever overnight.  Issues with tachycardia this morning.  Still on Levophed and discussed with the nurse to switch to Marco-Synephrine.  Amiodarone on board.  Cardiology/primary managing KEON carlson RVR. ABG reviewed and ventilator settings adjusted as appropriate. Discussed with nurse at bedside about overall poor prognosis    ROS: Unable to obtain due to critical illness    Objective     Vital Signs past 24hrs    BP range: BP: ()/(34-75) 123/63  Pulse range: Heart Rate:  [] 97  Resp rate range: Resp:  [22-25] 24  Temp range: Temp (24hrs), Av.9 °F (36.6 °C), Min:96.1 °F (35.6 °C), Max:99.5 °F (37.5 °C)      Ventilator/Non-Invasive Ventilation Settings (From admission, onward)     Start     Ordered    21 1636  Ventilator - AC/VC; (22); 50; 90%; 12; 450  Continuous     Comments: May titrate up to 70% FIO2 please notify Dr Webster if higher 02 amount is required.   Question Answer Comment   Vent Mode AC/VC    Breath rate  22   FiO2 50    FiO2 titrate for Sp02% =/> 90%    PEEP 12    Tidal Volume 450        21 1637    21 0810  Ventilator - AC/VC; (22); 50; 90%; 10; 450  Continuous,   Status:  Canceled     Question Answer Comment   Vent Mode AC/VC    Breath rate  22   FiO2 50    FiO2 titrate for Sp02% =/> 90%    PEEP 10    Tidal Volume 450        21 0809    21 1306  Ventilator - AC/VC; (18); 100; 90%; Other (see  comments); 16; 450  Continuous,   Status:  Canceled     Question Answer Comment   Vent Mode AC/VC    Breath rate  18   FiO2 100    FiO2 titrate for Sp02% =/> 90%    PEEP Other (see comments)    PEEP: 16    Tidal Volume 450        09/05/21 1308    09/02/21 2228  NIPPV (CPAP or BIPAP)  Until Discontinued,   Status:  Canceled     Question Answer Comment   Indication: Acute Respiratory Failure    Type: BIPAP    NIPPV Mask Interface: Per Patient Preference    IPAP 15    EPAP 5    Oxygen FIO2    FIO2 % 32    Breath Rate 20    Titrate for SPO2 90%        09/02/21 2227    09/02/21 2223  NIPPV (CPAP or BIPAP)  Until Discontinued,   Status:  Canceled     Question Answer Comment   Type: BIPAP    NIPPV Mask Interface: Per Patient Preference        09/02/21 2223                Device (Oxygen Therapy): ventilator FiO2 (%): 100 %     68.6 kg (151 lb 4.8 oz); Body mass index is 24.43 kg/m².      Intake/Output Summary (Last 24 hours) at 9/17/2021 1158  Last data filed at 9/17/2021 0615  Gross per 24 hour   Intake 3462.09 ml   Output 2600 ml   Net 862.09 ml       PHYSICAL EXAM   Constitutional: Elderly white male sedated on the mechanical ventilator  Head: - NCAT  Eyes: No pallor.  Anicteric sclerae, EOMI.  ENMT:   Endotracheal tube in place, no oral thrush. Moist MM.   NECK: Trachea midline, No thyromegaly, no palpable cervical lymphadenopathy  Heart: RRR, no murmur. Trace pedal edema   Lungs: GOGO +, No wheezes/ crackles heard    Abdomen: Soft. No tenderness, guarding or rigidity. No palpable masses  Extremities: Extremities warm and well perfused. No cyanosis/ clubbing  Neuro: Sedated  Psych: Mood and affect - UTO     PPE recommended per [facility] infectious disease Isolation protocol for the current clinical scenario(as mentioned below) was followed.     Scheduled meds:  castor oil-balsam peru, 1 application, Topical, Q12H  chlorhexidine, 15 mL, Mouth/Throat, Q12H  dexamethasone, 6 mg, Intravenous, Daily  enoxaparin, 1  mg/kg, Subcutaneous, Q12H  famotidine, 20 mg, Intravenous, BID  insulin aspart, 0-14 Units, Subcutaneous, TID AC  insulin detemir, 25 Units, Subcutaneous, Nightly  meropenem, 1 g, Intravenous, Q8H  midodrine, 10 mg, Oral, TID AC  sodium chloride, 10 mL, Intravenous, Q12H  sodium chloride, 10 mL, Intravenous, Q12H  sodium chloride, 10 mL, Intravenous, Q12H  sodium chloride, 10 mL, Intravenous, Q12H  sodium chloride, 10 mL, Intravenous, Q12H        IV meds:                      amiodarone, 1 mg/min, Last Rate: Stopped (09/17/21 0824)   Followed by  amiodarone, 0.5 mg/min  dexmedetomidine, 0.2-1.5 mcg/kg/hr, Last Rate: Stopped (09/09/21 1414)  norepinephrine, 0.02-0.3 mcg/kg/min, Last Rate: 0.14 mcg/kg/min (09/17/21 1104)  Pharmacy Consult - Pharmacy to dose,   Pharmacy Consult - Pharmacy to dose,   Pharmacy to Dose enoxaparin (LOVENOX),   Pharmacy to dose vancomycin,   phenylephrine, 0.5-3 mcg/kg/min, Last Rate: 0.5 mcg/kg/min (09/17/21 1058)  propofol, 5-50 mcg/kg/min, Last Rate: 30 mcg/kg/min (09/17/21 0625)  vasopressin (PITRESSIN) 20 units in 100 mL infusion, 0.03 Units/min, Last Rate: 0.03 Units/min (09/17/21 0751)        Data Review:      Results from last 7 days   Lab Units 09/17/21  0111 09/17/21  0109 09/16/21  0047 09/15/21  0628 09/15/21  0627 09/15/21  0626 09/14/21  1124 09/14/21  0432 09/13/21  0732   SODIUM mmol/L 140  --  140  --   --  146*  --    < > 151*   POTASSIUM mmol/L 3.7  --  4.5  --   --  4.5  --    < > 3.0*   CHLORIDE mmol/L 105  --  104  --   --  110*  --    < > 116*   CO2 mmol/L 29.6*  --  28.8  --   --  29.0  --    < > 26.6   BUN mg/dL 43*  --  54*  --   --  54*  --    < > 58*   CREATININE mg/dL 0.54*  --  0.61*  --   --  0.71*  --    < > 0.88   CALCIUM mg/dL 8.5*  --  8.3*  --   --  8.5*  --    < > 7.1*   BILIRUBIN mg/dL 0.4  --  0.4  --   --  0.4  --    < > 0.6   ALK PHOS U/L 56  --  48  --   --  59  --    < > 46   ALT (SGPT) U/L 37  --  37  --   --  40  --    < > 34   AST (SGOT) U/L 38   --  45*  --   --  60*  --    < > 40   GLUCOSE mg/dL 158*  --  197*  --   --  145*  --    < > 130*   WBC 10*3/mm3 14.26*  --  9.55  --  15.44*  --    < >   < > 11.51*   HEMOGLOBIN g/dL 9.4*  --  9.1*  --  9.9*  --    < >   < > 10.6*   PLATELETS 10*3/mm3 226  --  148  --  198  --    < >   < > 150   INR   --  1.07  --   --  1.13*  --   --   --  1.19*   PROCALCITONIN ng/mL  --   --   --  2.59*  --   --   --   --   --     < > = values in this interval not displayed.       Lab Results   Component Value Date    CALCIUM 8.5 (L) 09/17/2021       Results from last 7 days   Lab Units 09/15/21  1125 09/14/21  1351 09/14/21  1350 09/13/21  1526   BLOODCX   --  No growth at 2 days No growth at 2 days  --    RESPCX   --   --   --  No growth   URINECX  No growth  --   --   --        Results from last 7 days   Lab Units 09/17/21  0449 09/16/21  0453 09/15/21  0500 09/14/21  0447 09/13/21  1134 09/13/21  0328 09/12/21  0426   PH, ARTERIAL pH units 7.394 7.451* 7.356 7.418 7.434 7.470* 7.455*   PO2 ART mm Hg 64.3* 66.7* 121.0* 115.0* 72.6* 54.0* 52.4*   PCO2, ARTERIAL mm Hg 44.6 42.3 52.2* 46.1* 48.4* 45.7* 45.7*   HCO3 ART mmol/L 27.2* 29.5* 29.2* 29.7* 32.4* 33.2* 32.1*      COVID LABS:  Results From Last 14 Days   Lab Units 09/17/21  0112 09/17/21  0111 09/17/21  0109 09/16/21  0047 09/15/21  0629 09/15/21  0628 09/15/21  0627 09/15/21  0626 09/14/21  0432 09/13/21  0732 09/10/21  0429 09/09/21  0914 09/09/21  0055 09/08/21  0233 09/07/21  0320 09/07/21  0320 09/06/21  0433 09/05/21  0227 09/05/21  0227   CRP mg/dL  --  5.85*  --  12.35*  --   --   --  19.45*   < > 4.68*   < >  --    < >  --   --  2.54* 2.93*   < > 3.13*   D DIMER QUANT MCGFEU/mL  --   --  0.78*  --   --   --  1.69*  --   --  0.83*   < >  --    < >  --   --  0.36  --    < > 0.36   FERRITIN ng/mL  --   --   --   --   --   --   --   --   --   --   --   --   --   --   --  1,685.00* 2,397.00*  --  2,904.00*   LACTATE mmol/L 1.5  --   --   --  2.0  --   --   --   --   1.9   < >  --    < >  --   --  1.3  --    < > 1.4   LDH U/L  --  347*  --   --   --   --   --  496*  --  390*   < >  --    < >  --   --  608*  --    < > 608*   PROCALCITONIN ng/mL  --   --   --   --   --  2.59*  --   --   --   --   --  0.45*  --  0.22   < > 0.11  --   --   --    PROTIME Seconds  --   --  14.3  --   --   --  15.0*  --   --  15.5*   < >  --    < >  --   --  15.9*  --    < > 14.9*   INR   --   --  1.07  --   --   --  1.13*  --   --  1.19*   < >  --    < >  --   --  1.23*  --    < > 1.13*    < > = values in this interval not displayed.         Results Review:    I have reviewed the relevant laboratory results and independently reviewed the chest imaging from this hospitalization including the available echocardiogram reports personally and summarized it if/ when appropriate below    Assessment    Acute hypoxic respiratory failure  Acute COVID-19 pneumonia  ARDS  Shock - Septic vs cardiogenic  Atrial fibrillation/RVR on anticoagulation  Severe pulmonary hypertension with RV dysfunction  Anemia    PLAN:  Patient remains on the mechanical ventilator and settings adjusted as appropriate.  ABG and last chest x-ray reviewed.  Moving the wrong direction now.  Requiring increased support.  On several antibiotics with neg respiratory cultures.  Would recommend discontinuing-ID on board  Continue with steroids given high CRP still.   Unable to wean down pressors likely from severe pulmonary hypertension and RV dysfunction and likely will not be able to.  Midodrine not helping as well  A. fib RVR managed by primary.  Switch to Marco-Synephrine. Patient already on anticoagulation.   Rest of the medical management per primary    Very poor prognosis  DNR/DNI    DVT/GI prophylaxis    Would strongly recommend transition to comfort measures only given advanced age and severe COVID-19 pneumonia with prolonged respiratory failure, cardiogenic shock, severe pulmonary hypertension    D/w RN     CC - 32 mins    Daryl Worley  MD [place]  9/17/2021

## 2021-09-17 NOTE — PROGRESS NOTES
[facility] HOSPITALIST PROGRESS NOTE     Patient Identification:  Name:  Valdemar Solitario  Age:  76 y.o.  Sex:  male  :  1945  MRN:  [id number]  Visit Number:  [id number]  ROOM: [address]     Primary Care Provider:  Laurence Geiger APRN    Length of stay in inpatient status:  15    Subjective     Chief Compliant:    Chief Complaint   Patient presents with   • Exposure To Known Illness   • Altered Mental Status   • Weakness - Generalized     History of Presenting Illness:    Patient remains critically ill, intubated for airway protection, this AM significantly more tachycardic, irregular rhythm c/w Afib, confirmed on EKG, started on Amio gtt w/ load, re-consutled cardiology, BP lower during this time and had to add back vasopressin, BP slightly improved, 02 sats in 80's and bumped Fi02 up to 100%, discussed w/ palliative care and plan to discuss comfort measures w/ family today, patient remains critically ill, guarded/poor prognosis, pulm following and strongly recommending comfort measures at this time, remains afebrile, on Abx per ID.   Objective     Current Hospital Meds:castor oil-balsam peru, 1 application, Topical, Q12H  chlorhexidine, 15 mL, Mouth/Throat, Q12H  dexamethasone, 6 mg, Intravenous, Daily  enoxaparin, 1 mg/kg, Subcutaneous, Q12H  famotidine, 20 mg, Intravenous, BID  insulin aspart, 0-14 Units, Subcutaneous, TID AC  insulin detemir, 25 Units, Subcutaneous, Nightly  meropenem, 1 g, Intravenous, Q8H  midodrine, 10 mg, Oral, TID AC  sodium chloride, 10 mL, Intravenous, Q12H  sodium chloride, 10 mL, Intravenous, Q12H  sodium chloride, 10 mL, Intravenous, Q12H  sodium chloride, 10 mL, Intravenous, Q12H  sodium chloride, 10 mL, Intravenous, Q12H    amiodarone, 1 mg/min, Last Rate: Stopped (21 0824)   Followed by  amiodarone, 0.5 mg/min  dexmedetomidine, 0.2-1.5 mcg/kg/hr, Last Rate: Stopped (21 1414)  fentaNYL Citrate,   norepinephrine, 0.02-0.3 mcg/kg/min, Last Rate:  0.18 mcg/kg/min (09/17/21 0829)  Pharmacy Consult - Pharmacy to dose,   Pharmacy Consult - Pharmacy to dose,   Pharmacy to Dose enoxaparin (LOVENOX),   Pharmacy to dose vancomycin,   phenylephrine, 0.5-3 mcg/kg/min, Last Rate: Stopped (09/15/21 2150)  propofol, 5-50 mcg/kg/min, Last Rate: 30 mcg/kg/min (09/17/21 0625)  vasopressin (PITRESSIN) 20 units in 100 mL infusion, 0.03 Units/min, Last Rate: 0.03 Units/min (09/17/21 0751)      Current Antimicrobial Therapy:  Anti-Infectives (From admission, onward)    Ordered     Dose/Rate Route Frequency Start Stop    09/13/21 1407  vancomycin 1750 mg/500 mL 0.9% NS IVPB ([facility])     Ordering Provider: Jose De Jesus Mccord MD    1,750 mg  over 120 Minutes Intravenous Every 24 Hours 09/13/21 1600 09/16/21 1744    09/10/21 1042  meropenem (MERREM) 1 g in sodium chloride 0.9 % 100 mL IVPB-VTB     Ordering Provider: Nic Webster MD    1 g  over 3 Hours Intravenous Every 8 Hours 09/10/21 1300 09/19/21 1259    09/09/21 0949  vancomycin 1250 mg/250 mL 0.9% NS IVPB ([facility])     Ordering Provider: Brock Metzger MD    1,250 mg  over 60 Minutes Intravenous Once 09/09/21 1100 09/09/21 1201    09/09/21 0942  meropenem (MERREM) 1 g in sodium chloride 0.9 % 100 mL IVPB-VTB     Ordering Provider: Brock Metzger MD    1 g  over 30 Minutes Intravenous Once 09/09/21 1030 09/09/21 1131    09/09/21 0918  Pharmacy to dose vancomycin     Ordering Provider: Jeny Lopes, NELSON     Does not apply Continuous PRN 09/09/21 0917 09/19/21 0916    09/03/21 0615  remdesivir 100 mg in sodium chloride 0.9 % 270 mL IVPB (powder vial)     Ordering Provider: Mg Augustine MD    100 mg  over 60 Minutes Intravenous Every 24 Hours 09/04/21 0900 09/07/21 1055    09/03/21 0615  remdesivir 200 mg in sodium chloride 0.9 % 290 mL IVPB (powder vial)     Ordering Provider: Mg Augustine MD    200 mg  over 60 Minutes Intravenous Every 24 Hours 09/03/21 0900 09/03/21 0940        Current Diuretic  Therapy:  Diuretics (From admission, onward)    Ordered     Dose/Rate Route Frequency Start Stop    09/12/21 0857  bumetanide (BUMEX) injection 2 mg     Ordering Provider: Nic Webster MD    2 mg Intravenous Once 09/12/21 0945 09/12/21 0948    09/11/21 1026  furosemide (LASIX) 80 mg in sodium chloride 0.9 % 50 mL IVPB     Ordering Provider: Nic Webster MD    80 mg  over 4 Hours Intravenous Once 09/11/21 1200 09/11/21 1646    09/10/21 0822  furosemide (LASIX) injection 40 mg     Ordering Provider: Manuelito Adame MD    40 mg Intravenous Once 09/10/21 0915 09/10/21 0839    09/08/21 0736  furosemide (LASIX) injection 40 mg     Ordering Provider: Manuelito Adame MD    40 mg Intravenous Once 09/08/21 0800 09/08/21 0926    09/05/21 0732  furosemide (LASIX) injection 80 mg     Ordering Provider: Nic Webster MD    80 mg Intravenous Once 09/05/21 0830 09/05/21 0842        ----------------------------------------------------------------------------------------------------------------------  Vital Signs:  Temp:  [96.1 °F (35.6 °C)-99.5 °F (37.5 °C)] 98.8 °F (37.1 °C)  Heart Rate:  [] 146  Resp:  [22-25] 24  BP: ()/(34-75) 101/64  FiO2 (%):  [90 %-100 %] 100 %  SpO2:  [60 %-97 %] 92 %  on  Flow (L/min):  [15] 15;   Device (Oxygen Therapy): ventilator  Body mass index is 24.43 kg/m².    Wt Readings from Last 3 Encounters:   09/14/21 68.6 kg (151 lb 4.8 oz)   06/14/17 73.9 kg (163 lb)   03/23/17 76.7 kg (169 lb)     Intake & Output (last 3 days)       09/14 0701 - 09/15 0700 09/15 0701 - 09/16 0700 09/16 0701 - 09/17 0700 09/17 0701 - 09/18 0700    I.V. (mL/kg) 1381.1 (20.1) 760.4 (11.1) 540.1 (7.9)     Other 1042 1601 1752     NG/ 350 786     IV Piggyback 750 409.7 800     Total Intake(mL/kg) 3384.1 (49.3) 3121.1 (45.5) 3878.1 (56.5)     Urine (mL/kg/hr) 2250 (1.4) 1775 (1.1) 2600 (1.6)     Stool 0 0 0     Total Output 2250 1775 2600     Net +1134.1 +1346.1 +1278.1                 NPO  Diet  ----------------------------------------------------------------------------------------------------------------------  Physical exam:  General: intubated/sedated  Head: Normocephalic, atraumatic  Eyes: Conjunctivae and sclerae normal.  Ears: Ears appear intact with no abnormalities noted.   Neck: Trachea midline. No obvious JVD.  Heart: Tele reveals significant tachycardia, HR up to 190's at times, appears irregular c/w Afib w/ RVR  Lungs: Intubated/venilated, b/l equal rise of chest, course breath sounds, on 100% Fi02 now as was satting 85% in room when on 90% Fi02 and I turned this up  Abdomen: No obvious abdominal distension.  MS: Muscle tone appears normal. No gross deformities.  Extremities: No clubbing, cyanosis or edema noted. Cool extremieis  Skin: No visible bleeding, bruising, or rash.  Neurologic: unable to assess due to sedation  : Alis in place  ----------------------------------------------------------------------------------------------------------------------  Results from last 7 days   Lab Units 09/17/21  0112 09/17/21  0111 09/17/21  0109 09/16/21  0047 09/15/21  0629 09/15/21  0627 09/15/21  0626 09/14/21  1124 09/14/21  0432 09/13/21  0732 09/12/21  0308 09/11/21  0417   CRP mg/dL  --  5.85*  --  12.35*  --   --  19.45*  --    < > 4.68*   < >  --    LACTATE mmol/L 1.5  --   --   --  2.0  --   --   --   --  1.9  --   --    WBC 10*3/mm3  --  14.26*  --  9.55  --  15.44*  --    < >   < > 11.51*   < >  --    HEMOGLOBIN g/dL  --  9.4*  --  9.1*  --  9.9*  --    < >   < > 10.6*   < >  --    HEMATOCRIT %  --  30.9*  --  30.9*  --  34.0*  --    < >   < > 35.5*   < >  --    MCV fL  --  92.5  --  95.7  --  95.5  --    < >   < > 94.9   < >  --    MCHC g/dL  --  30.4*  --  29.4*  --  29.1*  --    < >   < > 29.9*   < >  --    PLATELETS 10*3/mm3  --  226  --  148  --  198  --    < >   < > 150   < >  --    INR   --   --  1.07  --   --  1.13*  --   --   --  1.19*  --  1.36*    < > = values in this  interval not displayed.     Results from last 7 days   Lab Units 09/17/21  0449   PH, ARTERIAL pH units 7.394   PO2 ART mm Hg 64.3*   PCO2, ARTERIAL mm Hg 44.6   HCO3 ART mmol/L 27.2*     Results from last 7 days   Lab Units 09/17/21  0111 09/16/21  0047 09/15/21  0626   SODIUM mmol/L 140 140 146*   POTASSIUM mmol/L 3.7 4.5 4.5   CHLORIDE mmol/L 105 104 110*   CO2 mmol/L 29.6* 28.8 29.0   BUN mg/dL 43* 54* 54*   CREATININE mg/dL 0.54* 0.61* 0.71*   EGFR IF NONAFRICN AM mL/min/1.73 148 129 108   CALCIUM mg/dL 8.5* 8.3* 8.5*   GLUCOSE mg/dL 158* 197* 145*   ALBUMIN g/dL 1.91* 1.88* 2.04*   BILIRUBIN mg/dL 0.4 0.4 0.4   ALK PHOS U/L 56 48 59   AST (SGOT) U/L 38 45* 60*   ALT (SGPT) U/L 37 37 40   Estimated Creatinine Clearance: 76.2 mL/min (A) (by C-G formula based on SCr of 0.54 mg/dL (L)).  No results found for: AMMONIA              Glucose   Date/Time Value Ref Range Status   09/17/2021 1037 79 70 - 130 mg/dL Final     Comment:     Meter: [id number] : [id number] GINA OLSEN LINDA   09/17/2021 0539 102 70 - 130 mg/dL Final     Comment:     Meter: [id number] : [id number] Donaldo MERRILL   09/17/2021 0412 115 70 - 130 mg/dL Final     Comment:     Meter: [id number] : [id number] Donaldo MERRILL   09/17/2021 0022 147 (H) 70 - 130 mg/dL Final     Comment:     Meter: [id number] : [id number] Donaldo MERRILL   09/16/2021 2050 203 (H) 70 - 130 mg/dL Final     Comment:     Meter: [id number] : [id number] Donaldo MERRILL   09/16/2021 1655 216 (H) 70 - 130 mg/dL Final     Comment:     RN Notified Meter: [id number] : [id number] Leonidas Lemons   09/16/2021 1048 173 (H) 70 - 130 mg/dL Final     Comment:     Meter: [id number] : [id number] CHARANJIT DELGADO   09/16/2021 0621 216 (H) 70 - 130 mg/dL Final     Comment:     Meter: [id number] : [id number] Marie CURRIE     No results found for: TSH, FREET4  No results found for: PREGTESTUR, PREGSERUM, HCG, HCGQUANT  Pain Management Panel    There is no  flowsheet data to display.       Brief Urine Lab Results  (Last result in the past 365 days)      Color   Clarity   Blood   Leuk Est   Nitrite   Protein   CREAT   Urine HCG        09/15/21 1125 Yellow Turbid Large (3+) Negative Negative 30 mg/dL (1+)             Blood Culture   Date Value Ref Range Status   09/14/2021 No growth at 2 days  Preliminary   09/14/2021 No growth at 2 days  Preliminary     Urine Culture   Date Value Ref Range Status   09/15/2021 No growth  Final     No results found for: WOUNDCX  No results found for: STOOLCX  Respiratory Culture   Date Value Ref Range Status   09/13/2021 No growth  Final     No results found for: AFBCX  Results from last 7 days   Lab Units 09/17/21  0112 09/17/21  0111 09/16/21  0047 09/15/21  0629 09/15/21  0628 09/15/21  0626 09/14/21  0432 09/13/21  0732 09/12/21  0308 09/11/21  0302   PROCALCITONIN ng/mL  --   --   --   --  2.59*  --   --   --   --   --    LACTATE mmol/L 1.5  --   --  2.0  --   --   --  1.9  --  1.9   CRP mg/dL  --  5.85* 12.35*  --   --  19.45* 21.82* 4.68* 7.14* 14.61*     I have personally looked at the labs and they are summarized above.  ----------------------------------------------------------------------------------------------------------------------  Detailed radiology reports for the last 24 hours:  Imaging Results (Last 24 Hours)     Procedure Component Value Units Date/Time    XR Chest 1 View [[id number]] Resulted: 09/17/21 0904     Updated: 09/17/21 1051        Assessment & Plan    76M PMH diabetes meliltus, depression, GERD, hypertension, dyslipidemia, presented to his place of work in a state of confusion, brought to ER, found to be Covid +. Hospitalization complicated by worsening hypoxia.      #Septic shock, Acute Metabolic Encephalopathy, Acute Hypoxic Respiratory Failure requiring intubation and mechanical ventilation 2/2 viral pneumonia treating for Covid 19 c/b ARDS and mild transaminitis  #Acute UTI - New  - Patient presented w/  increased shortness of breath, confusion, tachypnea, covid + on 9/2, intubated on 9/5 for worsening hypoxia on bipap.  - Admission labs showed WBC count 5K, CRP 7.8, lactate 2.3, d-dimer 0.95, procal 0.16, MRSA -, covid +, Bcx's NGTD.  UA 9/14 w/ 4+ bacteria though no culture noted, repeat UA and Cx from 9/15 w/o growth, repeat Bcx's 9/14 NGTD.  - B/l Venous Duplex negative for DVT  - VQ scan showed indeterminate study, noted abnormal perfusion, multifocal defects could be related to multifocal airspace disease but also PE.  - CT Head showed no acute intracranial abnormality  - CT Chest showed patchy b/l airspace disease c/w covid 19  - ID consulted; Following  - CTPE on 9/4 did not reveal PE but did show worsening bilateral airspace disease that is likely worsening COVID pneumonia. Given fluid in fissure on plain film could not rule out component of edema  - Pulm consulted; Following  - ID consulted; Following, s/p Remdesivir, initially initiated on baricitinib but d/c'd due to intubation/mechanical ventilation.   Continue 6mg IV Dexamethasone for extended time period due to continued significant respiratory failure  - Continue Vanc and Merrem  - Continue Level III AC for Covid 19 thromboembolism Ppx  - Continue APAP PRN for fevers  - Continue Albuterol Inhaler  - Continue IV pressors to maintain MAPs > 65mmHg or SBP > 90, currently still requiring multiple pressors at this time  - Trend Covid 19 progression labs w/ CBC, CMP, CK, CRP, Ferritin daily and LDH, D-dimer, Fibrinogen q48hrs.  - Continue to monitor in CCU, continue enhanced droplet/contact isolation precautions, wean 02 as able though so far have been unable to wean significantly, palliative care following for GOC conversations, may need to start trach/peg discussions soon given prolonged intubation.     #HTN/Dyslipidemia  #Elevated Troponin, NSTEMI Type II suspected  #Cardiomyopathy now w/ suspected component of cardiogenic shock  #Pulm HTN   #Afib w/  RVR - Recurrent/worse  - Labs showed trops 0.031->0.016, proBNP 370   - EKG showed NSR, RBBB, no significant ST changes  - Echo showed LVEF 46-50%, mild-mod dilated RV, mild-mod dilated RA, mod-severe Pulm HTN, RVSP 45-55mmHg  - Consulted Cardiology; Followed, rec'd outpatient echo for f/u 6 months  - Holding home ARB due to shock  - Holding home statin while on Remdesivir, consider resuming soon  - Continue Tlov, switch to DOAC when appropraite  - This AM worsening Afib w/ RVR up to 190's, EKG confirmed, started on IV amio gtt w/ load, reconsulted cardiology  - Continue to monitor on tele, strict I/O's, daily weights, trend HR and BP, IV diuresis PRN    #NIDDM Type II, controlled, unknown complications  - Hgb A1c = 6.3%  - Holding home oral agents, continue FSBG and SSI  - Uncontrolled. Pulmonary has increased basal, will also increase SSI.      #Anxiety/Depression  - Continue home regimen     #GERD  - Continue home PPI      #BPH  - Continue home Flomax     #Nonoliguric MURIEL - Resolved  - B/l Cr 0.9-1.0, was up to 2.1 on admission, now back to baseline, S/p mIVFs; Trend Cr and UOP, avoid nephrotoxins, NSAIDs, dehydration and contrast as able.    #Electrolyte Abnormalities  - Acute Mild Hypokalemia - K 3.0 today, Replaced per protocol, Resolved  - Acute Mild Hypernatremia - Na up to 151, slightly increased FW flushes, now NML, Resolved     F: NPO  E: Monitor & Replace PRN  N: NPO, TFs  PPx: TLov  Code: DNR/DNI     Dispo: Pending clinical improvement, palliative consulted for GOC conversations, discussed possibly comfort measures w/ palliative care and they plan to discuss further w/ family today     I have spent 30 minutes of critical care time (7:45-8:00AM & 9:15-9:30AM) with > 50% of time spent in direct patient care, evaluating the patient at bedside, reviewing all labs and images, reviewing ABG and settings, reviewing pain and sedation gtt's, reviewing pressor requirement and increasing, triaging severe  tachycardia and started amio gtt, communicating plan of care w/ nursing staff and consultant, utilizing high complexity medical decision making to assess and treat vital organ system failure in an individual who has impairment of one or more vital organ systems such that there is a high probability of imminent or life threatening deterioration in the patient’s condition. Failure to initiate the above interventions on an urgent basis would likely result in sudden, clinically significant or life threatening deterioration in the patient's condition.     VTE Prophylaxis:   Mechanical Order History:     None      Pharmalogical Order History:      Ordered     Dose Route Frequency Stop    09/09/21 0813  enoxaparin (LOVENOX) syringe 70 mg      1 mg/kg SC Every 12 Hours --    09/09/21 0730  Pharmacy to Dose enoxaparin (LOVENOX)      -- XX Continuous PRN --    09/07/21 0859  enoxaparin (LOVENOX) syringe 40 mg  Status:  Discontinued      40 mg SC Every 24 Hours 09/09/21 0730    09/03/21 0739  enoxaparin (LOVENOX) syringe 80 mg  Status:  Discontinued      1 mg/kg SC Every 12 Hours 09/07/21 0859    09/03/21 0724  Pharmacy to Dose enoxaparin (LOVENOX)  Status:  Discontinued      -- XX Continuous PRN 09/03/21 0740    09/02/21 1237  enoxaparin (LOVENOX) syringe 40 mg  Status:  Discontinued      40 mg SC Every 24 Hours 09/02/21 1247    09/02/21 1247  enoxaparin (LOVENOX) syringe 40 mg  Status:  Discontinued      0.5 mg/kg SC Every 24 Hours 09/03/21 0549              Jose De Jesus Mccord MD  [facility]  09/17/21  10:58 EDT

## 2021-09-17 NOTE — PROGRESS NOTES
PROGRESS NOTE         Patient Identification:  Name:  Valdemar Solitario  Age:  76 y.o.  Sex:  male  :  1945  MRN:  [id number]  Visit Number:  [id number]  Primary Care Provider:  Laurence Geiger APRN         LOS: 15 days       ----------------------------------------------------------------------------------------------------------------------  Subjective       Chief Complaints:    Exposure To Known Illness, Altered Mental Status, and Weakness - Generalized        Interval History:      Patient remains sedated and intubated at 90% FiO2.  Afebrile.  CRP improved at 5.85.  White count elevated at 14.26.  Chest x-ray from 2021 reports stable appearance of the chest.    ----------------------------------------------------------------------------------------------------------------------      Objective       Current Hospital Meds:  castor oil-balsam peru, 1 application, Topical, Q12H  chlorhexidine, 15 mL, Mouth/Throat, Q12H  dexamethasone, 6 mg, Intravenous, Daily  enoxaparin, 1 mg/kg, Subcutaneous, Q12H  famotidine, 20 mg, Intravenous, BID  insulin aspart, 0-14 Units, Subcutaneous, TID AC  insulin detemir, 25 Units, Subcutaneous, Nightly  meropenem, 1 g, Intravenous, Q8H  midodrine, 10 mg, Oral, TID AC  sodium chloride, 10 mL, Intravenous, Q12H  sodium chloride, 10 mL, Intravenous, Q12H  sodium chloride, 10 mL, Intravenous, Q12H  sodium chloride, 10 mL, Intravenous, Q12H  sodium chloride, 10 mL, Intravenous, Q12H      amiodarone, 1 mg/min, Last Rate: 1 mg/min (21 0753)   Followed by  amiodarone, 0.5 mg/min  dexmedetomidine, 0.2-1.5 mcg/kg/hr, Last Rate: Stopped (21 1414)  norepinephrine, 0.02-0.3 mcg/kg/min, Last Rate: 0.1 mcg/kg/min (21 1205)  Pharmacy Consult - Pharmacy to dose,   Pharmacy Consult - Pharmacy to dose,   Pharmacy to Dose enoxaparin (LOVENOX),   Pharmacy to dose vancomycin,   phenylephrine, 0.5-3 mcg/kg/min, Last Rate: 0.8 mcg/kg/min (21 1213)  propofol,  5-50 mcg/kg/min, Last Rate: 30 mcg/kg/min (09/17/21 1221)  vasopressin (PITRESSIN) 20 units in 100 mL infusion, 0.03 Units/min, Last Rate: 0.03 Units/min (09/17/21 1206)      ----------------------------------------------------------------------------------------------------------------------    Vital Signs:  Temp:  [96.1 °F (35.6 °C)-99.5 °F (37.5 °C)] 98.8 °F (37.1 °C)  Heart Rate:  [] 88  Resp:  [22-25] 24  BP: ()/(34-75) 104/65  FiO2 (%):  [90 %-100 %] 100 %  Mean Arterial Pressure (Non-Invasive) for the past 24 hrs (Last 3 readings):   Noninvasive MAP (mmHg)   09/17/21 1130 85   09/17/21 1055 76   09/17/21 1030 76     SpO2 Percentage    09/17/21 1030 09/17/21 1055 09/17/21 1130   SpO2: (!) 89% 92% 92%     SpO2:  [60 %-97 %] 92 %  on  Flow (L/min):  [15] 15;   Device (Oxygen Therapy): ventilator    Body mass index is 24.43 kg/m².  Wt Readings from Last 3 Encounters:   09/14/21 68.6 kg (151 lb 4.8 oz)   06/14/17 73.9 kg (163 lb)   03/23/17 76.7 kg (169 lb)        Intake/Output Summary (Last 24 hours) at 9/17/2021 1226  Last data filed at 9/17/2021 1206  Gross per 24 hour   Intake 3562.09 ml   Output 2600 ml   Net 962.09 ml     NPO Diet  ----------------------------------------------------------------------------------------------------------------------      Physical Exam:    Deferred due to COVID-19 isolation.  ----------------------------------------------------------------------------------------------------------------------            Results from last 7 days   Lab Units 09/17/21  0449   PH, ARTERIAL pH units 7.394   PO2 ART mm Hg 64.3*   PCO2, ARTERIAL mm Hg 44.6   HCO3 ART mmol/L 27.2*     Results from last 7 days   Lab Units 09/17/21  0112 09/17/21  0111 09/17/21  0109 09/16/21  0047 09/15/21  0629 09/15/21  0627 09/15/21  0626 09/14/21  1124 09/14/21  0432 09/13/21  0732 09/12/21  0308 09/11/21  0417   CRP mg/dL  --  5.85*  --  12.35*  --   --  19.45*  --    < > 4.68*   < >  --    LACTATE  mmol/L 1.5  --   --   --  2.0  --   --   --   --  1.9  --   --    WBC 10*3/mm3  --  14.26*  --  9.55  --  15.44*  --    < >   < > 11.51*   < >  --    HEMOGLOBIN g/dL  --  9.4*  --  9.1*  --  9.9*  --    < >   < > 10.6*   < >  --    HEMATOCRIT %  --  30.9*  --  30.9*  --  34.0*  --    < >   < > 35.5*   < >  --    MCV fL  --  92.5  --  95.7  --  95.5  --    < >   < > 94.9   < >  --    MCHC g/dL  --  30.4*  --  29.4*  --  29.1*  --    < >   < > 29.9*   < >  --    PLATELETS 10*3/mm3  --  226  --  148  --  198  --    < >   < > 150   < >  --    INR   --   --  1.07  --   --  1.13*  --   --   --  1.19*  --  1.36*    < > = values in this interval not displayed.     Results from last 7 days   Lab Units 09/17/21  0111 09/16/21  0047 09/15/21  0626   SODIUM mmol/L 140 140 146*   POTASSIUM mmol/L 3.7 4.5 4.5   CHLORIDE mmol/L 105 104 110*   CO2 mmol/L 29.6* 28.8 29.0   BUN mg/dL 43* 54* 54*   CREATININE mg/dL 0.54* 0.61* 0.71*   EGFR IF NONAFRICN AM mL/min/1.73 148 129 108   CALCIUM mg/dL 8.5* 8.3* 8.5*   GLUCOSE mg/dL 158* 197* 145*   ALBUMIN g/dL 1.91* 1.88* 2.04*   BILIRUBIN mg/dL 0.4 0.4 0.4   ALK PHOS U/L 56 48 59   AST (SGOT) U/L 38 45* 60*   ALT (SGPT) U/L 37 37 40   Estimated Creatinine Clearance: 76.2 mL/min (A) (by C-G formula based on SCr of 0.54 mg/dL (L)).  No results found for: AMMONIA    Glucose   Date/Time Value Ref Range Status   09/17/2021 1037 79 70 - 130 mg/dL Final     Comment:     Meter: [id number] : [id number] GINA OLSEN LINDA   09/17/2021 0539 102 70 - 130 mg/dL Final     Comment:     Meter: [id number] : [id number] Donaldo MERRILL   09/17/2021 0412 115 70 - 130 mg/dL Final     Comment:     Meter: [id number] : [id number] Donaldo MERRILL   09/17/2021 0022 147 (H) 70 - 130 mg/dL Final     Comment:     Meter: [id number] : [id number] Donaldo MERRILL   09/16/2021 2050 203 (H) 70 - 130 mg/dL Final     Comment:     Meter: [id number] : [id number] Donaldo MERRILL   09/16/2021 1655 216 (H)  70 - 130 mg/dL Final     Comment:     RN Notified Meter: [id number] : [id number] Leonidas Lemons   09/16/2021 1048 173 (H) 70 - 130 mg/dL Final     Comment:     Meter: [id number] : [id number] CHARANJIT DELGADO   09/16/2021 0621 216 (H) 70 - 130 mg/dL Final     Comment:     Meter: [id number] : [id number] Marie CURRIE     Lab Results   Component Value Date    HGBA1C 6.30 (H) 09/02/2021     No results found for: TSH, FREET4    Blood Culture   Date Value Ref Range Status   09/02/2021 No growth at 24 hours  Preliminary   09/02/2021 No growth at 24 hours  Preliminary   09/02/2021 No growth at 24 hours  Preliminary     No results found for: URINECX  No results found for: WOUNDCX  No results found for: STOOLCX  No results found for: RESPCX  Pain Management Panel    There is no flowsheet data to display.           ----------------------------------------------------------------------------------------------------------------------  Imaging Results (Last 24 Hours)       Procedure Component Value Units Date/Time    XR Chest 1 View [[id number]] Collected: 09/17/21 1122     Updated: 09/17/21 1125    Narrative:      EXAM:    XR Chest, 1 View     EXAM DATE:    9/17/2021 9:03 AM     CLINICAL HISTORY:    intubated; U07.1-COVID-19; J12.82-Pneumonia due to Coronavirus disease  2019; R09.02-Hypoxemia; J96.01-Acute respiratory failure with hypoxia     TECHNIQUE:    Frontal view of the chest.     COMPARISON:    09/16/2021     FINDINGS:    Lungs:  Diffuse bilateral airspace disease stable.    Pleural space:  Unremarkable.  No pneumothorax.    Heart:  Cardiomegaly stable.    Mediastinum:  Unremarkable.    Bones/joints:  Unremarkable.    Tubes, lines and devices:  ET tube with tip just below clavicles.  NG  tube extends into the stomach.  Right IJ deep line with tip at the  cavoatrial junction.       Impression:      1.  Stable positioning of support devices.  2.  Stable appearance of the chest.     This report was finalized on  9/17/2021 11:23 AM by Dr. Houston Larios MD.               ----------------------------------------------------------------------------------------------------------------------    Assessment/Plan       Assessment/Plan     ASSESSMENT:    1.  Severe sepsis with lactic acid greater than 2 on admission  2.  COVID-19 pneumonia    PLAN:    Patient remains sedated and intubated at 90% FiO2.  Afebrile.  CRP improved at 5.85.  White count elevated at 14.26.  Chest x-ray from 9/17/2021 reports stable appearance of the chest.    Urinalysis from 9/15/2021 reports 6-12 WBCs, trace bacteria, 1+ yeast, urine culture reports no growth.  Blood cultures from 9/14/2021 report no growth at 24 hours.      VQ scan on 9/2/2021 indeterminate for exclusion of PE.  CT of the chest on 9/3/2021 reports patchy bilateral airspace disease concerning for COVID-19 pneumonia.  CT of the abdomen and pelvis on 9/3/2021 reports bibasilar airspace disease suggestive of COVID-19 pneumonia, large right inguinal hernia containing nondilated bowel and fat.     Patient remains on Decadron but has completed course of remdesivir.    We are concerned about possible development of bacterial pneumonia in the setting of prior treatment with baricitinib.     Recommend to continue vancomycin and meropenem courses for now.  Meropenem course completing the next day or so.    We will continue to follow closely and adjust antibiotic therapy as appropriate.    Code Status:   Code Status and Medical Interventions:   Ordered at: 09/10/21 1544     Limited Support to NOT Include:    Intubation    Cardioversion/Defibrillation     Code Status:    No CPR     Medical Interventions (Level of Support Prior to Arrest):    Limited     Comments:    adult children Tiffany, Cathleen and John all agree he would not want to be resuciated or reintubated in the event he became extubated.     Scribed for Brock Metzger MD by Jeny Lopes, APRN. 9/17/2021  12:26 EDT       Jeny RIVERA  NELSON Lopes  09/17/21  12:26 EDT      Physician Attestation:    The documentation recorded by the scribe accurately reflects the service I personally performed and the decisions made by me.    Brock Metzger MD  09/17/21  12:26 EDT

## 2021-09-17 NOTE — CASE MANAGEMENT/SOCIAL WORK
Discharge Planning Assessment   [facility]     Patient Name: Valdemar Solitario  MRN: [id number]  Today's Date: 9/17/2021    Admit Date: 9/2/2021    Discharge Plan     Row Name 09/17/21 1123       Plan    Plan Pt admitted 9/2/21. Pt remains intubated and sedated. Palliative following for GOC, discussing possibly comfort measures with family today. Pt lives alone and does not utilize home health services or DME at this time. Discharge plan pending clinical improvement. SS following.        SHALONDA Lewis

## 2021-09-17 NOTE — PROGRESS NOTES
"Palliative Care Daily Progress Note     S: Medical record reviewed, followed up with Primary RN Kathleen and Dr. Mccord regarding patient's condition. Patient is sedated on 100% and 15 of PEEP saturating 87%. RN Kathleen called stating patient was desatting despite being bagged.      O:   Palliative Performance Scale Score: 30%   BP (!) 72/38   Pulse 56   Temp 97.9 °F (36.6 °C)   Resp 22   Ht 167.6 cm (65.98\")   Wt 68.6 kg (151 lb 4.8 oz)   SpO2 (!) 87%   BMI 24.43 kg/m²     Intake/Output Summary (Last 24 hours) at 9/17/2021 1611  Last data filed at 9/17/2021 1206  Gross per 24 hour   Intake 2962.09 ml   Output 2600 ml   Net 362.09 ml       PE:  COVID RESTRICTIONS      Meds: Reviewed and changes noted.    Labs:   Results from last 7 days   Lab Units 09/17/21  0111   WBC 10*3/mm3 14.26*   HEMOGLOBIN g/dL 9.4*   HEMATOCRIT % 30.9*   PLATELETS 10*3/mm3 226     Results from last 7 days   Lab Units 09/17/21  0111   SODIUM mmol/L 140   POTASSIUM mmol/L 3.7   CHLORIDE mmol/L 105   CO2 mmol/L 29.6*   BUN mg/dL 43*   CREATININE mg/dL 0.54*   GLUCOSE mg/dL 158*   CALCIUM mg/dL 8.5*     Results from last 7 days   Lab Units 09/17/21  0111   SODIUM mmol/L 140   POTASSIUM mmol/L 3.7   CHLORIDE mmol/L 105   CO2 mmol/L 29.6*   BUN mg/dL 43*   CREATININE mg/dL 0.54*   CALCIUM mg/dL 8.5*   BILIRUBIN mg/dL 0.4   ALK PHOS U/L 56   ALT (SGPT) U/L 37   AST (SGOT) U/L 38   GLUCOSE mg/dL 158*     Imaging Results (Last 72 Hours)     Procedure Component Value Units Date/Time    XR Chest 1 View [[id number]] Collected: 09/17/21 1122     Updated: 09/17/21 1125    Narrative:      EXAM:    XR Chest, 1 View     EXAM DATE:    9/17/2021 9:03 AM     CLINICAL HISTORY:    intubated; U07.1-COVID-19; J12.82-Pneumonia due to Coronavirus disease  2019; R09.02-Hypoxemia; J96.01-Acute respiratory failure with hypoxia     TECHNIQUE:    Frontal view of the chest.     COMPARISON:    09/16/2021     FINDINGS:    Lungs:  Diffuse bilateral airspace disease stable.   "  Pleural space:  Unremarkable.  No pneumothorax.    Heart:  Cardiomegaly stable.    Mediastinum:  Unremarkable.    Bones/joints:  Unremarkable.    Tubes, lines and devices:  ET tube with tip just below clavicles.  NG  tube extends into the stomach.  Right IJ deep line with tip at the  cavoatrial junction.       Impression:      1.  Stable positioning of support devices.  2.  Stable appearance of the chest.     This report was finalized on 9/17/2021 11:23 AM by Dr. Houston Larios MD.       XR Chest 1 View [[id number]] Collected: 09/16/21 1029     Updated: 09/16/21 1034    Narrative:      EXAM:    XR Chest, 1 View     EXAM DATE:    9/16/2021 8:35 AM     CLINICAL HISTORY:    intubated; U07.1-COVID-19; J12.82-Pneumonia due to Coronavirus disease  2019; R09.02-Hypoxemia; J96.01-Acute respiratory failure with hypoxia     TECHNIQUE:    Frontal view of the chest.     COMPARISON:    09/15/2021     FINDINGS:    Lungs:  Overall minimal worsening of diffuse lateral airspace disease.    Pleural space:  Unremarkable.  No pneumothorax.    Heart:  Cardiomegaly.    Mediastinum:  Unremarkable.    Bones/joints:  Unremarkable.    Vasculature:  Right IJ deep line with tip in the distal SVC.    Tubes, lines and devices:  ET tube with tip at level of clavicles.  NG  tube extends into the stomach.       Impression:      1.  Minimal worsening of diffuse bilateral airspace disease.  2.  Support devices as above.     This report was finalized on 9/16/2021 10:31 AM by Dr. Houston Larios MD.       XR Chest 1 View [[id number]] Collected: 09/15/21 1405     Updated: 09/15/21 1413    Narrative:      EXAM:    XR Chest, 1 View     EXAM DATE:    9/15/2021 12:53 PM     CLINICAL HISTORY:    intubated; U07.1-COVID-19; J12.82-Pneumonia due to Coronavirus disease  2019; R09.02-Hypoxemia; J96.01-Acute respiratory failure with hypoxia     TECHNIQUE:    Frontal view of the chest.     COMPARISON:    09/14/2021     FINDINGS:    Lungs:  Bilateral airspace  disease stable.  Lung volumes are stable.    Pleural space:  No pneumothorax identified.    Heart:  Mild cardiac enlargement.    Mediastinum:  Unremarkable.    Bones/joints:  Unremarkable.    Tubes, lines and devices:  ET tube with tip just below clavicles.  NG  tube extends into the proximal-mid stomach.  Right IJ deep line with tip  at the cavoatrial junction.       Impression:      1.  No significant change in bilateral airspace disease.  2.  Positioning of support devices as detailed above.     This report was finalized on 9/15/2021 2:06 PM by Dr. Houston Larios MD.               Diagnostics: Reviewed    A: Valdemar Solitario is a 76 y.o. yo male with confusion, dyspnea, and weakness, he has a past medical history significant for diabetes meliltus, depression, GERD, hypertension, dyslipidemia.  He arrived to the ED via EMS after presenting to his place of work in a state of confusion.  He reported he had been feeling bad for a few days which worsened on 9/2/2021.  He reported while at work he was unable to remember his locker combination and was experiencing shortness of breath and generalized weakness.     Upon arrival to the ED, vital signs were T 98.8F, pulse 85, RR 14, and BP 96/52 with room air oxygen saturation of 82%.  Initial arterial blood gas revealed pH of 7.324 with PCO2 of 42.4 and PO2 of 47.9 on room air.  Troponin T was found to be 0.031.  Creatinine was found to be elevated at 2.10.  Lactate was found to be elevated at 2.3 with D-dimer of 0.95.  Hemoglobin was found to be 11.8.     Mr. Solitario is evaluated in the ED currently on BiPAP.  He reports feeling unwell for a few days but has difficulty talking due to BiPAP. He reports he has been making urine, though it is unclear at present if he has produced a specimen while in the ED.  He reports cough, weakness, and dyspnea.  He is alert to self.  He follows commands but is wearing BiPAP during examination. He denies chest pain and known dysuria.  He  reports he has had some pain in his low back.      High risk secondary to acute hypoxemic respiratory failure 2/2 COVID-19 pneumonia      P:  After speaking with RN Kathleen regarding patient desaturation, I called pts son Janessa who called both his sister, and they stated to stop bagging him. I cleared with supervisor to let his 4 children come and view their father through the CCU window. When I discussed potentially making their father comfort measures as his prognosis is poor and he has already been trying to desat on us, Janessa stated that they did not want to make him comfort measures, however understands he is very sick and most likely will not survive, he does state that he does not want him resuscitated and let him pass at that time.      We will continue to follow along. Please do not hesitate to contact us regarding further sx mgmt or GOC needs, including after hours or on weekends via our on call provider at [phone].     Socorro Zhou, APRN    9/17/2021

## 2021-09-18 NOTE — PROGRESS NOTES
PROGRESS NOTE         Patient Identification:  Name:  Valdemar Solitario  Age:  76 y.o.  Sex:  male  :  1945  MRN:  [id number]  Visit Number:  [id number]  Primary Care Provider:  Laurence Geiger APRN         LOS: 16 days       ----------------------------------------------------------------------------------------------------------------------  Subjective       Chief Complaints:    Exposure To Known Illness, Altered Mental Status, and Weakness - Generalized        Interval History:      Patient intubated and sedated at slightly increased FiO2 of 100% today.  WBC worsening at 17.02.  CRP worsening at 14.08.  Chest x-ray from 2021 reports stable bilateral airspace disease.    ----------------------------------------------------------------------------------------------------------------------      Objective       Current Hospital Meds:  castor oil-balsam peru, 1 application, Topical, Q12H  chlorhexidine, 15 mL, Mouth/Throat, Q12H  dexamethasone, 6 mg, Intravenous, Daily  enoxaparin, 1 mg/kg, Subcutaneous, Q12H  famotidine, 20 mg, Intravenous, BID  insulin aspart, 0-14 Units, Subcutaneous, TID AC  insulin detemir, 25 Units, Subcutaneous, Nightly  meropenem, 1 g, Intravenous, Q8H  midodrine, 10 mg, Oral, TID AC  sodium chloride, 10 mL, Intravenous, Q12H  sodium chloride, 10 mL, Intravenous, Q12H  sodium chloride, 10 mL, Intravenous, Q12H  sodium chloride, 10 mL, Intravenous, Q12H  sodium chloride, 10 mL, Intravenous, Q12H      dexmedetomidine, 0.2-1.5 mcg/kg/hr, Last Rate: Stopped (21 1414)  fentaNYL Citrate,   norepinephrine, 0.02-0.3 mcg/kg/min, Last Rate: Stopped (21 1526)  Pharmacy Consult - Pharmacy to dose,   Pharmacy Consult - Pharmacy to dose,   Pharmacy to Dose enoxaparin (LOVENOX),   phenylephrine, 0.5-3 mcg/kg/min, Last Rate: 0.5 mcg/kg/min (21 1123)  propofol, 5-50 mcg/kg/min, Last Rate: 25 mcg/kg/min (21 1124)  vasopressin (PITRESSIN) 20 units in 100 mL  Patient notified via portal message.     infusion, 0.03 Units/min, Last Rate: 0.03 Units/min (09/18/21 0158)      ----------------------------------------------------------------------------------------------------------------------    Vital Signs:  Temp:  [97.1 °F (36.2 °C)-97.9 °F (36.6 °C)] 97.8 °F (36.6 °C)  Heart Rate:  [47-69] 49  Resp:  [22] 22  BP: ()/(31-93) 120/58  FiO2 (%):  [100 %] 100 %  Mean Arterial Pressure (Non-Invasive) for the past 24 hrs (Last 3 readings):   Noninvasive MAP (mmHg)   09/18/21 1400 87   09/18/21 1345 102   09/18/21 1330 99     SpO2 Percentage    09/18/21 1345 09/18/21 1400 09/18/21 1413   SpO2: 90% (!) 88% (!) 89%     SpO2:  [83 %-98 %] 89 %  on   ;   Device (Oxygen Therapy): ventilator    Body mass index is 26.92 kg/m².  Wt Readings from Last 3 Encounters:   09/18/21 75.6 kg (166 lb 11.2 oz)   06/14/17 73.9 kg (163 lb)   03/23/17 76.7 kg (169 lb)        Intake/Output Summary (Last 24 hours) at 9/18/2021 1417  Last data filed at 9/18/2021 0517  Gross per 24 hour   Intake 3919.1 ml   Output 1320 ml   Net 2599.1 ml     NPO Diet  ----------------------------------------------------------------------------------------------------------------------      Physical Exam:    Deferred due to COVID-19 isolation.  ----------------------------------------------------------------------------------------------------------------------      Results from last 7 days   Lab Units 09/18/21  0143   PROBNP pg/mL 1,762.0       Results from last 7 days   Lab Units 09/18/21  0501   PH, ARTERIAL pH units 7.347*   PO2 ART mm Hg 72.8*   PCO2, ARTERIAL mm Hg 57.5*   HCO3 ART mmol/L 31.5*     Results from last 7 days   Lab Units 09/18/21  0143 09/17/21  0112 09/17/21  0111 09/17/21  0109 09/16/21  0047 09/15/21  0629 09/15/21  0627 09/15/21  0627 09/14/21  0432 09/13/21  0732   CRP mg/dL 14.08*  --  5.85*  --  12.35*  --    < >  --    < > 4.68*   LACTATE mmol/L  --  1.5  --   --   --  2.0  --   --   --  1.9   WBC 10*3/mm3 17.02*  --  14.26*  --   9.55  --    < > 15.44*   < > 11.51*   HEMOGLOBIN g/dL 9.5*  --  9.4*  --  9.1*  --    < > 9.9*   < > 10.6*   HEMATOCRIT % 31.1*  --  30.9*  --  30.9*  --    < > 34.0*   < > 35.5*   MCV fL 91.5  --  92.5  --  95.7  --    < > 95.5   < > 94.9   MCHC g/dL 30.5*  --  30.4*  --  29.4*  --    < > 29.1*   < > 29.9*   PLATELETS 10*3/mm3 201  --  226  --  148  --    < > 198   < > 150   INR   --   --   --  1.07  --   --   --  1.13*  --  1.19*    < > = values in this interval not displayed.     Results from last 7 days   Lab Units 09/18/21  0143 09/17/21  0111 09/16/21  0047   SODIUM mmol/L 139 140 140   POTASSIUM mmol/L 4.1 3.7 4.5   CHLORIDE mmol/L 104 105 104   CO2 mmol/L 28.3 29.6* 28.8   BUN mg/dL 42* 43* 54*   CREATININE mg/dL 0.52* 0.54* 0.61*   EGFR IF NONAFRICN AM mL/min/1.73 >150 148 129   CALCIUM mg/dL 8.3* 8.5* 8.3*   GLUCOSE mg/dL 181* 158* 197*   ALBUMIN g/dL 1.78* 1.91* 1.88*   BILIRUBIN mg/dL 0.3 0.4 0.4   ALK PHOS U/L 75 56 48   AST (SGOT) U/L 54* 38 45*   ALT (SGPT) U/L 42* 37 37   Estimated Creatinine Clearance: 84 mL/min (A) (by C-G formula based on SCr of 0.52 mg/dL (L)).  No results found for: AMMONIA    Glucose   Date/Time Value Ref Range Status   09/18/2021 1121 171 (H) 70 - 130 mg/dL Final     Comment:     Meter: [id number] : [id number] SAWYER VIEYRA   09/18/2021 0550 115 70 - 130 mg/dL Final     Comment:     Meter: [id number] : [id number] Elisasai Swann   09/17/2021 2003 147 (H) 70 - 130 mg/dL Final     Comment:     Meter: [id number] : [id number] Elisasai Abrahamsa   09/17/2021 1801 144 (H) 70 - 130 mg/dL Final     Comment:     Meter: [id number] : [id number] Edvin CURRIE   09/17/2021 1037 79 70 - 130 mg/dL Final     Comment:     Meter: [id number] : [id number] GINA MCKEON   09/17/2021 0539 102 70 - 130 mg/dL Final     Comment:     Meter: [id number] : [id number] Donaldo MERRILL   09/17/2021 0412 115 70 - 130 mg/dL Final     Comment:     Meter: [id number] : [id number]  Donaldo MERRILL   09/17/2021 0022 147 (H) 70 - 130 mg/dL Final     Comment:     Meter: [id number] : [id number] Donaldo MERRILL     Lab Results   Component Value Date    HGBA1C 6.30 (H) 09/02/2021     No results found for: TSH, FREET4    Blood Culture   Date Value Ref Range Status   09/02/2021 No growth at 24 hours  Preliminary   09/02/2021 No growth at 24 hours  Preliminary   09/02/2021 No growth at 24 hours  Preliminary     No results found for: URINECX  No results found for: WOUNDCX  No results found for: STOOLCX  No results found for: RESPCX  Pain Management Panel    There is no flowsheet data to display.           ----------------------------------------------------------------------------------------------------------------------  Imaging Results (Last 24 Hours)       Procedure Component Value Units Date/Time    XR Chest 1 View [[id number]] Collected: 09/18/21 1146     Updated: 09/18/21 1148    Narrative:      EXAM:    XR Chest, 1 View     EXAM DATE:    9/18/2021 5:09 AM     CLINICAL HISTORY:    intubated; U07.1-COVID-19; J12.82-Pneumonia due to Coronavirus disease  2019; R09.02-Hypoxemia; J96.01-Acute respiratory failure with hypoxia     TECHNIQUE:    Frontal view of the chest.     COMPARISON:    09/17/2021     FINDINGS:    Lungs:  Bilateral airspace disease overall stable.    Pleural space:  Unremarkable.  No pneumothorax.    Heart:  Cardiomegaly is stable.    Mediastinum:  Unremarkable.    Bones/joints:  Unremarkable.    Tubes, lines and devices:  ET tube with tip at level of clavicles.  NG  tube extends into the stomach.  Right IJ deep line is noted with tip at  the cavoatrial junction.       Impression:      1.  Stable bilateral airspace disease.  2.  Support devices as above.     This report was finalized on 9/18/2021 11:46 AM by Dr. Houston Larios MD.       XR Chest 1 View [[id number]] Collected: 09/17/21 1857     Updated: 09/17/21 1900    Narrative:      EXAM:    XR Chest, 1 View     EXAM DATE:     09/17/2021     CLINICAL HISTORY:    high peak pressure; U07.1-COVID-19; J12.82-Pneumonia due to  Coronavirus disease 2019; R09.02-Hypoxemia; J96.01-Acute respiratory  failure with hypoxia     TECHNIQUE:    Frontal view of the chest.     COMPARISON:    09/17/2020 10:02 AM     FINDINGS:    Lungs:  Interstitial thickening is stable.  Consolidation left base  stable.  Slight improvement in lung volumes.    Pleural space:  Unremarkable.  No pneumothorax.    Heart:  Cardiomegaly is stable.    Mediastinum:  Unremarkable.    Bones/joints:  Unremarkable.    Tubes, lines and devices:  ET tube with tip at level of clavicles.   Right IJ deep line with tip at the cavoatrial junction.  NG tube extends  into the stomach.       Impression:      1.  Stable appearance of the chest except for slight improved aeration.  2.  Support devices as detailed above.     This report was finalized on 9/17/2021 6:58 PM by Dr. Houston Larios MD.               ----------------------------------------------------------------------------------------------------------------------    Assessment/Plan       Assessment/Plan     ASSESSMENT:    1.  Severe sepsis with lactic acid greater than 2 on admission  2.  COVID-19 pneumonia    PLAN:    Patient intubated and sedated at slightly increased FiO2 of 100% today.  WBC worsening at 17.02.  CRP worsening at 14.08.  Chest x-ray from 9/18/2021 reports stable bilateral airspace disease.    Urinalysis from 9/15/2021 reports 6-12 WBCs, trace bacteria, 1+ yeast, urine culture reports no growth.  Blood cultures from 9/14/2021 report no growth at 24 hours.      VQ scan on 9/2/2021 indeterminate for exclusion of PE.  CT of the chest on 9/3/2021 reports patchy bilateral airspace disease concerning for COVID-19 pneumonia.  CT of the abdomen and pelvis on 9/3/2021 reports bibasilar airspace disease suggestive of COVID-19 pneumonia, large right inguinal hernia containing nondilated bowel and fat.     Patient remains on  Decadron but has completed course of remdesivir.    We are concerned about possible development of bacterial pneumonia in the setting of prior treatment with baricitinib.     Patient completed extended course of Merrem and vancomycin.  For now we will monitor closely off of antibiotic coverage.        Code Status:   Code Status and Medical Interventions:   Ordered at: 09/10/21 1447     Limited Support to NOT Include:    Intubation    Cardioversion/Defibrillation     Code Status:    No CPR     Medical Interventions (Level of Support Prior to Arrest):    Limited     Comments:    adult children Tiffany, Cathleen and Reganалександрnilsa all agree he would not want to be resuciated or reintubated in the event he became extubated.     Patient remains in isolation for COVID-19 and is currently on the ventilator. As patient is unable to provide a history or review of systems,  overnight findings noted. Vital signs, pertinent laboratory values and radiological studies were reviewed.         NELSON Medina  09/18/21  14:17 EDT

## 2021-09-18 NOTE — PROGRESS NOTES
[facility]      Name:  Valdemar Solitario    MRN:  [id number]    1945   76 y.o.  male            Patient Care Team:  Laurence Geiger APRN as PCP - General (Family Medicine)  Corinne Dimas APRN as PCP - Family Medicine    CC/ Reason for visit: Acute hypoxic respiratory failure     Acute respiratory failure, acute coronary pneumonia, shock, pulmonary pretension    Subjective: Patient was seen and examined.  Remains critically ill with acute hypoxic respiratory failure.  Overnight events reviewed discussed with patient's nurse.  All the labs medications and the notes and vitals reviewed.      ROS: Unable to obtain due to critical illness    Objective     Vital Signs past 24hrs    BP range: BP: ()/(31-93) 113/53  Pulse range: Heart Rate:  [47-62] 50  Resp rate range: Resp:  [22] 22  Temp range: Temp (24hrs), Av.5 °F (36.4 °C), Min:97.1 °F (36.2 °C), Max:97.8 °F (36.6 °C)      Ventilator/Non-Invasive Ventilation Settings (From admission, onward)     Start     Ordered    21 1636  Ventilator - AC/VC; (22); 50; 90%; 12; 450  Continuous     Comments: May titrate up to 70% FIO2 please notify Dr Webster if higher 02 amount is required.   Question Answer Comment   Vent Mode AC/VC    Breath rate  22   FiO2 50    FiO2 titrate for Sp02% =/> 90%    PEEP 12    Tidal Volume 450        21 1637    21 0810  Ventilator - AC/VC; (22); 50; 90%; 10; 450  Continuous,   Status:  Canceled     Question Answer Comment   Vent Mode AC/VC    Breath rate  22   FiO2 50    FiO2 titrate for Sp02% =/> 90%    PEEP 10    Tidal Volume 450        21 0809    21 1306  Ventilator - AC/VC; (18); 100; 90%; Other (see comments); 16; 450  Continuous,   Status:  Canceled     Question Answer Comment   Vent Mode AC/VC    Breath rate  18   FiO2 100    FiO2 titrate for Sp02% =/> 90%    PEEP Other (see comments)    PEEP: 16    Tidal Volume 450        21 1308    21 2228  NIPPV (CPAP  or BIPAP)  Until Discontinued,   Status:  Canceled     Question Answer Comment   Indication: Acute Respiratory Failure    Type: BIPAP    NIPPV Mask Interface: Per Patient Preference    IPAP 15    EPAP 5    Oxygen FIO2    FIO2 % 32    Breath Rate 20    Titrate for SPO2 90%        09/02/21 2227 09/02/21 2223  NIPPV (CPAP or BIPAP)  Until Discontinued,   Status:  Canceled     Question Answer Comment   Type: BIPAP    NIPPV Mask Interface: Per Patient Preference        09/02/21 2223                Device (Oxygen Therapy): ventilator FiO2 (%): 100 %     75.6 kg (166 lb 11.2 oz); Body mass index is 26.92 kg/m².      Intake/Output Summary (Last 24 hours) at 9/18/2021 1829  Last data filed at 9/18/2021 1802  Gross per 24 hour   Intake 3391.29 ml   Output 1370 ml   Net 2021.29 ml       PHYSICAL EXAM   Patient was seen and examined via telemedicine.     Patient is intubated and sedated.     Head is normocephalic     Respiratory-no acute respiratory distress noted.  Intubated and on mechanical ventilator.     Cardiology-sinus rhythm noted on telemetry     Neuro-sedated    Scheduled meds:  castor oil-balsam peru, 1 application, Topical, Q12H  chlorhexidine, 15 mL, Mouth/Throat, Q12H  dexamethasone, 6 mg, Intravenous, Daily  enoxaparin, 1 mg/kg, Subcutaneous, Q12H  famotidine, 20 mg, Intravenous, BID  insulin aspart, 0-14 Units, Subcutaneous, TID AC  insulin detemir, 25 Units, Subcutaneous, Nightly  midodrine, 10 mg, Oral, TID AC  sodium chloride, 10 mL, Intravenous, Q12H  sodium chloride, 10 mL, Intravenous, Q12H  sodium chloride, 10 mL, Intravenous, Q12H  sodium chloride, 10 mL, Intravenous, Q12H  sodium chloride, 10 mL, Intravenous, Q12H        IV meds:                      dexmedetomidine, 0.2-1.5 mcg/kg/hr, Last Rate: Stopped (09/09/21 1414)  fentaNYL Citrate,   norepinephrine, 0.02-0.3 mcg/kg/min, Last Rate: Stopped (09/17/21 1526)  Pharmacy Consult - Pharmacy to dose,   Pharmacy to Dose enoxaparin (LOVENOX),    phenylephrine, 0.5-3 mcg/kg/min, Last Rate: 0.5 mcg/kg/min (09/18/21 1123)  propofol, 5-50 mcg/kg/min, Last Rate: 30 mcg/kg/min (09/18/21 1802)  vasopressin (PITRESSIN) 20 units in 100 mL infusion, 0.03 Units/min, Last Rate: 0.03 Units/min (09/18/21 1608)        Data Review:      Results from last 7 days   Lab Units 09/18/21  0143 09/17/21  0111 09/17/21  0109 09/16/21  0047 09/15/21  0628 09/15/21  0627 09/15/21  0627 09/14/21  0432 09/13/21  0732   SODIUM mmol/L 139 140  --  140  --    < >  --    < > 151*   POTASSIUM mmol/L 4.1 3.7  --  4.5  --    < >  --    < > 3.0*   CHLORIDE mmol/L 104 105  --  104  --    < >  --    < > 116*   CO2 mmol/L 28.3 29.6*  --  28.8  --    < >  --    < > 26.6   BUN mg/dL 42* 43*  --  54*  --    < >  --    < > 58*   CREATININE mg/dL 0.52* 0.54*  --  0.61*  --    < >  --    < > 0.88   CALCIUM mg/dL 8.3* 8.5*  --  8.3*  --    < >  --    < > 7.1*   BILIRUBIN mg/dL 0.3 0.4  --  0.4  --    < >  --    < > 0.6   ALK PHOS U/L 75 56  --  48  --    < >  --    < > 46   ALT (SGPT) U/L 42* 37  --  37  --    < >  --    < > 34   AST (SGOT) U/L 54* 38  --  45*  --    < >  --    < > 40   GLUCOSE mg/dL 181* 158*  --  197*  --    < >  --    < > 130*   WBC 10*3/mm3 17.02* 14.26*  --  9.55  --    < > 15.44*   < > 11.51*   HEMOGLOBIN g/dL 9.5* 9.4*  --  9.1*  --    < > 9.9*   < > 10.6*   PLATELETS 10*3/mm3 201 226  --  148  --    < > 198   < > 150   INR   --   --  1.07  --   --   --  1.13*  --  1.19*   PROBNP pg/mL 1,762.0  --   --   --   --   --   --   --   --    PROCALCITONIN ng/mL  --   --   --   --  2.59*  --   --   --   --     < > = values in this interval not displayed.       Lab Results   Component Value Date    CALCIUM 8.3 (L) 09/18/2021       Results from last 7 days   Lab Units 09/15/21  1125 09/14/21  1351 09/14/21  1350 09/13/21  1526   BLOODCX   --  No growth at 4 days No growth at 4 days  --    RESPCX   --   --   --  No growth   URINECX  No growth  --   --   --        Results from last 7 days    Lab Units 09/18/21  1750 09/18/21  1651 09/18/21  0501 09/17/21  0449 09/16/21  0453 09/15/21  0500 09/14/21  0447   PH, ARTERIAL pH units 7.401 7.385 7.347* 7.394 7.451* 7.356 7.418   PO2 ART mm Hg 53.1* 54.4* 72.8* 64.3* 66.7* 121.0* 115.0*   PCO2, ARTERIAL mm Hg 51.3* 53.8* 57.5* 44.6 42.3 52.2* 46.1*   HCO3 ART mmol/L 31.8* 32.1* 31.5* 27.2* 29.5* 29.2* 29.7*      COVID LABS:  Results From Last 14 Days   Lab Units 09/18/21  0143 09/17/21  0112 09/17/21  0111 09/17/21  0109 09/16/21  0047 09/15/21  0629 09/15/21  0628 09/15/21  0627 09/15/21  0626 09/15/21  0626 09/14/21  0432 09/13/21  0732 09/10/21  0429 09/09/21  0914 09/09/21  0055 09/08/21  0233 09/07/21  0320 09/07/21  0320 09/06/21  0433 09/05/21  0227 09/05/21  0227   PROBNP pg/mL 1,762.0  --   --   --   --   --   --   --   --   --   --   --   --   --   --   --   --   --   --   --   --    CRP mg/dL 14.08*  --  5.85*  --  12.35*  --   --   --    < > 19.45*   < > 4.68*   < >  --    < >  --   --  2.54* 2.93*   < > 3.13*   D DIMER QUANT MCGFEU/mL  --   --   --  0.78*  --   --   --  1.69*  --   --   --  0.83*   < >  --    < >  --   --  0.36  --    < > 0.36   FERRITIN ng/mL  --   --   --   --   --   --   --   --   --   --   --   --   --   --   --   --   --  1,685.00* 2,397.00*  --  2,904.00*   LACTATE mmol/L  --  1.5  --   --   --  2.0  --   --   --   --   --  1.9   < >  --    < >  --   --  1.3  --    < > 1.4   LDH U/L  --   --  347*  --   --   --   --   --   --  496*  --  390*   < >  --    < >  --   --  608*  --    < > 608*   PROCALCITONIN ng/mL  --   --   --   --   --   --  2.59*  --   --   --   --   --   --  0.45*  --  0.22   < > 0.11  --   --   --    PROTIME Seconds  --   --   --  14.3  --   --   --  15.0*  --   --   --  15.5*   < >  --    < >  --   --  15.9*  --    < > 14.9*   INR   --   --   --  1.07  --   --   --  1.13*  --   --   --  1.19*   < >  --    < >  --   --  1.23*  --    < > 1.13*    < > = values in this interval not displayed.         Results  Review:    I have reviewed the relevant laboratory results and independently reviewed the chest imaging from this hospitalization including the available echocardiogram reports personally and summarized it if/ when appropriate below    Assessment    Acute hypoxic respiratory failure  Acute COVID-19 pneumonia  ARDS  Shock - Septic vs cardiogenic  Atrial fibrillation/RVR on anticoagulation  Severe pulmonary hypertension with RV dysfunction  Anemia    PLAN:    All the labs medications and the notes vitals reviewed.  Ventilator settings graphics latest blood gas chest x-ray reviewed.  Ventilator settings remains on the higher side.  White count is increasing.  If goes further we will get pancultures and start broad-spectrum antibiotics.  Latest microbiology reviewed.  Blood Culture   Date Value Ref Range Status   09/14/2021 No growth at 5 days  Final     No results found for: BCIDPCR, CXREFLEX, CSFCX, CULTURETIS  No results found for: CULTURES, HSVCX, URCX  No results found for: EYECULTURE, GCCX, HSVCULTURE, LABHSV  No results found for: LEGIONELLA, MRSACX, MUMPSCX, MYCOPLASCX  No results found for: NOCARDIACX, STOOLCX  Urine Culture   Date Value Ref Range Status   09/15/2021 No growth  Final     No results found for: VIRALCULTU, WOUNDCX      Results for orders placed during the hospital encounter of 09/02/21    Adult Transthoracic Echo Complete W/ Cont if Necessary Per Protocol    Interpretation Summary  · Estimated left ventricular EF = 50% Left ventricular ejection fraction appears to be 46 - 50%. Left ventricular systolic function is normal.  · Left ventricular diastolic function was normal.  · The right ventricular cavity is mild to moderately dilated.  · The right atrial cavity is mild to moderately dilated.  · Estimated right ventricular systolic pressure from tricuspid regurgitation is moderately elevated (45-55 mmHg).  · Moderate to severe pulmonary hypertension is present.  · No pericardial effusion  · No prev  echo    Continue vasopressors to maintain a map of 65 and above.  Continue steroids for COVID-19 pneumonia-to decrease the inflammation  Ventilator settings graphics reviewed and adjusted.    ABG shows-pH is within physiological range.  PO2 is low but saturating good.  Continue with current FiO2.  Discussed with RT  Echo shows pulmonary hypertension with reduced EF.  As patient was in atrial fibrillation vasopressors switched to Marco-Synephrine.  If remains under control will consider switching it back or start on midodrine.  EF is mildly reduced.  Continue anticoagulation for atrial fibrillation.  Medication list reviewed.  Currently rate controlled  Overall prognosis with multiorgan involvement and advanced age is poor.  We will get chest x-ray and blood gas in the morning.  Very poor prognosis  DNR/DNI    DVT/GI prophylaxis    Would strongly recommend transition to comfort measures only given advanced age and severe COVID-19 pneumonia with prolonged respiratory failure, cardiogenic shock, severe pulmonary hypertension    D/w RN       Patient was seen and examined via telemedicine.     Case d/w nurse and team   This service is provided via tele medicine   I am in [place] [facility] and patient is in [facility]  Cc time 50 mins which doesnot overlap with any other physician   Apollo Carbajal MD  9/18/2021

## 2021-09-18 NOTE — PROGRESS NOTES
[facility] HOSPITALIST PROGRESS NOTE     Patient Identification:  Name:  Valdemar Solitario  Age:  76 y.o.  Sex:  male  :  1945  MRN:  [id number]  Visit Number:  [id number]  ROOM: [address]     Primary Care Provider:  Laurence Geiger APRN    Length of stay in inpatient status:  16    Subjective     Chief Compliant:    Chief Complaint   Patient presents with   • Exposure To Known Illness   • Altered Mental Status   • Weakness - Generalized     History of Presenting Illness:    Patient remains critically ill, intubated for prolonged period of time, remains on multiple pressors, remains on 100% Fi02, AM ABG reviewed and P02 70, have decreased RR and repeat ABG pending, repeat CXR ordered and pending, proBNP trending up, Cr stable, considering gentle diuresis though BP so tenuous on multiple pressors, holding for now, HR is bradycardic now, holding amio gtt, cards following, family has asked not to have patient bagged anymore, if develops worsening hypoxia and needs will need to call family to discuss transition to comfort measures, continued on steroids, continued on Abx, remains afebrile.   Objective     Current Hospital Meds:castor oil-balsam peru, 1 application, Topical, Q12H  chlorhexidine, 15 mL, Mouth/Throat, Q12H  dexamethasone, 6 mg, Intravenous, Daily  enoxaparin, 1 mg/kg, Subcutaneous, Q12H  famotidine, 20 mg, Intravenous, BID  insulin aspart, 0-14 Units, Subcutaneous, TID AC  insulin detemir, 25 Units, Subcutaneous, Nightly  meropenem, 1 g, Intravenous, Q8H  midodrine, 10 mg, Oral, TID AC  sodium chloride, 10 mL, Intravenous, Q12H  sodium chloride, 10 mL, Intravenous, Q12H  sodium chloride, 10 mL, Intravenous, Q12H  sodium chloride, 10 mL, Intravenous, Q12H  sodium chloride, 10 mL, Intravenous, Q12H    dexmedetomidine, 0.2-1.5 mcg/kg/hr, Last Rate: Stopped (21 1414)  fentaNYL Citrate,   norepinephrine, 0.02-0.3 mcg/kg/min, Last Rate: Stopped (21 1526)  Pharmacy Consult -  Pharmacy to dose,   Pharmacy Consult - Pharmacy to dose,   Pharmacy to Dose enoxaparin (LOVENOX),   Pharmacy to dose vancomycin,   phenylephrine, 0.5-3 mcg/kg/min, Last Rate: 0.8 mcg/kg/min (09/18/21 0842)  propofol, 5-50 mcg/kg/min, Last Rate: 25 mcg/kg/min (09/18/21 0441)  vasopressin (PITRESSIN) 20 units in 100 mL infusion, 0.03 Units/min, Last Rate: 0.03 Units/min (09/18/21 0158)      Current Antimicrobial Therapy:  Anti-Infectives (From admission, onward)    Ordered     Dose/Rate Route Frequency Start Stop    09/13/21 1407  vancomycin 1750 mg/500 mL 0.9% NS IVPB ([facility])     Ordering Provider: Jose De Jesus Mccord MD    1,750 mg  over 120 Minutes Intravenous Every 24 Hours 09/13/21 1600 09/16/21 1744    09/10/21 1042  meropenem (MERREM) 1 g in sodium chloride 0.9 % 100 mL IVPB-VTB     Ordering Provider: Nic Webster MD    1 g  over 3 Hours Intravenous Every 8 Hours 09/10/21 1300 09/19/21 1259    09/09/21 0949  vancomycin 1250 mg/250 mL 0.9% NS IVPB ([facility])     Ordering Provider: Brock Metzger MD    1,250 mg  over 60 Minutes Intravenous Once 09/09/21 1100 09/09/21 1201    09/09/21 0942  meropenem (MERREM) 1 g in sodium chloride 0.9 % 100 mL IVPB-VTB     Ordering Provider: Brock Metzger MD    1 g  over 30 Minutes Intravenous Once 09/09/21 1030 09/09/21 1131    09/09/21 0918  Pharmacy to dose vancomycin     Ordering Provider: Jeny Lopes, NELSON     Does not apply Continuous PRN 09/09/21 0917 09/19/21 0916    09/03/21 0615  remdesivir 100 mg in sodium chloride 0.9 % 270 mL IVPB (powder vial)     Ordering Provider: Mg Augustine MD    100 mg  over 60 Minutes Intravenous Every 24 Hours 09/04/21 0900 09/07/21 1055    09/03/21 0615  remdesivir 200 mg in sodium chloride 0.9 % 290 mL IVPB (powder vial)     Ordering Provider: Mg Augustine MD    200 mg  over 60 Minutes Intravenous Every 24 Hours 09/03/21 0900 09/03/21 0940        Current Diuretic Therapy:  Diuretics (From admission, onward)     Ordered     Dose/Rate Route Frequency Start Stop    09/12/21 0857  bumetanide (BUMEX) injection 2 mg     Ordering Provider: Nic Webster MD    2 mg Intravenous Once 09/12/21 0945 09/12/21 0948    09/11/21 1026  furosemide (LASIX) 80 mg in sodium chloride 0.9 % 50 mL IVPB     Ordering Provider: Nic Webster MD    80 mg  over 4 Hours Intravenous Once 09/11/21 1200 09/11/21 1646    09/10/21 0822  furosemide (LASIX) injection 40 mg     Ordering Provider: Manuelito Adame MD    40 mg Intravenous Once 09/10/21 0915 09/10/21 0839    09/08/21 0736  furosemide (LASIX) injection 40 mg     Ordering Provider: Manuelito Adame MD    40 mg Intravenous Once 09/08/21 0800 09/08/21 0926    09/05/21 0732  furosemide (LASIX) injection 80 mg     Ordering Provider: Nic Webster MD    80 mg Intravenous Once 09/05/21 0830 09/05/21 0842        ----------------------------------------------------------------------------------------------------------------------  Vital Signs:  Temp:  [97.1 °F (36.2 °C)-98.8 °F (37.1 °C)] 97.7 °F (36.5 °C)  Heart Rate:  [] 53  Resp:  [22] 22  BP: ()/(33-93) 118/53  FiO2 (%):  [100 %] 100 %  SpO2:  [83 %-98 %] 94 %  on   ;   Device (Oxygen Therapy): ventilator  Body mass index is 26.92 kg/m².    Wt Readings from Last 3 Encounters:   09/18/21 75.6 kg (166 lb 11.2 oz)   06/14/17 73.9 kg (163 lb)   03/23/17 76.7 kg (169 lb)     Intake & Output (last 3 days)       09/15 0701 - 09/16 0700 09/16 0701 - 09/17 0700 09/17 0701 - 09/18 0700 09/18 0701 - 09/19 0700    I.V. (mL/kg) 760.4 (11.1) 540.1 (7.9) 1631.1 (21.6)     Other 1601 1752 1386     NG/ 786 802     IV Piggyback 409.7 800 200     Total Intake(mL/kg) 3121.1 (45.5) 3878.1 (56.5) 4019.1 (53.2)     Urine (mL/kg/hr) 1775 (1.1) 2600 (1.6) 1320 (0.7)     Emesis/NG output   0     Stool 0 0 0     Total Output 1775 2600 1320     Net +1346.1 +1278.1 +2699.1                 NPO  Diet  ----------------------------------------------------------------------------------------------------------------------  Physical exam:  General: intubated/sedated  Head: Normocephalic, atraumatic  Eyes: Conjunctivae and sclerae normal.  Ears: Ears appear intact with no abnormalities noted.   Neck: Trachea midline. No obvious JVD.  Heart: regular rhythm, bradycardic  Lungs: Intubated/venilated, b/l equal rise of chest, course breath sounds, on 100% Fi02 still  Abdomen: No obvious abdominal distension.  MS: Muscle tone appears normal. No gross deformities.  Extremities: No clubbing, cyanosis or edema noted.   Skin: No visible bleeding, bruising, or rash.  Neurologic: unable to assess due to sedation  : Alis in place  ----------------------------------------------------------------------------------------------------------------------  Results from last 7 days   Lab Units 09/18/21  0143 09/17/21  0112 09/17/21  0111 09/17/21  0109 09/16/21  0047 09/15/21  0629 09/15/21  0627 09/15/21  0627 09/14/21  0432 09/13/21  0732   CRP mg/dL 14.08*  --  5.85*  --  12.35*  --    < >  --    < > 4.68*   LACTATE mmol/L  --  1.5  --   --   --  2.0  --   --   --  1.9   WBC 10*3/mm3 17.02*  --  14.26*  --  9.55  --    < > 15.44*   < > 11.51*   HEMOGLOBIN g/dL 9.5*  --  9.4*  --  9.1*  --    < > 9.9*   < > 10.6*   HEMATOCRIT % 31.1*  --  30.9*  --  30.9*  --    < > 34.0*   < > 35.5*   MCV fL 91.5  --  92.5  --  95.7  --    < > 95.5   < > 94.9   MCHC g/dL 30.5*  --  30.4*  --  29.4*  --    < > 29.1*   < > 29.9*   PLATELETS 10*3/mm3 201  --  226  --  148  --    < > 198   < > 150   INR   --   --   --  1.07  --   --   --  1.13*  --  1.19*    < > = values in this interval not displayed.     Results from last 7 days   Lab Units 09/18/21  0501   PH, ARTERIAL pH units 7.347*   PO2 ART mm Hg 72.8*   PCO2, ARTERIAL mm Hg 57.5*   HCO3 ART mmol/L 31.5*     Results from last 7 days   Lab Units 09/18/21  0143 09/17/21  0111 09/16/21  0047    SODIUM mmol/L 139 140 140   POTASSIUM mmol/L 4.1 3.7 4.5   CHLORIDE mmol/L 104 105 104   CO2 mmol/L 28.3 29.6* 28.8   BUN mg/dL 42* 43* 54*   CREATININE mg/dL 0.52* 0.54* 0.61*   EGFR IF NONAFRICN AM mL/min/1.73 >150 148 129   CALCIUM mg/dL 8.3* 8.5* 8.3*   GLUCOSE mg/dL 181* 158* 197*   ALBUMIN g/dL 1.78* 1.91* 1.88*   BILIRUBIN mg/dL 0.3 0.4 0.4   ALK PHOS U/L 75 56 48   AST (SGOT) U/L 54* 38 45*   ALT (SGPT) U/L 42* 37 37   Estimated Creatinine Clearance: 84 mL/min (A) (by C-G formula based on SCr of 0.52 mg/dL (L)).  No results found for: AMMONIA      Results from last 7 days   Lab Units 09/18/21  0143   PROBNP pg/mL 1,762.0         Glucose   Date/Time Value Ref Range Status   09/18/2021 0550 115 70 - 130 mg/dL Final     Comment:     Meter: [id number] : [id number] Adam Swann   09/17/2021 2003 147 (H) 70 - 130 mg/dL Final     Comment:     Meter: [id number] : [id number] Jackjenni Swann   09/17/2021 1801 144 (H) 70 - 130 mg/dL Final     Comment:     Meter: [id number] : [id number] Edvin CURRIE   09/17/2021 1037 79 70 - 130 mg/dL Final     Comment:     Meter: [id number] : [id number] GINA MCKEON   09/17/2021 0539 102 70 - 130 mg/dL Final     Comment:     Meter: [id number] : [id number] Donaldo MERRILL   09/17/2021 0412 115 70 - 130 mg/dL Final     Comment:     Meter: [id number] : [id number] Donaldo MERRILL   09/17/2021 0022 147 (H) 70 - 130 mg/dL Final     Comment:     Meter: [id number] : [id number] Donaldo MERRILL   09/16/2021/16/2021 2050 203 (H) 70 - 130 mg/dL Final     Comment:     Meter: [id number] : [id number] Donaldo MERRILL     No results found for: TSH, FREET4  No results found for: PREGTESTUR, PREGSERUM, HCG, HCGQUANT  Pain Management Panel    There is no flowsheet data to display.       Brief Urine Lab Results  (Last result in the past 365 days)      Color   Clarity   Blood   Leuk Est   Nitrite   Protein   CREAT   Urine HCG        09/15/21 1125 Yellow Turbid Large (3+)  Negative Negative 30 mg/dL (1+)             Blood Culture   Date Value Ref Range Status   09/14/2021 No growth at 3 days  Preliminary   09/14/2021 No growth at 3 days  Preliminary     Urine Culture   Date Value Ref Range Status   09/15/2021 No growth  Final     No results found for: WOUNDCX  No results found for: STOOLCX  Respiratory Culture   Date Value Ref Range Status   09/13/2021 No growth  Final     No results found for: AFBCX  Results from last 7 days   Lab Units 09/18/21  0143 09/17/21  0112 09/17/21  0111 09/16/21  0047 09/15/21  0629 09/15/21  0628 09/15/21  0626 09/14/21  0432 09/13/21  0732 09/12/21  0308   PROCALCITONIN ng/mL  --   --   --   --   --  2.59*  --   --   --   --    LACTATE mmol/L  --  1.5  --   --  2.0  --   --   --  1.9  --    CRP mg/dL 14.08*  --  5.85* 12.35*  --   --  19.45* 21.82* 4.68* 7.14*     I have personally looked at the labs and they are summarized above.  ----------------------------------------------------------------------------------------------------------------------  Detailed radiology reports for the last 24 hours:  Imaging Results (Last 24 Hours)     Procedure Component Value Units Date/Time    XR Chest 1 View [[id number]] Resulted: 09/18/21 0510     Updated: 09/18/21 0624    XR Chest 1 View [[id number]] Collected: 09/17/21 1857     Updated: 09/17/21 1900    Narrative:      EXAM:    XR Chest, 1 View     EXAM DATE:    09/17/2021     CLINICAL HISTORY:    high peak pressure; U07.1-COVID-19; J12.82-Pneumonia due to  Coronavirus disease 2019; R09.02-Hypoxemia; J96.01-Acute respiratory  failure with hypoxia     TECHNIQUE:    Frontal view of the chest.     COMPARISON:    09/17/2020 10:02 AM     FINDINGS:    Lungs:  Interstitial thickening is stable.  Consolidation left base  stable.  Slight improvement in lung volumes.    Pleural space:  Unremarkable.  No pneumothorax.    Heart:  Cardiomegaly is stable.    Mediastinum:  Unremarkable.    Bones/joints:  Unremarkable.    Tubes,  lines and devices:  ET tube with tip at level of clavicles.   Right IJ deep line with tip at the cavoatrial junction.  NG tube extends  into the stomach.       Impression:      1.  Stable appearance of the chest except for slight improved aeration.  2.  Support devices as detailed above.     This report was finalized on 9/17/2021 6:58 PM by Dr. Houston Larios MD.           Assessment & Plan    76M PMH diabetes meliltus, depression, GERD, hypertension, dyslipidemia, presented to his place of work in a state of confusion, brought to ER, found to be Covid +. Hospitalization complicated by worsening hypoxia.      #Septic shock, Acute Metabolic Encephalopathy, Acute Hypoxic Respiratory Failure requiring intubation and mechanical ventilation 2/2 viral pneumonia treating for Covid 19 c/b ARDS and mild transaminitis  #Acute UTI - New  - Patient presented w/ increased shortness of breath, confusion, tachypnea, covid + on 9/2, intubated on 9/5 for worsening hypoxia on bipap.  - Admission labs showed WBC count 5K, CRP 7.8, lactate 2.3, d-dimer 0.95, procal 0.16, MRSA -, covid +, Bcx's NGTD.  UA 9/14 w/ 4+ bacteria though no culture noted, repeat UA and Cx from 9/15 w/o growth, repeat Bcx's 9/14 NGTD.  - B/l Venous Duplex negative for DVT  - VQ scan showed indeterminate study, noted abnormal perfusion, multifocal defects could be related to multifocal airspace disease but also PE.  - CT Head showed no acute intracranial abnormality  - CT Chest showed patchy b/l airspace disease c/w covid 19  - ID consulted; Following  - CTPE on 9/4 did not reveal PE but did show worsening bilateral airspace disease that is likely worsening COVID pneumonia. Given fluid in fissure on plain film could not rule out component of edema  - Pulm consulted; Following, not here this weekend  - ID consulted; Following, s/p Remdesivir, initially initiated on baricitinib but d/c'd due to intubation/mechanical ventilation.   Continue 6mg IV Dexamethasone for  extended time period due to continued significant respiratory failure  - Continue Merrem per ID  - Continue Level III AC for Covid 19 thromboembolism Ppx  - Continue APAP PRN for fevers  - Continue Albuterol Inhaler  - Continue IV pressors to maintain MAPs > 65mmHg or SBP > 90, currently still requiring multiple pressors at this time  - Trend Covid 19 progression labs w/ CBC, CMP, CK, CRP, Ferritin daily and LDH, D-dimer, Fibrinogen q48hrs.  - Continue to monitor in CCU, continue enhanced droplet/contact isolation precautions, wean 02 as able though so far have been unable to wean significantly, palliative care following for GOC conversations, family does not want bagged if becomes hypoxic again, have asked RN to notify MD if 02 dropping and will need to call family to address comfort measures at that time.     #HTN/Dyslipidemia  #Elevated Troponin, NSTEMI Type II suspected  #Cardiomyopathy now w/ suspected component of cardiogenic shock  #Pulm HTN   #Afib w/ RVR - Recurrent  - Labs showed trops 0.031->0.016, proBNP 370   - EKG showed NSR, RBBB, no significant ST changes  - Echo showed LVEF 46-50%, mild-mod dilated RV, mild-mod dilated RA, mod-severe Pulm HTN, RVSP 45-55mmHg  - Consulted Cardiology; Followed, rec'd outpatient echo for f/u 6 months  - Int Cards now following for Afib  - Holding home ARB due to shock  - Holding home statin   - Continue Tlov for now  - Patient bradycardic again, holding IV Amio for now  - Continue to monitor on tele, strict I/O's, daily weights, trend HR and BP, IV diuresis PRN    #NIDDM Type II, controlled, unknown complications  - Hgb A1c = 6.3%  - Holding home oral agents, continue FSBG and SSI  - Uncontrolled. Pulmonary has increased basal, will also increase SSI.      #Anxiety/Depression  - Continue home regimen     #GERD  - Continue home PPI      #BPH  - Continue home Flomax     #Nonoliguric MURIEL - Resolved  - B/l Cr 0.9-1.0, was up to 2.1 on admission, now back to baseline, S/p  mIVFs; Trend Cr and UOP, avoid nephrotoxins, NSAIDs, dehydration and contrast as able.    #Electrolyte Abnormalities  - Acute Mild Hypokalemia - K 3.0 today, Replaced per protocol, Resolved  - Acute Mild Hypernatremia - Na up to 151, slightly increased FW flushes, now NML, Resolved     F: NPO  E: Monitor & Replace PRN  N: NPO, TFs  PPx: TLov  Code: DNR/DNI     Dispo: Pending clinical improvement, palliative consulted for GOC conversations     I have spent 30 minutes of critical care time (8:15-8:45AM) with > 50% of time spent in direct patient care, evaluating the patient at bedside, reviewing all labs and images, reviewing ABG and settings, reviewing pain and sedation gtt's, reviewing pressor requirement and increasing, triaging severe tachycardia and started amio gtt, communicating plan of care w/ nursing staff and consultant, utilizing high complexity medical decision making to assess and treat vital organ system failure in an individual who has impairment of one or more vital organ systems such that there is a high probability of imminent or life threatening deterioration in the patient’s condition. Failure to initiate the above interventions on an urgent basis would likely result in sudden, clinically significant or life threatening deterioration in the patient's condition.     VTE Prophylaxis:   Mechanical Order History:     None      Pharmalogical Order History:      Ordered     Dose Route Frequency Stop    09/09/21 0813  enoxaparin (LOVENOX) syringe 70 mg      1 mg/kg SC Every 12 Hours --    09/09/21 0730  Pharmacy to Dose enoxaparin (LOVENOX)      -- XX Continuous PRN --    09/07/21 0859  enoxaparin (LOVENOX) syringe 40 mg  Status:  Discontinued      40 mg SC Every 24 Hours 09/09/21 0730 09/03/21 0739  enoxaparin (LOVENOX) syringe 80 mg  Status:  Discontinued      1 mg/kg SC Every 12 Hours 09/07/21 0859 09/03/21 0724  Pharmacy to Dose enoxaparin (LOVENOX)  Status:  Discontinued      -- XX Continuous  PRN 09/03/21 0740    09/02/21 1237  enoxaparin (LOVENOX) syringe 40 mg  Status:  Discontinued      40 mg SC Every 24 Hours 09/02/21 1247    09/02/21 1247  enoxaparin (LOVENOX) syringe 40 mg  Status:  Discontinued      0.5 mg/kg SC Every 24 Hours 09/03/21 0549              Jose De Jesus Mccord MD  [facility] Hospital[facility]  09/18/21  10:52 EDT

## 2021-09-19 NOTE — NURSING NOTE
Wound consult for stage 2 pressure injury to RT and left gluteal and midline gluteal cleft. Wounds present with a fluid filled blisters from both moisture and pressure. New order for magic barrier cream Q shift and PRN

## 2021-09-19 NOTE — PROGRESS NOTES
[facility]     Patient ID:    Name:  Valdemar Solitario    MRN:  [id number]    1945   76 y.o.  male            Patient Care Team:  Laurence Geiger APRN as PCP - General (Family Medicine)  Corinne Dimas APRN as PCP - Family Medicine    CC/ Reason for visit: Acute respiratory failure, acute coronary pneumonia, shock, pulmonary pretension    Subjective:   MDR done at bed side over night events reviewed     ROS: Unable to obtain due intubation     Objective     Vital Signs past 24hrs    BP range: BP: ()/(35-71) 136/50  Pulse range: Heart Rate:  [48-96] 51  Resp rate range: Resp:  [22-28] 22  Temp range: Temp (24hrs), Av.8 °F (36.6 °C), Min:97.1 °F (36.2 °C), Max:98.6 °F (37 °C)      Ventilator/Non-Invasive Ventilation Settings (From admission, onward)     Start     Ordered    21 1636  Ventilator - AC/VC; (22); 50; 90%; 12; 450  Continuous     Comments: May titrate up to 70% FIO2 please notify Dr Webster if higher 02 amount is required.   Question Answer Comment   Vent Mode AC/VC    Breath rate  22   FiO2 50    FiO2 titrate for Sp02% =/> 90%    PEEP 12    Tidal Volume 450        21 1637    21 0810  Ventilator - AC/VC; (22); 50; 90%; 10; 450  Continuous,   Status:  Canceled     Question Answer Comment   Vent Mode AC/VC    Breath rate  22   FiO2 50    FiO2 titrate for Sp02% =/> 90%    PEEP 10    Tidal Volume 450        21 0809    21 1306  Ventilator - AC/VC; (18); 100; 90%; Other (see comments); 16; 450  Continuous,   Status:  Canceled     Question Answer Comment   Vent Mode AC/VC    Breath rate  18   FiO2 100    FiO2 titrate for Sp02% =/> 90%    PEEP Other (see comments)    PEEP: 16    Tidal Volume 450        21 1308    21 2228  NIPPV (CPAP or BIPAP)  Until Discontinued,   Status:  Canceled     Question Answer Comment   Indication: Acute Respiratory Failure    Type: BIPAP    NIPPV Mask Interface: Per Patient Preference    IPAP 15    EPAP  5    Oxygen FIO2    FIO2 % 32    Breath Rate 20    Titrate for SPO2 90%        09/02/21 2227 09/02/21 2223  NIPPV (CPAP or BIPAP)  Until Discontinued,   Status:  Canceled     Question Answer Comment   Type: BIPAP    NIPPV Mask Interface: Per Patient Preference        09/02/21 2223                Device (Oxygen Therapy): ventilator FiO2 (%): 100 %     75.8 kg (167 lb 3.2 oz); Body mass index is 27 kg/m².      Intake/Output Summary (Last 24 hours) at 9/19/2021 0849  Last data filed at 9/19/2021 0427  Gross per 24 hour   Intake 3251.34 ml   Output 1150 ml   Net 2101.34 ml       PHYSICAL EXAM   GENERAL APPEARANCE: Intubated and sedated.  Appears to be resting comfortably in bed.     HEAD: normocephalic.    EYES: PERRLA.    THROAT: ET tube in place. Oral cavity and pharynx normal. No inflammation, swelling, exudate, or lesions.     NECK: Neck supple.     CARDIAC: Normal S1 and S2. No S3, S4 or murmurs. Rhythm is regular.     LUNGS: Clear to auscultation and percussion without rales, rhonchi, wheezing or diminished breath sounds.    ABDOMEN: Positive bowel sounds. Soft, nondistended, nontender.     EXTREMITIES: No significant deformity or joint abnormality. No edema. Peripheral pulses intact.    NEUROLOGICAL: Unable to assess due to sedation status.     PSYCHIATRIC: Unable to assess due to sedation status  PPE recommended per [facility] [place], operated by [facility] infectious disease Isolation protocol for the current clinical scenario(as mentioned below) was followed.     Scheduled meds:  castor oil-balsam peru, 1 application, Topical, Q12H  chlorhexidine, 15 mL, Mouth/Throat, Q12H  dexamethasone, 6 mg, Intravenous, Daily  enoxaparin, 1 mg/kg, Subcutaneous, Q12H  famotidine, 20 mg, Intravenous, BID  insulin aspart, 0-14 Units, Subcutaneous, TID AC  insulin detemir, 25 Units, Subcutaneous, Nightly  midodrine, 10 mg, Oral, TID AC  sodium chloride, 10 mL, Intravenous, Q12H  sodium chloride, 10 mL, Intravenous, Q12H  sodium chloride, 10 mL,  Intravenous, Q12H  sodium chloride, 10 mL, Intravenous, Q12H  sodium chloride, 10 mL, Intravenous, Q12H        IV meds:                      dexmedetomidine, 0.2-1.5 mcg/kg/hr, Last Rate: Stopped (09/09/21 1414)  fentaNYL Citrate,   norepinephrine, 0.02-0.3 mcg/kg/min, Last Rate: Stopped (09/17/21 1526)  Pharmacy Consult - Pharmacy to dose,   Pharmacy to Dose enoxaparin (LOVENOX),   phenylephrine, 0.5-3 mcg/kg/min, Last Rate: 1.1 mcg/kg/min (09/19/21 0451)  propofol, 5-50 mcg/kg/min, Last Rate: 40 mcg/kg/min (09/19/21 0839)  vasopressin (PITRESSIN) 20 units in 100 mL infusion, 0.03 Units/min, Last Rate: 0.03 Units/min (09/19/21 0404)        Data Review:      Results from last 7 days   Lab Units 09/19/21  0139 09/19/21  0138 09/18/21  0143 09/17/21  0111 09/17/21  0109 09/16/21  0047 09/15/21  0628 09/15/21  0627   SODIUM mmol/L 135*  --  139 140  --    < >  --   --    POTASSIUM mmol/L 4.0  --  4.1 3.7  --    < >  --   --    CHLORIDE mmol/L 99  --  104 105  --    < >  --   --    CO2 mmol/L 26.2  --  28.3 29.6*  --    < >  --   --    BUN mg/dL 42*  --  42* 43*  --    < >  --   --    CREATININE mg/dL 0.55*  --  0.52* 0.54*  --    < >  --   --    CALCIUM mg/dL 8.0*  --  8.3* 8.5*  --    < >  --   --    BILIRUBIN mg/dL 0.3  --  0.3 0.4  --    < >  --   --    ALK PHOS U/L 71  --  75 56  --    < >  --   --    ALT (SGPT) U/L 68*  --  42* 37  --    < >  --   --    AST (SGOT) U/L 65*  --  54* 38  --    < >  --   --    GLUCOSE mg/dL 88  --  181* 158*  --    < >  --   --    WBC 10*3/mm3  --  20.13* 17.02* 14.26*  --    < >  --  15.44*   HEMOGLOBIN g/dL  --  9.8* 9.5* 9.4*  --    < >  --  9.9*   PLATELETS 10*3/mm3  --  221 201 226  --    < >  --  198   INR  1.03  --   --   --  1.07  --   --  1.13*   PROBNP pg/mL  --   --  1,762.0  --   --   --   --   --    PROCALCITONIN ng/mL  --   --   --   --   --   --  2.59*  --     < > = values in this interval not displayed.       Lab Results   Component Value Date    CALCIUM 8.0 (L)  09/19/2021       Results from last 7 days   Lab Units 09/15/21  1125 09/14/21  1351 09/14/21  1350 09/13/21  1526   BLOODCX   --  No growth at 4 days No growth at 4 days  --    RESPCX   --   --   --  No growth   URINECX  No growth  --   --   --        Results from last 7 days   Lab Units 09/19/21  0501 09/18/21  1750 09/18/21  1651 09/18/21  0501 09/17/21  0449 09/16/21  0453 09/15/21  0500   PH, ARTERIAL pH units 7.385 7.401 7.385 7.347* 7.394 7.451* 7.356   PO2 ART mm Hg 61.3* 53.1* 54.4* 72.8* 64.3* 66.7* 121.0*   PCO2, ARTERIAL mm Hg 52.3* 51.3* 53.8* 57.5* 44.6 42.3 52.2*   HCO3 ART mmol/L 31.3* 31.8* 32.1* 31.5* 27.2* 29.5* 29.2*      COVID LABS:  Results From Last 14 Days   Lab Units 09/19/21  0139 09/19/21  0138 09/18/21  0143 09/17/21  0112 09/17/21  0111 09/17/21  0109 09/16/21  0047 09/15/21  0629 09/15/21  0628 09/15/21  0627 09/15/21  0626 09/13/21  0732 09/10/21  0429 09/09/21  0914 09/09/21  0055 09/08/21  0233 09/07/21  0320 09/07/21  0320 09/06/21  0433 09/06/21  0433   PROBNP pg/mL  --   --  1,762.0  --   --   --   --   --   --   --   --   --   --   --   --   --   --   --   --   --    CRP mg/dL 13.04*  --  14.08*  --  5.85*  --    < >  --   --   --  19.45*  --    < >  --    < >  --   --  2.54*   < > 2.93*   D DIMER QUANT MCGFEU/mL 1.38*  --   --   --   --  0.78*  --   --   --  1.69*  --    < >   < >  --    < >  --   --  0.36  --   --    FERRITIN ng/mL  --   --   --   --   --   --   --   --   --   --   --   --   --   --   --   --   --  1,685.00*  --  2,397.00*   LACTATE mmol/L  --  1.7  --  1.5  --   --   --  2.0  --   --   --    < >   < >  --    < >  --   --  1.3  --   --    LDH U/L 349*  --   --   --  347*  --   --   --   --   --  496*  --    < >  --    < >  --   --  608*  --   --    PROCALCITONIN ng/mL  --   --   --   --   --   --   --   --  2.59*  --   --   --   --  0.45*  --  0.22   < > 0.11  --   --    PROTIME Seconds 14.0  --   --   --   --  14.3  --   --   --  15.0*  --    < >   < >  --    <  >  --   --  15.9*  --   --    INR  1.03  --   --   --   --  1.07  --   --   --  1.13*  --    < >   < >  --    < >  --   --  1.23*  --   --     < > = values in this interval not displayed.         Results Review:    I have reviewed the relevant laboratory results and independently reviewed the chest imaging from this hospitalization including the available echocardiogram reports personally and summarized it if/ when appropriate below    Assessment    Acute hypoxic respiratory failure  Acute COVID-19 pneumonia  ARDS  Shock - Septic vs cardiogenic  Atrial fibrillation/RVR on anticoagulation  Severe pulmonary hypertension with RV dysfunction  Anemia    PLAN:  Sedation - propofol fentanyl and precdex was held as was bradycardic   phenylephrine and vasopressin   Vent Settings          Resp Rate (Set): 22     FiO2 (%): 100 %  PEEP/CPAP (cm H2O): 15 cm H20    Minute Ventilation (L/min) (Obs): 10.8 L/min  Resp Rate (Observed) Vent: 26     I:E Ratio (Obs): 1:2.70    PIP Observed (cm H2O): 37 cm H2O   proning done     abg reviewed   Will cont same vent settings       Continue with steroids given high CRP still.   Wbc up   Will get cultures and start abx   ID on case    bp better will decrease phenyl phrine and vasopressin   Results for orders placed during the hospital encounter of 09/02/21    Adult Transthoracic Echo Complete W/ Cont if Necessary Per Protocol    Interpretation Summary  · Estimated left ventricular EF = 50% Left ventricular ejection fraction appears to be 46 - 50%. Left ventricular systolic function is normal.  · Left ventricular diastolic function was normal.  · The right ventricular cavity is mild to moderately dilated.  · The right atrial cavity is mild to moderately dilated.  · Estimated right ventricular systolic pressure from tricuspid regurgitation is moderately elevated (45-55 mmHg).  · Moderate to severe pulmonary hypertension is present.  · No pericardial effusion  · No prev echo    Levophed - made  afib worse    A. fib RVR managed by primary.  Switch to Marco-Synephrine. Patient already on anticoagulation.   Rest of the medical management per primary  Very poor prognosis     Recommend comfort measures       Very poor prognosis  DNR/DNI    DVT/GI prophylaxis  Patient's overall prognosis is poor due to severe COVID-19 pneumonia requiring very high ventilator settings cardiogenic shock and severe pulmonary hypertension.    D/w RN     CC - 32 mins    Apollo Carbajal MD  9/19/2021

## 2021-09-19 NOTE — PROGRESS NOTES
As of today, 9/19, critical care unit has become a closed unit serviced primarily by Pulmonary Critical Care team, care will be assumed by Dr. Carbajal today as new attending.

## 2021-09-19 NOTE — NURSING NOTE
Per nursing communication order for no bagging at family request, family was called after patient starting to drop into low 80's to upper 70's.   Spoke to Son Bennie who stated he disagreed to the no bagging and no one spoke to him. He was in agreement about No CPR and No intubation. Bennie [phone].  Called and spoke to Son Janessa (who is noted in Palliative care note on 9/17/21) who states that there was a misunderstanding. Janessa states they do want patient bagged if his oxygen level drops, but not want to continue if there is no improvement in oxygen level. He states they want to be contacted at that time, to evaluate plan of care. Janessa is also in agreement to maintain patient as a No CPR and No intubation.   Dr. Ashby's was contacted and order for no bagging was discontinued. Patient remains stable at 90% at this time.

## 2021-09-19 NOTE — PROGRESS NOTES
Patient Identification:  Name:  Valdemar Solitario  Age:  76 y.o.  Sex:  male  :  1945  MRN:  [id number]  Visit Number:  [id number]    Chief Complaint:   AFib    Subjective:    Pt remains intubated and sedated. HR is stable and now sinus bradycardia  No new issues over night.   ----------------------------------------------------------------------------------------------------------------------  Current Hospital Meds:  castor oil-balsam peru, 1 application, Topical, Q12H  chlorhexidine, 15 mL, Mouth/Throat, Q12H  dexamethasone, 6 mg, Intravenous, Daily  enoxaparin, 1 mg/kg, Subcutaneous, Q12H  famotidine, 20 mg, Intravenous, BID  insulin aspart, 0-14 Units, Subcutaneous, TID AC  insulin detemir, 25 Units, Subcutaneous, Nightly  midodrine, 10 mg, Oral, TID AC  sodium chloride, 10 mL, Intravenous, Q12H  sodium chloride, 10 mL, Intravenous, Q12H  sodium chloride, 10 mL, Intravenous, Q12H  sodium chloride, 10 mL, Intravenous, Q12H  sodium chloride, 10 mL, Intravenous, Q12H      dexmedetomidine, 0.2-1.5 mcg/kg/hr, Last Rate: Stopped (21 1414)  fentaNYL Citrate,   norepinephrine, 0.02-0.3 mcg/kg/min, Last Rate: Stopped (21 1526)  Pharmacy Consult - Pharmacy to dose,   Pharmacy to Dose enoxaparin (LOVENOX),   phenylephrine, 0.5-3 mcg/kg/min, Last Rate: 0.8 mcg/kg/min (21 0955)  propofol, 5-50 mcg/kg/min, Last Rate: 40 mcg/kg/min (21 1153)  vasopressin (PITRESSIN) 20 units in 100 mL infusion, 0.03 Units/min, Last Rate: 0.03 Units/min (21 0404)      ----------------------------------------------------------------------------------------------------------------------  Vital Signs:  Temp:  [97.1 °F (36.2 °C)-98.6 °F (37 °C)] 97.3 °F (36.3 °C)  Heart Rate:  [48-96] 60  Resp:  [22-28] 22  BP: ()/(35-71) 100/46  FiO2 (%):  [100 %] 100 %      21  0600 21  0305 21  0425   Weight: 68.6 kg (151 lb 4.8 oz) 75.6 kg (166 lb 11.2 oz) 75.8 kg (167 lb 3.2 oz)     Body mass index  is 27 kg/m².    Intake/Output Summary (Last 24 hours) at 9/19/2021 1246  Last data filed at 9/19/2021 1105  Gross per 24 hour   Intake 3601.34 ml   Output 1150 ml   Net 2451.34 ml     NPO Diet  ----------------------------------------------------------------------------------------------------------------------  Physical exam:  Constitutional:  Well-developed and well-nourished.  No respiratory distress.      HENT:  Head:  Normocephalic and atraumatic.  Mouth:  Moist mucous membranes.    Eyes:  Conjunctivae and EOM are normal.  Pupils are equal, round, and reactive to light.  No scleral icterus.    Neck:  Neck supple.  No JVD present.    Cardiovascular:  Normal rate, regular rhythm and normal heart sounds with no murmur.  Pulmonary/Chest:  No respiratory distress, no wheezes, no crackles, with normal breath sounds and good air movement.  Abdominal:  Soft.  Bowel sounds are normal.  No distension and no tenderness.   Musculoskeletal:  No edema, no tenderness, and no deformity.  No red or swollen joints anywhere.    Neurological:  Alert and oriented to person, place, and time.  No cranial nerve deficit.  No tongue deviation.  No facial droop.  No slurred speech.   Skin:  Skin is warm and dry. No rash noted. No pallor.   Peripheral vascular:  Strong pulses in all 4 extremities with no clubbing, no cyanosis, no edema.  ----------------------------------------------------------------------------------------------------------------------  Tele:  Sinus bradycardia with RBBB  ----------------------------------------------------------------------------------------------------------------------      Results from last 7 days   Lab Units 09/19/21  0139 09/19/21  0138 09/18/21  0143 09/17/21  0112 09/17/21  0111 09/17/21  0109 09/16/21  0047 09/15/21  0629 09/15/21  0627 09/14/21  0432 09/13/21  0732 09/13/21  0732   CRP mg/dL 13.04*  --  14.08*  --  5.85*  --    < >  --   --    < >  --  4.68*   LACTATE mmol/L  --  1.7  --  1.5   --   --   --  2.0  --   --    < > 1.9   WBC 10*3/mm3  --  20.13* 17.02*  --  14.26*  --    < >  --  15.44*   < >  --  11.51*   HEMOGLOBIN g/dL  --  9.8* 9.5*  --  9.4*  --    < >  --  9.9*   < >  --  10.6*   HEMATOCRIT %  --  31.1* 31.1*  --  30.9*  --    < >  --  34.0*   < >  --  35.5*   MCV fL  --  90.7 91.5  --  92.5  --    < >  --  95.5   < >  --  94.9   MCHC g/dL  --  31.5 30.5*  --  30.4*  --    < >  --  29.1*   < >  --  29.9*   PLATELETS 10*3/mm3  --  221 201  --  226  --    < >  --  198   < >  --  150   INR  1.03  --   --   --   --  1.07  --   --  1.13*  --   --  1.19*    < > = values in this interval not displayed.     Results from last 7 days   Lab Units 09/19/21  0501   PH, ARTERIAL pH units 7.385   PO2 ART mm Hg 61.3*   PCO2, ARTERIAL mm Hg 52.3*   HCO3 ART mmol/L 31.3*     Results from last 7 days   Lab Units 09/19/21  0139 09/18/21  0143 09/17/21  0111   SODIUM mmol/L 135* 139 140   POTASSIUM mmol/L 4.0 4.1 3.7   CHLORIDE mmol/L 99 104 105   CO2 mmol/L 26.2 28.3 29.6*   BUN mg/dL 42* 42* 43*   CREATININE mg/dL 0.55* 0.52* 0.54*   EGFR IF NONAFRICN AM mL/min/1.73 145 >150 148   CALCIUM mg/dL 8.0* 8.3* 8.5*   GLUCOSE mg/dL 88 181* 158*   ALBUMIN g/dL 1.92* 1.78* 1.91*   BILIRUBIN mg/dL 0.3 0.3 0.4   ALK PHOS U/L 71 75 56   AST (SGOT) U/L 65* 54* 38   ALT (SGPT) U/L 68* 42* 37   Estimated Creatinine Clearance: 84.2 mL/min (A) (by C-G formula based on SCr of 0.55 mg/dL (L)).    No results found for: AMMONIA      Blood Culture   Date Value Ref Range Status   09/14/2021 No growth at 4 days  Preliminary   09/14/2021 No growth at 4 days  Preliminary     Urine Culture   Date Value Ref Range Status   09/15/2021 No growth  Final     No results found for: WOUNDCX  No results found for: STOOLCX    I have personally looked at the labs and they are summarized above.  ----------------------------------------------------------------------------------------------------------------------  Imaging Results (Last 24 Hours)      ** No results found for the last 24 hours. **        ----------------------------------------------------------------------------------------------------------------------    Assessment:  A-Fib RVR is now converted to sinus bradycardia  Echo showing normal LV function  Acute COVID Pneumonia with severe respiratory failure and hypoxia      Plan:  Agree with jay-synephrine  Since his A-Fib is now converted I will sign off (he is DNR)      Smooth Espino MD  09/19/21  12:46 EDT        Director, Cardiac Cath Lab

## 2021-09-19 NOTE — PROGRESS NOTES
PROGRESS NOTE         Patient Identification:  Name:  Valdemar Solitario  Age:  76 y.o.  Sex:  male  :  1945  MRN:  [id number]  Visit Number:  [id number]  Primary Care Provider:  Laurence Geiger APRN         LOS: 17 days       ----------------------------------------------------------------------------------------------------------------------  Subjective       Chief Complaints:    Exposure To Known Illness, Altered Mental Status, and Weakness - Generalized        Interval History:      Patient remains intubated and sedated at 100% FiO2 today.  Afebrile.  WBC elevated at 20.13, likely related to steroid therapy.  CRP improving at 13.04.    ----------------------------------------------------------------------------------------------------------------------      Objective       Current Hospital Meds:  castor oil-balsam peru, 1 application, Topical, Q12H  cefepime, 2 g, Intravenous, Q8H  chlorhexidine, 15 mL, Mouth/Throat, Q12H  dexamethasone, 6 mg, Intravenous, Daily  enoxaparin, 1 mg/kg, Subcutaneous, Q12H  famotidine, 20 mg, Intravenous, BID  insulin aspart, 0-14 Units, Subcutaneous, TID AC  insulin detemir, 25 Units, Subcutaneous, Nightly  midodrine, 10 mg, Oral, TID AC  sodium chloride, 10 mL, Intravenous, Q12H  sodium chloride, 10 mL, Intravenous, Q12H  sodium chloride, 10 mL, Intravenous, Q12H  sodium chloride, 10 mL, Intravenous, Q12H  sodium chloride, 10 mL, Intravenous, Q12H      dexmedetomidine, 0.2-1.5 mcg/kg/hr, Last Rate: Stopped (21 1414)  fentaNYL Citrate,   norepinephrine, 0.02-0.3 mcg/kg/min, Last Rate: Stopped (21 1526)  Pharmacy Consult - Pharmacy to dose,   Pharmacy to Dose enoxaparin (LOVENOX),   phenylephrine, 0.5-3 mcg/kg/min, Last Rate: 0.8 mcg/kg/min (21 0955)  propofol, 5-50 mcg/kg/min, Last Rate: 40 mcg/kg/min (21 0839)  vasopressin (PITRESSIN) 20 units in 100 mL infusion, 0.03 Units/min, Last Rate: 0.03 Units/min (21  0404)      ----------------------------------------------------------------------------------------------------------------------    Vital Signs:  Temp:  [97.1 °F (36.2 °C)-98.6 °F (37 °C)] 97.1 °F (36.2 °C)  Heart Rate:  [48-96] 52  Resp:  [22-28] 22  BP: ()/(35-71) 120/50  FiO2 (%):  [100 %] 100 %  Mean Arterial Pressure (Non-Invasive) for the past 24 hrs (Last 3 readings):   Noninvasive MAP (mmHg)   09/19/21 1100 74   09/19/21 1003 87   09/19/21 0948 99     SpO2 Percentage    09/19/21 1003 09/19/21 1010 09/19/21 1100   SpO2: (!) 87% (!) 86% 90%     SpO2:  [85 %-97 %] 90 %  on   ;   Device (Oxygen Therapy): ventilator    Body mass index is 27 kg/m².  Wt Readings from Last 3 Encounters:   09/19/21 75.8 kg (167 lb 3.2 oz)   06/14/17 73.9 kg (163 lb)   03/23/17 76.7 kg (169 lb)        Intake/Output Summary (Last 24 hours) at 9/19/2021 1152  Last data filed at 9/19/2021 1105  Gross per 24 hour   Intake 3601.34 ml   Output 1150 ml   Net 2451.34 ml     NPO Diet  ----------------------------------------------------------------------------------------------------------------------      Physical Exam:    Deferred due to COVID-19 isolation.  ----------------------------------------------------------------------------------------------------------------------      Results from last 7 days   Lab Units 09/18/21  0143   PROBNP pg/mL 1,762.0       Results from last 7 days   Lab Units 09/19/21  0501   PH, ARTERIAL pH units 7.385   PO2 ART mm Hg 61.3*   PCO2, ARTERIAL mm Hg 52.3*   HCO3 ART mmol/L 31.3*     Results from last 7 days   Lab Units 09/19/21  0139 09/19/21  0138 09/18/21  0143 09/17/21  0112 09/17/21  0111 09/17/21  0109 09/16/21  0047 09/15/21  0629 09/15/21  0627 09/14/21  0432 09/13/21  0732 09/13/21  0732   CRP mg/dL 13.04*  --  14.08*  --  5.85*  --    < >  --   --    < >  --  4.68*   LACTATE mmol/L  --  1.7  --  1.5  --   --   --  2.0  --   --    < > 1.9   WBC 10*3/mm3  --  20.13* 17.02*  --  14.26*  --    <  >  --  15.44*   < >  --  11.51*   HEMOGLOBIN g/dL  --  9.8* 9.5*  --  9.4*  --    < >  --  9.9*   < >  --  10.6*   HEMATOCRIT %  --  31.1* 31.1*  --  30.9*  --    < >  --  34.0*   < >  --  35.5*   MCV fL  --  90.7 91.5  --  92.5  --    < >  --  95.5   < >  --  94.9   MCHC g/dL  --  31.5 30.5*  --  30.4*  --    < >  --  29.1*   < >  --  29.9*   PLATELETS 10*3/mm3  --  221 201  --  226  --    < >  --  198   < >  --  150   INR  1.03  --   --   --   --  1.07  --   --  1.13*  --   --  1.19*    < > = values in this interval not displayed.     Results from last 7 days   Lab Units 09/19/21  0139 09/18/21  0143 09/17/21  0111   SODIUM mmol/L 135* 139 140   POTASSIUM mmol/L 4.0 4.1 3.7   CHLORIDE mmol/L 99 104 105   CO2 mmol/L 26.2 28.3 29.6*   BUN mg/dL 42* 42* 43*   CREATININE mg/dL 0.55* 0.52* 0.54*   EGFR IF NONAFRICN AM mL/min/1.73 145 >150 148   CALCIUM mg/dL 8.0* 8.3* 8.5*   GLUCOSE mg/dL 88 181* 158*   ALBUMIN g/dL 1.92* 1.78* 1.91*   BILIRUBIN mg/dL 0.3 0.3 0.4   ALK PHOS U/L 71 75 56   AST (SGOT) U/L 65* 54* 38   ALT (SGPT) U/L 68* 42* 37   Estimated Creatinine Clearance: 84.2 mL/min (A) (by C-G formula based on SCr of 0.55 mg/dL (L)).  No results found for: AMMONIA    Glucose   Date/Time Value Ref Range Status   09/19/2021 1108 134 (H) 70 - 130 mg/dL Final     Comment:     Meter: [id number] : [id number] CHARANJIT MARTINEZDLE   09/19/2021 0409 103 70 - 130 mg/dL Final     Comment:     Meter: [id number] : [id number] Adam Abrahamsa   09/18/2021 2018 183 (H) 70 - 130 mg/dL Final     Comment:     Meter: [id number] : [id number] Jacksai Hue   09/18/2021 1616 211 (H) 70 - 130 mg/dL Final     Comment:     Meter: [id number] : [id number] Sophono   09/18/2021 1121 171 (H) 70 - 130 mg/dL Final     Comment:     Meter: [id number] : [id number] Sophono   09/18/2021 0550 115 70 - 130 mg/dL Final     Comment:     Meter: [id number] : [id number] Adam Abrahamsa   09/17/2021 2003 147 (H) 70 - 130 mg/dL Final      Comment:     Meter: [id number] : [id number] Adam Swann   09/17/2021 1801 144 (H) 70 - 130 mg/dL Final     Comment:     Meter: [id number] : [id number] Edvin CURRIE     Lab Results   Component Value Date    HGBA1C 6.30 (H) 09/02/2021     No results found for: TSH, FREET4    Blood Culture   Date Value Ref Range Status   09/02/2021 No growth at 24 hours  Preliminary   09/02/2021 No growth at 24 hours  Preliminary   09/02/2021 No growth at 24 hours  Preliminary     No results found for: URINECX  No results found for: WOUNDCX  No results found for: STOOLCX  No results found for: RESPCX  Pain Management Panel    There is no flowsheet data to display.           ----------------------------------------------------------------------------------------------------------------------  Imaging Results (Last 24 Hours)       ** No results found for the last 24 hours. **            ----------------------------------------------------------------------------------------------------------------------    Assessment/Plan       Assessment/Plan     ASSESSMENT:    1.  Severe sepsis with lactic acid greater than 2 on admission  2.  COVID-19 pneumonia    PLAN:    Patient remains intubated and sedated at 100% FiO2 today.  Afebrile.  WBC elevated at 20.13, likely related to steroid therapy.  CRP improving at 13.04.    Chest x-ray from 9/18/2021 reports stable bilateral airspace disease.    Urinalysis from 9/15/2021 reports 6-12 WBCs, trace bacteria, 1+ yeast, urine culture reports no growth.  Blood cultures from 9/14/2021 report no growth at 24 hours.      VQ scan on 9/2/2021 indeterminate for exclusion of PE.  CT of the chest on 9/3/2021 reports patchy bilateral airspace disease concerning for COVID-19 pneumonia.  CT of the abdomen and pelvis on 9/3/2021 reports bibasilar airspace disease suggestive of COVID-19 pneumonia, large right inguinal hernia containing nondilated bowel and fat.     Patient remains on Decadron but has  completed course of remdesivir.    We are concerned about possible development of bacterial pneumonia in the setting of prior treatment with baricitinib.     Patient completed extended course of Merrem and vancomycin.  For now we will monitor closely off of antibiotic coverage.        Code Status:   Code Status and Medical Interventions:   Ordered at: 09/10/21 1446     Limited Support to NOT Include:    Intubation    Cardioversion/Defibrillation     Code Status:    No CPR     Medical Interventions (Level of Support Prior to Arrest):    Limited     Comments:    adult children Tiffany, Cathleen and John all agree he would not want to be resuciated or reintubated in the event he became extubated.     Patient remains in isolation for COVID-19 and is currently on the ventilator. As patient is unable to provide a history or review of systems, overnight findings noted. Vital signs, pertinent laboratory values and radiological studies were reviewed.         NELSON Medina  09/19/21  11:52 EDT

## 2021-09-20 NOTE — CASE MANAGEMENT/SOCIAL WORK
Discharge Planning Assessment  AMIE Knox     Patient Name: Valdemar Solitario  MRN: [id number]  Today's Date: 9/20/2021    Admit Date: 9/2/2021    Discharge Plan     Row Name 09/20/21 1020       Plan    Plan Pt admitted 9/2/21. Pt remains intubated and sedated. Palliative following for GOC. Pt in covid isolation until 9/23/21. Pt lives alone and does not utilize home health services or DME at this time. Discharge plan pending clinical improvement. SS following.        SHALONDA Lewis

## 2021-09-20 NOTE — PROGRESS NOTES
THC Physician - Brief Progress Note  PERMANENT  09/19/2021 20:55    [facility] [place] [facility] - [facility] - [place], [place] ([facility])    CALDERON LECHUGA    Date of Service 09/19/2021 20:55    HPI/Events of Note Bedside team eLert due to  patient’s hypoxemia. After reviewing patient’s chart, pt is covid 19 on high vent setting PEEP of 15 at this time   , plan as following   Have RT to perform ambuga recruitment maneuver   Obtain ABG   Obtain CXR to ensure proper position of ETT and look for other cardiopulmonary causes to explain pt’s hypoxia   consider increase PEEP if no PTX   lavage and Suctioning patient ‘s airway   Consider paralytic gtt / proning pt  very critical condtion ,      Interventions Minor-Clinical assessment - ordering diagnostic tests, Communication with other healthcare providers and/or family        Electronically Signed by: Andre Bailey) on 09/19/2021 20:59

## 2021-09-20 NOTE — PROGRESS NOTES
Clayton Kennedy MD                          [phone]      Patient ID:    Name:  Valdemar Solitario    MRN:  [id number]    1945   76 y.o.  male            Patient Care Team:  Laurence Geiger APRN as PCP - General (Family Medicine)  Corinne Dimas APRN as PCP - Family Medicine    CC/ Reason for visit: Acute respiratory failure, Covid pneumonia, shock, pulmonary pretension    Subjective:   Reviewed prior records.    Patient is currently on mechanical ventilation with AC VC, RR 22, , FiO2 100% and PEEP 15.  Noted ABG done earlier today: 7.45/44/60.  he had desaturation down to the 70% and requiring about backing.  He had similar events previously.  He was suctioned but no significant secretions were obtained.    He remains on intravenous pressors and he is sedated with propofol 35 mics/KG/minute.  No sedation holiday performed.  He does not follow any commands and has no spontaneous movements.    ROS: Unable to obtain due to mechanical ventilation    Objective     Vital Signs past 24hrs    BP range: BP: ()/(44-80) 107/57  Pulse range: Heart Rate:  [44-62] 56  Resp rate range: Resp:  [22] 22  Temp range: Temp (24hrs), Av.3 °F (36.3 °C), Min:96 °F (35.6 °C), Max:98.2 °F (36.8 °C)      Ventilator/Non-Invasive Ventilation Settings (From admission, onward)     Start     Ordered    21 1636  Ventilator - AC/VC; (22); 50; 90%; 12; 450  Continuous     Comments: May titrate up to 70% FIO2 please notify Dr Webster if higher 02 amount is required.   Question Answer Comment   Vent Mode AC/VC    Breath rate  22   FiO2 50    FiO2 titrate for Sp02% =/> 90%    PEEP 12    Tidal Volume 450        21 1637    21 0810  Ventilator - AC/VC; (22); 50; 90%; 10; 450  Continuous,   Status:  Canceled     Question Answer Comment   Vent Mode AC/VC    Breath rate  22   FiO2 50    FiO2 titrate for Sp02% =/> 90%    PEEP 10    Tidal Volume 450        21 0809    21 1306   Ventilator - AC/VC; (18); 100; 90%; Other (see comments); 16; 450  Continuous,   Status:  Canceled     Question Answer Comment   Vent Mode AC/VC    Breath rate  18   FiO2 100    FiO2 titrate for Sp02% =/> 90%    PEEP Other (see comments)    PEEP: 16    Tidal Volume 450        09/05/21 1308    09/02/21 2228  NIPPV (CPAP or BIPAP)  Until Discontinued,   Status:  Canceled     Question Answer Comment   Indication: Acute Respiratory Failure    Type: BIPAP    NIPPV Mask Interface: Per Patient Preference    IPAP 15    EPAP 5    Oxygen FIO2    FIO2 % 32    Breath Rate 20    Titrate for SPO2 90%        09/02/21 2227    09/02/21 2223  NIPPV (CPAP or BIPAP)  Until Discontinued,   Status:  Canceled     Question Answer Comment   Type: BIPAP    NIPPV Mask Interface: Per Patient Preference        09/02/21 2223                Device (Oxygen Therapy): ventilator FiO2 (%): 100 %     77.2 kg (170 lb 3.2 oz); Body mass index is 27.48 kg/m².      Intake/Output Summary (Last 24 hours) at 9/20/2021 0847  Last data filed at 9/20/2021 0453  Gross per 24 hour   Intake 3486.17 ml   Output 1220 ml   Net 2266.17 ml     BP used per hospital policy    PHYSICAL EXAM   Constitutional: Elderly white male sedated on the mechanical ventilator  Head: - NCAT  Eyes: No pallor.  Anicteric sclerae, EOMI.  ENMT:   Endotracheal tube noted, no oral thrush. Moist lips.   NECK: Trachea midline, No thyromegaly, no palpable cervical lymphadenopathy.  No palpable subcutaneous emphysema  Heart: RRR, no murmur.  Grade 2 pitting pedal edema  and edema in the lower legs  Lungs: Decreased air entry throughout with no audible adventitious sounds.  Nonlabored breathing on mechanical ventilation.  Abdomen: Soft. No tenderness, guarding or rigidity. No palpable masses  Extremities: Cold extremities with reduced perfusion in the feet.  Neuro: Sedated.  Does not react to painful stimuli.  Psych: Mood and affect - UTO         Scheduled meds:  castor oil-balsam peru, 1  application, Topical, Q12H  chlorhexidine, 15 mL, Mouth/Throat, Q12H  dexamethasone, 6 mg, Intravenous, Daily  enoxaparin, 1 mg/kg, Subcutaneous, Q12H  famotidine, 20 mg, Intravenous, BID  insulin aspart, 0-14 Units, Subcutaneous, TID AC  insulin detemir, 25 Units, Subcutaneous, Nightly  midodrine, 10 mg, Oral, TID AC  sodium chloride, 10 mL, Intravenous, Q12H  sodium chloride, 10 mL, Intravenous, Q12H  sodium chloride, 10 mL, Intravenous, Q12H  sodium chloride, 10 mL, Intravenous, Q12H  sodium chloride, 10 mL, Intravenous, Q12H        IV meds:                      dexmedetomidine, 0.2-1.5 mcg/kg/hr, Last Rate: Stopped (09/09/21 1414)  fentaNYL Citrate,   norepinephrine, 0.02-0.3 mcg/kg/min, Last Rate: Stopped (09/17/21 1526)  Pharmacy Consult - Pharmacy to dose,   Pharmacy to Dose enoxaparin (LOVENOX),   phenylephrine, 0.5-3 mcg/kg/min, Last Rate: 0.3 mcg/kg/min (09/20/21 0554)  propofol, 5-50 mcg/kg/min, Last Rate: 35 mcg/kg/min (09/20/21 0519)  vasopressin (PITRESSIN) 20 units in 100 mL infusion, 0.03 Units/min, Last Rate: 0.03 Units/min (09/20/21 0253)        Data Review:      Results from last 7 days   Lab Units 09/20/21  0204 09/19/21  0139 09/19/21  0138 09/18/21  0143 09/17/21  0111 09/17/21  0109 09/16/21  0047 09/15/21  0628 09/15/21  0627   SODIUM mmol/L 133* 135*  --  139   < >  --    < >  --   --    POTASSIUM mmol/L 3.9 4.0  --  4.1   < >  --    < >  --   --    CHLORIDE mmol/L 97* 99  --  104   < >  --    < >  --   --    CO2 mmol/L 27.6 26.2  --  28.3   < >  --    < >  --   --    BUN mg/dL 43* 42*  --  42*   < >  --    < >  --   --    CREATININE mg/dL 0.39* 0.55*  --  0.52*   < >  --    < >  --   --    CALCIUM mg/dL 8.1* 8.0*  --  8.3*   < >  --    < >  --   --    BILIRUBIN mg/dL 0.4 0.3  --  0.3   < >  --    < >  --   --    ALK PHOS U/L 67 71  --  75   < >  --    < >  --   --    ALT (SGPT) U/L 47* 68*  --  42*   < >  --    < >  --   --    AST (SGOT) U/L 35 65*  --  54*   < >  --    < >  --   --     GLUCOSE mg/dL 135* 88  --  181*   < >  --    < >  --   --    WBC 10*3/mm3 14.73*  --  20.13* 17.02*   < >  --    < >  --  15.44*   HEMOGLOBIN g/dL 8.3*  --  9.8* 9.5*   < >  --    < >  --  9.9*   PLATELETS 10*3/mm3 118*  --  221 201   < >  --    < >  --  198   INR   --  1.03  --   --   --  1.07  --   --  1.13*   PROBNP pg/mL  --   --   --  1,762.0  --   --   --   --   --    PROCALCITONIN ng/mL  --   --   --   --   --   --   --  2.59*  --     < > = values in this interval not displayed.       Lab Results   Component Value Date    CALCIUM 8.1 (L) 09/20/2021       Results from last 7 days   Lab Units 09/15/21  1125 09/14/21  1351 09/14/21  1350 09/13/21  1526   BLOODCX   --  No growth at 5 days No growth at 5 days  --    RESPCX   --   --   --  No growth   URINECX  No growth  --   --   --        Results from last 7 days   Lab Units 09/20/21  0452 09/19/21  2101 09/19/21  0501 09/18/21  1750 09/18/21  1651 09/18/21  0501 09/17/21  0449   PH, ARTERIAL pH units 7.451* 7.451* 7.385 7.401 7.385 7.347* 7.394   PO2 ART mm Hg 59.9* 40.7* 61.3* 53.1* 54.4* 72.8* 64.3*   PCO2, ARTERIAL mm Hg 43.9 44.5 52.3* 51.3* 53.8* 57.5* 44.6   HCO3 ART mmol/L 30.6* 31.0* 31.3* 31.8* 32.1* 31.5* 27.2*      COVID LABS:  Results From Last 14 Days   Lab Units 09/20/21  0204 09/19/21  0139 09/19/21  0138 09/18/21  0143 09/17/21  0112 09/17/21  0111 09/17/21  0111 09/17/21  0109 09/16/21  0047 09/15/21  0629 09/15/21  0628 09/15/21  0627 09/15/21  0626 09/13/21  0732 09/10/21  0429 09/09/21  0914 09/09/21  0055 09/08/21  0233 09/07/21  0320 09/07/21  0320   PROBNP pg/mL  --   --   --  1,762.0  --   --   --   --   --   --   --   --   --   --   --   --   --   --   --   --    CRP mg/dL 16.41* 13.04*  --  14.08*  --    < > 5.85*  --    < >  --   --   --  19.45*  --    < >  --    < >  --   --  2.54*   D DIMER QUANT MCGFEU/mL  --  1.38*  --   --   --   --   --  0.78*  --   --   --  1.69*  --    < >   < >  --    < >  --   --  0.36   FERRITIN ng/mL  --    --   --   --   --   --   --   --   --   --   --   --   --   --   --   --   --   --   --  1,685.00*   LACTATE mmol/L  --   --  1.7  --  1.5  --   --   --   --  2.0  --   --   --    < >   < >  --    < >  --   --  1.3   LDH U/L  --  349*  --   --   --   --  347*  --   --   --   --   --  496*  --    < >  --    < >  --   --  608*   PROCALCITONIN ng/mL  --   --   --   --   --   --   --   --   --   --  2.59*  --   --   --   --  0.45*  --  0.22   < > 0.11   PROTIME Seconds  --  14.0  --   --   --   --   --  14.3  --   --   --  15.0*  --    < >   < >  --    < >  --   --  15.9*   INR   --  1.03  --   --   --   --   --  1.07  --   --   --  1.13*  --    < >   < >  --    < >  --   --  1.23*    < > = values in this interval not displayed.         Diagnostic imaging:  I personally and Independently reviewed and interpreted the following images:  CXR 9/19/2021 and CT chest 9/4/2021: Diffuse bilateral interstitial pulmonary infiltrates, mostly groundglass on the CT and predominantly in the lower lobes.  Bilateral essential infiltrates mostly in the lower lobes on the CXR.      Assessment    1. Acute hypoxic respiratory failure, hospitalized 9/2, symptomatic few days prior  2. Bilateral COVID-19 pneumonia  3. Severe ARDS, PF ratio 60  4. Shock: cardiac and 2ary to sedation and PPV  5. Atrial fibrillation/RVR on anticoagulation  6. Severe pulmonary hypertension with RV dysfunction    Pertinent medical problems:  · Anemia  · Mild hyponatremia  · Elevated transaminases, secondary to viral illness/hepatic congestion from pulmonary hypertension  · Protein calorie malnutrition  · Mild leukocytosis, likely steroid-induced  · DM type II with worsening hyperglycemia due to steroid use      PLAN:  Bagging performed with Ambu bag and a PEEP valve 20.  SPO2 went up to 75% at best.  We then learned that family did not want him to be bagged and therefore he was connected back to the ventilator.  I made some adjustments.  Plateau pressure was  noted to be around 38 on PEEP 15.  Therefore I lowered the PEEP to 10.  SPO2 remained at 75%.  Plateau was 28.  I readjusted the PEEP to 12.  He could be experiencing more harm from increased peak pressure with resulting hemodynamic compromise and reduction in venous return and cardiac output.  He may require lower PEEP.  We will see how he does on that.    Continue dexamethasone 6 mg daily.  Unclear how much benefit provided with steroid at this stage of ARDS (likely progressing into fibrotic stage now).    Lovenox therapeutic dose for A. fib and prophylaxis.    Lantus and insulin sliding scale    Tube feeding    Sedation with propofol currently at 35 mics/KG/minute.    Pressors with vasopressin and Marco-Synephrine to keep MAP >65    Discussed with nursing staff and palliative care nurse.  Prognosis is extremely poor.  Patient will unlikely survive this event and therefore comfort measures are recommended.  We will allow family to visit and discuss the care with him further    Clayton Kennedy MD  9/20/2021     CC time 39 minutes

## 2021-09-20 NOTE — NURSING NOTE
Family has decided to make pt comfort measures. I called [facility] and talked to Mirela White to verify that the patient was not a candidate for organ donation and to call with time of death.

## 2021-09-20 NOTE — PAYOR COMM NOTE
"CONTACT:  SEBASTIEN RICHARDS MSN, APRN  UTILIZATION MANAGEMENT DEPT.  [facility]  [address] [facility] [place], [address]  PHONE:  [phone]  FAX: [phone]    CLINICAL UPDATE    REFERENCE # [id number]    WE STILL HAVE NOT RECEIVED ANY NOTIFICATION IF THIS CASE HAS BEEN APPROVED YET OR NOT, PLEASE FAX AUTHORIZATION DETERMINATION [phone] OR CALL [phone].    Valdemar Bear (76 y.o. Male)     Date of Birth Social Security Number Address Home Phone MRN    1945  [address]   [facility] [address] [phone] [id number]    Jewish Marital Status          Tenriism        Admission Date Admission Type Admitting Provider Attending Provider Department, Room/Bed    9/2/21 Emergency Jose De Jesus Mccord MD Jambeih, Rami, MD [facility] CRITICAL CARE, CC03/1C    Discharge Date Discharge Disposition Discharge Destination                       Attending Provider: Clayton Kennedy MD    Allergies: Codeine    Isolation: Enh Drop/Con   Infection: COVID (confirmed) (09/02/21)   Code Status: No CPR    Ht: 167.6 cm (65.98\")   Wt: 77.2 kg (170 lb 3.2 oz)    Admission Cmt: None   Principal Problem: None                Active Insurance as of 9/2/2021     Primary Coverage     Payor Plan Insurance Group Employer/Plan Group    [id number]     Payor Plan Address Payor Plan Phone Number Payor Plan Fax Number Effective Dates    [address] [address] [phone]  1/1/2020 - None Entered    [facility] [address]       Subscriber Name Subscriber Birth Date Member ID       VALDEMAR BEAR [id number]                 Emergency Contacts      (Rel.) Home Phone Work Phone Mobile Phone    Josef Bear (Brother) [phone] -- [phone]    Janessa bear (Son) -- -- [phone]    Kathleen Winn (Sister) -- -- [phone]    ALEXANDREALISA (Daughter) -- -- [phone]    [facility] (Daughter) -- -- [phone]            Intake & Output       09/15 0701 - 09/16 0700 " 09/16 0701 - 09/17 0700 09/17 0701 - 09/18 0700 09/18 0701 - 09/19 0700 09/19 0701 - 09/20 0700 09/20 0701 - 09/21 0700    I.V. (mL/kg) 760.4 (11.1) 540.1 (7.9) 1631.1 (21.6) 931.3 (12.3) 1007.2 (13)     Other 1601 1752 1386 1634 1411     NG/ 786 802 686 718     IV Piggyback 409.7 800 200  350     Total Intake(mL/kg) 3121.1 (45.5) 3878.1 (56.5) 4019.1 (53.2) 3251.3 (42.9) 3486.2 (45.2)     Urine (mL/kg/hr) 1775 (1.1) 2600 (1.6) 1320 (0.7) 1150 (0.6) 1220 (0.7)     Emesis/NG output   0 0 0     Stool 0 0 0 0 0     Total Output 1775 2600 1320 1150 1220     Net +1346.1 +1278.1 +2699.1 +2101.3 +2266.2               Stool Unmeasured Occurrence     1 x             Medication Administration Report for Valdemar Solitario as of 09/20/21 0954   Medications 09/14/21 09/15/21 09/16/21 09/17/21 09/18/21 09/19/21 09/20/21    castor oil-balsam peru (VENELEX) ointment 1 application  Dose: 1 application  Freq: Every 12 Hours Scheduled Route: TOP  Start: 09/12/21 2000    Admin Instructions:   Apply to DTI to prepuce of the penis     0933 2033 0824 1955               0833       2050        1044       2001        0843       2014 0802 2020 0828 2100           chlorhexidine (PERIDEX) 0.12 % solution 15 mL  Dose: 15 mL  Freq: Every 12 Hours Scheduled Route: MT  Start: 09/05/21 1400    Admin Instructions:    Do not swallow.     0933 2033 0849       1955               0833       2053        1031       2001        0844       2014 0804 2020 0828 2100           dexamethasone (DECADRON) injection 6 mg  Dose: 6 mg  Freq: Daily Route: IV  Start: 09/14/21 0900    Admin Instructions:   For IV administration. May be pushed over a minimum of 1 minute.     0935        0805        0819        1044        0848        0801        0827           enoxaparin (LOVENOX) syringe 70 mg  Dose: 1 mg/kg  Weight Dosing Info: 67.9 kg  Freq: Every 12 Hours Route: SC  Indications  of Use: ATRIAL FIBRILLATION  Start: 09/09/21 1000    Admin Instructions:   Give subcutaneous in abdomen only. Do not massage site after injection.     1422       2245        0842       (0955) [C]       2151        1056       2101        1045       2321        1121       2216        1106       2021       (2200) [C]        0927 2200           famotidine (PEPCID) injection 20 mg  Dose: 20 mg  Freq: 2 Times Daily Route: IV  Start: 09/05/21 2100    Admin Instructions:   Dilute to 10 mL total volume and give IV push over 2 minutes.     0933 2033        0806 1954 0819 2049 1045 2003 0843 2014 0801 2021 0828 2100           insulin aspart (novoLOG) injection 0-14 Units  Dose: 0-14 Units  Freq: 3 Times Daily Before Meals Route: SC  Start: 09/10/21 1130    Admin Instructions:   Correction - Moderate-High Dose.  60-80 units/day total insulin dose or uses insulin at home    Blood glucose 150-199 mg/dL - 3 units  Blood glucose 200-249 mg/dL - 5 units  Blood glucose 250-299 mg/dL - 8 units  Blood glucose 300-349 mg/dL - 10 units  Blood glucose 350-400 mg/dL - 12 units  Blood glucose greater than 400 mg/dL - 14 units and call provider       (1081)       1237       1822        (0809)       (1122)       1737        0655       1059       1706        (0704)       1046       (1802)               1121       1751               1109)       1707               1130       1730           insulin detemir (LEVEMIR) injection 25 Units  Dose: 25 Units  Freq: Nightly Route: SC  Start: 09/11/21 2100    Admin Instructions:        2031 2019 2052 2004 2018 2020 2100           midodrine (PROAMATINE) tablet 10 mg  Dose: 10 mg  Freq: 3 Times Daily Before Meals Route: PO  Start: 09/13/21 1730    0933       1218       1822        0807       1112       1723        0655       1053       1707        0630              1207        1816        0552              1122       1752        0527              1104       1756        0447              1130       1730           sodium chloride 0.9 % flush 10 mL  Dose: 10 mL  Freq: Every 12 Hours Scheduled Route: IV  Start: 09/06/21 0015    Admin Instructions:   Lumen #3     0934       2033        0807       2018 0834       2049        1046       2004        0844       2014 0802 2021        0827 2100           sodium chloride 0.9 % flush 10 mL  Dose: 10 mL  Freq: Every 12 Hours Scheduled Route: IV  Start: 09/06/21 0015    Admin Instructions:   Lumen #2     0934       2033        0808       2019 0834       2049 1046       2004 0844       2014 0802 2021        0828 2100           sodium chloride 0.9 % flush 10 mL  Dose: 10 mL  Freq: Every 12 Hours Scheduled Route: IV  Start: 09/06/21 0015    Admin Instructions:   Lumen #1     0934       2033        0808       2019 0833       2049 2003 0844       2014 0803 2021        0828 2100           sodium chloride 0.9 % flush 10 mL  Dose: 10 mL  Freq: Every 12 Hours Scheduled Route: IV  Start: 09/03/21 2100    0934       2033        0810       2019 0834       2050        (1047)       2113 2015 0803 2022        0828       2100           sodium chloride 0.9 % flush 10 mL  Dose: 10 mL  Freq: Every 12 Hours Scheduled Route: IV  Start: 09/03/21 0900 0934       2034        0810       2019 0834       2050        1047       2113 2015 0803 2022        0828       2100                 and   Medication Administration Report for Valdemar Solitario as of 09/20/21 0954   Medications 09/14/21 09/15/21 09/16/21 09/17/21 09/18/21 09/19/21 09/20/21    fentaNYL (SUBLIMAZE) PCA 1500 mcg/30 mL syringe  Nurse Loading Dose: 0 mcg  Patient Bolus Dose: 0 mcg  Lockout Interval: 10 Minutes  Basal Rate: 50  mcg/hr  One Hour Dose Limit: 300 mcg  Freq: Titrated Route: IV  Start: 09/18/21 1745   End: 09/21/21 1647    Admin Instructions:   Begin infusion at 50 mcg/hr. Titrate up or down by 50 mcg/hr every 5 minutes for NRS 7-10 or CPOT 5-8. Max. dose of 300 mcg/hr.    Caution: Look alike/sound alike drug alert    If given for pain, use the following pain scale:  Mild Pain = Pain Score of 1-3, CPOT 1-2  Moderate Pain = Pain Score of 4-6, CPOT 3-4  Severe Pain = Pain Score of 7-10, CPOT 5-8         1648       1918 [C]        0130       0800       1407       2020       2045 2050        0630           norepinephrine (LEVOPHED) 8 mg in 250 mL NS infusion (premix)  Rate: 2.55-38.19 mL/hr Dose: 0.02-0.3 mcg/kg/min  Weight Dosing Info: 67.9 kg  Freq: Titrated Route: IV  Start: 09/09/21 1745    Admin Instructions:   Initiate infusion at 0.02 mcg/kg/min and titrate up or down by 0.02 mcg/kg/min every 5 minutes to use the lowest dose possible to maintain a MAP greater than or equal To 65 mm Hg. Maximum Dose = 0.3 mcg/kg/min. Contact provider if unable to maintain MAP target at the maximum dose or if MAP is greater than 95 mm Hg. Once MAP target achieved, obtain vitals a minimum of every 30 minutes.  With provider order may titrate to maximum dose of 0.5 mcg/kg/min.  Concentration 8 mg/250 mL Central line preferred, if unavailable use large bore IV access with frequent nurse monitoring of IV site.      2242        0409       0415       0831       0838       0845       1406       2055       2124        0615       0650       0655       0700       0829       1059       1104       1130       1205       1400       1445       1500       1515       1526              phenylephrine (SIMONE-SYNEPHRINE) 50 mg in 250 mL NS infusion  Rate: 10.29-61.74 mL/hr Dose: 0.5-3 mcg/kg/min  Weight Dosing Info: 68.6 kg  Freq: Titrated Route: IV  Start: 09/08/21 2215    Admin Instructions:   Initiate infusion at 0.5 mcg/kg/min and titrate up or down by 0.3  mcg/kg/min every 5 minutes to use the lowest dose possible to maintain MAP greater than or equal to 65 mm Hg. Maximum dose = 3 mcg/kg/min. Contact provider if unable to maintain MAP greater than or equal to 65 mm Hg on maximum dose or if MAP is greater than 95 mm Hg. Once MAP target achieved obtain vitals a minimum of every 30 minutes.  Protect from light. Central line preferred, if unavailable use large bore IV access with frequent nurse monitoring of IV site.     0105       0747       0759       1003       1521       2355        0820       0839       1113       1235       1407       1622       1628       1726       1853       1942       2150         1058       1213       1529       1555       1740        0100       0630       0842       1123        0020       0130       0225       0402       0451       0955       1347       1706       2023       2050 2150 [C]        0040       0258       0554           propofol (DIPRIVAN) infusion 10 mg/mL 100 mL  Rate: 2.29-22.86 mL/hr Dose: 5-50 mcg/kg/min  Weight Dosing Info: 76.2 kg  Freq: Titrated Route: IV  Start: 09/05/21 1400    Admin Instructions:   Begin infusion at 5 mcg/kg/min and titrate up or down by 5 mcg/kg/min every 5 minutes to maintain RASS goal.  Maximum dose = 50 mcg/kg/min.  Notify MD if not at goal and requiring more than 50 mcg/kg/min. Infuse into dedicated line, change vial and tube every 12 hours. Patient must be intubated.    RASS Level:   0 (Alert & Calm) - Alert & Calm Spontaneously pays attention to caregiver.  -1 (Drowsy) - Not fully alert, but has sustained awakening to voice (eye opening & contact >10 sec)  -2 (Light Sedation) - Briefly awakens to voice (eyes open & contact <10 sec)  -3 (Moderate Sedation) - Movement or eye opening to voice (no eye contact)  -4 (Deep Sedation) - No response to voice, but movement or eye opening to physical stimulation  -5 (Unarousable) - No response to voice or physical stimulation     Order specific  questions:   RASS Goal: -1 TO -2      0105              0649       0750       1422        0018       0810       1857        0224       0410       0415       0816       1222       2119        0615       0620       0625       1221       1815        0441       1124       1802        0015       0431       0830       0839       1153       1705       2045       2050       2055       2323        0010       0016       0519           Vasopressin (PITRESSIN) 20 Units in sodium chloride 0.9 % 100 mL infusion  Rate: 9 mL/hr Dose: 0.03 Units/min  Freq: Continuous Route: IV  Start: 09/09/21 0045    Admin Instructions:   Infuse at 0.03 units/minute. Do not titrate.     0243       1521        0137       0303       0819       1725        0516       0840        0751       1206        0158       1608        0404       1352        0253            Lab Results      Procedure Component Value Units Date/Time    Blood Gas, Arterial With Co-Ox [[id number]]  (Abnormal) Collected: 09/19/21 0501    Specimen: Arterial Blood Updated: 09/19/21 0508     Site Left Radial     Aj's Test N/A     pH, Arterial 7.385 pH units      pCO2, Arterial 52.3 mm Hg      Comment: 83 Value above reference range        pO2, Arterial 61.3 mm Hg      Comment: 84 Value below reference range        HCO3, Arterial 31.3 mmol/L      Comment: 83 Value above reference range        Base Excess, Arterial 5.3 mmol/L      O2 Saturation, Arterial 91.8 %      Comment: 84 Value below reference range        Hemoglobin, Blood Gas 9.9 g/dL      Comment: 84 Value below reference range        Hematocrit, Blood Gas 30.4 %      Comment: 84 Value below reference range        Oxyhemoglobin 90.2 %      Comment: 84 Value below reference range        Methemoglobin 0.50 %      Carboxyhemoglobin 1.2 %      A-a Gradiant 568.2 mmHg      CO2 Content 32.9 mmol/L      Temperature 0.0 C      Barometric Pressure for Blood Gas 729 mmHg      Modality Ventilator     FIO2 100 %      Ventilator Mode VC/AC      Set Tidal Volume 480.00     Set [facility] Resp Rate 22.0     PEEP 15.0     Note --     Collected by [id number]     Comment: Meter: [id number]     :  [id number]        pH, Temp Corrected --     pCO2, Temperature Corrected --     pO2, Temperature Corrected --    POC Glucose Once [[id number]]  (Normal) Collected: 09/19/21 0409    Specimen: Blood Updated: 09/19/21 0415     Glucose 103 mg/dL          aPTT [[id number]]  (Abnormal) Collected: 09/19/21 0139    Specimen: Blood Updated: 09/19/21 0306     PTT 36.2 seconds     Narrative:      Protime-INR [[id number]]  (Normal) Collected: 09/19/21 0139    Specimen: Blood Updated: 09/19/21 0306     Protime 14.0 Seconds      INR 1.03    D-dimer, Quantitative [[id number]]  (Abnormal) Collected: 09/19/21 0139    Specimen: Blood Updated: 09/19/21 0306     D-Dimer, Quantitative 1.38 MCGFEU/mL     Narrative:      d-Dimer assay result is to be used in conjunction with a non-high clinical pretest probability (PTP) assessment tool to exclude PE and aid in diagnosis of VTE with cutoff of 0.5 MCGFEU/mL.    Lactate Dehydrogenase [[id number]]  (Abnormal) Collected: 09/19/21 0139    Specimen: Blood Updated: 09/19/21 0224      U/L     Comprehensive Metabolic Panel [[id number]]  (Abnormal) Collected: 09/19/21 0139    Specimen: Blood Updated: 09/19/21 0208     Glucose 88 mg/dL      BUN 42 mg/dL      Creatinine 0.55 mg/dL      Sodium 135 mmol/L      Potassium 4.0 mmol/L           Chloride 99 mmol/L      CO2 26.2 mmol/L      Calcium 8.0 mg/dL      Total Protein 5.0 g/dL      Albumin 1.92 g/dL      ALT (SGPT) 68 U/L      AST (SGOT) 65 U/L      Alkaline Phosphatase 71 U/L      Total Bilirubin 0.3 mg/dL      eGFR Non African Amer 145 mL/min/1.73      Globulin 3.1 gm/dL      A/G Ratio 0.6 g/dL      BUN/Creatinine Ratio 76.4     Anion Gap 9.8 mmol/L     C-reactive Protein [[id number]]  (Abnormal) Collected: 09/19/21 0139    Specimen: Blood Updated: 09/19/21 0208     C-Reactive Protein 13.04 mg/dL      Lactic Acid, Plasma [[id number]]  (Normal) Collected: 09/19/21 0138    Specimen: Blood Updated: 09/19/21 0204     Lactate 1.7 mmol/L     CBC (No Diff) [[id number]]  (Abnormal) Collected: 09/19/21 0138    Specimen: Blood Updated: 09/19/21 0141     WBC 20.13 10*3/mm3      RBC 3.43 10*6/mm3      Hemoglobin 9.8 g/dL      Hematocrit 31.1 %      MCV 90.7 fL      MCH 28.6 pg      MCHC 31.5 g/dL      RDW 12.8 %      RDW-SD 41.9 fl      MPV 12.5 fL      Platelets 221 10*3/mm3     Specimen: Arterial Blood Updated: 09/17/21 0453     Site Right Radial     Aj's Test N/A     pH, Arterial 7.394 pH units      pCO2, Arterial 44.6 mm Hg      pO2, Arterial 64.3 mm Hg           HCO3, Arterial 27.2 mmol/L           Base Excess, Arterial 2.0 mmol/L      O2 Saturation, Arterial 92.6 %           Hemoglobin, Blood Gas 9.8 g/dL           Hematocrit, Blood Gas 29.9 %           Oxyhemoglobin 90.8 %      Methemoglobin 0.40 %      Carboxyhemoglobin 1.5 %      A-a Gradiant 504.4 mmHg      CO2 Content 28.6 mmol/L      Temperature 0.0 C      Barometric Pressure for Blood Gas 729 mmHg      Modality Ventilator     FIO2 90 %      Ventilator Mode VC/AC     Set Tidal Volume 450.00     Set Mech Resp Rate 22.0     PEEP 12.0     Note --     Notified Who aicu     Notified By [id number]     Collected by [id number]          pH, Temp Corrected --     pCO2, Temperature Corrected --     pO2, Temperature Corrected --    POC Glucose Once [[id number]]  (Normal) Collected: 09/17/21 0412    Specimen: Blood Updated: 09/17/21 0418     Glucose 115 mg/dL          Comprehensive Metabolic Panel [[id number]]  (Abnormal) Collected: 09/17/21 0111    Specimen: Blood Updated: 09/17/21 0200     Glucose 158 mg/dL      BUN 43 mg/dL      Creatinine 0.54 mg/dL      Sodium 140 mmol/L      Potassium 3.7 mmol/L      Chloride 105 mmol/L      CO2 29.6 mmol/L      Calcium 8.5 mg/dL      Total Protein 5.0 g/dL      Albumin 1.91 g/dL      ALT (SGPT) 37 U/L      AST (SGOT) 38 U/L      Alkaline  Phosphatase 56 U/L      Total Bilirubin 0.4 mg/dL      eGFR Non African Amer 148 mL/min/1.73      Globulin 3.1 gm/dL      A/G Ratio 0.6 g/dL      BUN/Creatinine Ratio 79.6     Anion Gap 5.4 mmol/L     Lactate Dehydrogenase [[id number]]  (Abnormal) Collected: 09/17/21 0111    Specimen: Blood Updated: 09/17/21 0200      U/L     C-reactive Protein [[id number]]  (Abnormal) Collected: 09/17/21 0111    Specimen: Blood Updated: 09/17/21 0200     C-Reactive Protein 5.85 mg/dL     Lactic Acid, Plasma [[id number]]  (Normal) Collected: 09/17/21 0112    Specimen: Blood Updated: 09/17/21 0155     Lactate 1.5 mmol/L     D-dimer, Quantitative [[id number]]  (Abnormal) Collected: 09/17/21 0109    Specimen: Blood Updated: 09/17/21 0148     D-Dimer, Quantitative 0.78 MCGFEU/mL     Narrative:      d-Dimer assay result is to be used in conjunction with a non-high clinical pretest probability (PTP) assessment tool to exclude PE and aid in diagnosis of VTE with cutoff of 0.5 MCGFEU/mL.    Protime-INR [[id number]]  (Normal) Collected: 09/17/21 0109    Specimen: Blood Updated: 09/17/21 0143     Protime 14.3 Seconds      INR 1.07    aPTT [[id number]]  (Normal) Collected: 09/17/21 0109    Specimen: Blood Updated: 09/17/21 0143     PTT 35.3 seconds     Narrative:      PTT Heparin Therapeutic Range:  59 - 95 seconds      CBC (No Diff) [[id number]]  (Abnormal) Collected: 09/17/21 0111    Specimen: Blood Updated: 09/17/21 0119     WBC 14.26 10*3/mm3      RBC 3.34 10*6/mm3      Hemoglobin 9.4 g/dL      Hematocrit 30.9 %      MCV 92.5 fL      MCH 28.1 pg      MCHC 30.4 g/dL      RDW 12.8 %      RDW-SD 43.3 fl      MPV 12.8 fL      Platelets 226 10*3/mm3     Specimen: Blood Updated: 09/15/21 1926     Procalcitonin 2.59 ng/mL     POC Glucose Once [[id number]]  (Abnormal) Collected: 09/15/21 1725    Specimen: Blood Updated: 09/15/21 1736     Glucose 178 mg/dL          Urinalysis, Microscopic Only - Urine, Catheter [[id number]]  (Abnormal) Collected:  09/15/21 1125    Specimen: Urine, Catheter Updated: 09/15/21 1149     RBC, UA 21-30 /HPF      WBC, UA 6-12 /HPF      Bacteria, UA Trace /HPF      Squamous Epithelial Cells, UA 3-6 /HPF      Yeast, UA Small/1+ Yeast /HPF      Hyaline Casts, UA 7-12 /LPF      Amorphous Crystals, UA Small/1+ /HPF      Methodology Manual Light Microscopy    Urinalysis With Culture If Indicated - Urine, Catheter [[id number]]  (Abnormal) Collected: 09/15/21 1125    Specimen: Urine, Catheter Updated: 09/15/21 1144     Color, UA Yellow     Appearance, UA Turbid     pH, UA <=5.0     Specific Gravity, UA 1.021     Glucose, UA Negative     Ketones, UA Negative     Bilirubin, UA Negative     Blood, UA Large (3+)     Protein, UA 30 mg/dL (1+)     Leuk Esterase, UA Negative     Nitrite, UA Negative     Urobilinogen, UA 1.0 E.U./dL    Comprehensive Metabolic Panel [[id number]]  (Abnormal) Collected: 09/15/21 0626    Specimen: Blood Updated: 09/15/21 0718     Glucose 145 mg/dL      BUN 54 mg/dL      Creatinine 0.71 mg/dL      Sodium 146 mmol/L      Potassium 4.5 mmol/L      Chloride 110 mmol/L      CO2 29.0 mmol/L      Calcium 8.5 mg/dL      Total Protein 5.4 g/dL      Albumin 2.04 g/dL      ALT (SGPT) 40 U/L      AST (SGOT) 60 U/L      Alkaline Phosphatase 59 U/L      Total Bilirubin 0.4 mg/dL      eGFR Non African Amer 108 mL/min/1.73      Globulin 3.4 gm/dL      A/G Ratio 0.6 g/dL      BUN/Creatinine Ratio 76.1     Anion Gap 7.0 mmol/L     Lactate Dehydrogenase [[id number]]  (Abnormal) Collected: 09/15/21 0626    Specimen: Blood Updated: 09/15/21 0718      U/L     C-reactive Protein [[id number]]  (Abnormal) Collected: 09/15/21 0626    Specimen: Blood Updated: 09/15/21 0718     C-Reactive Protein 19.45 mg/dL     Lactic Acid, Plasma [[id number]]  (Normal) Collected: 09/15/21 0629    Specimen: Blood Updated: 09/15/21 0714     Lactate 2.0 mmol/L     aPTT [[id number]]  (Abnormal) Collected: 09/15/21 0627    Specimen: Blood Updated: 09/15/21 0649      PTT 36.8 seconds     Protime-INR [[id number]]  (Abnormal) Collected: 09/15/21 0627    Specimen: Blood Updated: 09/15/21 0649     Protime 15.0 Seconds      INR 1.13    D-dimer, Quantitative [[id number]]  (Abnormal) Collected: 09/15/21 0627    Specimen: Blood Updated: 09/15/21 0649     D-Dimer, Quantitative 1.69 MCGFEU/mL     CBC (No Diff) [[id number]]  (Abnormal) Collected: 09/15/21 0627    Specimen: Blood Updated: 09/15/21 0641     WBC 15.44 10*3/mm3      RBC 3.56 10*6/mm3      Hemoglobin 9.9 g/dL      Hematocrit 34.0 %      MCV 95.5 fL      MCH 27.8 pg      MCHC 29.1 g/dL      RDW 13.2 %      RDW-SD 46.8 fl      MPV 13.3 fL      Platelets 198 10*3/mm3     Blood Gas, Arterial With Co-Ox [[id number]]  (Abnormal) Collected: 09/15/21 0500    Specimen: Arterial Blood Updated: 09/15/21 0514     Site Right Radial     Aj's Test N/A     pH, Arterial 7.356 pH units      pCO2, Arterial 52.2 mm Hg           pO2, Arterial 121.0 mm Hg           HCO3, Arterial 29.2 mmol/L           Base Excess, Arterial 2.9 mmol/L      O2 Saturation, Arterial 98.8 %      Hemoglobin, Blood Gas 10.7 g/dL           Hematocrit, Blood Gas 32.7 %           Oxyhemoglobin 97.6 %      Methemoglobin 0.20 %      Carboxyhemoglobin 1.0 %      A-a Gradiant 505.1 mmHg      CO2 Content 30.8 mmol/L      Temperature 0.0 C      Barometric Pressure for Blood Gas 726 mmHg      Modality Ventilator     FIO2 100 %      Ventilator Mode VC/AC     Set Tidal Volume 450.00     Set Mech Resp Rate 22.0     PEEP 12.0     Note --     Collected by [id number]          pH, Temp Corrected --     pCO2, Temperature Corrected --     pO2, Temperature Corrected --          Imaging Results (Last 7 Days)     Procedure Component Value Units Date/Time    XR Chest 1 View [[id number]] Collected: 09/19/21 2150     Updated: 09/19/21 2153    Narrative:      CR Chest 1 Vw    INDICATION:   Hypoxia. COVID- 19 pneumonia. Oxygen desaturation.     COMPARISON:    9/18/2021    FINDINGS:  Portable AP view(s) of  the chest.    Enteric tube extends to the level of the proximal body stomach. Endotracheal tube tip is about 0.4 cm proximal to the beltran. Right-sided central line from a neck approach terminates at the mid SVC level.    Stable borderline cardiomegaly. Extensive airspace disease in both lungs left slightly greater than right is stable to slightly worse for technical factors. There are remote left rib fractures. No distinct pneumothorax.       Impression:        1. Tubes and lines appear to be in satisfactory position as described. No distinct pneumothorax.  2. Stable to slight interval worsening of extensive bilateral pneumonia left greater than right.    Signer Name: Roc Tony MD   Signed: 9/19/2021 9:50 PM   Workstation Name: [facility]    [facility]     Orders (last 7 days)      Start     Ordered    09/20/21 0911  No bagging  Nursing Communication  Continuous     Comments: No bagging    09/20/21 0911                Ventilator/Non-Invasive Ventilation Settings (From admission, onward)     Start     Ordered    09/18/21 1707  Ventilator - AC/VC; (22); 100; 90%; 15; 480 (vent does not do 475)  Continuous     Question Answer Comment   Vent Mode AC/VC    Breath rate  22   FiO2 100    FiO2 titrate for Sp02% =/> 90%    PEEP 15    Tidal Volume 480 vent does not do 475       09/18/21 1707                   Physician Progress Notes (last 7 days) (Notes from 09/13/21 0955 through 09/20/21 0955)      Clayton Kennedy MD at 09/20/21 0847                 Clayton Kennedy MD                          [phone]      Patient ID:    Name:  Valdemar Solitario    MRN:  [id number]    1945   76 y.o.  male            Patient Care Team:  Laurence Geiger APRN as PCP - General (Family Medicine)  Corinne Dimas APRN as PCP - Family Medicine    CC/ Reason for visit: Acute respiratory failure, Covid pneumonia, shock, pulmonary pretension    Subjective:   Reviewed prior records.    Patient is currently on  mechanical ventilation with AC VC, RR 22, , FiO2 100% and PEEP 15.  Noted ABG done earlier today: 7.45/44/60.  he had desaturation down to the 70% and requiring about backing.  He had similar events previously.  He was suctioned but no significant secretions were obtained.    He remains on intravenous pressors and he is sedated with propofol 35 mics/KG/minute.  No sedation holiday performed.  He does not follow any commands and has no spontaneous movements.    ROS: Unable to obtain due to mechanical ventilation    Objective     Vital Signs past 24hrs    BP range: BP: ()/(44-80) 107/57  Pulse range: Heart Rate:  [44-62] 56  Resp rate range: Resp:  [22] 22  Temp range: Temp (24hrs), Av.3 °F (36.3 °C), Min:96 °F (35.6 °C), Max:98.2 °F (36.8 °C)  Device (Oxygen Therapy): ventilator FiO2 (%): 100 %     77.2 kg (170 lb 3.2 oz); Body mass index is 27.48 kg/m².      Intake/Output Summary (Last 24 hours) at 2021 0847  Last data filed at 2021 0453  Gross per 24 hour   Intake 3486.17 ml   Output 1220 ml   Net 2266.17 ml     BP used per hospital policy    PHYSICAL EXAM   Constitutional: Elderly white male sedated on the mechanical ventilator  Head: - NCAT  Eyes: No pallor.  Anicteric sclerae, EOMI.  ENMT:   Endotracheal tube noted, no oral thrush. Moist lips.   NECK: Trachea midline, No thyromegaly, no palpable cervical lymphadenopathy.  No palpable subcutaneous emphysema  Heart: RRR, no murmur.  Grade 2 pitting pedal edema  and edema in the lower legs  Lungs: Decreased air entry throughout with no audible adventitious sounds.  Nonlabored breathing on mechanical ventilation.  Abdomen: Soft. No tenderness, guarding or rigidity. No palpable masses  Extremities: Cold extremities with reduced perfusion in the feet.  Neuro: Sedated.  Does not react to painful stimuli.  Psych: Mood and affect - UTO     Assessment    1. Acute hypoxic respiratory failure, hospitalized , symptomatic few days  prior  2. Bilateral COVID-19 pneumonia  3. Severe ARDS, PF ratio 60  4. Shock: cardiac and 2ary to sedation and PPV  5. Atrial fibrillation/RVR on anticoagulation  6. Severe pulmonary hypertension with RV dysfunction    Pertinent medical problems:  · Anemia  · Mild hyponatremia  · Elevated transaminases, secondary to viral illness/hepatic congestion from pulmonary hypertension  · Protein calorie malnutrition  · Mild leukocytosis, likely steroid-induced  · DM type II with worsening hyperglycemia due to steroid use      PLAN:  Bagging performed with Ambu bag and a PEEP valve 20.  SPO2 went up to 75% at best.  We then learned that family did not want him to be bagged and therefore he was connected back to the ventilator.  I made some adjustments.  Plateau pressure was noted to be around 38 on PEEP 15.  Therefore I lowered the PEEP to 10.  SPO2 remained at 75%.  Plateau was 28.  I readjusted the PEEP to 12.  He could be experiencing more harm from increased peak pressure with resulting hemodynamic compromise and reduction in venous return and cardiac output.  He may require lower PEEP.  We will see how he does on that.    Continue dexamethasone 6 mg daily.  Unclear how much benefit provided with steroid at this stage of ARDS (likely progressing into fibrotic stage now).    Lovenox therapeutic dose for A. fib and prophylaxis.    Lantus and insulin sliding scale    Tube feeding    Sedation with propofol currently at 35 mics/KG/minute.    Pressors with vasopressin and Marco-Synephrine to keep MAP >65    Discussed with nursing staff and palliative care nurse.  Prognosis is extremely poor.  Patient will unlikely survive this event and therefore comfort measures are recommended.  We will allow family to visit and discuss the care with him further    Clayton Kennedy MD  9/20/2021     CC time 39 minutes    Electronically signed by Clayton Kennedy MD at 09/20/21 0931       Jose De Jesus Mccord MD at 09/18/21 1055              [facility]     Patient Identification:  Name:  Valdemar Solitario  Age:  76 y.o.  Sex:  male  :  1945  MRN:  [id number]  Visit Number:  [id number]  ROOM: [address]     Primary Care Provider:  Laurence Geiger APRN    Length of stay in inpatient status:  16    Subjective     Chief Compliant:    Chief Complaint   Patient presents with   • Exposure To Known Illness   • Altered Mental Status   • Weakness - Generalized     History of Presenting Illness:    Patient remains critically ill, intubated for prolonged period of time, remains on multiple pressors, remains on 100% Fi02, AM ABG reviewed and P02 70, have decreased RR and repeat ABG pending, repeat CXR ordered and pending, proBNP trending up, Cr stable, considering gentle diuresis though BP so tenuous on multiple pressors, holding for now, HR is bradycardic now, holding amio gtt, cards following, family has asked not to have patient bagged anymore, if develops worsening hypoxia and needs will need to call family to discuss transition to comfort measures, continued on steroids, continued on Abx, remains afebrile.   Objective       ----------------------------------------------------------------------------------------------------------------------  Vital Signs:  Temp:  [97.1 °F (36.2 °C)-98.8 °F (37.1 °C)] 97.7 °F (36.5 °C)  Heart Rate:  [] 53  Resp:  [22] 22  BP: ()/(33-93) 118/53  FiO2 (%):  [100 %] 100 %  SpO2:  [83 %-98 %] 94 %  on   ;   Device (Oxygen Therapy): ventilator  Body mass index is 26.92 kg/m².    Wt Readings from Last 3 Encounters:   21 75.6 kg (166 lb 11.2 oz)   17 73.9 kg (163 lb)   17 76.7 kg (169 lb)       NPO Diet  ----------------------------------------------------------------------------------------------------------------------  Physical exam:  General: intubated/sedated  Head: Normocephalic, atraumatic  Eyes: Conjunctivae and sclerae normal.  Ears: Ears appear intact with no  abnormalities noted.   Neck: Trachea midline. No obvious JVD.  Heart: regular rhythm, bradycardic  Lungs: Intubated/venilated, b/l equal rise of chest, course breath sounds, on 100% Fi02 still  Abdomen: No obvious abdominal distension.  MS: Muscle tone appears normal. No gross deformities.  Extremities: No clubbing, cyanosis or edema noted.   Skin: No visible bleeding, bruising, or rash.  Neurologic: unable to assess due to sedation  : Snell in place  ----------------------------------------------------------------------------------------------------------------------    Assessment & Plan    76M PMH diabetes meliltus, depression, GERD, hypertension, dyslipidemia, presented to his place of work in a state of confusion, brought to ER, found to be Covid +. Hospitalization complicated by worsening hypoxia.      #Septic shock, Acute Metabolic Encephalopathy, Acute Hypoxic Respiratory Failure requiring intubation and mechanical ventilation 2/2 viral pneumonia treating for Covid 19 c/b ARDS and mild transaminitis  #Acute UTI - New  - Patient presented w/ increased shortness of breath, confusion, tachypnea, covid + on 9/2, intubated on 9/5 for worsening hypoxia on bipap.  - Admission labs showed WBC count 5K, CRP 7.8, lactate 2.3, d-dimer 0.95, procal 0.16, MRSA -, covid +, Bcx's NGTD.  UA 9/14 w/ 4+ bacteria though no culture noted, repeat UA and Cx from 9/15 w/o growth, repeat Bcx's 9/14 NGTD.  - B/l Venous Duplex negative for DVT  - VQ scan showed indeterminate study, noted abnormal perfusion, multifocal defects could be related to multifocal airspace disease but also PE.  - CT Head showed no acute intracranial abnormality  - CT Chest showed patchy b/l airspace disease c/w covid 19  - ID consulted; Following  - CTPE on 9/4 did not reveal PE but did show worsening bilateral airspace disease that is likely worsening COVID pneumonia. Given fluid in fissure on plain film could not rule out component of edema  - Pulm  consulted; Following, not here this weekend  - ID consulted; Following, s/p Remdesivir, initially initiated on baricitinib but d/c'd due to intubation/mechanical ventilation.   Continue 6mg IV Dexamethasone for extended time period due to continued significant respiratory failure  - Continue Merrem per ID  - Continue Level III AC for Covid 19 thromboembolism Ppx  - Continue APAP PRN for fevers  - Continue Albuterol Inhaler  - Continue IV pressors to maintain MAPs > 65mmHg or SBP > 90, currently still requiring multiple pressors at this time  - Trend Covid 19 progression labs w/ CBC, CMP, CK, CRP, Ferritin daily and LDH, D-dimer, Fibrinogen q48hrs.  - Continue to monitor in CCU, continue enhanced droplet/contact isolation precautions, wean 02 as able though so far have been unable to wean significantly, palliative care following for GOC conversations, family does not want bagged if becomes hypoxic again, have asked RN to notify MD if 02 dropping and will need to call family to address comfort measures at that time.     #HTN/Dyslipidemia  #Elevated Troponin, NSTEMI Type II suspected  #Cardiomyopathy now w/ suspected component of cardiogenic shock  #Pulm HTN   #Afib w/ RVR - Recurrent  - Labs showed trops 0.031->0.016, proBNP 370   - EKG showed NSR, RBBB, no significant ST changes  - Echo showed LVEF 46-50%, mild-mod dilated RV, mild-mod dilated RA, mod-severe Pulm HTN, RVSP 45-55mmHg  - Consulted Cardiology; Followed, rec'd outpatient echo for f/u 6 months  - Int Cards now following for Afib  - Holding home ARB due to shock  - Holding home statin   - Continue Tlov for now  - Patient bradycardic again, holding IV Amio for now  - Continue to monitor on tele, strict I/O's, daily weights, trend HR and BP, IV diuresis PRN    #NIDDM Type II, controlled, unknown complications  - Hgb A1c = 6.3%  - Holding home oral agents, continue FSBG and SSI  - Uncontrolled. Pulmonary has increased basal, will also increase SSI.       #Anxiety/Depression  - Continue home regimen     #GERD  - Continue home PPI      #BPH  - Continue home Flomax     #Nonoliguric MURIEL - Resolved  - B/l Cr 0.9-1.0, was up to 2.1 on admission, now back to baseline, S/p mIVFs; Trend Cr and UOP, avoid nephrotoxins, NSAIDs, dehydration and contrast as able.    #Electrolyte Abnormalities  - Acute Mild Hypokalemia - K 3.0 today, Replaced per protocol, Resolved  - Acute Mild Hypernatremia - Na up to 151, slightly increased FW flushes, now NML, Resolved     F: NPO  E: Monitor & Replace PRN  N: NPO, TFs  PPx: TLov  Code: DNR/DNI     Dispo: Pending clinical improvement, palliative consulted for GOC conversations     I have spent 30 minutes of critical care time (8:15-8:45AM) with > 50% of time spent in direct patient care, evaluating the patient at bedside, reviewing all labs and images, reviewing ABG and settings, reviewing pain and sedation gtt's, reviewing pressor requirement and increasing, triaging severe tachycardia and started amio gtt, communicating plan of care w/ nursing staff and consultant, utilizing high complexity medical decision making to assess and treat vital organ system failure in an individual who has impairment of one or more vital organ systems such that there is a high probability of imminent or life threatening deterioration in the patient’s condition. Failure to initiate the above interventions on an urgent basis would likely result in sudden, clinically significant or life threatening deterioration in the patient's condition.       Jose De Jesus Mccord MD  [facility]  09/18/21  10:52 EDT    Electronically signed by Jose De Jesus Mccord MD at 09/18/21 1059     Apollo Carbajal MD at 09/18/21 1029          [facility] pulmonary      Name:  Valdemar Solitario    MRN:  [id number]    1945   76 y.o.  male            Patient Care Team:  Laurence Geiger APRN as PCP - General (Family Medicine)  Corinne Dimas APRN as PCP - Family Medicine    CC/  Reason for visit: Acute hypoxic respiratory failure     Acute respiratory failure, acute coronary pneumonia, shock, pulmonary pretension    Subjective: Patient was seen and examined.  Remains critically ill with acute hypoxic respiratory failure.  Overnight events reviewed discussed with patient's nurse.  All the labs medications and the notes and vitals reviewed.      ROS: Unable to obtain due to critical illness    Objective     Vital Signs past 24hrs    BP range: BP: ()/(31-93) 113/53  Pulse range: Heart Rate:  [47-62] 50  Resp rate range: Resp:  [22] 22  Temp range: Temp (24hrs), Av.5 °F (36.4 °C), Min:97.1 °F (36.2 °C), Max:97.8 °F (36.6 °C)          Device (Oxygen Therapy): ventilator FiO2 (%): 100 %     75.6 kg (166 lb 11.2 oz); Body mass index is 26.92 kg/m².      Intake/Output Summary (Last 24 hours) at 2021 1829  Last data filed at 2021 1802  Gross per 24 hour   Intake 3391.29 ml   Output 1370 ml   Net 2021.29 ml       PHYSICAL EXAM   Patient was seen and examined via telemedicine.     Patient is intubated and sedated.     Head is normocephalic     Respiratory-no acute respiratory distress noted.  Intubated and on mechanical ventilator.     Cardiology-sinus rhythm noted on telemetry     Neuro-sedated      Results Review:    I have reviewed the relevant laboratory results and independently reviewed the chest imaging from this hospitalization including the available echocardiogram reports personally and summarized it if/ when appropriate below    Assessment    Acute hypoxic respiratory failure  Acute COVID-19 pneumonia  ARDS  Shock - Septic vs cardiogenic  Atrial fibrillation/RVR on anticoagulation  Severe pulmonary hypertension with RV dysfunction  Anemia    PLAN:    All the labs medications and the notes vitals reviewed.  Ventilator settings graphics latest blood gas chest x-ray reviewed.  Ventilator settings remains on the higher side.  White count is increasing.  If goes further we  will get pancultures and start broad-spectrum antibiotics.  Latest microbiology reviewed.    Continue vasopressors to maintain a map of 65 and above.  Continue steroids for COVID-19 pneumonia-to decrease the inflammation  Ventilator settings graphics reviewed and adjusted.    ABG shows-pH is within physiological range.  PO2 is low but saturating good.  Continue with current FiO2.  Discussed with RT  Echo shows pulmonary hypertension with reduced EF.  As patient was in atrial fibrillation vasopressors switched to Marco-Synephrine.  If remains under control will consider switching it back or start on midodrine.  EF is mildly reduced.  Continue anticoagulation for atrial fibrillation.  Medication list reviewed.  Currently rate controlled  Overall prognosis with multiorgan involvement and advanced age is poor.  We will get chest x-ray and blood gas in the morning.  Very poor prognosis  DNR/DNI    DVT/GI prophylaxis    Would strongly recommend transition to comfort measures only given advanced age and severe COVID-19 pneumonia with prolonged respiratory failure, cardiogenic shock, severe pulmonary hypertension    D/w RN       Patient was seen and examined via telemedicine.     Patient is intubated and sedated.     Head is normocephalic     Respiratory-no acute respiratory distress noted.  Intubated and on mechanical ventilator.     Cardiology-sinus rhythm noted on telemetry     Neuro-sedated  Apollo Carbajal MD  2021    Electronically signed by Apollo Carbajal MD at 21 0953         Jose De Jesus Mccord MD at 09/15/21 1110            [facility] HOSPITALIST PROGRESS NOTE     Patient Identification:  Name:  Valdemar Solitario  Age:  76 y.o.  Sex:  male  :  1945  MRN:  [id number]  Visit Number:  [id number]  ROOM: [address]     Primary Care Provider:  Laurence Geiger APRN    Length of stay in inpatient status:  13    Subjective     Chief Compliant:    Chief Complaint   Patient presents with   • Exposure  To Known Illness   • Altered Mental Status   • Weakness - Generalized     History of Presenting Illness:    Patient remains critically ill, intubated for airway protection, remains on multiple pressors for septic and possibly cardiogenic component of shock, blood pressures this AM were significantly low, 50's/30's, appeared Vasopressin had ran out, increased phenylephrine dose and BP trended back up fairly quickly, re-ordered new back Vasopressin, remains on broad Abx, CRP bumped yesterday to 21, today at 19, ID following, ordered repeat Bcx's which are pending and UA showing 4+ bacteria, no culture obtained and have ordered add on, reviewed AM ABG and P02 high, have ordered wean to 90% Fi02, have ordered repeat CXR as well, Pulm consulted and following, notes guarded prognosis, discussed case w/ sister yesterday, she had asked me to call patient's brother which I have tried several occasions yesterday and again this AM without success.  Objective       ----------------------------------------------------------------------------------------------------------------------  Vital Signs:  Temp:  [99.3 °F (37.4 °C)-100.6 °F (38.1 °C)] 99.9 °F (37.7 °C)  Heart Rate:  [52-76] 60  Resp:  [22-24] 22  BP: ()/(25-90) 142/56  FiO2 (%):  [100 %] 100 %  SpO2:  [88 %-99 %] 93 %  on   ;   Device (Oxygen Therapy): ventilator  Body mass index is 24.43 kg/m².    Wt Readings from Last 3 Encounters:   09/14/21 68.6 kg (151 lb 4.8 oz)   06/14/17 73.9 kg (163 lb)   03/23/17 76.7 kg (169 lb)       ----------------------------------------------------------------------------------------------------------------------  Physical exam:  General: intubated/sedated  Head: Normocephalic, atraumatic  Eyes: Conjunctivae and sclerae normal.  Ears: Ears appear intact with no abnormalities noted.   Neck: Trachea midline. No obvious JVD.  Heart: Tele reveals regular rate and rhtyhm  Lungs: Intubated/venilated, b/l equal rise of chest, course breath  sounds, on 100% Fi02 but satting well  Abdomen: No obvious abdominal distension.  MS: Muscle tone appears normal. No gross deformities.  Extremities: No clubbing, cyanosis or edema noted. Cool extremieis  Skin: No visible bleeding, bruising, or rash.  Neurologic: unable to assess due to sedation  ----------------------------------------------------------------------------------------------------------------------    Assessment & Plan    Mr. Solitario is our 76M PMH diabetes meliltus, depression, GERD, hypertension, dyslipidemia, presented to his place of work in a state of confusion, brought to ER, found to be Covid +. Hospitalization complicated by worsening hypoxia.      #Septic shock, Acute Metabolic Encephalopathy, Acute Hypoxic Respiratory Failure requiring intubation and mechanical ventilation 2/2 viral pneumonia treating for Covid 19 c/b ARDS and mild transaminitis  #Acute UTI - New  - Patient presented w/ increased shortness of breath, confusion, tachypnea, covid + on 9/2, intubated on 9/5 for worsening hypoxia on bipap.  - Admission labs showed WBC count 5K, CRP 7.8, lactate 2.3, d-dimer 0.95, procal 0.16, MRSA -, covid +, Bcx's NGTD  - B/l Venous Duplex negative for DVT  - VQ scan showed indeterminate study, noted abnormal perfusion, multifocal defects could be related to multifocal airspace disease but also PE.  - CT Head showed no acute intracranial abnormality  - CT Chest showed patchy b/l airspace disease c/w covid 19  - ID consulted; Following  - CTPE on 9/4 did not reveal PE but did show worsening bilateral airspace disease that is likely worsening COVID pneumonia. Given fluid in fissure on plain film could not rule out component of edema  - Pulm consulted; Following  - ID consulted; Following, s/p Remdesivir, initially initiated on baricitinib but d/c'd due to intubation/mechanical ventilation.   Continue 6mg IV Dexamethasone for extended time period due to continued significant respiratory failure  -  Continue Vanc and Merrem, CRP improved today slightly, UA yesterday w/ 4+ bacteria, no culture obtained and have ordered to add on, repeat Bcx's pending  - Continue Level III AC for Covid 19 thromboembolism Ppx  - Continue APAP PRN for fevers  - Continue Albuterol Inhaler  - Continue IV pressors to maintain MAPs > 65mmHg or SBP > 90, currently still requiring   - AM ABG w/ improved P02, ordered Fi02 down to 90%, repeating CXR today  - Trend Covid 19 progression labs w/ CBC, CMP, CK, CRP, Ferritin daily and LDH, D-dimer, Fibrinogen q48hrs.  - Continue to monitor in CCU, continue enhanced droplet/contact isolation precautions, wean 02 as able.     #HTN/Dyslipidemia  #Elevated Troponin, NSTEMI Type II suspected  #Cardiomyopathy now w/ suspected component of cardiogenic shock  #Pulm HTN   #Afib w/ RVR  - Labs showed trops 0.031->0.016, proBNP 370   - EKG showed NSR, RBBB, no significant ST changes  - Echo showed LVEF 46-50%, mild-mod dilated RV, mild-mod dilated RA, mod-severe Pulm HTN, RVSP 45-55mmHg  - Consulted Cardiology; Followed, rec'd outpatient echo for f/u 6 months  - Holding home ARB due to shock  - Holding home statin while on Remdesivir, consider resuming soon  - Continue Tlov, switch to DOAC when appropraite  - Continue to monitor on tele, strict I/O's, daily weights, trend HR and BP, IV diuresis PRN     #Electrolyte Abnormalities  - Acute Mild Hypokalemia - K 3.0 today, Replaced per protocol, Resolved  - Acute Mild Hypernatremia - Na up to 151, slightly increased FW flushes, improved to 146     #NIDDM Type II, controlled, unknown complications  - Hgb A1c = 6.3%  - Holding home oral agents, continue FSBG and SSI  - Uncontrolled. Pulmonary has increased basal, will also increase SSI.      #Anxiety/Depression  - Continue home regimen     #GERD  - Continue home PPI      #BPH  - Continue home Flomax     #Nonoliguric MURIEL - Resolved  - B/l Cr 0.9-1.0, was up to 2.1 on admission, now back to baseline, S/p mIVFs;  "Trend Cr and UOP, avoid nephrotoxins, NSAIDs, dehydration and contrast as able.    F: NPO  E: Monitor & Replace PRN  N: TFs  PPx: TLov  Code: DNR/DNI     Dispo: Pending clinical improvement, palliative consulted for GOC conversations     I have spent 30 minutes of critical care time (7:30-8:00AM) with > 50% of time spent in direct patient care, evaluating the patient at bedside, reviewing all labs and images, reviewing ABG and adjustingvent settings, reviewing pain and sedation gtt's, reviewing pressor requirement and adjusting doses this AM as was hypotensive w/ vasopressin running out, communicating plan of care w/ nursing staff and consultants, utilizing high complexity medical decision making to assess and treat vital organ system failure in an individual who has impairment of one or more vital organ systems such that there is a high probability of imminent or life threatening deterioration in the patient’s condition. Failure to initiate the above interventions on an urgent basis would likely result in sudden, clinically significant or life threatening deterioration in the patient's condition.       Jose De Jesus Mccord MD  [facility] Hospitalist  09/15/21  11:10 EDT    Electronically signed by Jose De Jesus Mccord MD at 09/15/21 1116     Socorro Zhou, NELSON at 09/15/21 1025          Palliative Care Daily Progress Note     S: Medical record reviewed, followed up with Primary RN Concepción MCKEON  regarding patient's condition. Pt is sedated on ventilator @ 100% FiO2 with peep of 12 and in covid isolation.      O:   Palliative Performance Scale Score: 30%   /56   Pulse 55   Temp 98.7 °F (37.1 °C) (Oral)   Resp 22   Ht 167.6 cm (65.98\")   Wt 68.6 kg (151 lb 4.8 oz)   SpO2 95%   BMI 24.43 kg/m²     Intake/Output Summary (Last 24 hours) at 9/15/2021 1540  Last data filed at 9/15/2021 0600  Gross per 24 hour   Intake 3284.06 ml   Output 2250 ml   Net 1034.06 ml       PE:  COVID RESTRICTIONS      Meds: Reviewed and " changes noted.        Diagnostics: Reviewed    A: Valdemar Solitario is a 76 y.o. yo male with confusion, dyspnea, and weakness, he has a past medical history significant for diabetes meliltus, depression, GERD, hypertension, dyslipidemia.  He arrived to the ED via EMS after presenting to his place of work in a state of confusion.  He reported he had been feeling bad for a few days which worsened on 9/2/2021.  He reported while at work he was unable to remember his locker combination and was experiencing shortness of breath and generalized weakness.     Upon arrival to the ED, vital signs were T 98.8F, pulse 85, RR 14, and BP 96/52 with room air oxygen saturation of 82%.  Initial arterial blood gas revealed pH of 7.324 with PCO2 of 42.4 and PO2 of 47.9 on room air.  Troponin T was found to be 0.031.  Creatinine was found to be elevated at 2.10.  Lactate was found to be elevated at 2.3 with D-dimer of 0.95.  Hemoglobin was found to be 11.8.     Mr. Solitario is evaluated in the ED currently on BiPAP.  He reports feeling unwell for a few days but has difficulty talking due to BiPAP. He reports he has been making urine, though it is unclear at present if he has produced a specimen while in the ED.  He reports cough, weakness, and dyspnea.  He is alert to self.  He follows commands but is wearing BiPAP during examination. He denies chest pain and known dysuria.  He reports he has had some pain in his low back.      High risk secondary to acute hypoxemic respiratory failure 2/2 COVID-19 pneumonia          P:  I was able to speak with patients daughter Kathleen to update her on her fathers condition, she stated that she had been speaking with nurses and getting updates I let her know that if she ever had questions to please fell free to call. Plan is to continue to monitor and treat patient, however he is a DNR.      We will continue to follow along. Please do not hesitate to contact us regarding further sx mgmt or GOC needs,  including after hours or on weekends via our on call provider at [phone].     NELSON Wiggins    9/15/2021    Electronically signed by Socorro Zhou APRN at 09/15/21 7941     Daryl Aquino MD at 09/15/21 0912                      Daryl Aquino MD                          [phone]      Patient ID:    Name:  Valdemar Solitario    MRN:  [id number]    1945   76 y.o.  male            Patient Care Team:  Laurence Geiger APRN as PCP - General (Family Medicine)  Corinne Dimas APRN as PCP - Family Medicine    CC/ Reason for visit: Acute respiratory failure, acute coronary pneumonia, shock, pulmonary pretension    Subjective: Pt seen and examined this AM. Overnight events noted.  Patient remains on the mechanical ventilator. Sedated and on multiple pressors.  Low-grade fever overnight. ABG reviewed and ventilator settings adjusted as appropriate.  Patient now DNR/DNI.  Discussed with nurse at bedside about overall poor prognosis    ROS: Unable to obtain due to critical illness    Objective     Vital Signs past 24hrs    BP range: BP: ()/(25-90) 60/30  Pulse range: Heart Rate:  [52-76] 63  Resp rate range: Resp:  [22-26] 22  Temp range: Temp (24hrs), Av.9 °F (37.7 °C), Min:99.3 °F (37.4 °C), Max:100.6 °F (38.1 °C)          68.6 kg (151 lb 4.8 oz); Body mass index is 24.43 kg/m².      Intake/Output Summary (Last 24 hours) at 9/15/2021 09  Last data filed at 9/15/2021 0600  Gross per 24 hour   Intake 3384.06 ml   Output 2250 ml   Net 1134.06 ml       PHYSICAL EXAM   Constitutional: Elderly white male sedated on the mechanical ventilator  Head: - NCAT  Eyes: No pallor.  Anicteric sclerae, EOMI.  ENMT:   Endotracheal tube in place, no oral thrush. Moist MM.   NECK: Trachea midline, No thyromegaly, no palpable cervical lymphadenopathy  Heart: RRR, no murmur. No pedal edema   Lungs: GOGO +, No wheezes/ crackles heard    Abdomen: Soft. No tenderness, guarding or rigidity. No  palpable masses  Extremities: Extremities warm and well perfused. No cyanosis/ clubbing  Neuro: Sedated  Psych: Mood and affect - UTO     PPE recommended per [facility] infectious disease Isolation protocol for the current clinical scenario(as mentioned below) was followed.       Results Review:    I have reviewed the relevant laboratory results and independently reviewed the chest imaging from this hospitalization including the available echocardiogram reports personally and summarized it if/ when appropriate below    Assessment    Acute hypoxic respiratory failure  Acute COVID-19 pneumonia  ARDS  Shock - Septic vs cardiogenic  Atrial fibrillation on anticoagulation  Severe pulmonary hypertension  RV dilation  Anemia    PLAN:  Patient remains on the mechanical ventilator and settings adjusted as appropriate.  ABG and last chest x-ray reviewed.  Will work on weaning down but not in a position to consider extubation.  Continue with current treatment plan for Covid pneumonia.  Low-grade fever now while on Merrem and previously on antifungal.  Will defer to ID.  No positive cultures  Renal function is better.  Continue to keep him as negative as possible  Shock likely cardiogenic from severe pulmonary hypertension on acute respiratory issues and would continue to work on weaning down as much as possible but unfortunately patient will be very volume dependent and this is not a good prognosis overall.  No clinical concern for PE based on CT angiogram and D-dimer. No benefit with midodrine.  Patient already on anticoagulation.   Rest of the medical management per primary    Very guarded prognosis  DNR/DNI    DVT/GI prophylaxis    Would strongly recommend transition to comfort measures only given advanced age and severe COVID-19 pneumonia with prolonged respiratory failure, cardiogenic shock, severe pulmonary hypertension    D/w RN     CC - 32 mins    Daryl Aquino MD  9/15/2021    Electronically signed by  Daryl Aquino MD at 09/15/21 0917     Brock Metzger MD at 21 1330                     PROGRESS NOTE         Patient Identification:  Name:  Valdemar Solitario  Age:  76 y.o.  Sex:  male  :  1945  MRN:  [id number]  Visit Number:  [id number]  Primary Care Provider:  Laurence Geiger APRN         LOS: 12 days       ----------------------------------------------------------------------------------------------------------------------  Subjective       Chief Complaints:    Exposure To Known Illness, Altered Mental Status, and Weakness - Generalized        Interval History:      Patient continues to be sedated and intubated at 100% FiO2.  T-max 100 °F.  CRP has worsened from 4.6-21.82.  White count improved at 11.51.  Respiratory culture from 2021 reports no organisms seen preliminarily.  Chest x-ray from 2021 reports stable bilateral airspace disease.    ----------------------------------------------------------------------------------------------------------------------      Objective     -----------------------------------------------------------------------------------    Vital Signs:  Temp:  [99 °F (37.2 °C)-100.6 °F (38.1 °C)] 100.6 °F (38.1 °C)  Heart Rate:  [53-75] 68  Resp:  [22-26] 22  BP: ()/(31-86) 119/54  FiO2 (%):  [100 %] 100 %  Mean Arterial Pressure (Non-Invasive) for the past 24 hrs (Last 3 readings):   Noninvasive MAP (mmHg)   21 1303 85   21 1248 76   21 1230 72     SpO2 Percentage    21 1230 21 1248 21 1303   SpO2: 96% 97% 97%     SpO2:  [83 %-99 %] 97 %  on  Flow (L/min):  [15] 15;   Device (Oxygen Therapy): ventilator    Body mass index is 24.43 kg/m².  Wt Readings from Last 3 Encounters:   21 68.6 kg (151 lb 4.8 oz)   17 73.9 kg (163 lb)   17 76.7 kg (169 lb)        NPO  Diet  ----------------------------------------------------------------------------------------------------------------------    --------------------------------------------------------------    Assessment/Plan       Assessment/Plan     ASSESSMENT:    1.  Severe sepsis with lactic acid greater than 2 on admission  2.  COVID-19 pneumonia    PLAN:    Patient continues to be sedated and intubated at 100% FiO2.  T-max 100 °F.  CRP has worsened from 4.6-21.82.  White count improved at 11.51.  Respiratory culture from 9/13/2021 reports no organisms seen preliminarily.  Chest x-ray from 9/14/2021 reports stable bilateral airspace disease.    VQ scan on 9/2/2021 indeterminate for exclusion of PE.  CT of the chest on 9/3/2021 reports patchy bilateral airspace disease concerning for COVID-19 pneumonia.  CT of the abdomen and pelvis on 9/3/2021 reports bibasilar airspace disease suggestive of COVID-19 pneumonia, large right inguinal hernia containing nondilated bowel and fat.     Patient remains on Decadron but has completed course of remdesivir.    We are concerned about possible development of bacterial pneumonia in the setting of prior treatment with baricitinib.     Recommend to continue vancomycin and meropenem courses for now.  Blood cultures ordered as well as UA with culture.      We will continue to follow closely and adjust antibiotic therapy as appropriate.    Code Status:   Code Status and Medical Interventions:   Ordered at: 09/10/21 7497     Limited Support to NOT Include:    Intubation    Cardioversion/Defibrillation     Code Status:    No CPR     Medical Interventions (Level of Support Prior to Arrest):    Limited     Comments:    adult children Cathleen Allen and John all agree he would not want to be resuciated or reintubated in the event he became extubated.     Scribed for Brock Metzger MD by NELSON Torres. 9/14/2021  13:30 EDT       NELSON Torres  09/14/21  13:30 EDT      Physician  Attestation:    The documentation recorded by the scribe accurately reflects the service I personally performed and the decisions made by me.    Brock Metzger MD  09/14/21  13:30 EDT      Electronically signed by Brock Metzger MD at 09/15/21 0577

## 2021-09-20 NOTE — PROGRESS NOTES
"Palliative Care Daily Progress Note     S: Medical record reviewed, followed up with Primary RN Bernadette and Dr. Kennedy regarding patient's condition. Qi was in the room attempting to bag pt with RT this am, staff have been unable to maintain oxygen saturations.      O:   Palliative Performance Scale Score: 30%   /52   Pulse 72   Temp 97.9 °F (36.6 °C) (Axillary)   Resp 24   Ht 167.6 cm (65.98\")   Wt 77.2 kg (170 lb 3.2 oz)   SpO2 (!) 58%   BMI 27.48 kg/m²     Intake/Output Summary (Last 24 hours) at 9/20/2021 1723  Last data filed at 9/20/2021 0453  Gross per 24 hour   Intake 1549.25 ml   Output 470 ml   Net 1079.25 ml       PE:  COVID RESTRICTIONS      Meds: Reviewed and changes noted.    Labs:   Results from last 7 days   Lab Units 09/20/21  0204   WBC 10*3/mm3 14.73*   HEMOGLOBIN g/dL 8.3*   HEMATOCRIT % 26.0*   PLATELETS 10*3/mm3 118*     Results from last 7 days   Lab Units 09/20/21  0204   SODIUM mmol/L 133*   POTASSIUM mmol/L 3.9   CHLORIDE mmol/L 97*   CO2 mmol/L 27.6   BUN mg/dL 43*   CREATININE mg/dL 0.39*   GLUCOSE mg/dL 135*   CALCIUM mg/dL 8.1*     Results from last 7 days   Lab Units 09/20/21  0204   SODIUM mmol/L 133*   POTASSIUM mmol/L 3.9   CHLORIDE mmol/L 97*   CO2 mmol/L 27.6   BUN mg/dL 43*   CREATININE mg/dL 0.39*   CALCIUM mg/dL 8.1*   BILIRUBIN mg/dL 0.4   ALK PHOS U/L 67   ALT (SGPT) U/L 47*   AST (SGOT) U/L 35   GLUCOSE mg/dL 135*     Imaging Results (Last 72 Hours)     Procedure Component Value Units Date/Time    XR Chest 1 View [[id number]] Collected: 09/19/21 2150     Updated: 09/19/21 2153    Narrative:      CR Chest 1 Vw    INDICATION:   Hypoxia. COVID- 19 pneumonia. Oxygen desaturation.     COMPARISON:    9/18/2021    FINDINGS:  Portable AP view(s) of the chest.    Enteric tube extends to the level of the proximal body stomach. Endotracheal tube tip is about 0.4 cm proximal to the beltran. Right-sided central line from a neck approach terminates at the mid SVC " level.    Stable borderline cardiomegaly. Extensive airspace disease in both lungs left slightly greater than right is stable to slightly worse for technical factors. There are remote left rib fractures. No distinct pneumothorax.       Impression:        1. Tubes and lines appear to be in satisfactory position as described. No distinct pneumothorax.  2. Stable to slight interval worsening of extensive bilateral pneumonia left greater than right.    Signer Name: Roc Tony MD   Signed: 9/19/2021 9:50 PM   Workstation Name: [facility]    [facility]    XR Chest 1 View [[id number]] Collected: 09/18/21 1146     Updated: 09/18/21 1148    Narrative:      EXAM:    XR Chest, 1 View     EXAM DATE:    9/18/2021 5:09 AM     CLINICAL HISTORY:    intubated; U07.1-COVID-19; J12.82-Pneumonia due to Coronavirus disease  2019; R09.02-Hypoxemia; J96.01-Acute respiratory failure with hypoxia     TECHNIQUE:    Frontal view of the chest.     COMPARISON:    09/17/2021     FINDINGS:    Lungs:  Bilateral airspace disease overall stable.    Pleural space:  Unremarkable.  No pneumothorax.    Heart:  Cardiomegaly is stable.    Mediastinum:  Unremarkable.    Bones/joints:  Unremarkable.    Tubes, lines and devices:  ET tube with tip at level of clavicles.  NG  tube extends into the stomach.  Right IJ deep line is noted with tip at  the cavoatrial junction.       Impression:      1.  Stable bilateral airspace disease.  2.  Support devices as above.     This report was finalized on 9/18/2021 11:46 AM by Dr. Houston Larios MD.               Diagnostics: Reviewed    A: Valdemar Solitario is a 76 y.o. yo male with confusion, dyspnea, and weakness, he has a past medical history significant for diabetes meliltus, depression, GERD, hypertension, dyslipidemia.  He arrived to the ED via EMS after presenting to his place of work in a state of confusion.  He reported he had been feeling bad for a few days which worsened on  9/2/2021.  He reported while at work he was unable to remember his locker combination and was experiencing shortness of breath and generalized weakness.     Upon arrival to the ED, vital signs were T 98.8F, pulse 85, RR 14, and BP 96/52 with room air oxygen saturation of 82%.  Initial arterial blood gas revealed pH of 7.324 with PCO2 of 42.4 and PO2 of 47.9 on room air.  Troponin T was found to be 0.031.  Creatinine was found to be elevated at 2.10.  Lactate was found to be elevated at 2.3 with D-dimer of 0.95.  Hemoglobin was found to be 11.8.     Mr. Solitario is evaluated in the ED currently on BiPAP.  He reports feeling unwell for a few days but has difficulty talking due to BiPAP. He reports he has been making urine, though it is unclear at present if he has produced a specimen while in the ED.  He reports cough, weakness, and dyspnea.  He is alert to self.  He follows commands but is wearing BiPAP during examination. He denies chest pain and known dysuria.  He reports he has had some pain in his low back.      High risk secondary to acute hypoxemic respiratory failure 2/2 COVID-19 pneumonia      Today 9/20/2021  Pt was afebrile, HR 70, RR 24 and BP of 135/52 and was saturating 66% on 100% FiO2 on the ventilator.          P:  I was able to speak with all four of patients children, Janessa, Malaika, Cathleen, and Bennie concerning their fathers poor condition and that he was only saturating 60% despite being on a 100% vent. I proposed making him comfortable rather than prolonging his suffering and his death. After a long conversation the decision was made to make him comfort measures and to remove him from vent to allow him to pass naturally. The son Janessa, daughter Malaika and pts twin brother Josef would like to see him through the CCU window. I let son Janessa know that it could only be them who come up unfortunately we can not allow grandchildren to come to CCU Window. I spoke with CCU manager Bernard to let him  know we were making him comfort measures and the three family were coming to see him thru window as I do not expect him to last very long. I also let Dr. Estrella know of the plan and let RN Bernadette know that I ordered some prn comfort medications in the event the pt should require them. Bernadette turned propofol off @ 16:55. Awaiting families arrival.      We will continue to follow along. Please do not hesitate to contact us regarding further sx mgmt or GOC needs, including after hours or on weekends via our on call provider at [phone].     Socorro Zhou, APRN    9/20/2021

## 2021-09-20 NOTE — NURSING NOTE
Patient de sat and remaining in 70's despite bagging. AICU contacted, see orders. Patient repositioned, suctioned, and gtts titrated down. Chest xray and ABG performed. Patient's family contacted at this time, alcides Riddle contacted and states to continue doing what we need to at this time.

## 2021-09-20 NOTE — PROGRESS NOTES
[facility] Physician - Brief Progress Note  PERMANENT  09/20/2021 18:31    [facility] - [facility] [place] [place] [facility]    CALDERON LECHUGA    Date of Service 09/20/2021 18:31    HPI/Events of Note elert     Pt is comfort care  Family visited and ready to extubate    Extubation      Interventions Major-Respiratory failure - evaluation and management        Electronically Signed by: Marcin Latham) on 09/20/2021 18:31

## 2021-09-21 LAB
BACTERIA SPEC RESP CULT: NORMAL
GRAM STN SPEC: NORMAL

## 2021-09-21 NOTE — PAYOR COMM NOTE
"[facility]  [place]  PHONE  [phone]  FAX  [phone]  NPI:  [id number]    PATIENT D/C 21      Valdemar Bear (Dcsd. Male)     Date of Birth Social Security Number Address Home Phone MRN    1945  [address]   [place] [address] [phone] [id number]    Confucianist Marital Status          Mosque        Admission Date Admission Type Admitting Provider Attending Provider Department, Room/Bed    21 Emergency Jose De Jesus Mccord MD  [facility] CRITICAL CARE, CC03/1C    Discharge Date Discharge Disposition Discharge Destination        2021               Attending Provider: (none)   Allergies: Codeine    Isolation: Enh Drop/Con   Infection: COVID (confirmed) (21)   Code Status: Prior    Ht: 167.6 cm (65.98\")   Wt: 77.2 kg (170 lb 3.2 oz)    Admission Cmt: None   Principal Problem: None                Active Insurance as of 2021     Primary Coverage     Payor Plan Insurance Group Employer/Plan Group    [facility] [id number]     Payor Plan Address Payor Plan Phone Number Payor Plan Fax Number Effective Dates    [address] [address] [phone]  2020 - None Entered    [facility] [id number]       Subscriber Name Subscriber Birth Date Member ID       VALDEMAR EBAR 1945 [id number]                 Emergency Contacts      (Rel.) Home Phone Work Phone Mobile Phone    Josef Bear (Brother) [phone] -- [phone]    Janessa bear (Son) -- -- [phone]    Tatum, Kathleen (Sister) -- -- [phone]    LISA BEAR (Daughter) -- -- [phone]    MOLINALYNN BECK (Daughter) -- -- [phone]              "

## 2021-09-24 LAB
BACTERIA SPEC AEROBE CULT: NORMAL
BACTERIA SPEC AEROBE CULT: NORMAL

## 2021-09-28 NOTE — DISCHARGE SUMMARY
Discharge date: 2021:  Discharge condition: Patient .    Reason for death: Respiratory failure secondary to COVID-19 pneumonia.    Discharge diagnosis:  1. Acute hypoxic respiratory failure, hospitalized , symptomatic few days prior  2. Bilateral COVID-19 pneumonia  3. Severe ARDS, PF ratio 60  4. Shock: cardiac and 2ary to sedation and PPV  5. Atrial fibrillation/RVR on anticoagulation  6. Severe pulmonary hypertension with RV dysfunction     Pertinent medical problems:  · Anemia  · Mild hyponatremia  · Elevated transaminases, secondary to viral illness/hepatic congestion from pulmonary hypertension  · Protein calorie malnutrition  · Mild leukocytosis, likely steroid-induced  · DM type II with worsening hyperglycemia due to steroid use    HPI per Margarita Rowe:  Valdemar Solitario is a 76 y.o. male with past medical history significant for diabetes meliltus, depression, GERD, hypertension, dyslipidemia.  He arrived to the ED via EMS after presenting to his place of work in a state of confusion.  He reported he had been feeling bad for a few days which worsened on 2021.  He reported while at work he was unable to remember his locker combination and was experiencing shortness of breath and generalized weakness.     Upon arrival to the ED, vital signs were T 98.8F, pulse 85, RR 14, and BP 96/52 with room air oxygen saturation of 82%.  Initial arterial blood gas revealed pH of 7.324 with PCO2 of 42.4 and PO2 of 47.9 on room air.  Troponin T was found to be 0.031.  Creatinine was found to be elevated at 2.10.  Lactate was found to be elevated at 2.3 with D-dimer of 0.95.  Hemoglobin was found to be 11.8.     Mr. Solitario is evaluated in the ED currently on BiPAP.  He reports feeling unwell for a few days but has difficulty talking due to BiPAP. He reports he has been making urine, though it is unclear at present if he has produced a specimen while in the ED.  He reports cough, weakness, and dyspnea.   He is alert to self.  He follows commands but is wearing BiPAP during examination. He denies chest pain and known dysuria.  He reports he has had some pain in his low back.     Course in the hospital: I was involved in the patient's care the last few days of her stay.  I reviewed the records and tried to summarize them to the best of my knowledge.    Patient was hospitalized on 9/2.  She required mechanical ventilation.  She progressed into severe ARDS with PF ratio of 60 and developed shock.  She required significant support on the ventilator.  She was treated with steroid and also received antibiotics per ID recommendations.  Prognosis for recovery was very poor and this was discussed with family decided to proceed with comfort measures.  Patient was terminally extubated on 9/20/2021 passed away peacefully.

## 2024-08-15 NOTE — PROGRESS NOTES
PROGRESS NOTE         Patient Identification:  Name:  Valdemar Solitario  Age:  76 y.o.  Sex:  male  :  1945  MRN:  [id number]  Visit Number:  [id number]  Primary Care Provider:  Laurence Geiger APRN         LOS: 13 days       ----------------------------------------------------------------------------------------------------------------------  Subjective       Chief Complaints:    Exposure To Known Illness, Altered Mental Status, and Weakness - Generalized        Interval History:      Patient remains sedated and intubated at 100% FiO2.  T-max 99.9 °F.  CRP is improved at 19.45.  White count up at 15.4.  Blood cultures from 2021 are pending.  Respiratory culture from 2021 preliminarily reports no growth of organisms.  Urine culture from 9/15/2021 is pending.      ----------------------------------------------------------------------------------------------------------------------      Objective       Current Hospital Meds:  castor oil-balsam peru, 1 application, Topical, Q12H  chlorhexidine, 15 mL, Mouth/Throat, Q12H  dexamethasone, 6 mg, Intravenous, Daily  enoxaparin, 1 mg/kg, Subcutaneous, Q12H  famotidine, 20 mg, Intravenous, BID  insulin aspart, 0-14 Units, Subcutaneous, TID AC  insulin detemir, 25 Units, Subcutaneous, Nightly  meropenem, 1 g, Intravenous, Q8H  midodrine, 10 mg, Oral, TID AC  sodium chloride, 10 mL, Intravenous, Q12H  sodium chloride, 10 mL, Intravenous, Q12H  sodium chloride, 10 mL, Intravenous, Q12H  sodium chloride, 10 mL, Intravenous, Q12H  sodium chloride, 10 mL, Intravenous, Q12H  vancomycin, 1,750 mg, Intravenous, Q24H      dexmedetomidine, 0.2-1.5 mcg/kg/hr, Last Rate: Stopped (21 1414)  fentaNYL Citrate,   norepinephrine, 0.02-0.3 mcg/kg/min, Last Rate: 2.6 mcg/kg/min (21 8690)  Pharmacy Consult - Pharmacy to dose,   Pharmacy Consult - Pharmacy to dose,   Pharmacy to Dose enoxaparin (LOVENOX),   Pharmacy to dose vancomycin,   phenylephrine,  0.5-3 mcg/kg/min, Last Rate: 1.6 mcg/kg/min (09/15/21 1235)  propofol, 5-50 mcg/kg/min, Last Rate: 20 mcg/kg/min (09/15/21 0810)  vasopressin (PITRESSIN) 20 units in 100 mL infusion, 0.03 Units/min, Last Rate: 0.03 Units/min (09/15/21 0819)      ----------------------------------------------------------------------------------------------------------------------    Vital Signs:  Temp:  [98.7 °F (37.1 °C)-99.9 °F (37.7 °C)] 98.7 °F (37.1 °C)  Heart Rate:  [52-76] 58  Resp:  [22-24] 22  BP: ()/(25-90) 161/69  FiO2 (%):  [100 %] 100 %  Mean Arterial Pressure (Non-Invasive) for the past 24 hrs (Last 3 readings):   Noninvasive MAP (mmHg)   09/15/21 1233 109   09/15/21 1218 99   09/15/21 1203 107     SpO2 Percentage    09/15/21 1203 09/15/21 1218 09/15/21 1233   SpO2: 96% 95% 96%     SpO2:  [88 %-99 %] 96 %  on   ;   Device (Oxygen Therapy): ventilator    Body mass index is 24.43 kg/m².  Wt Readings from Last 3 Encounters:   09/14/21 68.6 kg (151 lb 4.8 oz)   06/14/17 73.9 kg (163 lb)   03/23/17 76.7 kg (169 lb)        Intake/Output Summary (Last 24 hours) at 9/15/2021 1241  Last data filed at 9/15/2021 0600  Gross per 24 hour   Intake 3284.06 ml   Output 2250 ml   Net 1034.06 ml     NPO Diet  ----------------------------------------------------------------------------------------------------------------------      Physical Exam:    Deferred due to COVID-19 isolation.  ----------------------------------------------------------------------------------------------------------------------            Results from last 7 days   Lab Units 09/15/21  0500   PH, ARTERIAL pH units 7.356   PO2 ART mm Hg 121.0*   PCO2, ARTERIAL mm Hg 52.2*   HCO3 ART mmol/L 29.2*     Results from last 7 days   Lab Units 09/15/21  0629 09/15/21  0627 09/15/21  0626 09/14/21  1124 09/14/21  0432 09/13/21  0732 09/12/21  0308 09/11/21  0417 09/11/21  0302 09/10/21  0429 09/09/21  0055   CRP mg/dL  --   --  19.45*  --  21.82* 4.68*   < >  --   14.61*   < > 13.17*   LACTATE mmol/L 2.0  --   --   --   --  1.9  --   --  1.9  --  1.6   WBC 10*3/mm3  --  15.44*  --  10.94*  --  11.51*   < >  --  13.27*   < > 21.69*   HEMOGLOBIN g/dL  --  9.9*  --  10.8*  --  10.6*   < >  --  11.0*   < > 13.6   HEMATOCRIT %  --  34.0*  --  37.1*  --  35.5*   < >  --  36.7*   < > 44.6   MCV fL  --  95.5  --  96.6  --  94.9   < >  --  91.8   < > 92.3   MCHC g/dL  --  29.1*  --  29.1*  --  29.9*   < >  --  30.0*   < > 30.5*   PLATELETS 10*3/mm3  --  198  --  136*  --  150   < >  --  154   < > 216   INR   --  1.13*  --   --   --  1.19*  --  1.36*  --   --  1.29*    < > = values in this interval not displayed.     Results from last 7 days   Lab Units 09/15/21  0626 09/14/21  1124 09/14/21  0432 09/13/21  0732 09/13/21  0732   SODIUM mmol/L 146* 147*  --   --  151*   POTASSIUM mmol/L 4.5 5.2 5.1   < > 3.0*   CHLORIDE mmol/L 110* 113*  --   --  116*   CO2 mmol/L 29.0 22.5  --   --  26.6   BUN mg/dL 54* 56*  --   --  58*   CREATININE mg/dL 0.71* 0.88 0.92   < > 0.88   EGFR IF NONAFRICN AM mL/min/1.73 108 84 80   < > 84   CALCIUM mg/dL 8.5* 8.2*  --   --  7.1*   GLUCOSE mg/dL 145* 158*  --   --  130*   ALBUMIN g/dL 2.04* 1.96* 2.05*   < > 1.74*   BILIRUBIN mg/dL 0.4 0.6 0.6   < > 0.6   ALK PHOS U/L 59 61 57   < > 46   AST (SGOT) U/L 60* 72* 71*   < > 40   ALT (SGPT) U/L 40 41 43*   < > 34    < > = values in this interval not displayed.   Estimated Creatinine Clearance: 76.2 mL/min (A) (by C-G formula based on SCr of 0.71 mg/dL (L)).  No results found for: AMMONIA    Glucose   Date/Time Value Ref Range Status   09/15/2021 1111 137 (H) 70 - 130 mg/dL Final     Comment:     Meter: [id number] : [id number] CHARANJIT DANNY   09/15/2021 0807 130 70 - 130 mg/dL Final     Comment:     Meter: [id number] : [id number] [facility]   09/14/2021 2248 176 (H) 70 - 130 mg/dL Final     Comment:     Meter: [id number] : [id number] PIOT CASE   09/14/2021 2036 169 (H) 70 - 130 mg/dL Final      Comment:     Meter: [id number] : [id number] PITO HERNANDEZ   09/14/2021 1820 176 (H) 70 - 130 mg/dL Final     Comment:     Meter: [id number] : [id number] DEE WINCHESTER   09/14/2021 1230 167 (H) 70 - 130 mg/dL Final     Comment:     Meter: [id number] : [id number] DEE LYNNNA   09/14/2021 0611 133 (H) 70 - 130 mg/dL Final     Comment:     Meter: [id number] : [id number] Nayla Smithtis   09/13/2021 2110 74 70 - 130 mg/dL Final     Comment:     Meter: [id number] : [id number] Nayla Everett     Lab Results   Component Value Date    HGBA1C 6.30 (H) 09/02/2021     No results found for: TSH, FREET4    Blood Culture   Date Value Ref Range Status   09/02/2021 No growth at 24 hours  Preliminary   09/02/2021 No growth at 24 hours  Preliminary   09/02/2021 No growth at 24 hours  Preliminary     No results found for: URINECX  No results found for: WOUNDCX  No results found for: STOOLCX  No results found for: RESPCX  Pain Management Panel    There is no flowsheet data to display.           ----------------------------------------------------------------------------------------------------------------------  Imaging Results (Last 24 Hours)       ** No results found for the last 24 hours. **            ----------------------------------------------------------------------------------------------------------------------    Assessment/Plan       Assessment/Plan     ASSESSMENT:    1.  Severe sepsis with lactic acid greater than 2 on admission  2.  COVID-19 pneumonia    PLAN:    Patient remains sedated and intubated at 100% FiO2.  T-max 99.9 °F.  CRP is improved at 19.45.  White count up at 15.4.  Blood cultures from 9/14/2021 are pending.  Respiratory culture from 9/13/2021 preliminarily reports no growth of organisms.  Urinalysis reports 6-12 WBCs, trace bacteria, 1+ growth of yeast.  Urine culture from 9/15/2021 is pending.    Chest x-ray from 9/14/2021 reports stable bilateral airspace disease.    VQ scan  on 9/2/2021 indeterminate for exclusion of PE.  CT of the chest on 9/3/2021 reports patchy bilateral airspace disease concerning for COVID-19 pneumonia.  CT of the abdomen and pelvis on 9/3/2021 reports bibasilar airspace disease suggestive of COVID-19 pneumonia, large right inguinal hernia containing nondilated bowel and fat.     Patient remains on Decadron but has completed course of remdesivir.    We are concerned about possible development of bacterial pneumonia in the setting of prior treatment with baricitinib.     Recommend to continue vancomycin and meropenem courses for now.  We will follow for finalized cultures and susceptibilities.    We will continue to follow closely and adjust antibiotic therapy as appropriate.    Code Status:   Code Status and Medical Interventions:   Ordered at: 09/10/21 4222     Limited Support to NOT Include:    Intubation    Cardioversion/Defibrillation     Code Status:    No CPR     Medical Interventions (Level of Support Prior to Arrest):    Limited     Comments:    adult children Tiffany, Cathleen and John all agree he would not want to be resuciated or reintubated in the event he became extubated.     Scribed for Brock Metzger MD by NELSON Torres. 9/15/2021  12:41 EDT       NELSON Torres  09/15/21  12:41 EDT      Physician Attestation:    The documentation recorded by the scribe accurately reflects the service I personally performed and the decisions made by me.    Brock Metzger MD  09/15/21  12:41 EDT     [As Noted in HPI] : as noted in HPI [Negative] : Heme/Lymph

## 2024-12-21 NOTE — PROGRESS NOTES
PROGRESS NOTE         Patient Identification:  Name:  Valdemar Solitario  Age:  76 y.o.  Sex:  male  :  1945  MRN:  [id number]  Visit Number:  [id number]  Primary Care Provider:  Laurence Geiger APRN         LOS: 18 days       ----------------------------------------------------------------------------------------------------------------------  Subjective       Chief Complaints:    Exposure To Known Illness, Altered Mental Status, and Weakness - Generalized        Interval History:      Patient remains intubated and sedated 100% FiO2.  Afebrile.  CRP is worsened to 16.41.  White count improved to 14.73.  The patient is currently saturating in the mid to low 70s, palliative care team working with the family regarding goals of care, per the patient's wife the patient is not to be manually Ambu bagged with low saturations.  Respiratory culture from 2021 reports no organisms seen.  Chest x-ray from 2021 reports stable to slight interval worsening of extensive bilateral pneumonia left greater than right.    ----------------------------------------------------------------------------------------------------------------------      Objective       Current Hospital Meds:  castor oil-balsam peru, 1 application, Topical, Q12H  chlorhexidine, 15 mL, Mouth/Throat, Q12H  dexamethasone, 6 mg, Intravenous, Daily  enoxaparin, 1 mg/kg, Subcutaneous, Q12H  famotidine, 20 mg, Intravenous, BID  insulin aspart, 0-14 Units, Subcutaneous, TID AC  insulin detemir, 25 Units, Subcutaneous, Nightly  midodrine, 10 mg, Oral, TID AC  sodium chloride, 10 mL, Intravenous, Q12H  sodium chloride, 10 mL, Intravenous, Q12H  sodium chloride, 10 mL, Intravenous, Q12H  sodium chloride, 10 mL, Intravenous, Q12H  sodium chloride, 10 mL, Intravenous, Q12H      dexmedetomidine, 0.2-1.5 mcg/kg/hr, Last Rate: Stopped (21 1414)  fentaNYL Citrate,   norepinephrine, 0.02-0.3 mcg/kg/min, Last Rate: Stopped (21  Pt denies chance of pregnancy. Pt states currently on period and also stated has a hx of tubal ligation.    8046)  Pharmacy Consult - Pharmacy to dose,   Pharmacy to Dose enoxaparin (LOVENOX),   phenylephrine, 0.5-3 mcg/kg/min, Last Rate: 0.3 mcg/kg/min (09/20/21 0554)  propofol, 5-50 mcg/kg/min, Last Rate: 35 mcg/kg/min (09/20/21 1235)  vasopressin (PITRESSIN) 20 units in 100 mL infusion, 0.03 Units/min, Last Rate: 0.03 Units/min (09/20/21 0253)      ----------------------------------------------------------------------------------------------------------------------    Vital Signs:  Temp:  [96 °F (35.6 °C)-98.2 °F (36.8 °C)] 97.9 °F (36.6 °C)  Heart Rate:  [44-72] 56  Resp:  [22-24] 24  BP: ()/(44-80) 93/45  FiO2 (%):  [100 %] 100 %  Mean Arterial Pressure (Non-Invasive) for the past 24 hrs (Last 3 readings):   Noninvasive MAP (mmHg)   09/20/21 1005 62   09/20/21 0950 65   09/20/21 0920 96     SpO2 Percentage    09/20/21 0850 09/20/21 0950 09/20/21 1012   SpO2: (!) 86% (!) 75%  Comment: provider aware; family requested no bagging (!) 73%  Comment: MD and RN aware     SpO2:  [73 %-96 %] 73 %  on   ;   Device (Oxygen Therapy): ventilator    Body mass index is 27.48 kg/m².  Wt Readings from Last 3 Encounters:   09/20/21 77.2 kg (170 lb 3.2 oz)   06/14/17 73.9 kg (163 lb)   03/23/17 76.7 kg (169 lb)        Intake/Output Summary (Last 24 hours) at 9/20/2021 1259  Last data filed at 9/20/2021 3073  Gross per 24 hour   Intake 3136.17 ml   Output 1220 ml   Net 1916.17 ml     NPO Diet  ----------------------------------------------------------------------------------------------------------------------      Physical Exam:    Deferred due to COVID-19 isolation.  ----------------------------------------------------------------------------------------------------------------------      Results from last 7 days   Lab Units 09/18/21  0143   PROBNP pg/mL 1,762.0       Results from last 7 days   Lab Units 09/20/21  0452   PH, ARTERIAL pH units 7.451*   PO2 ART mm Hg 59.9*   PCO2, ARTERIAL mm Hg 43.9   HCO3 ART mmol/L 30.6*      Results from last 7 days   Lab Units 09/20/21  0204 09/19/21  0139 09/19/21  0138 09/18/21  0143 09/17/21  0112 09/17/21  0111 09/17/21  0109 09/16/21  0047 09/15/21  0629 09/15/21  0627   CRP mg/dL 16.41* 13.04*  --  14.08*  --    < >  --    < >  --   --    LACTATE mmol/L  --   --  1.7  --  1.5  --   --   --  2.0  --    WBC 10*3/mm3 14.73*  --  20.13* 17.02*  --    < >  --    < >  --  15.44*   HEMOGLOBIN g/dL 8.3*  --  9.8* 9.5*  --    < >  --    < >  --  9.9*   HEMATOCRIT % 26.0*  --  31.1* 31.1*  --    < >  --    < >  --  34.0*   MCV fL 88.4  --  90.7 91.5  --    < >  --    < >  --  95.5   MCHC g/dL 31.9  --  31.5 30.5*  --    < >  --    < >  --  29.1*   PLATELETS 10*3/mm3 118*  --  221 201  --    < >  --    < >  --  198   INR   --  1.03  --   --   --   --  1.07  --   --  1.13*    < > = values in this interval not displayed.     Results from last 7 days   Lab Units 09/20/21 0204 09/19/21 0139 09/18/21  0143   SODIUM mmol/L 133* 135* 139   POTASSIUM mmol/L 3.9 4.0 4.1   CHLORIDE mmol/L 97* 99 104   CO2 mmol/L 27.6 26.2 28.3   BUN mg/dL 43* 42* 42*   CREATININE mg/dL 0.39* 0.55* 0.52*   EGFR IF NONAFRICN AM mL/min/1.73 >150 145 >150   CALCIUM mg/dL 8.1* 8.0* 8.3*   GLUCOSE mg/dL 135* 88 181*   ALBUMIN g/dL 1.85* 1.92* 1.78*   BILIRUBIN mg/dL 0.4 0.3 0.3   ALK PHOS U/L 67 71 75   AST (SGOT) U/L 35 65* 54*   ALT (SGPT) U/L 47* 68* 42*   Estimated Creatinine Clearance: 76.9 mL/min (A) (by C-G formula based on SCr of 0.39 mg/dL (L)).  No results found for: AMMONIA    Glucose   Date/Time Value Ref Range Status   09/20/2021 1111 115 70 - 130 mg/dL Final     Comment:     Meter: [id number] : [id number] Wyatt Mounika ROSEY   09/20/2021 0445 112 70 - 130 mg/dL Final     Comment:     Meter: [id number] : [id number] Adam Hue   09/19/2021 2031 186 (H) 70 - 130 mg/dL Final     Comment:     Meter: [id number] : [id number] Adam Hue   09/19/2021 1640 183 (H) 70 - 130 mg/dL Final     Comment:     Meter:  [id number] : [id number] CHARANJIT DELGADO   09/19/2021 1108 134 (H) 70 - 130 mg/dL Final     Comment:     Meter: [id number] : [id number] CHARANJIT DELGADO   09/19/2021 0409 103 70 - 130 mg/dL Final     Comment:     Meter: [id number] : [id number] Adam Swann   09/18/2021 2018 183 (H) 70 - 130 mg/dL Final     Comment:     Meter: [id number] : [id number] Adam Swann   09/18/2021 1616 211 (H) 70 - 130 mg/dL Final     Comment:     Meter: [id number] : [id number] NemeriX     Lab Results   Component Value Date    HGBA1C 6.30 (H) 09/02/2021     No results found for: TSH, FREET4    Blood Culture   Date Value Ref Range Status   09/02/2021 No growth at 24 hours  Preliminary   09/02/2021 No growth at 24 hours  Preliminary   09/02/2021 No growth at 24 hours  Preliminary     No results found for: URINECX  No results found for: WOUNDCX  No results found for: STOOLCX  No results found for: RESPCX  Pain Management Panel    There is no flowsheet data to display.           ----------------------------------------------------------------------------------------------------------------------  Imaging Results (Last 24 Hours)       Procedure Component Value Units Date/Time    XR Chest 1 View [[id number]] Collected: 09/19/21 2150     Updated: 09/19/21 2153    Narrative:      CR Chest 1 Vw    INDICATION:   Hypoxia. COVID- 19 pneumonia. Oxygen desaturation.     COMPARISON:    9/18/2021    FINDINGS:  Portable AP view(s) of the chest.    Enteric tube extends to the level of the proximal body stomach. Endotracheal tube tip is about 0.4 cm proximal to the beltran. Right-sided central line from a neck approach terminates at the mid SVC level.    Stable borderline cardiomegaly. Extensive airspace disease in both lungs left slightly greater than right is stable to slightly worse for technical factors. There are remote left rib fractures. No distinct pneumothorax.       Impression:        1. Tubes and lines appear to be in  satisfactory position as described. No distinct pneumothorax.  2. Stable to slight interval worsening of extensive bilateral pneumonia left greater than right.    Signer Name: Roc Tony MD   Signed: 9/19/2021 9:50 PM   Workstation Name: [email or web address]    [facility]            ----------------------------------------------------------------------------------------------------------------------    Assessment/Plan       Assessment/Plan     ASSESSMENT:    1.  Severe sepsis with lactic acid greater than 2 on admission  2.  COVID-19 pneumonia    PLAN:    Patient remains intubated and sedated 100% FiO2.  Afebrile.  CRP is worsened to 16.41.  White count improved to 14.73.  The patient is currently saturating in the mid to low 70s, palliative care team working with the family regarding goals of care, per the patient's wife the patient is not to be manually Ambu bagged with low saturations.  Respiratory culture from 9/19/2021 reports no organisms seen.  Chest x-ray from 9/19/2021 reports stable to slight interval worsening of extensive bilateral pneumonia left greater than right.    Chest x-ray from 9/18/2021 reports stable bilateral airspace disease.    Urinalysis from 9/15/2021 reports 6-12 WBCs, trace bacteria, 1+ yeast, urine culture reports no growth.  Blood cultures from 9/14/2021 report no growth at 24 hours.      VQ scan on 9/2/2021 indeterminate for exclusion of PE.  CT of the chest on 9/3/2021 reports patchy bilateral airspace disease concerning for COVID-19 pneumonia.  CT of the abdomen and pelvis on 9/3/2021 reports bibasilar airspace disease suggestive of COVID-19 pneumonia, large right inguinal hernia containing nondilated bowel and fat.     Patient remains on Decadron but has completed course of remdesivir.    We are concerned for risk of relapse aspiration pneumonia, in the setting of desaturation and previous episodes requiring a manual ambubagging and elevated CRP.  The patient is  currently off antibiotic coverage but if continues to clinically worsen we would recommend broad-spectrum antibiotic coverage with vancomycin, Merrem, micafungin    Code Status:   Code Status and Medical Interventions:   Ordered at: 09/10/21 1443     Limited Support to NOT Include:    Intubation    Cardioversion/Defibrillation     Code Status:    No CPR     Medical Interventions (Level of Support Prior to Arrest):    Limited     Comments:    adult children Tiffayn, Cathleen and John all agree he would not want to be resuciated or reintubated in the event he became extubated.     Patient remains in isolation for COVID-19 and is currently on the ventilator. As patient is unable to provide a history or review of systems, overnight findings noted. Vital signs, pertinent laboratory values and radiological studies were reviewed.     Scribed for Brock Metzger MD by NELSON Torres. 9/20/2021  13:07 EDT       NELSON Torres  09/20/21  12:59 EDT      Physician Attestation:    The documentation recorded by the scribe accurately reflects the service I personally performed and the decisions made by me.    Brock Metzger MD  09/21/21  06:54 EDT

## 2025-05-16 NOTE — PLAN OF CARE
Problem: Adult Inpatient Plan of Care  Goal: Plan of Care Review  Outcome: Ongoing, Progressing  Flowsheets (Taken 9/11/2021 0225)  Progress: improving  Plan of Care Reviewed With: spouse  Outcome Summary: Patient remains on Marco and Vaso for BP support. He has Fentanyl, Propofol, and Vec for sedation. BIS has persistently stayed in the 40's throughout the night. [facility]   Goal Outcome Evaluation:  Plan of Care Reviewed With: spouse        Progress: improving  Outcome Summary: Patient remains on Marco and Vaso for BP support. He has Fentanyl, Propofol, and Vec for sedation. BIS has persistently stayed in the 40's throughout the night. [facility]   3